# Patient Record
Sex: FEMALE | Race: WHITE | NOT HISPANIC OR LATINO | Employment: OTHER | URBAN - METROPOLITAN AREA
[De-identification: names, ages, dates, MRNs, and addresses within clinical notes are randomized per-mention and may not be internally consistent; named-entity substitution may affect disease eponyms.]

---

## 2018-04-27 ENCOUNTER — OFFICE VISIT (OUTPATIENT)
Dept: INTERNAL MEDICINE | Facility: CLINIC | Age: 63
End: 2018-04-27

## 2018-04-27 VITALS
HEART RATE: 66 BPM | RESPIRATION RATE: 18 BRPM | TEMPERATURE: 97.1 F | HEIGHT: 60 IN | SYSTOLIC BLOOD PRESSURE: 136 MMHG | WEIGHT: 167.4 LBS | DIASTOLIC BLOOD PRESSURE: 70 MMHG | BODY MASS INDEX: 32.86 KG/M2

## 2018-04-27 DIAGNOSIS — J30.9 ALLERGIC RHINITIS, UNSPECIFIED CHRONICITY, UNSPECIFIED SEASONALITY, UNSPECIFIED TRIGGER: ICD-10-CM

## 2018-04-27 DIAGNOSIS — Z13.220 ENCOUNTER FOR SCREENING FOR LIPID DISORDER: ICD-10-CM

## 2018-04-27 DIAGNOSIS — E53.8 COBALAMIN DEFICIENCY: ICD-10-CM

## 2018-04-27 DIAGNOSIS — E78.49 OTHER HYPERLIPIDEMIA: ICD-10-CM

## 2018-04-27 DIAGNOSIS — Z86.19: ICD-10-CM

## 2018-04-27 DIAGNOSIS — Z86.39 HISTORY OF VITAMIN D DEFICIENCY: ICD-10-CM

## 2018-04-27 DIAGNOSIS — I25.118 CORONARY ARTERY DISEASE OF NATIVE ARTERY OF NATIVE HEART WITH STABLE ANGINA PECTORIS (HCC): ICD-10-CM

## 2018-04-27 DIAGNOSIS — K21.9 GASTROESOPHAGEAL REFLUX DISEASE WITHOUT ESOPHAGITIS: Primary | ICD-10-CM

## 2018-04-27 DIAGNOSIS — R09.89 BILATERAL CAROTID BRUITS: ICD-10-CM

## 2018-04-27 DIAGNOSIS — I10 ESSENTIAL HYPERTENSION: ICD-10-CM

## 2018-04-27 DIAGNOSIS — Z79.899 HIGH RISK MEDICATION USE: ICD-10-CM

## 2018-04-27 PROCEDURE — 99204 OFFICE O/P NEW MOD 45 MIN: CPT | Performed by: PHYSICIAN ASSISTANT

## 2018-04-27 RX ORDER — NITROGLYCERIN 0.4 MG/1
0.4 TABLET SUBLINGUAL
COMMUNITY
Start: 2016-03-08 | End: 2021-03-10 | Stop reason: SDUPTHER

## 2018-04-27 RX ORDER — ISOSORBIDE MONONITRATE 30 MG/1
30 TABLET, EXTENDED RELEASE ORAL DAILY
COMMUNITY
Start: 2016-08-01 | End: 2018-07-23 | Stop reason: SDUPTHER

## 2018-04-27 RX ORDER — PANTOPRAZOLE SODIUM 40 MG/1
40 TABLET, DELAYED RELEASE ORAL 2 TIMES DAILY
Qty: 60 TABLET | Refills: 2 | Status: SHIPPED | OUTPATIENT
Start: 2018-04-27 | End: 2018-05-07 | Stop reason: SDUPTHER

## 2018-04-27 RX ORDER — SUCRALFATE 1 G/1
1 TABLET ORAL AS NEEDED
COMMUNITY
Start: 2016-03-22 | End: 2019-05-08 | Stop reason: SDUPTHER

## 2018-04-27 RX ORDER — CETIRIZINE HYDROCHLORIDE 10 MG/1
10 TABLET ORAL DAILY
Qty: 30 TABLET | Refills: 2 | Status: SHIPPED | OUTPATIENT
Start: 2018-04-27 | End: 2018-09-21 | Stop reason: SDDI

## 2018-04-27 RX ORDER — CLOPIDOGREL BISULFATE 75 MG/1
75 TABLET ORAL DAILY
COMMUNITY
Start: 2016-08-01 | End: 2018-07-09 | Stop reason: SDUPTHER

## 2018-04-27 RX ORDER — PRAVASTATIN SODIUM 20 MG
20 TABLET ORAL DAILY
COMMUNITY
End: 2018-04-27 | Stop reason: SDUPTHER

## 2018-04-27 RX ORDER — MELATONIN
1000 DAILY
COMMUNITY
End: 2020-07-29 | Stop reason: SDUPTHER

## 2018-04-27 RX ORDER — PRAVASTATIN SODIUM 20 MG
20 TABLET ORAL DAILY
Qty: 30 TABLET | Refills: 2 | Status: SHIPPED | OUTPATIENT
Start: 2018-04-27 | End: 2018-05-07 | Stop reason: SDUPTHER

## 2018-04-27 RX ORDER — KETOCONAZOLE 20 MG/ML
SHAMPOO TOPICAL 2 TIMES WEEKLY
Qty: 120 ML | Refills: 1 | Status: SHIPPED | OUTPATIENT
Start: 2018-04-30 | End: 2018-05-07 | Stop reason: SDUPTHER

## 2018-04-27 RX ORDER — LOSARTAN POTASSIUM 50 MG/1
1 TABLET ORAL DAILY
COMMUNITY
Start: 2016-03-01 | End: 2018-05-07 | Stop reason: SDUPTHER

## 2018-04-27 RX ORDER — IBUPROFEN 600 MG/1
600 TABLET ORAL AS NEEDED
COMMUNITY
Start: 2016-03-22 | End: 2018-11-15 | Stop reason: CLARIF

## 2018-04-27 RX ORDER — METOPROLOL TARTRATE 50 MG/1
50 TABLET, FILM COATED ORAL DAILY
COMMUNITY
Start: 2016-08-01 | End: 2018-08-03 | Stop reason: SDUPTHER

## 2018-04-27 RX ORDER — PANTOPRAZOLE SODIUM 40 MG/1
40 TABLET, DELAYED RELEASE ORAL 2 TIMES DAILY
COMMUNITY
Start: 2016-08-01 | End: 2018-04-27 | Stop reason: SDUPTHER

## 2018-04-27 RX ORDER — PYRIDOXINE HCL (VITAMIN B6) 50 MG
100 TABLET ORAL DAILY
COMMUNITY
End: 2018-09-21 | Stop reason: SDDI

## 2018-04-27 NOTE — PROGRESS NOTES
Chief Complaint   Patient presents with   • Heartburn     establish care       Subjective       History of Present Illness     Jasvir Hamilton is a 63 y.o. female. She presents as a new patient to establish care. Primary concerns today include GERD symptoms and need for pantoprazole refill. She also has R knee pain. She was previously seeing Dr. Ledesma as PCP.     Regarding GERD, patient states that she was previously taking pantoprazole 40mg BID per her last PCP, but she has been out of this medication x1 month. She was increased to BID as she was having cough, and PCP thought it may be related to GERD symptoms. Patient continues to have mildly productive cough, clear mucous. She is also having return of burning sensation in her chest which is worse at night x2-3 weeks since being off her medications. Current smoker, especially at night. She is not eating 3-4 hours before bed, earlier dinner. She also has runny nose and seasonal allergies x weeks and is not currently taking anything for this issue.     Regarding R knee pain, has history knee pain but usually improves within 1-2 days. This episode has lasted one week with resolving and remitting dull, achy pain. Pain is worse on medial side of knee. Worsened with walking and standing for long periods of time. Currently taking Ibuprofen 600mg and this relieves her pain. Has not needed to take Ibuprofen the last two days as pain is improving. It does not affect her daily activities. No history of recent trauma or injury to affected knee.     Patient also complains of chronic history of scalp infection x years. She states it is a fungal infection and she has tried various treatments for this issue. Ketoconazole shampoo improves her symptoms but causes irritation at border of scalp on forehead. Has also tried oral steroids which improved symptoms, but irritation returned when finished steroid doses. She has seen a dermatologist for this in the past. Has not seen   dermatology.     Relevant past medical history includes heart stent placed 2015 for CAD. Also had heart cath in 2012. Previously had cardiologist in [place] but has not re-established here in [place] x2 years.       Are you currently seeing any other doctors or specialists? Needs cardiologist, not established x2 years. [facility] for optometry.     Are you currently taking any OTC medications or herbal medications? As listed in med list, no other vitamins or supplements.    Sleep: 7-8 hours, sleeps well and well-rested  Diet: not well-balanced, has been to nutritionist in past- helped but only short term.   Exercise: no current exercise plan    [facility]   Most recent mammogram: [facility]April 2017.  Most recent pap smear: July 2017-- in scanned records  First day of last menses: n/a     Regular dental visits: no, 2+ years  Regular eye exams: yes, annually- [facility]    Patient is current smoker. No EtOH use. No current illicit drug use.     The following portions of the patient's history were reviewed and updated as appropriate: allergies, current medications, past family history, past medical history, past social history, past surgical history and problem list.    No Known Allergies  Social History   Substance Use Topics   • Smoking status: Current Every Day Smoker   • Smokeless tobacco: Never Used   • Alcohol use No     Past Surgical History:   Procedure Laterality Date   • CARDIAC CATHETERIZATION     • CHOLECYSTECTOMY     • ECTOPIC PREGNANCY     • HAND SURGERY     • OTHER SURGICAL HISTORY      lap and leep     Family History   Problem Relation Age of Onset   • Arthritis Mother    • Hypertension Mother    • Obesity Mother    • Heart attack Father    • Hypertension Father    • Obesity Father    • Stroke Father    • Arthritis Sister    • Hypertension Sister          Current Outpatient Prescriptions:   •  cholecalciferol (VITAMIN  D3) 1000 units tablet, Take 2,000 Units by mouth Daily., Disp: , Rfl:   •  clopidogrel (PLAVIX) 75 MG tablet, Take 75 mg by mouth Daily., Disp: , Rfl:   •  cyanocobalamin (CYANOCOBALAMIN) 100 MCG tablet tablet, Take 100 mcg by mouth Daily., Disp: , Rfl:   •  ibuprofen (ADVIL,MOTRIN) 600 MG tablet, Take 600 mg by mouth As Needed., Disp: , Rfl:   •  isosorbide mononitrate (IMDUR) 30 MG 24 hr tablet, Take 30 mg by mouth Daily., Disp: , Rfl:   •  losartan (COZAAR) 50 MG tablet, Take 1 tablet by mouth Daily., Disp: , Rfl:   •  metoprolol tartrate (LOPRESSOR) 50 MG tablet, Take 50 mg by mouth Daily., Disp: , Rfl:   •  nitroglycerin (NITROSTAT) 0.4 MG SL tablet, Place 0.4 mg under the tongue Every 5 (Five) Minutes As Needed., Disp: , Rfl:   •  pantoprazole (PROTONIX) 40 MG EC tablet, Take 1 tablet by mouth 2 (Two) Times a Day., Disp: 60 tablet, Rfl: 2  •  pravastatin (PRAVACHOL) 20 MG tablet, Take 1 tablet by mouth Daily., Disp: 30 tablet, Rfl: 2  •  sucralfate (CARAFATE) 1 g tablet, Take 1 g by mouth As Needed., Disp: , Rfl:   •  aspirin 81 MG tablet, Take 1 tablet by mouth Daily., Disp: 30 tablet, Rfl: 2  •  cetirizine (ZYRTEC ALLERGY) 10 MG tablet, Take 1 tablet by mouth Daily., Disp: 30 tablet, Rfl: 2  •  [START ON 4/30/2018] ketoconazole (NIZORAL) 2 % shampoo, Apply  topically 2 (Two) Times a Week., Disp: 120 mL, Rfl: 1    Patient Active Problem List   Diagnosis   • Essential hypertension   • Gastroesophageal reflux disease without esophagitis   • Other hyperlipidemia   • Cobalamin deficiency   • History of vitamin D deficiency   • Coronary artery disease of native heart with stable angina pectoris   • Bilateral carotid bruits       Review of Systems   Constitutional: Negative for chills, diaphoresis, fatigue and fever.   HENT: Negative for congestion, ear pain, sneezing and sore throat.    Eyes: Negative for blurred vision, double vision and pain.   Respiratory: Negative for cough, shortness of breath and wheezing.     Cardiovascular: Negative for chest pain, palpitations and leg swelling.   Gastrointestinal: Positive for GERD. Negative for abdominal pain, constipation, diarrhea, nausea and vomiting.   Genitourinary: Negative for dysuria, frequency, hematuria and urgency.   Musculoskeletal: Positive for arthralgias. Negative for myalgias.   Skin: Positive for rash.   Neurological: Negative for dizziness, weakness, light-headedness and headaches.   Hematological: Does not bruise/bleed easily.   Psychiatric/Behavioral: Negative for agitation and depressed mood. The patient is not nervous/anxious.        Objective   Vitals:    04/27/18 1053   BP: 136/70   Pulse: 66   Resp: 18   Temp: 97.1 °F (36.2 °C)     Physical Exam   Constitutional: She appears well-developed and well-nourished.   HENT:   Head: Normocephalic and atraumatic.   Right Ear: Tympanic membrane, external ear and ear canal normal. Tympanic membrane is not erythematous and not bulging.   Left Ear: Tympanic membrane, external ear and ear canal normal. Tympanic membrane is not erythematous and not bulging.   Nose: Right sinus exhibits no maxillary sinus tenderness and no frontal sinus tenderness. Left sinus exhibits no maxillary sinus tenderness and no frontal sinus tenderness.   Mouth/Throat: Oropharynx is clear and moist and mucous membranes are normal.   Eyes: Conjunctivae are normal. Pupils are equal, round, and reactive to light.   Neck: Normal range of motion. Neck supple. Carotid bruit is present (left carotid and right carotid, L > R). No thyromegaly present.   Cardiovascular: Normal rate, regular rhythm, normal heart sounds and intact distal pulses.  Exam reveals no gallop.    No murmur heard.  No peripheral edema of lower extremities bilaterally.    Pulmonary/Chest: Effort normal and breath sounds normal.   Abdominal: Soft. Bowel sounds are normal. She exhibits no distension and no mass. There is no hepatosplenomegaly. There is no tenderness.   Musculoskeletal:  Normal range of motion.   Lymphadenopathy:     She has no cervical adenopathy.   Neurological: She has normal strength and normal reflexes. She exhibits normal muscle tone.   Skin: Rash (diffuse rash of scalp, erythematous, scaly, no bleeding or discharge) noted.   Psychiatric: She has a normal mood and affect.   Nursing note and vitals reviewed.          Assessment/Plan   Jasvir was seen today for heartburn.    Diagnoses and all orders for this visit:    Gastroesophageal reflux disease without esophagitis  -     Comprehensive Metabolic Panel; Future  -     CBC & Differential; Future  -     pantoprazole (PROTONIX) 40 MG EC tablet; Take 1 tablet by mouth 2 (Two) Times a Day.    Essential hypertension  -     Comprehensive Metabolic Panel; Future  -     CBC & Differential; Future  -     Ambulatory Referral to Cardiology    Other hyperlipidemia  -     Comprehensive Metabolic Panel; Future  -     Lipid Panel; Future  -     pravastatin (PRAVACHOL) 20 MG tablet; Take 1 tablet by mouth Daily.  -     Ambulatory Referral to Cardiology    High risk medication use  -     Comprehensive Metabolic Panel; Future  -     CBC & Differential; Future    Encounter for screening for lipid disorder  -     Lipid Panel; Future    Cobalamin deficiency  -     Vitamin B12; Future    History of vitamin D deficiency  -     Vitamin D 1,25 Dihydroxy; Future    Allergic rhinitis, unspecified chronicity, unspecified seasonality, unspecified trigger  -     cetirizine (ZYRTEC ALLERGY) 10 MG tablet; Take 1 tablet by mouth Daily.    Hx of tinea capitis  -     ketoconazole (NIZORAL) 2 % shampoo; Apply  topically 2 (Two) Times a Week.    Coronary artery disease of native artery of native heart with stable angina pectoris  -     Ambulatory Referral to Cardiology    Bilateral carotid bruits  -     Ambulatory Referral to Cardiology    Other orders  -     aspirin 81 MG tablet; Take 1 tablet by mouth Daily.        Bilateral carotid bruits noted previously by  cardiologist in [place]; has had doppler study as noted in past medical records scanned in media. Referral to cardiology for f/u of carotid stenosis/ sclerosis and CAD.   Continue Ibuprofen PRN with food for knee pain, as this appears to be controlling symptoms well.  Will re-start pantoprazole BID and add Zyrtec for cough symptoms.        Return in about 3 months (around 7/27/2018) for Annual physical.

## 2018-05-07 ENCOUNTER — TELEPHONE (OUTPATIENT)
Dept: INTERNAL MEDICINE | Facility: CLINIC | Age: 63
End: 2018-05-07

## 2018-05-07 DIAGNOSIS — Z79.899 HIGH RISK MEDICATION USE: ICD-10-CM

## 2018-05-07 DIAGNOSIS — Z86.19: ICD-10-CM

## 2018-05-07 DIAGNOSIS — K21.9 GASTROESOPHAGEAL REFLUX DISEASE WITHOUT ESOPHAGITIS: ICD-10-CM

## 2018-05-07 DIAGNOSIS — I10 ESSENTIAL HYPERTENSION: ICD-10-CM

## 2018-05-07 DIAGNOSIS — Z13.220 ENCOUNTER FOR SCREENING FOR LIPID DISORDER: ICD-10-CM

## 2018-05-07 DIAGNOSIS — Z86.39 HISTORY OF VITAMIN D DEFICIENCY: ICD-10-CM

## 2018-05-07 DIAGNOSIS — E53.8 COBALAMIN DEFICIENCY: ICD-10-CM

## 2018-05-07 DIAGNOSIS — E78.49 OTHER HYPERLIPIDEMIA: ICD-10-CM

## 2018-05-07 RX ORDER — LOSARTAN POTASSIUM 50 MG/1
50 TABLET ORAL DAILY
Qty: 90 TABLET | Refills: 1 | Status: SHIPPED | OUTPATIENT
Start: 2018-05-07 | End: 2018-10-24 | Stop reason: SDUPTHER

## 2018-05-07 RX ORDER — KETOCONAZOLE 20 MG/ML
SHAMPOO TOPICAL 2 TIMES WEEKLY
Qty: 120 ML | Refills: 1 | Status: SHIPPED | OUTPATIENT
Start: 2018-05-07 | End: 2018-09-21 | Stop reason: SDDI

## 2018-05-07 RX ORDER — PANTOPRAZOLE SODIUM 40 MG/1
40 TABLET, DELAYED RELEASE ORAL 2 TIMES DAILY
Qty: 180 TABLET | Refills: 1 | Status: SHIPPED | OUTPATIENT
Start: 2018-05-07 | End: 2018-11-15 | Stop reason: SDUPTHER

## 2018-05-07 RX ORDER — PRAVASTATIN SODIUM 20 MG
20 TABLET ORAL DAILY
Qty: 90 TABLET | Refills: 1 | Status: SHIPPED | OUTPATIENT
Start: 2018-05-07 | End: 2018-09-21

## 2018-05-07 NOTE — TELEPHONE ENCOUNTER
Spoke with pt and advised of medications refilled for 90 day supply. She verbalized understanding. She stated she [facility] and her B12 was at 1140, which 1 year ago it was in the 300s. Should she do anything at this point? Please advise?

## 2018-05-07 NOTE — TELEPHONE ENCOUNTER
Request for 90 day supply rcvd via fax. Resent Rx's to [facility] pharmacy electronically.

## 2018-05-07 NOTE — TELEPHONE ENCOUNTER
Please call patient and let her know I have sent in Losartan 50mg 90 day supply + one refill to [facility].     ----- Message from Angella Ko sent at 5/7/2018 10:32 AM EDT -----  Contact: SELF  WILLIE BISHOP CALLING FOR A REFILL FOR LOSARTAN 50 MG. SHE USES THE [facility] FOR A 90 DAY SUPPLY. SHE CAN BE REACHED [phone]

## 2018-05-09 NOTE — TELEPHONE ENCOUNTER
Please call patient and let her know that the elevated B12 level is nothing to be concerned about; decreased B12 levels cause more problems, and her B12 is only slightly elevated. Is she still taking the oral Vitamin B12? She may discontinue this if she is still taking it and we can re-check levels at future office visit. If she is not taking B12 supplement, we will just monitor and no changes necessary at this time.

## 2018-05-09 NOTE — TELEPHONE ENCOUNTER
Spoke with pt and advised of lab results. She verbalized good understanding and stated she would continue to work on her diet and exercising more. Pt requested we mail a copy of her labs to her which I have placed in an envelope up front to be mailed. She thanked our office and we ended the call.

## 2018-06-06 ENCOUNTER — PRIOR AUTHORIZATION (OUTPATIENT)
Dept: INTERNAL MEDICINE | Facility: CLINIC | Age: 63
End: 2018-06-06

## 2018-06-06 NOTE — TELEPHONE ENCOUNTER
PA for Pantoprazole 40mg tablets submitted due to quantity limits via cover my meds. Key: [id number] Diagnosis GERD without Esophagitis. Real time approval rcvd via cover my meds. Nothing further needed at this time.

## 2018-07-09 ENCOUNTER — TELEPHONE (OUTPATIENT)
Dept: INTERNAL MEDICINE | Facility: CLINIC | Age: 63
End: 2018-07-09

## 2018-07-09 RX ORDER — CLOPIDOGREL BISULFATE 75 MG/1
75 TABLET ORAL DAILY
Qty: 30 TABLET | Refills: 5 | Status: SHIPPED | OUTPATIENT
Start: 2018-07-09 | End: 2018-07-23 | Stop reason: SDUPTHER

## 2018-07-09 NOTE — TELEPHONE ENCOUNTER
Sent to [facility].     ----- Message from Juliana Goldsmith sent at 7/9/2018  9:08 AM EDT -----  Patient called needing refill on clopidogrel (PLAVIX) 75 MG tablet sent to [facility] in [place] phone [phone].  Patient can be reached at [phone] if needed.

## 2018-07-23 ENCOUNTER — TELEPHONE (OUTPATIENT)
Dept: INTERNAL MEDICINE | Facility: CLINIC | Age: 63
End: 2018-07-23

## 2018-07-23 RX ORDER — CLOPIDOGREL BISULFATE 75 MG/1
75 TABLET ORAL DAILY
Qty: 90 TABLET | Refills: 1 | Status: SHIPPED | OUTPATIENT
Start: 2018-07-23 | End: 2018-08-03 | Stop reason: SDUPTHER

## 2018-07-23 RX ORDER — ISOSORBIDE MONONITRATE 30 MG/1
30 TABLET, EXTENDED RELEASE ORAL DAILY
Qty: 90 TABLET | Refills: 1 | Status: SHIPPED | OUTPATIENT
Start: 2018-07-23 | End: 2019-01-09 | Stop reason: SDUPTHER

## 2018-07-23 NOTE — TELEPHONE ENCOUNTER
Placed order for Isosorbide 30mg ER qday  #90 + 1 refill and Plavix 75mg qday #90 + 1 refill.    [facility]   I have ordered these as prescribed rather than doubling dose daily as mail order had suggested.     ----- Message from Monica Montalvo sent at 7/23/2018 11:41 AM EDT -----  [phone]    NEEDS NEW RXS OF ISOSORBIDE NN ER 30MG 2X DAY AND CLOPIDOGREL 75 MG 2X DAY.  90 DAY SUPPLY OF EACH.  SHE ONLY TAKES THESE 1X DAY BUT MAIL ORDER TOLD HER TO GET IT FOR 2X DAY TO GET EXTRA MEDS. HAS BEEN GETTING THESE FROM OLD DR MUNOZ NEEDS FROM US NOW    [facility]

## 2018-08-03 ENCOUNTER — TELEPHONE (OUTPATIENT)
Dept: INTERNAL MEDICINE | Facility: CLINIC | Age: 63
End: 2018-08-03

## 2018-08-03 DIAGNOSIS — Z00.00 HEALTHCARE MAINTENANCE: Primary | ICD-10-CM

## 2018-08-03 RX ORDER — METOPROLOL TARTRATE 50 MG/1
50 TABLET, FILM COATED ORAL 2 TIMES DAILY
Qty: 180 TABLET | Refills: 1 | Status: SHIPPED | OUTPATIENT
Start: 2018-08-03 | End: 2018-08-03 | Stop reason: SDUPTHER

## 2018-08-03 RX ORDER — CLOPIDOGREL BISULFATE 75 MG/1
75 TABLET ORAL DAILY
Qty: 90 TABLET | Refills: 3 | Status: SHIPPED | OUTPATIENT
Start: 2018-08-03 | End: 2020-04-06 | Stop reason: SDUPTHER

## 2018-08-03 RX ORDER — METOPROLOL TARTRATE 50 MG/1
50 TABLET, FILM COATED ORAL 2 TIMES DAILY
Qty: 180 TABLET | Refills: 3 | Status: SHIPPED | OUTPATIENT
Start: 2018-08-03 | End: 2019-07-09 | Stop reason: SDUPTHER

## 2018-08-03 NOTE — TELEPHONE ENCOUNTER
Please call pt and let her know I sent in her metoprolol. I had previously sent in Plavix on 7/23/2018 for 90 day supply to [facility] mail order so she should have plenty of these left.     ----- Message from Angella Ko sent at 8/3/2018 10:06 AM EDT -----  Contact: SELF  WILLIE BISHOP CALLING FOR REFILLS FOR METOPROLOL 50 MG BID, CLOPIDOGREL 75 MG ONCE DAILY. SHE WOULD LIKE THESE FOR A 90 DAY SUPPLY SENT TO [facility]. IF NEEDED WILLIE CAN BE REACHED [phone]

## 2018-09-20 PROBLEM — E78.5 HYPERLIPEMIA: Status: ACTIVE | Noted: 2018-04-27

## 2018-09-20 PROBLEM — Z72.0 TOBACCO ABUSE: Status: ACTIVE | Noted: 2018-09-20

## 2018-09-20 PROCEDURE — 93000 ELECTROCARDIOGRAM COMPLETE: CPT | Performed by: INTERNAL MEDICINE

## 2018-09-20 NOTE — PROGRESS NOTES
Subjective   Jasvir Hamilton is a 63 y.o. female.  Valeria Gentile PA-C Franey, Jessica L, PA-C  [address] [address]      Chief Complaint   Patient presents with   • Coronary Artery Disease   • Hypertension   • Hyperlipidemia       Patient Active Problem List    Diagnosis   • Coronary artery disease of native heart with stable angina pectoris (CMS/HCC) [I25.118]     Priority: High     Overview Note:     1. [facility] 3-16-12  · One vessel CAD with 60-70% eccentric stenosis of the mid LAD  · LVEF 65-70%    2. [facility] 1-26-15  · Coronary angioplasty with stenting to the mid LAD  · Entry into the ABSORB bio-absorbable drug eluding stent trial     • Bilateral carotid bruits [R09.89]     Priority: High   • Hypertension [I10]     Priority: Medium   • Hyperlipemia [E78.5]     Priority: Medium   • Tobacco abuse [Z72.0]     Priority: Low   • Obesity (BMI 30.0-34.9) [E66.9]   • Carotid artery disease (CMS/[facility]) [I77.9]     Overview Note:     1.  Carotid duplex study August 31, 2012  · R ICA less than 50%  · LICA less than 50%  · Bilateral common carotid arteries reveal no evidence of significant stenosis patent vertebral arteries bilaterally  ·      • GERD (gastroesophageal reflux disease) [K21.9]   • Cobalamin deficiency [E53.8]   • History of vitamin D deficiency [Z86.39]        History of Present Illness   This is a 63 year old hypertensive dyslipidemic obese female smoker with known coronary artery disease.  She underwent cardiac catheterization in 2015 in [place] and received a research study stent and remains enrolled.  She moved to [place] approximately 2 years ago and presents on referral today to establish cardiology follow-up.  She has known carotid bruits, states her last carotid duplex study was in 2015 and showed a 40-50% blockage.  She checks her blood pressure regularly at work and it generally runs in  the 130s systolic.  Her last lipid panel was in May of this year at which time her total cholesterol 234, triglycerides 217, HDL 42 and .  She states she's been on pravastatin 20 mg daily for at least the last 2 years.  She was previously on Livalo however this was not covered by her insurance.  States she has never been on atorvastatin or rosuvastatin.  She unfortunately continues to smoke though she did stop briefly after her stent procedure.  She currently has no exercise regimen.  She works full time without significant limitations.    Past Surgical History:   Procedure Laterality Date   • CARDIAC CATHETERIZATION     • CARPAL TUNNEL RELEASE      both hands   • CHOLECYSTECTOMY     • COLONOSCOPY  2009   • ECTOPIC PREGNANCY     • HAND SURGERY     • OTHER SURGICAL HISTORY      lap and leep       The following portions of the patient's history were reviewed and updated as appropriate: allergies, current medications, past family history, past medical history, past social history, past surgical history and problem list.    No Known Allergies      Current Outpatient Prescriptions:   •  aspirin 81 MG tablet, Take 1 tablet by mouth Daily., Disp: 90 tablet, Rfl: 1  •  cetirizine (ZYRTEC ALLERGY) 10 MG tablet, Take 1 tablet by mouth Daily., Disp: 30 tablet, Rfl: 2  •  cholecalciferol (VITAMIN D3) 1000 units tablet, Take 2,000 Units by mouth Daily., Disp: , Rfl:   •  clopidogrel (PLAVIX) 75 MG tablet, Take 1 tablet by mouth Daily., Disp: 90 tablet, Rfl: 3  •  cyanocobalamin (CYANOCOBALAMIN) 100 MCG tablet tablet, Take 100 mcg by mouth Daily., Disp: , Rfl:   •  ibuprofen (ADVIL,MOTRIN) 600 MG tablet, Take 600 mg by mouth As Needed., Disp: , Rfl:   •  isosorbide mononitrate (IMDUR) 30 MG 24 hr tablet, Take 1 tablet by mouth Daily., Disp: 90 tablet, Rfl: 1  •  ketoconazole (NIZORAL) 2 % shampoo, Apply  topically 2 (Two) Times a Week., Disp: 120 mL, Rfl: 1  •  losartan (COZAAR) 50 MG tablet, Take 1 tablet by mouth Daily.,  "Disp: 90 tablet, Rfl: 1  •  metoprolol tartrate (LOPRESSOR) 50 MG tablet, Take 1 tablet by mouth 2 (Two) Times a Day., Disp: 180 tablet, Rfl: 3  •  nitroglycerin (NITROSTAT) 0.4 MG SL tablet, Place 0.4 mg under the tongue Every 5 (Five) Minutes As Needed., Disp: , Rfl:   •  pantoprazole (PROTONIX) 40 MG EC tablet, Take 1 tablet by mouth 2 (Two) Times a Day., Disp: 180 tablet, Rfl: 1  •  pravastatin (PRAVACHOL) 20 MG tablet, Take 1 tablet by mouth Daily., Disp: 90 tablet, Rfl: 1  •  sucralfate (CARAFATE) 1 g tablet, Take 1 g by mouth As Needed., Disp: , Rfl:     Review of Systems   Constitution: Negative.   HENT: Negative.    Eyes: Negative.    Respiratory: Positive for cough, snoring and sputum production.    Endocrine: Positive for cold intolerance.   Hematologic/Lymphatic: Negative.    Skin: Negative.    Musculoskeletal: Positive for muscle weakness.   Gastrointestinal: Positive for heartburn.   Genitourinary: Negative.    Neurological: Positive for dizziness and vertigo.   Psychiatric/Behavioral: Negative.    Allergic/Immunologic: Negative.    All other systems reviewed and are negative.      Social History     Social History   • Marital status:      Spouse name: N/A   • Number of children: N/A   • Years of education: N/A     Occupational History   • Not on file.     Social History Main Topics   • Smoking status: Current Every Day Smoker     Packs/day: 1.00   • Smokeless tobacco: Never Used   • Alcohol use No   • Drug use: No   • Sexual activity: Defer     Other Topics Concern   • Not on file     Social History Narrative   • No narrative on file       Family History   Problem Relation Age of Onset   • Arthritis Mother    • Hypertension Mother    • Heart attack Father    • Hypertension Father    • Obesity Father    • Stroke Father         massive   • Arthritis Sister    • Hypertension Sister    • No Known Problems Sister        Objective      Blood pressure 150/90, pulse 67, height 154.9 cm (61\"), weight " 74.9 kg (165 lb 3.2 oz), SpO2 97 %.    Physical Exam   Constitutional: She is oriented to person, place, and time. She appears well-developed and well-nourished. No distress.   HENT:   Head: Normocephalic and atraumatic.   Mouth/Throat: Oropharynx is clear and moist.   Eyes: Pupils are equal, round, and reactive to light. No scleral icterus.   Neck: Neck supple. No JVD present. No tracheal deviation present. No thyromegaly present.   Cardiovascular: Normal rate, regular rhythm and normal heart sounds.  Exam reveals no gallop and no friction rub.    No murmur heard.  Pulses:       Femoral pulses are on the right side with bruit, and on the left side with bruit.  Pulmonary/Chest: Effort normal and breath sounds normal. No respiratory distress. She has no wheezes. She has no rales.   Abdominal: Soft. Bowel sounds are normal. She exhibits no distension and no mass. There is no tenderness. There is no rebound and no guarding.   Musculoskeletal: Normal range of motion. She exhibits no edema or deformity.   Lymphadenopathy:     She has no cervical adenopathy.   Neurological: She is alert and oriented to person, place, and time. No cranial nerve deficit.   Skin: Skin is warm and dry. No rash noted. She is not diaphoretic.   Psychiatric: She has a normal mood and affect.   Nursing note and vitals reviewed.        ECG 12 Lead  Date/Time: 9/20/2018 12:32 PM  Performed by: LOREN BEVERLY  Authorized by: LOREN BEVERLY   Previous ECG: no previous ECG available  Rhythm: sinus rhythm  Rate: normal  Conduction: conduction normal  ST Segments: ST segments normal  T Waves: T waves normal  QRS axis: normal  Other: no other findings  Clinical impression: normal ECG          Lab Review:    Assessment:     Diagnosis Plan   1. Coronary artery disease of native artery of native heart with stable angina pectoris (CMS/HCC)  Currently stable    2. Carotid artery disease, unspecified laterality (CMS/HCC)  Asymptomatic    3. Essential hypertension   Overall reasonably well controlled    4. Mixed hyperlipidemia  Lipid Panel in 3 months     Comprehensive Metabolic Panel in 3 months  Discontinue pravastatin 20 mg daily   Prescribed rosuvastatin 40 mg daily    5. Tobacco abuse  She is counseled and strongly urged to discontinue tobacco abuse    Plan:  1. And with CAD, status post previous stent of the LAD, has done fairly well with no current symptoms of angina or CHF and fairly active lifestyle.  2. She monitors her blood pressure at home and average readings are within controlled range.  3. For now we have recommended changing from low-dose pravastatin to high dose Crestor for better control of dyslipidemia.  We will recheck FLP in 3 months and further adjust medical therapy as needed.  4. Continue all other current medications.  5. Follow up 6 months, sooner as needed.  6. Thank you for allowing us to participate in the care of your patient.     Scribed for Chiara Montalvo MD by Rui Asher PA-C. 9/21/2018  1:32 PM     IChiara MD, personally performed the services described in this documentation as scribed by the above named individual in my presence, and it is both accurate and complete.  9/21/2018  1:49 PM

## 2018-09-21 ENCOUNTER — OFFICE VISIT (OUTPATIENT)
Dept: INTERNAL MEDICINE | Facility: CLINIC | Age: 63
End: 2018-09-21

## 2018-09-21 ENCOUNTER — CONSULT (OUTPATIENT)
Dept: CARDIOLOGY | Facility: CLINIC | Age: 63
End: 2018-09-21

## 2018-09-21 VITALS
TEMPERATURE: 97.1 F | WEIGHT: 165.25 LBS | OXYGEN SATURATION: 98 % | HEIGHT: 60 IN | HEART RATE: 70 BPM | BODY MASS INDEX: 32.44 KG/M2 | DIASTOLIC BLOOD PRESSURE: 82 MMHG | SYSTOLIC BLOOD PRESSURE: 130 MMHG | RESPIRATION RATE: 16 BRPM

## 2018-09-21 VITALS
OXYGEN SATURATION: 97 % | HEART RATE: 67 BPM | HEIGHT: 61 IN | DIASTOLIC BLOOD PRESSURE: 90 MMHG | WEIGHT: 165.2 LBS | SYSTOLIC BLOOD PRESSURE: 150 MMHG | BODY MASS INDEX: 31.19 KG/M2

## 2018-09-21 DIAGNOSIS — I25.118 CORONARY ARTERY DISEASE OF NATIVE ARTERY OF NATIVE HEART WITH STABLE ANGINA PECTORIS (HCC): ICD-10-CM

## 2018-09-21 DIAGNOSIS — I10 ESSENTIAL HYPERTENSION: ICD-10-CM

## 2018-09-21 DIAGNOSIS — Z11.59 ENCOUNTER FOR HEPATITIS C SCREENING TEST FOR LOW RISK PATIENT: ICD-10-CM

## 2018-09-21 DIAGNOSIS — Z72.0 TOBACCO ABUSE: ICD-10-CM

## 2018-09-21 DIAGNOSIS — I77.9 CAROTID ARTERY DISEASE, UNSPECIFIED LATERALITY (HCC): ICD-10-CM

## 2018-09-21 DIAGNOSIS — E78.2 MIXED HYPERLIPIDEMIA: ICD-10-CM

## 2018-09-21 DIAGNOSIS — E53.8 COBALAMIN DEFICIENCY: ICD-10-CM

## 2018-09-21 DIAGNOSIS — E66.9 OBESITY (BMI 30.0-34.9): ICD-10-CM

## 2018-09-21 DIAGNOSIS — Z86.39 HISTORY OF VITAMIN D DEFICIENCY: ICD-10-CM

## 2018-09-21 DIAGNOSIS — K21.9 GASTROESOPHAGEAL REFLUX DISEASE, ESOPHAGITIS PRESENCE NOT SPECIFIED: ICD-10-CM

## 2018-09-21 DIAGNOSIS — E78.49 OTHER HYPERLIPIDEMIA: ICD-10-CM

## 2018-09-21 DIAGNOSIS — Z12.39 SCREENING FOR BREAST CANCER: ICD-10-CM

## 2018-09-21 DIAGNOSIS — Z12.4 SCREENING FOR CERVICAL CANCER: ICD-10-CM

## 2018-09-21 DIAGNOSIS — Z00.00 ENCOUNTER FOR HEALTH MAINTENANCE EXAMINATION: Primary | ICD-10-CM

## 2018-09-21 DIAGNOSIS — A63.0 GENITAL WARTS: ICD-10-CM

## 2018-09-21 DIAGNOSIS — I25.118 CORONARY ARTERY DISEASE OF NATIVE ARTERY OF NATIVE HEART WITH STABLE ANGINA PECTORIS (HCC): Primary | ICD-10-CM

## 2018-09-21 PROBLEM — E66.811 OBESITY (BMI 30.0-34.9): Status: ACTIVE | Noted: 2018-09-21

## 2018-09-21 PROCEDURE — 99406 BEHAV CHNG SMOKING 3-10 MIN: CPT | Performed by: PHYSICIAN ASSISTANT

## 2018-09-21 PROCEDURE — 99396 PREV VISIT EST AGE 40-64: CPT | Performed by: PHYSICIAN ASSISTANT

## 2018-09-21 PROCEDURE — 99243 OFF/OP CNSLTJ NEW/EST LOW 30: CPT | Performed by: INTERNAL MEDICINE

## 2018-09-21 RX ORDER — ROSUVASTATIN CALCIUM 40 MG/1
40 TABLET, COATED ORAL DAILY
Qty: 90 TABLET | Refills: 3 | Status: SHIPPED | OUTPATIENT
Start: 2018-09-21 | End: 2019-10-03 | Stop reason: SDUPTHER

## 2018-09-21 RX ORDER — FEXOFENADINE HYDROCHLORIDE 60 MG/1
60 TABLET, FILM COATED ORAL AS NEEDED
COMMUNITY
End: 2019-01-30

## 2018-09-21 NOTE — PROGRESS NOTES
"Chief Complaint   Patient presents with   • Annual Exam     physical with pap       Subjective       History of Present Illness     Jasvir Hamilton is a 63 y.o. female. She presents for annual physical with pap.  Pt is taking all medications as directed. She is not checking her blood pressure at home. She has seen significant improvement of GERD with current regimen, no GERD symptoms at this time. Pt has first appt to establish care with cardiology today for hx of CAD with stent placement in the past, and bilateral carotid bruits which she has had evaluated in the past with no tx at that time.     Pt has had recent Cologuard through her work at [employer], states results were normal.       Are you currently seeing any other doctors or specialists? Cardiology-- Dr. Montalvo    Are you currently taking any OTC medications or herbal medications? Only as listed in med list below    Sleep: 7-8 hours, sleeps well and well-rested  Diet: not well-balanced, has been to nutritionist in past- helped but only short term. Trying to eat healthier, participating in \"Biggest Loser\" competition at work.   Exercise: no current exercise plan     Most recent colonoscopy: July 2018 cologuard, normal per pt  Most recent mammogram: [facility].-April 2017.  Most recent pap smear: July 2017-- in scanned records  First day of last menses: n/a-- post-menopausal      Regular dental visits: no, 2+ years  Regular eye exams: yes, annually- Eye max     Patient is current smoker at 1 ppd. No EtOH use. No current illicit drug use.     PHQ-2 Depression Screening  Little interest or pleasure in doing things? 0   Feeling down, depressed, or hopeless? 0   PHQ-2 Total Score 0         The following portions of the patient's history were reviewed and updated as appropriate: allergies, current medications, past medical history, past social history and problem list.    No Known Allergies  Social History   Substance Use Topics   • Smoking status: " Current Every Day Smoker   • Smokeless tobacco: Never Used   • Alcohol use No     Past Surgical History:   Procedure Laterality Date   • CARDIAC CATHETERIZATION     • CHOLECYSTECTOMY     • ECTOPIC PREGNANCY     • HAND SURGERY     • OTHER SURGICAL HISTORY      lap and leep     Family History   Problem Relation Age of Onset   • Arthritis Mother    • Hypertension Mother    • Obesity Mother    • Heart attack Father    • Hypertension Father    • Obesity Father    • Stroke Father    • Arthritis Sister    • Hypertension Sister          Current Outpatient Prescriptions:   •  aspirin 81 MG tablet, Take 1 tablet by mouth Daily., Disp: 90 tablet, Rfl: 1  •  cetirizine (ZYRTEC ALLERGY) 10 MG tablet, Take 1 tablet by mouth Daily., Disp: 30 tablet, Rfl: 2  •  cholecalciferol (VITAMIN D3) 1000 units tablet, Take 2,000 Units by mouth Daily., Disp: , Rfl:   •  clopidogrel (PLAVIX) 75 MG tablet, Take 1 tablet by mouth Daily., Disp: 90 tablet, Rfl: 3  •  cyanocobalamin (CYANOCOBALAMIN) 100 MCG tablet tablet, Take 100 mcg by mouth Daily., Disp: , Rfl:   •  ibuprofen (ADVIL,MOTRIN) 600 MG tablet, Take 600 mg by mouth As Needed., Disp: , Rfl:   •  isosorbide mononitrate (IMDUR) 30 MG 24 hr tablet, Take 1 tablet by mouth Daily., Disp: 90 tablet, Rfl: 1  •  ketoconazole (NIZORAL) 2 % shampoo, Apply  topically 2 (Two) Times a Week., Disp: 120 mL, Rfl: 1  •  losartan (COZAAR) 50 MG tablet, Take 1 tablet by mouth Daily., Disp: 90 tablet, Rfl: 1  •  metoprolol tartrate (LOPRESSOR) 50 MG tablet, Take 1 tablet by mouth 2 (Two) Times a Day., Disp: 180 tablet, Rfl: 3  •  nitroglycerin (NITROSTAT) 0.4 MG SL tablet, Place 0.4 mg under the tongue Every 5 (Five) Minutes As Needed., Disp: , Rfl:   •  pantoprazole (PROTONIX) 40 MG EC tablet, Take 1 tablet by mouth 2 (Two) Times a Day., Disp: 180 tablet, Rfl: 1  •  pravastatin (PRAVACHOL) 20 MG tablet, Take 1 tablet by mouth Daily., Disp: 90 tablet, Rfl: 1  •  sucralfate (CARAFATE) 1 g tablet, Take 1 g  by mouth As Needed., Disp: , Rfl:     Patient Active Problem List   Diagnosis   • Hypertension   • GERD (gastroesophageal reflux disease)   • Hyperlipemia   • Cobalamin deficiency   • History of vitamin D deficiency   • Coronary artery disease of native heart with stable angina pectoris (CMS/HCC)   • Bilateral carotid bruits   • Tobacco abuse   • Obesity (BMI 30.0-34.9)       Review of Systems   Constitutional: Negative for chills, fatigue, fever, unexpected weight gain and unexpected weight loss.   HENT: Negative for congestion, ear pain, postnasal drip, sore throat and trouble swallowing.    Eyes: Negative for pain, redness and visual disturbance.   Respiratory: Negative for cough, shortness of breath and wheezing.    Cardiovascular: Negative for chest pain, palpitations and leg swelling.   Gastrointestinal: Negative for abdominal pain, blood in stool, diarrhea, nausea and vomiting.   Endocrine: Negative for cold intolerance and heat intolerance.   Genitourinary: Negative for urinary incontinence, difficulty urinating, dysuria, frequency and breast lump.   Musculoskeletal: Negative for back pain.   Skin: Negative for rash.   Neurological: Negative for dizziness, syncope, weakness, numbness and headache.   Hematological: Does not bruise/bleed easily.   Psychiatric/Behavioral: Negative for agitation and depressed mood. The patient is not nervous/anxious.        Objective   Vitals:    09/21/18 0852   BP: 130/82   Pulse: 70   Resp: 16   Temp: 97.1 °F (36.2 °C)   SpO2: 98%     Physical Exam   Constitutional: She is oriented to person, place, and time. She appears well-developed and well-nourished.   HENT:   Head: Normocephalic and atraumatic. Head is without abrasion and without contusion.   Right Ear: External ear normal. No tenderness. No foreign bodies. Tympanic membrane is not perforated and not erythematous.   Left Ear: External ear normal. No tenderness. No foreign bodies. Tympanic membrane is not perforated and  not erythematous.   Nose: Nose normal. No mucosal edema or sinus tenderness. No epistaxis. Right sinus exhibits no maxillary sinus tenderness and no frontal sinus tenderness. Left sinus exhibits no maxillary sinus tenderness and no frontal sinus tenderness.   Mouth/Throat: Uvula is midline and oropharynx is clear and moist. No oral lesions.   Eyes: Pupils are equal, round, and reactive to light. Conjunctivae and EOM are normal. No scleral icterus.   Neck: Trachea normal and normal range of motion. Neck supple. Carotid bruit is present (bilateral). No thyromegaly present.   Cardiovascular: Normal rate, regular rhythm, normal heart sounds and normal pulses.  Exam reveals no gallop and no friction rub.    No murmur heard.  Pulses:       Radial pulses are 2+ on the right side, and 2+ on the left side.        Dorsalis pedis pulses are 2+ on the right side, and 2+ on the left side.   Pulmonary/Chest: Effort normal and breath sounds normal. She has no wheezes. She has no rales. She exhibits no tenderness, no crepitus and no deformity. Right breast exhibits no mass, no nipple discharge, no skin change and no tenderness. Left breast exhibits no mass, no nipple discharge, no skin change and no tenderness.   Abdominal: Soft. Bowel sounds are normal. She exhibits no mass. There is no tenderness. There is no guarding.   Genitourinary: Cervix normal. Pelvic exam was performed with patient supine. There is lesion on the right labia. There is no rash or tenderness on the right labia. There is no rash, tenderness or lesion on the left labia. Uterus is not tender. Cervix does not exhibit discharge, lesion or cyanosis. Right adnexum displays no mass and no tenderness. Left adnexum displays no mass and no tenderness. No erythema or tenderness in the vagina. No vaginal discharge found.   Genitourinary Comments: +two small genital warts noted on interior R labia majora. No tenderness to palpation.    Musculoskeletal: Normal range of  motion. She exhibits no edema or deformity.   Lymphadenopathy:     She has no cervical adenopathy.     She has no axillary adenopathy.   Neurological: She is oriented to person, place, and time. She has normal strength.   Skin: Skin is warm and dry. No abrasion, no ecchymosis and no rash noted. No erythema.   Psychiatric: She has a normal mood and affect. Her behavior is normal.   Vitals reviewed.        No results found for this or any previous visit.    Assessment/Plan   Jasvir was seen today for annual exam.    Diagnoses and all orders for this visit:    Encounter for health maintenance examination  -     Ambulatory Referral to Nutrition Services  -     Liquid-based Pap Smear, Screening; Future  -     Hepatitis C Antibody; Future  -     Mammo Screening Digital Tomosynthesis Bilateral With CAD; Future  -     Lipid Panel; Future  -     Comprehensive Metabolic Panel; Future  -     CBC & Differential; Future  -     Vitamin B12; Future  -     Vitamin D 1,25 Dihydroxy; Future    Essential hypertension  -     Comprehensive Metabolic Panel; Future  -     CBC & Differential; Future    Other hyperlipidemia  -     Lipid Panel; Future    Coronary artery disease of native artery of native heart with stable angina pectoris (CMS/HCC)  -     Comprehensive Metabolic Panel; Future  -     CBC & Differential; Future    Gastroesophageal reflux disease, esophagitis presence not specified  -     CBC & Differential; Future  -     Vitamin B12; Future    Obesity (BMI 30.0-34.9)  -     Ambulatory Referral to Nutrition Services    Cobalamin deficiency  -     Vitamin B12; Future    History of vitamin D deficiency  -     Vitamin D 1,25 Dihydroxy; Future    Tobacco abuse    Screening for cervical cancer  -     Liquid-based Pap Smear, Screening; Future    Screening for breast cancer  -     Mammo Screening Digital Tomosynthesis Bilateral With CAD; Future    Genital warts    Encounter for hepatitis C screening test for low risk patient  -      Hepatitis C Antibody; Future       Discussed smoking cessation with patient for approximately 5 minutes, including health risks of tobacco abuse. Also discussed options for smoking cessation including nicotine patches/ gum, Wellbutrin, and Chantix. Pt would like to try to decrease on her own, declines any meds/ patches/ interventions today.   Pt prefers no follow up or tx of genital warts at this time. Has not noticed warts. Does note exposure in the past in 1980s to partner with genital warts. Declines referral to OBGYN or dermatology for removal of these warts. Will continue to monitor.  Continue all current meds at this time.          Patient education discussed during this visit:  - avoidance of texting while driving and the need for wearing seatbelt  - use of sunscreen  - healthy sleep habits and appropriate amount of sleep  - H2O consumption, well-balanced diet  - exercise routine which includes at least 150 minutes of cardio per week + muscle strengthening exercises  - immunizations including annual flu vaccination      Return in about 4 months (around 1/21/2019).

## 2018-09-24 ENCOUNTER — TELEPHONE (OUTPATIENT)
Dept: INTERNAL MEDICINE | Facility: CLINIC | Age: 63
End: 2018-09-24

## 2018-09-24 DIAGNOSIS — Z12.39 SCREENING FOR BREAST CANCER: Primary | ICD-10-CM

## 2018-09-24 NOTE — TELEPHONE ENCOUNTER
Spoke with the patient, she stated that she has the results at home and will fax us a copy when she returns.

## 2018-09-24 NOTE — TELEPHONE ENCOUNTER
----- Message from Valeria Gentile PA-C sent at 9/23/2018  6:45 PM EDT -----  Please track down cologuard results from [facility]. May need to call pt to see where to call/ locate these.

## 2018-09-24 NOTE — TELEPHONE ENCOUNTER
"----- Message from Renuka Durán sent at 9/24/2018  2:53 PM EDT -----  Concerning mammogram, scheduling dept said \"PLEASE REMOVE;Z00.00 (ICD-10-CM) - Encounter for health maintenance examination NOT AN ACTIVE CODE.\" can you remove dx code or reenter order?   "

## 2018-10-24 RX ORDER — LOSARTAN POTASSIUM 50 MG/1
TABLET ORAL
Qty: 90 TABLET | Refills: 1 | Status: SHIPPED | OUTPATIENT
Start: 2018-10-24 | End: 2019-03-04 | Stop reason: SDUPTHER

## 2018-10-29 ENCOUNTER — TELEPHONE (OUTPATIENT)
Dept: INTERNAL MEDICINE | Facility: CLINIC | Age: 63
End: 2018-10-29

## 2018-10-29 NOTE — TELEPHONE ENCOUNTER
----- Message from Angella Ko sent at 10/29/2018 12:11 PM EDT -----  Contact: SELF  WILLIE BISHOP CALLING FOR HER LAB RESULTS. SHE CAN BE REACHED -[phone]

## 2018-10-29 NOTE — TELEPHONE ENCOUNTER
Spoke to [facility] [phone], I was advised results are not accessible at draw station will need to contact [phone] to request. Spoke to Radha, confirmed results are ready, confirmed fax number [phone]. Radha advised I will have results in 10-15 minutes.

## 2018-10-29 NOTE — TELEPHONE ENCOUNTER
Could you call [facility] and check on the status of these? I don't see that they have been faxed over yet. Thanks.

## 2018-10-31 NOTE — TELEPHONE ENCOUNTER
Please call pt and let her know the following results:    Vit B12- WNL  Vit D - WNL  CMP and CBC- unremarkable  Lipids- TGs elevated mildly at 187 (150 is upper range). At this time, continue healthy diet choices and Crestor as directed.     No new meds or changes at this time.

## 2018-11-15 DIAGNOSIS — K21.9 GASTROESOPHAGEAL REFLUX DISEASE WITHOUT ESOPHAGITIS: ICD-10-CM

## 2018-11-15 RX ORDER — PANTOPRAZOLE SODIUM 40 MG/1
TABLET, DELAYED RELEASE ORAL
Qty: 180 TABLET | Refills: 1 | Status: SHIPPED | OUTPATIENT
Start: 2018-11-15 | End: 2019-10-17 | Stop reason: SDUPTHER

## 2018-11-15 RX ORDER — ASPIRIN 81 MG/1
TABLET, CHEWABLE ORAL
Qty: 90 TABLET | Refills: 1 | Status: SHIPPED | OUTPATIENT
Start: 2018-11-15 | End: 2019-01-30

## 2018-11-26 ENCOUNTER — HOSPITAL ENCOUNTER (OUTPATIENT)
Dept: MAMMOGRAPHY | Facility: HOSPITAL | Age: 63
Discharge: HOME | End: 2018-11-26
Admitting: PHYSICIAN ASSISTANT

## 2018-11-26 ENCOUNTER — APPOINTMENT (OUTPATIENT)
Dept: OTHER | Facility: HOSPITAL | Age: 63
End: 2018-11-26

## 2018-11-26 DIAGNOSIS — Z92.89 H/O MAMMOGRAM: ICD-10-CM

## 2018-11-26 DIAGNOSIS — Z12.39 SCREENING FOR BREAST CANCER: ICD-10-CM

## 2018-11-26 PROCEDURE — 77067 SCR MAMMO BI INCL CAD: CPT | Performed by: RADIOLOGY

## 2018-11-26 PROCEDURE — 77063 BREAST TOMOSYNTHESIS BI: CPT | Performed by: RADIOLOGY

## 2018-11-26 PROCEDURE — 77067 SCR MAMMO BI INCL CAD: CPT

## 2018-11-26 PROCEDURE — 77063 BREAST TOMOSYNTHESIS BI: CPT

## 2018-12-06 ENCOUNTER — TELEPHONE (OUTPATIENT)
Dept: INTERNAL MEDICINE | Facility: CLINIC | Age: 63
End: 2018-12-06

## 2018-12-06 RX ORDER — IBUPROFEN 600 MG/1
600 TABLET ORAL EVERY 8 HOURS PRN
Qty: 90 TABLET | Refills: 0 | Status: SHIPPED | OUTPATIENT
Start: 2018-12-06 | End: 2020-04-17 | Stop reason: SDUPTHER

## 2018-12-06 NOTE — TELEPHONE ENCOUNTER
----- Message from Sara Morrison sent at 12/6/2018  2:16 PM EST -----  PATIENT CALLED AND REQUESTED REFILL ON ibuprofen (ADVIL,MOTRIN) 600 MG tablet, Q90.    PHARMACY: [facility] - [address] [phone] [facility] [phone] FX    THANK YOU.

## 2018-12-19 ENCOUNTER — OFFICE VISIT (OUTPATIENT)
Dept: INTERNAL MEDICINE | Facility: CLINIC | Age: 63
End: 2018-12-19

## 2018-12-19 VITALS
TEMPERATURE: 97.9 F | HEIGHT: 61 IN | WEIGHT: 164 LBS | HEART RATE: 72 BPM | BODY MASS INDEX: 30.96 KG/M2 | SYSTOLIC BLOOD PRESSURE: 132 MMHG | RESPIRATION RATE: 16 BRPM | DIASTOLIC BLOOD PRESSURE: 80 MMHG | OXYGEN SATURATION: 97 %

## 2018-12-19 DIAGNOSIS — I10 ESSENTIAL HYPERTENSION: ICD-10-CM

## 2018-12-19 DIAGNOSIS — R19.7 DIARRHEA, UNSPECIFIED TYPE: ICD-10-CM

## 2018-12-19 DIAGNOSIS — K21.9 GASTROESOPHAGEAL REFLUX DISEASE, ESOPHAGITIS PRESENCE NOT SPECIFIED: Primary | ICD-10-CM

## 2018-12-19 DIAGNOSIS — E78.2 MIXED HYPERLIPIDEMIA: ICD-10-CM

## 2018-12-19 DIAGNOSIS — R05.9 COUGH: ICD-10-CM

## 2018-12-19 DIAGNOSIS — L65.9 ALOPECIA OF SCALP: ICD-10-CM

## 2018-12-19 PROCEDURE — 99214 OFFICE O/P EST MOD 30 MIN: CPT | Performed by: PHYSICIAN ASSISTANT

## 2018-12-19 RX ORDER — PRAVASTATIN SODIUM 20 MG
1 TABLET ORAL ONCE
COMMUNITY
End: 2019-04-19

## 2018-12-19 NOTE — PROGRESS NOTES
"Chief Complaint   Patient presents with   • Heartburn     4 month F/U   • Diarrhea     x3 weeks   • Cough     x2 months, dry       Subjective       History of Present Illness     Jasvir Hamilton is a 63 y.o. female. She presents for follow up of GERD, and new problems of diarrhea and cough. Regarding GERD and diarrhea, pt states she had 3 weeks of diarrhea which occurred about 6 weeks ago. She was experiencing loose stools every AM, but only 1 BM per day in AM. Pt states she stopped taking Crestor, and reduced protonix to 1 tab daily instead of prescribed BID dosing. This improved diarrhea, but not complete resolution of symptoms. She continues to have occasional loose stool with urgency in AM, about 20 mins after drinking morning coffee. She drinks 3-4 cups coffee daily, as well as sodas. Pt endorses poor fiber intake.  Pt also complains of abdominal cramping and gas. Cramping severe at times. Pt states pepsi worsens symptoms. She has not had any significant changes in diet, has not changed coffee intake or brand. She denies dark stools or blood in stool. Denies N/V. She has not taken Crestor in about 5 weeks, prev on Crestor for 3 months before onset of symptoms, per pt. She has not tried any meds for this issue. GERD well-controlled otherwise with no reflux.     Pt also complains of 2 month history of intermittent cough. Cough is dry and worse with laying down at night. Pt states she does cough up some mucous in AM, but this is to \"clear her throat\" and not associated with dry cough at night. She is not taking Allegra at this time. She continues to smoke 10 cigs/ day. She denies fever, chills, nasal congestion, ear pain or pressure, sore throat, SOA worsened from baseline. Does have occasional wheezing, present x years with smoking and not worsened in the last two months.     Pt requesting ref to dermatology for alopecia of scalp.     The following portions of the patient's history were reviewed and updated " as appropriate: allergies, current medications, past family history, past medical history, past social history, past surgical history and problem list.    No Known Allergies  Social History     Tobacco Use   • Smoking status: Current Every Day Smoker     Packs/day: 1.00   • Smokeless tobacco: Never Used   Substance Use Topics   • Alcohol use: No         Current Outpatient Medications:   •  aspirin 81 MG chewable tablet, CHEW 1 TABLET DAILY, Disp: 90 tablet, Rfl: 1  •  cholecalciferol (VITAMIN D3) 1000 units tablet, Take 1,000 Units by mouth Daily., Disp: , Rfl:   •  clopidogrel (PLAVIX) 75 MG tablet, Take 1 tablet by mouth Daily., Disp: 90 tablet, Rfl: 3  •  fexofenadine (ALLEGRA) 60 MG tablet, Take 60 mg by mouth As Needed., Disp: , Rfl:   •  ibuprofen (ADVIL,MOTRIN) 600 MG tablet, Take 1 tablet by mouth Every 8 (Eight) Hours As Needed for Mild Pain  or Moderate Pain ., Disp: 90 tablet, Rfl: 0  •  isosorbide mononitrate (IMDUR) 30 MG 24 hr tablet, Take 1 tablet by mouth Daily., Disp: 90 tablet, Rfl: 1  •  losartan (COZAAR) 50 MG tablet, TAKE 1 TABLET DAILY, Disp: 90 tablet, Rfl: 1  •  metoprolol tartrate (LOPRESSOR) 50 MG tablet, Take 1 tablet by mouth 2 (Two) Times a Day., Disp: 180 tablet, Rfl: 3  •  pantoprazole (PROTONIX) 40 MG EC tablet, TAKE 1 TABLET TWICE A DAY (Patient taking differently: take 1 tablet in the A.M), Disp: 180 tablet, Rfl: 1  •  rosuvastatin (CRESTOR) 40 MG tablet, Take 1 tablet by mouth Daily., Disp: 90 tablet, Rfl: 3  •  sucralfate (CARAFATE) 1 g tablet, Take 1 g by mouth As Needed., Disp: , Rfl:   •  nitroglycerin (NITROSTAT) 0.4 MG SL tablet, Place 0.4 mg under the tongue Every 5 (Five) Minutes As Needed., Disp: , Rfl:   •  pravastatin (PRAVACHOL) 20 MG tablet, Take 1 tablet by mouth 1 (One) Time., Disp: , Rfl:       Review of Systems   Constitutional: Negative for chills, fatigue and fever.   HENT: Negative for congestion, ear pain, postnasal drip, sore throat and trouble swallowing.     Respiratory: Positive for cough, shortness of breath (with exertion, at baseline) and wheezing (chronic, intermittent, at baseline).    Gastrointestinal: Positive for diarrhea and GERD (well-controlled). Negative for abdominal pain, blood in stool, nausea and vomiting.        +abdominal cramping   Genitourinary: Negative for dysuria and hematuria.   Neurological: Negative for dizziness and headache.       Objective   Vitals:    12/19/18 0936   BP: 132/80   Pulse: 72   Resp: 16   Temp: 97.9 °F (36.6 °C)   SpO2: 97%     Physical Exam   Constitutional: She appears well-developed and well-nourished.   HENT:   Head: Normocephalic and atraumatic.   Right Ear: Tympanic membrane, external ear and ear canal normal.   Left Ear: Tympanic membrane, external ear and ear canal normal.   Nose: Nose normal.   Mouth/Throat: Oropharynx is clear and moist and mucous membranes are normal.   Eyes: Conjunctivae are normal.   Neck: Normal range of motion. Neck supple. Carotid bruit is present (bilateral).   Cardiovascular: Normal rate and regular rhythm.   No murmur heard.  Pulmonary/Chest: Effort normal and breath sounds normal. She has no wheezes. She has no rales.   Abdominal: Soft. There is no hepatosplenomegaly. There is no tenderness.   Lymphadenopathy:     She has no cervical adenopathy.   Psychiatric: She has a normal mood and affect. Her behavior is normal.       No results found for this or any previous visit.      Assessment/Plan   Jasvir was seen today for heartburn, diarrhea and cough.    Diagnoses and all orders for this visit:    Gastroesophageal reflux disease, esophagitis presence not specified    Essential hypertension    Alopecia of scalp  -     Ambulatory Referral to Dermatology    Mixed hyperlipidemia    Diarrhea, unspecified type    Cough        Pt advised to resume Allegra daily. Advised to continue stepdown with cigs as this is contributing to cough.   Advised to resume BID dosing of Protonix.  Advised to resume  Crestor.  Pt advised to decrease caffeine use, cut out Pepsi and soda, and reduce coffee to 1 cup in AM. Increase fiber in diet.          Return in about 6 months (around 6/19/2019) for Follow up- routine meds.

## 2019-01-09 RX ORDER — ISOSORBIDE MONONITRATE 30 MG/1
TABLET, EXTENDED RELEASE ORAL
Qty: 90 TABLET | Refills: 1 | Status: SHIPPED | OUTPATIENT
Start: 2019-01-09 | End: 2019-01-30 | Stop reason: SDUPTHER

## 2019-01-30 ENCOUNTER — OFFICE VISIT (OUTPATIENT)
Dept: INTERNAL MEDICINE | Facility: CLINIC | Age: 64
End: 2019-01-30

## 2019-01-30 VITALS
BODY MASS INDEX: 30.96 KG/M2 | OXYGEN SATURATION: 95 % | HEIGHT: 61 IN | RESPIRATION RATE: 16 BRPM | SYSTOLIC BLOOD PRESSURE: 132 MMHG | DIASTOLIC BLOOD PRESSURE: 84 MMHG | TEMPERATURE: 97.7 F | HEART RATE: 65 BPM | WEIGHT: 164 LBS

## 2019-01-30 DIAGNOSIS — R14.0 ABDOMINAL BLOATING: ICD-10-CM

## 2019-01-30 DIAGNOSIS — R10.32 LLQ PAIN: Primary | ICD-10-CM

## 2019-01-30 PROBLEM — I10 HYPERTENSION: Status: RESOLVED | Noted: 2018-04-27 | Resolved: 2019-01-30

## 2019-01-30 PROCEDURE — 99213 OFFICE O/P EST LOW 20 MIN: CPT | Performed by: PHYSICIAN ASSISTANT

## 2019-01-30 RX ORDER — ISOSORBIDE MONONITRATE 30 MG/1
30 TABLET, EXTENDED RELEASE ORAL DAILY
Qty: 90 TABLET | Refills: 1 | Status: SHIPPED | OUTPATIENT
Start: 2019-01-30 | End: 2019-07-09 | Stop reason: SDUPTHER

## 2019-01-30 NOTE — PROGRESS NOTES
Chief Complaint   Patient presents with   • GI Problem     x1 month, worsening, sporadic       Subjective       History of Present Illness     Jasvir Hamilton is a 63 y.o. female. She presents with 2 month history intermittent abdominal cramping and bloating. Pt states this problem is unchanged since last visit, had some worsening of symptoms over the weekend. She reports she had cramping, bloating, increased gas over the weekend with some nausea and decreased appetite. The nausea has since resolved, but cramping and bloating occurring daily at this time. It is worse after she drinks carbonated drinks, but still present without carbonated drinks. She has cut down to 2 cups coffee in AM and only 1 pepsi throughout the day, improved from 3-4 pepsi per day at last visit. She is still taking only 1 protonix per day (prescribed BID). She has had some variation in stools, with loose stools and some constipation, although typically still going once per day in the AM after coffee. No dark stools or blood in stool. No weight gain or weight loss. She denies fever, chills, SOA, chest pain, difficulty urinating, dark urine or blood in urine, urinary frequency.     Pt has hx cholecystectomy, tuba ligation with left salpingectomy and left oophorectomy  (1993); and endometrial ablation (2002)-- no hysterectomy, 2001. Uterus and R ovary still intact.   No FHx ovarian cancer or uterine cancer.       The following portions of the patient's history were reviewed and updated as appropriate: allergies, current medications, past family history, past medical history, past social history, past surgical history and problem list.    No Known Allergies  Social History     Tobacco Use   • Smoking status: Current Every Day Smoker     Packs/day: 1.00   • Smokeless tobacco: Never Used   Substance Use Topics   • Alcohol use: No         Current Outpatient Medications:   •  cholecalciferol (VITAMIN D3) 1000 units tablet, Take 1,000 Units by  mouth Daily., Disp: , Rfl:   •  clopidogrel (PLAVIX) 75 MG tablet, Take 1 tablet by mouth Daily., Disp: 90 tablet, Rfl: 3  •  ibuprofen (ADVIL,MOTRIN) 600 MG tablet, Take 1 tablet by mouth Every 8 (Eight) Hours As Needed for Mild Pain  or Moderate Pain ., Disp: 90 tablet, Rfl: 0  •  isosorbide mononitrate (IMDUR) 30 MG 24 hr tablet, Take 1 tablet by mouth Daily., Disp: 90 tablet, Rfl: 1  •  losartan (COZAAR) 50 MG tablet, TAKE 1 TABLET DAILY, Disp: 90 tablet, Rfl: 1  •  metoprolol tartrate (LOPRESSOR) 50 MG tablet, Take 1 tablet by mouth 2 (Two) Times a Day., Disp: 180 tablet, Rfl: 3  •  nitroglycerin (NITROSTAT) 0.4 MG SL tablet, Place 0.4 mg under the tongue Every 5 (Five) Minutes As Needed., Disp: , Rfl:   •  pantoprazole (PROTONIX) 40 MG EC tablet, TAKE 1 TABLET TWICE A DAY (Patient taking differently: take 1 tablet in the A.M), Disp: 180 tablet, Rfl: 1  •  pravastatin (PRAVACHOL) 20 MG tablet, Take 1 tablet by mouth 1 (One) Time., Disp: , Rfl:   •  rosuvastatin (CRESTOR) 40 MG tablet, Take 1 tablet by mouth Daily., Disp: 90 tablet, Rfl: 3  •  sucralfate (CARAFATE) 1 g tablet, Take 1 g by mouth As Needed., Disp: , Rfl:     Past Surgical History:   Procedure Laterality Date   • CARDIAC CATHETERIZATION     • CARPAL TUNNEL RELEASE      both hands   • CHOLECYSTECTOMY     • COLONOSCOPY  2009   • ECTOPIC PREGNANCY     • ENDOMETRIAL ABLATION     • HAND SURGERY     • OTHER SURGICAL HISTORY      lap and leep   • TUBAL ABDOMINAL LIGATION  1993    with L salpingo-oophorectomy         Review of Systems   Constitutional: Negative for chills, fatigue, fever, unexpected weight gain and unexpected weight loss.   HENT: Negative for congestion and sore throat.    Eyes: Negative for pain.   Respiratory: Negative for cough and shortness of breath.    Cardiovascular: Negative for chest pain and palpitations.   Gastrointestinal: Positive for abdominal pain, constipation, diarrhea, nausea and GERD. Negative for vomiting.         +bloating   Genitourinary: Negative for dysuria, frequency, hematuria and urgency.   Musculoskeletal: Negative for back pain.   Neurological: Negative for dizziness and headache.       Objective   Vitals:    01/30/19 1353   BP: 132/84   Pulse: 65   Resp: 16   Temp: 97.7 °F (36.5 °C)   SpO2: 95%     Physical Exam   Constitutional: She appears well-developed and well-nourished.   HENT:   Head: Normocephalic and atraumatic.   Right Ear: Tympanic membrane, external ear and ear canal normal.   Left Ear: Tympanic membrane, external ear and ear canal normal.   Mouth/Throat: Oropharynx is clear and moist and mucous membranes are normal.   Eyes: Conjunctivae are normal. Pupils are equal, round, and reactive to light.   Neck: Normal range of motion. Neck supple.   Cardiovascular: Normal rate and regular rhythm.   No murmur heard.  Pulmonary/Chest: Effort normal and breath sounds normal. She has no wheezes. She has no rales.   Abdominal: Soft. There is no hepatosplenomegaly. There is tenderness (mild) in the left lower quadrant. There is no CVA tenderness.   Lymphadenopathy:     She has no cervical adenopathy.   Psychiatric: She has a normal mood and affect. Her behavior is normal.       No results found for this or any previous visit.      Assessment/Plan   Jasvir was seen today for gi problem.    Diagnoses and all orders for this visit:    LLQ pain  -     CT Abdomen Pelvis With & Without Contrast; Future    Abdominal bloating  -     CT Abdomen Pelvis With & Without Contrast; Future      Increase protonix to BID as directed. Avoid trigger foods for GERD.   Continue with decreased caffeine intake and decreased soda intake.   Further plans after review of imaging.          Return in about 6 weeks (around 3/13/2019).

## 2019-02-04 ENCOUNTER — HOSPITAL ENCOUNTER (OUTPATIENT)
Dept: CT IMAGING | Facility: HOSPITAL | Age: 64
Discharge: HOME | End: 2019-02-04
Admitting: PHYSICIAN ASSISTANT

## 2019-02-04 DIAGNOSIS — R10.32 LLQ PAIN: ICD-10-CM

## 2019-02-04 DIAGNOSIS — R14.0 ABDOMINAL BLOATING: ICD-10-CM

## 2019-02-04 LAB — CREAT BLDA-MCNC: 0.9 MG/DL (ref 0.6–1.3)

## 2019-02-04 PROCEDURE — 82565 ASSAY OF CREATININE: CPT

## 2019-02-04 PROCEDURE — 74178 CT ABD&PLV WO CNTR FLWD CNTR: CPT

## 2019-02-04 PROCEDURE — 25010000002 IOPAMIDOL 61 % SOLUTION: Performed by: PHYSICIAN ASSISTANT

## 2019-02-04 PROCEDURE — 0 DIATRIZOATE MEGLUMINE & SODIUM PER 1 ML: Performed by: PHYSICIAN ASSISTANT

## 2019-02-04 RX ADMIN — DIATRIZOATE MEGLUMINE AND DIATRIZOATE SODIUM 15 ML: 660; 100 LIQUID ORAL; RECTAL at 15:08

## 2019-02-04 RX ADMIN — IOPAMIDOL 85 ML: 612 INJECTION, SOLUTION INTRAVENOUS at 15:08

## 2019-02-07 RX ORDER — AMOXICILLIN AND CLAVULANATE POTASSIUM 875; 125 MG/1; MG/1
1 TABLET, FILM COATED ORAL 2 TIMES DAILY
Qty: 20 TABLET | Refills: 0 | Status: SHIPPED | OUTPATIENT
Start: 2019-02-07 | End: 2019-02-08 | Stop reason: SDUPTHER

## 2019-02-08 ENCOUNTER — TELEPHONE (OUTPATIENT)
Dept: INTERNAL MEDICINE | Facility: CLINIC | Age: 64
End: 2019-02-08

## 2019-02-08 RX ORDER — AMOXICILLIN AND CLAVULANATE POTASSIUM 875; 125 MG/1; MG/1
1 TABLET, FILM COATED ORAL 2 TIMES DAILY
Qty: 20 TABLET | Refills: 0 | Status: SHIPPED | OUTPATIENT
Start: 2019-02-08 | End: 2019-02-28

## 2019-02-08 NOTE — TELEPHONE ENCOUNTER
----- Message from Nichole Engle sent at 2/8/2019  8:11 AM EST -----  PATIENT STATES THE ANTIBIOTIC WAS SUPPOSED TO BE SENT TO [facility], NOT HER MAILORDER. SHE CAN BE REACHED [phone].

## 2019-02-28 ENCOUNTER — OFFICE VISIT (OUTPATIENT)
Dept: INTERNAL MEDICINE | Facility: CLINIC | Age: 64
End: 2019-02-28

## 2019-02-28 VITALS
RESPIRATION RATE: 18 BRPM | HEIGHT: 61 IN | OXYGEN SATURATION: 95 % | DIASTOLIC BLOOD PRESSURE: 80 MMHG | WEIGHT: 162.6 LBS | TEMPERATURE: 97.2 F | HEART RATE: 90 BPM | SYSTOLIC BLOOD PRESSURE: 128 MMHG | BODY MASS INDEX: 30.7 KG/M2

## 2019-02-28 DIAGNOSIS — R68.89 FLU-LIKE SYMPTOMS: ICD-10-CM

## 2019-02-28 DIAGNOSIS — J02.0 STREP PHARYNGITIS: Primary | ICD-10-CM

## 2019-02-28 LAB
EXPIRATION DATE: ABNORMAL
EXPIRATION DATE: NORMAL
FLUAV AG NPH QL: NEGATIVE
FLUBV AG NPH QL: NEGATIVE
INTERNAL CONTROL: ABNORMAL
INTERNAL CONTROL: NORMAL
Lab: ABNORMAL
Lab: NORMAL
S PYO AG THROAT QL: POSITIVE

## 2019-02-28 PROCEDURE — 87880 STREP A ASSAY W/OPTIC: CPT | Performed by: PHYSICIAN ASSISTANT

## 2019-02-28 PROCEDURE — 99213 OFFICE O/P EST LOW 20 MIN: CPT | Performed by: PHYSICIAN ASSISTANT

## 2019-02-28 PROCEDURE — 87804 INFLUENZA ASSAY W/OPTIC: CPT | Performed by: PHYSICIAN ASSISTANT

## 2019-02-28 RX ORDER — AMOXICILLIN 500 MG/1
1000 CAPSULE ORAL 2 TIMES DAILY
Qty: 20 CAPSULE | Refills: 0 | Status: SHIPPED | OUTPATIENT
Start: 2019-02-28 | End: 2019-04-15

## 2019-02-28 NOTE — PROGRESS NOTES
Chief Complaint   Patient presents with   • Sore Throat     x1 day, fatigue, chills, sinus headaches, ear pressure, no fever       Subjective       History of Present Illness     Jasvir Hamilton is a 63 y.o. female. She presents with 1 day history of sore throat, fatigue, and chills. Pt states she has also had some ear fullness today and feeling of sinus pressure. She has taken Motrin, last dose before bed last night, which did give some relief of symptoms. 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 She did not receive flu vaccine this season.       The following portions of the patient's history were reviewed and updated as appropriate: allergies, current medications, past medical history, past social history, past surgical history and problem list.    No Known Allergies  Social History     Tobacco Use   • Smoking status: Current Every Day Smoker     Packs/day: 1.00   • Smokeless tobacco: Never Used   Substance Use Topics   • Alcohol use: No         Current Outpatient Medications:   •  amoxicillin (AMOXIL) 500 MG capsule, Take 2 capsules by mouth 2 (Two) Times a Day., Disp: 20 capsule, Rfl: 0  •  cholecalciferol (VITAMIN D3) 1000 units tablet, Take 1,000 Units by mouth Daily., Disp: , Rfl:   •  clopidogrel (PLAVIX) 75 MG tablet, Take 1 tablet by mouth Daily., Disp: 90 tablet, Rfl: 3  •  ibuprofen (ADVIL,MOTRIN) 600 MG tablet, Take 1 tablet by mouth Every 8 (Eight) Hours As Needed for Mild Pain  or Moderate Pain ., Disp: 90 tablet, Rfl: 0  •  isosorbide mononitrate (IMDUR) 30 MG 24 hr tablet, Take 1 tablet by mouth Daily., Disp: 90 tablet, Rfl: 1  •  losartan (COZAAR) 50 MG tablet, TAKE 1 TABLET DAILY, Disp: 90 tablet, Rfl: 1  •  metoprolol tartrate (LOPRESSOR) 50 MG tablet, Take 1 tablet by mouth 2 (Two) Times a Day., Disp: 180 tablet, Rfl: 3  •  nitroglycerin (NITROSTAT) 0.4 MG SL tablet, Place 0.4 mg under the tongue Every 5 (Five) Minutes As Needed., Disp: , Rfl:   •  pantoprazole  (PROTONIX) 40 MG EC tablet, TAKE 1 TABLET TWICE A DAY (Patient taking differently: take 1 tablet in the A.M), Disp: 180 tablet, Rfl: 1  •  pravastatin (PRAVACHOL) 20 MG tablet, Take 1 tablet by mouth 1 (One) Time., Disp: , Rfl:   •  rosuvastatin (CRESTOR) 40 MG tablet, Take 1 tablet by mouth Daily., Disp: 90 tablet, Rfl: 3  •  sucralfate (CARAFATE) 1 g tablet, Take 1 g by mouth As Needed., Disp: , Rfl:     Review of Systems   Constitutional: Positive for fatigue. Negative for chills and fever.   HENT: Positive for congestion, ear pain and sore throat. Negative for drooling, ear discharge, postnasal drip and trouble swallowing.    Eyes: Negative for blurred vision.   Respiratory: Positive for cough. Negative for shortness of breath.    Cardiovascular: Negative for chest pain.   Gastrointestinal: Negative for abdominal pain, diarrhea and vomiting.   Genitourinary: Negative for dysuria.   Musculoskeletal: Negative for neck pain.   Skin: Negative for rash.   Neurological: Negative for dizziness and headache.       Objective   Vitals:    02/28/19 1008   BP: 128/80   Pulse: 90   Resp: 18   Temp: 97.2 °F (36.2 °C)   SpO2: 95%     Physical Exam   Constitutional: She appears well-developed and well-nourished.   HENT:   Head: Normocephalic and atraumatic.   Right Ear: Tympanic membrane, external ear and ear canal normal.   Left Ear: Tympanic membrane, external ear and ear canal normal.   Mouth/Throat: Mucous membranes are normal. Posterior oropharyngeal erythema (mild) present. No oropharyngeal exudate or posterior oropharyngeal edema.   Eyes: Conjunctivae are normal.   Neck: Normal range of motion. Neck supple.   Cardiovascular: Normal rate and regular rhythm.   No murmur heard.  Pulmonary/Chest: Effort normal and breath sounds normal. She has no wheezes. She has no rales.   Lymphadenopathy:     She has no cervical adenopathy.   Psychiatric: She has a normal mood and affect. Her behavior is normal.         Results for orders  placed or performed in visit on 02/28/19   POC Influenza A / B   Result Value Ref Range    Rapid Influenza A Ag Negative Negative    Rapid Influenza B Ag Negative Negative    Internal Control Passed Passed    Lot Number [id number]     Expiration Date 04/30/21    POC Rapid Strep A   Result Value Ref Range    Rapid Strep A Screen Positive (A) Negative, VALID, INVALID, Not Performed    Internal Control Passed Passed    Lot Number [id number]     Expiration Date 04/30/21          Assessment/Plan   Jasvir was seen today for sore throat.    Diagnoses and all orders for this visit:    Strep pharyngitis  -     amoxicillin (AMOXIL) 500 MG capsule; Take 2 capsules by mouth 2 (Two) Times a Day.    Flu-like symptoms  -     POC Influenza A / B  -     POC Rapid Strep A      POSITIVE strep. Negative flu A/B.   Finish all Abx through completion.  Motrin or tylenol PRN.  Plenty of fluids and rest.            Return if symptoms worsen or fail to improve.

## 2019-03-04 RX ORDER — LOSARTAN POTASSIUM 50 MG/1
TABLET ORAL
Qty: 90 TABLET | Refills: 1 | Status: SHIPPED | OUTPATIENT
Start: 2019-03-04 | End: 2019-09-30 | Stop reason: SDUPTHER

## 2019-04-15 ENCOUNTER — TELEPHONE (OUTPATIENT)
Dept: INTERNAL MEDICINE | Facility: CLINIC | Age: 64
End: 2019-04-15

## 2019-04-15 RX ORDER — AMOXICILLIN AND CLAVULANATE POTASSIUM 875; 125 MG/1; MG/1
1 TABLET, FILM COATED ORAL 2 TIMES DAILY
Qty: 20 TABLET | Refills: 0 | Status: ON HOLD | OUTPATIENT
Start: 2019-04-15 | End: 2019-09-30

## 2019-04-15 NOTE — TELEPHONE ENCOUNTER
----- Message from Nichole Engle sent at 4/15/2019 10:39 AM EDT -----  PATIENT STATES SHE HAS HAD DIARRHEA SINCE Friday. SHE CAN BE REACHED [phone]

## 2019-04-15 NOTE — TELEPHONE ENCOUNTER
Spoke with patient who states it is similar to previous Diverticulitis, patient has had cramping and chills no fever and no bloody stool.

## 2019-04-15 NOTE — TELEPHONE ENCOUNTER
I have sent in Augmentin to [facility]. She needs to complete all Abx. If not improving in the next 3-4 days, needs to be seen in office, or if she develops fever, dark stool, bloody stool, RTC sooner. Advised clear liquid diet x2 days, advance as tolerated with bland foods.

## 2019-04-17 RX ORDER — ONDANSETRON 8 MG/1
8 TABLET, ORALLY DISINTEGRATING ORAL EVERY 8 HOURS PRN
Qty: 20 TABLET | Refills: 0 | Status: SHIPPED | OUTPATIENT
Start: 2019-04-17 | End: 2020-07-29 | Stop reason: SDUPTHER

## 2019-04-17 NOTE — TELEPHONE ENCOUNTER
Jasvir Hamilton [phone]  Spoke to pt advised of clinical message, good verbal understanding. Pt is in agreement with plan, pt states she will be in on Friday to F/U regarding her CT scan. Pt complains of nausea and abdominal cramping. Pt would like to know what can she take for her nausea. Please advise?

## 2019-04-17 NOTE — TELEPHONE ENCOUNTER
Jasvir Hamilton [phone]  Spoke to pt, advised of clinical message. Good verbal understanding.

## 2019-04-19 ENCOUNTER — OFFICE VISIT (OUTPATIENT)
Dept: INTERNAL MEDICINE | Facility: CLINIC | Age: 64
End: 2019-04-19

## 2019-04-19 ENCOUNTER — OFFICE VISIT (OUTPATIENT)
Dept: CARDIOLOGY | Facility: CLINIC | Age: 64
End: 2019-04-19

## 2019-04-19 VITALS
TEMPERATURE: 97.2 F | RESPIRATION RATE: 16 BRPM | DIASTOLIC BLOOD PRESSURE: 76 MMHG | WEIGHT: 162 LBS | HEART RATE: 65 BPM | BODY MASS INDEX: 30.58 KG/M2 | SYSTOLIC BLOOD PRESSURE: 120 MMHG | HEIGHT: 61 IN | OXYGEN SATURATION: 96 %

## 2019-04-19 VITALS
HEART RATE: 70 BPM | SYSTOLIC BLOOD PRESSURE: 158 MMHG | WEIGHT: 162.2 LBS | DIASTOLIC BLOOD PRESSURE: 76 MMHG | BODY MASS INDEX: 30.62 KG/M2 | HEIGHT: 61 IN

## 2019-04-19 DIAGNOSIS — I10 ESSENTIAL HYPERTENSION: ICD-10-CM

## 2019-04-19 DIAGNOSIS — E78.2 MIXED HYPERLIPIDEMIA: ICD-10-CM

## 2019-04-19 DIAGNOSIS — Z86.39 HISTORY OF VITAMIN D DEFICIENCY: ICD-10-CM

## 2019-04-19 DIAGNOSIS — E53.8 COBALAMIN DEFICIENCY: Primary | ICD-10-CM

## 2019-04-19 DIAGNOSIS — I25.118 CORONARY ARTERY DISEASE OF NATIVE ARTERY OF NATIVE HEART WITH STABLE ANGINA PECTORIS (HCC): Primary | ICD-10-CM

## 2019-04-19 DIAGNOSIS — I25.118 CORONARY ARTERY DISEASE OF NATIVE ARTERY OF NATIVE HEART WITH STABLE ANGINA PECTORIS (HCC): ICD-10-CM

## 2019-04-19 DIAGNOSIS — B37.31 VAGINAL CANDIDIASIS: ICD-10-CM

## 2019-04-19 DIAGNOSIS — K21.9 GASTROESOPHAGEAL REFLUX DISEASE, ESOPHAGITIS PRESENCE NOT SPECIFIED: ICD-10-CM

## 2019-04-19 PROCEDURE — 99214 OFFICE O/P EST MOD 30 MIN: CPT | Performed by: INTERNAL MEDICINE

## 2019-04-19 PROCEDURE — 99214 OFFICE O/P EST MOD 30 MIN: CPT | Performed by: PHYSICIAN ASSISTANT

## 2019-04-19 RX ORDER — FLUCONAZOLE 150 MG/1
TABLET ORAL
Qty: 2 TABLET | Refills: 0 | Status: ON HOLD | OUTPATIENT
Start: 2019-04-19 | End: 2019-09-30

## 2019-04-19 NOTE — PROGRESS NOTES
Chief Complaint   Patient presents with   • Diarrhea     x1 week, diarhhea has subsided,   • Vaginitis     x2 days, due to abx       Subjective       History of Present Illness     Jasvir Hamilton is a 64 y.o. female. Pt presents for follow up of routine issues, and flare of diverticulitis. Regarding diverticulitis, pt began with symptoms 6 days ago with diarrhea, cramping, and LLQ pain as with previous episode. She started Augmentin on Monday 4/15/2019 as directed by PCP office, and she is significantly improved today. She still has some lower abdominal cramping, but no true pain, and diarrhea has resolved. Did have chills earlier this week which have also resolved. Denies fever, dark stool, blood in stool, N/V. In addition, she has cut back to 1 cup of coffee per day and only 1-2 pepsi/day, improved from 4 pepsi/ day at last visit.  Pt believes she is developing yeast infection d/t Augmentin. Has worsening vaginal itching which began yesterday, with no vaginal discharge at this time. No dysuria, dark urine, blood in urine.     Pt is taking protonix BID and this controls her GERD well. No new concerns, and no reflux symptoms.     She is taking crestor for HLD with no side effects or new concerns. Has CAD and bilateral carotid bruits as well, and she is seeing Dr. Montalvo in cardiology today for routine follow up. Taking metoprolol, imdur, and losartan as directed for HTN + CAD. She denies any chest pain, SOA, headaches, dizziness, or new concerns. She has not needed to take Nitrostat but does have at home.         The following portions of the patient's history were reviewed and updated as appropriate: allergies, current medications, past medical history, past social history, past surgical history and problem list.    No Known Allergies  Social History     Tobacco Use   • Smoking status: Current Every Day Smoker     Packs/day: 1.00   • Smokeless tobacco: Never Used   Substance Use Topics   • Alcohol use: No          Current Outpatient Medications:   •  amoxicillin-clavulanate (AUGMENTIN) 875-125 MG per tablet, Take 1 tablet by mouth 2 (Two) Times a Day., Disp: 20 tablet, Rfl: 0  •  cholecalciferol (VITAMIN D3) 1000 units tablet, Take 1,000 Units by mouth Daily., Disp: , Rfl:   •  clopidogrel (PLAVIX) 75 MG tablet, Take 1 tablet by mouth Daily., Disp: 90 tablet, Rfl: 3  •  ibuprofen (ADVIL,MOTRIN) 600 MG tablet, Take 1 tablet by mouth Every 8 (Eight) Hours As Needed for Mild Pain  or Moderate Pain ., Disp: 90 tablet, Rfl: 0  •  isosorbide mononitrate (IMDUR) 30 MG 24 hr tablet, Take 1 tablet by mouth Daily., Disp: 90 tablet, Rfl: 1  •  losartan (COZAAR) 50 MG tablet, TAKE 1 TABLET DAILY, Disp: 90 tablet, Rfl: 1  •  metoprolol tartrate (LOPRESSOR) 50 MG tablet, Take 1 tablet by mouth 2 (Two) Times a Day., Disp: 180 tablet, Rfl: 3  •  nitroglycerin (NITROSTAT) 0.4 MG SL tablet, Place 0.4 mg under the tongue Every 5 (Five) Minutes As Needed., Disp: , Rfl:   •  ondansetron ODT (ZOFRAN ODT) 8 MG disintegrating tablet, Take 1 tablet by mouth Every 8 (Eight) Hours As Needed for Nausea or Vomiting., Disp: 20 tablet, Rfl: 0  •  pantoprazole (PROTONIX) 40 MG EC tablet, TAKE 1 TABLET TWICE A DAY (Patient taking differently: take 1 tablet in the A.M), Disp: 180 tablet, Rfl: 1  •  rosuvastatin (CRESTOR) 40 MG tablet, Take 1 tablet by mouth Daily., Disp: 90 tablet, Rfl: 3  •  sucralfate (CARAFATE) 1 g tablet, Take 1 g by mouth As Needed., Disp: , Rfl:   •  fluconazole (DIFLUCAN) 150 MG tablet, Take 1 tablet by mouth today and 1 tablet in three days., Disp: 2 tablet, Rfl: 0    Review of Systems   Constitutional: Positive for chills (resolved). Negative for fatigue and fever.   HENT: Negative for congestion, ear pain, sore throat and trouble swallowing.    Respiratory: Negative for cough and shortness of breath.    Cardiovascular: Negative for chest pain and palpitations.   Gastrointestinal: Positive for diarrhea (resolved) and GERD.  Negative for abdominal pain, nausea and vomiting.        +abdominal cramps   Genitourinary: Negative for dysuria, frequency, hematuria and vaginal discharge.        +vaginal itching   Skin: Negative for rash.   Neurological: Negative for dizziness, weakness and headache.       Objective   Vitals:    04/19/19 0808   BP: 120/76   Pulse: 65   Resp: 16   Temp: 97.2 °F (36.2 °C)   SpO2: 96%     Physical Exam   Constitutional: She appears well-developed and well-nourished.   HENT:   Head: Normocephalic and atraumatic.   Right Ear: Tympanic membrane, external ear and ear canal normal.   Left Ear: Tympanic membrane, external ear and ear canal normal.   Mouth/Throat: Oropharynx is clear and moist and mucous membranes are normal.   Eyes: Conjunctivae are normal.   Neck: Normal range of motion. Neck supple. Carotid bruit is present (JOSE ROBERTO).   Cardiovascular: Normal rate and regular rhythm.   No murmur heard.  Pulmonary/Chest: Effort normal and breath sounds normal. She has no wheezes. She has no rales.   Abdominal: Soft. There is no hepatosplenomegaly. There is no tenderness.   Lymphadenopathy:     She has no cervical adenopathy.   Psychiatric: She has a normal mood and affect. Her behavior is normal.           Assessment/Plan   Jasvir was seen today for diarrhea and vaginitis.    Diagnoses and all orders for this visit:    Cobalamin deficiency  -     Vitamin B12; Future    Gastroesophageal reflux disease, esophagitis presence not specified  -     CBC & Differential; Future  -     Basic Metabolic Panel; Future    Coronary artery disease of native artery of native heart with stable angina pectoris (CMS/HCC)  -     CBC & Differential; Future  -     Basic Metabolic Panel; Future    History of vitamin D deficiency  -     Vitamin D 1,25 Dihydroxy; Future    Mixed hyperlipidemia  -     Lipid Panel; Future    Vaginal candidiasis  -     fluconazole (DIFLUCAN) 150 MG tablet; Take 1 tablet by mouth today and 1 tablet in three  days.      Continue all current meds.   Routine labs. Further plans after review of labs.   Smoking cessation discussed with pt. She continues to cut back on cig use, not ready for other meds or interventions to help with smoking cessation today.            Return in about 6 months (around 10/19/2019) for Annual physical.

## 2019-04-19 NOTE — PROGRESS NOTES
Encounter Date:04/19/2019      Patient ID: Jasvir Hamilton is a 64 y.o. female.    Chief Complaint: Hypertension and Coronary Artery Disease      PROBLEM LIST:  1. Coronary artery disease:  a. [facility], 03/16/2012, [place], [place]: single-vessel CAD with 60-70% eccentric stenosis of the mid-LAD. EF 65-70%.   b. [facility], 01/26/2015, [place], [place]: Coronary angioplasty with stenting to the mid LAD. Entry into the ABSORB bio-absorbable drug eluding stent trial.  2. Carotid artery disease:  a. Carotid duplex, 08/31/2012: Bilateral ICA stenosis < 50%.   3. Hypertension.  4. Hyperlipidemia.  5. Tobacco abuse.  6. Obesity (BMI 30.6)  7. GERD  8. Cobalamin deficiency    History of Present Illness  Patient presents today for 6 month follow-up with a history of coronary artery disease and cardiac risk factors. Since last visit, she has had a flare-up in an infection in her stomach. Denies chest pain, shortness of breath, leg swelling, palpitations, and syncope. 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  Continues to smoke less than 1 pack of cigarettes a day.    No Known Allergies      Current Outpatient Medications:   •  amoxicillin-clavulanate (AUGMENTIN) 875-125 MG per tablet, Take 1 tablet by mouth 2 (Two) Times a Day., Disp: 20 tablet, Rfl: 0  •  cholecalciferol (VITAMIN D3) 1000 units tablet, Take 1,000 Units by mouth Daily., Disp: , Rfl:   •  clopidogrel (PLAVIX) 75 MG tablet, Take 1 tablet by mouth Daily., Disp: 90 tablet, Rfl: 3  •  fluconazole (DIFLUCAN) 150 MG tablet, Take 1 tablet by mouth today and 1 tablet in three days., Disp: 2 tablet, Rfl: 0  •  ibuprofen (ADVIL,MOTRIN) 600 MG tablet, Take 1 tablet by mouth Every 8 (Eight) Hours As Needed for Mild Pain  or Moderate Pain ., Disp: 90 tablet, Rfl: 0  •  isosorbide mononitrate (IMDUR) 30 MG 24 hr tablet, Take 1 tablet by mouth Daily., Disp: 90 tablet, Rfl: 1  •  losartan (COZAAR) 50  "MG tablet, TAKE 1 TABLET DAILY (Patient taking differently: TAKE 1/2 TABLET DAILY), Disp: 90 tablet, Rfl: 1  •  metoprolol tartrate (LOPRESSOR) 50 MG tablet, Take 1 tablet by mouth 2 (Two) Times a Day., Disp: 180 tablet, Rfl: 3  •  nitroglycerin (NITROSTAT) 0.4 MG SL tablet, Place 0.4 mg under the tongue Every 5 (Five) Minutes As Needed., Disp: , Rfl:   •  ondansetron ODT (ZOFRAN ODT) 8 MG disintegrating tablet, Take 1 tablet by mouth Every 8 (Eight) Hours As Needed for Nausea or Vomiting., Disp: 20 tablet, Rfl: 0  •  pantoprazole (PROTONIX) 40 MG EC tablet, TAKE 1 TABLET TWICE A DAY (Patient taking differently: take 1 tablet twice a day), Disp: 180 tablet, Rfl: 1  •  rosuvastatin (CRESTOR) 40 MG tablet, Take 1 tablet by mouth Daily., Disp: 90 tablet, Rfl: 3  •  sucralfate (CARAFATE) 1 g tablet, Take 1 g by mouth As Needed., Disp: , Rfl:     The following portions of the patient's history were reviewed and updated as appropriate: allergies, current medications, past family history, past medical history, past social history, past surgical history and problem list.    ROS  Review of Systems   Constitution: Negative for chills, fatigue, fever, generalized weakness, weight gain and weight loss.   Cardiovascular: Negative for chest pain, claudication, dyspnea on exertion, leg swelling, orthopnea, palpitations, paroxysmal nocturnal dyspnea and syncope.   Respiratory: Negative for cough, shortness of breath, snoring, and wheezing.  HENT: Negative for ear pain, nosebleeds, and tinnitus.  Gastrointestinal: Negative for abdominal pain, constipation, diarrhea, nausea and vomiting.   Genitourinary: No urinary symptoms   Neurological: Negative for dizziness, headaches, loss of balance, numbness, and symptoms of stroke.  Psychiatric: Normal mental status.     All other systems reviewed and are negative.    Objective:     /76   Pulse 70   Ht 154.9 cm (61\")   Wt 73.6 kg (162 lb 3.2 oz)   BMI 30.65 kg/m²    Repeat blood " pressure measurement by, Chiara Montalvo MD, at 136/78      Physical Exam  Constitutional: Patient appears well-developed and well-nourished.   HENT: HEENT exam unremarkable.   Neck: Neck supple. No JVD present. No carotid bruits.   Cardiovascular: Normal rate, regular rhythm and normal heart sounds. No murmur heard.   2+ symmetric pulses.   Pulmonary/Chest: Breath sounds normal. Does not exhibit tenderness.   Abdominal: Abdomen benign.   Musculoskeletal: Does not exhibit edema.   Neurological: Neurological exam unremarkable.   Vitals reviewed.    Lab Review:     Last lipid panel, 10/24/2018:  Chol 148  Trog 187  HDL 41  LDL 79      Procedures       Assessment:      Diagnosis Plan   1. Coronary artery disease of native artery of native heart with stable angina pectoris (CMS/HCC)  Stable, continue current medications.   2. Mixed hyperlipidemia  Well-controlled, continue rosuvastatin 40 mg. Pt had an FLP ordered with her PCP this morning.   3. Essential hypertension  Well-controlled, continue current medications.     Plan:   Stable cardiac status.  Advised to quit smoking.  Continue current medications.   FU in 12 MO, sooner as needed.  Thank you for allowing us to participate in the care of your patient.     Scribed for Chiara Montalvo MD by Leelee Florention. 4/19/2019  1:21 PM      I, Chiara Montalvo MD, personally performed the services described in this documentation as scribed by the above named individual in my presence, and it is both accurate and complete.  4/19/2019  1:45 PM        Please note that portions of this note may have been completed with a voice recognition program. Efforts were made to edit the dictations, but occasionally words are mistranscribed.

## 2019-05-08 ENCOUNTER — TELEPHONE (OUTPATIENT)
Dept: INTERNAL MEDICINE | Facility: CLINIC | Age: 64
End: 2019-05-08

## 2019-05-08 RX ORDER — SUCRALFATE 1 G/1
1 TABLET ORAL NIGHTLY
Qty: 90 TABLET | Refills: 1 | Status: SHIPPED | OUTPATIENT
Start: 2019-05-08 | End: 2020-05-21

## 2019-05-08 NOTE — TELEPHONE ENCOUNTER
----- Message from Emily Brown sent at 5/7/2019  3:52 PM EDT -----  OK FOR Wednesday   [phone]  REFILL ON CARAFATE , PT ONLY TAKES IT AT NIGHT ONLY NEEDS 30 DAY SUPPLY   [facility]

## 2019-07-09 DIAGNOSIS — Z00.00 HEALTHCARE MAINTENANCE: ICD-10-CM

## 2019-07-09 RX ORDER — METOPROLOL TARTRATE 50 MG/1
TABLET, FILM COATED ORAL
Qty: 180 TABLET | Refills: 3 | Status: SHIPPED | OUTPATIENT
Start: 2019-07-09 | End: 2020-06-22 | Stop reason: SDUPTHER

## 2019-07-09 RX ORDER — ISOSORBIDE MONONITRATE 30 MG/1
TABLET, EXTENDED RELEASE ORAL
Qty: 90 TABLET | Refills: 1 | Status: SHIPPED | OUTPATIENT
Start: 2019-07-09 | End: 2020-01-14

## 2019-09-30 ENCOUNTER — HOSPITAL ENCOUNTER (INPATIENT)
Facility: HOSPITAL | Age: 64
LOS: 2 days | Discharge: HOME | End: 2019-10-02
Attending: EMERGENCY MEDICINE | Admitting: INTERNAL MEDICINE

## 2019-09-30 ENCOUNTER — APPOINTMENT (OUTPATIENT)
Dept: GENERAL RADIOLOGY | Facility: HOSPITAL | Age: 64
End: 2019-09-30

## 2019-09-30 ENCOUNTER — APPOINTMENT (OUTPATIENT)
Dept: CT IMAGING | Facility: HOSPITAL | Age: 64
End: 2019-09-30

## 2019-09-30 DIAGNOSIS — Z74.09 IMPAIRED MOBILITY AND ADLS: ICD-10-CM

## 2019-09-30 DIAGNOSIS — I63.9 ACUTE ISCHEMIC STROKE (HCC): Primary | ICD-10-CM

## 2019-09-30 DIAGNOSIS — Z78.9 IMPAIRED MOBILITY AND ADLS: ICD-10-CM

## 2019-09-30 LAB
ALT SERPL W P-5'-P-CCNC: 12 U/L (ref 1–33)
APTT PPP: 29.8 SECONDS (ref 24–37)
AST SERPL-CCNC: 13 U/L (ref 1–32)
BASOPHILS # BLD AUTO: 0.02 10*3/MM3 (ref 0–0.2)
BASOPHILS NFR BLD AUTO: 0.5 % (ref 0–1.5)
BUN BLDA-MCNC: 15 MG/DL (ref 8–26)
CA-I BLDA-SCNC: 1.2 MMOL/L (ref 1.2–1.32)
CHLORIDE BLDA-SCNC: 105 MMOL/L (ref 98–109)
CO2 BLDA-SCNC: 26 MMOL/L (ref 24–29)
CREAT BLDA-MCNC: 0.8 MG/DL (ref 0.6–1.3)
DEPRECATED RDW RBC AUTO: 46.4 FL (ref 37–54)
EOSINOPHIL # BLD AUTO: 0.13 10*3/MM3 (ref 0–0.4)
EOSINOPHIL NFR BLD AUTO: 3.1 % (ref 0.3–6.2)
ERYTHROCYTE [DISTWIDTH] IN BLOOD BY AUTOMATED COUNT: 14.5 % (ref 12.3–15.4)
GLUCOSE BLDC GLUCOMTR-MCNC: 103 MG/DL (ref 70–130)
GLUCOSE BLDC GLUCOMTR-MCNC: 113 MG/DL (ref 70–130)
GLUCOSE BLDC GLUCOMTR-MCNC: 153 MG/DL (ref 70–130)
HCT VFR BLD AUTO: 36.2 % (ref 34–46.6)
HCT VFR BLDA CALC: 36 % (ref 38–51)
HGB BLD-MCNC: 11.8 G/DL (ref 12–15.9)
HGB BLDA-MCNC: 12.2 G/DL (ref 12–17)
HOLD SPECIMEN: NORMAL
HOLD SPECIMEN: NORMAL
IMM GRANULOCYTES # BLD AUTO: 0.01 10*3/MM3 (ref 0–0.05)
IMM GRANULOCYTES NFR BLD AUTO: 0.2 % (ref 0–0.5)
INR PPP: 1 (ref 0.8–1.2)
LYMPHOCYTES # BLD AUTO: 1.01 10*3/MM3 (ref 0.7–3.1)
LYMPHOCYTES NFR BLD AUTO: 24.5 % (ref 19.6–45.3)
MCH RBC QN AUTO: 28.9 PG (ref 26.6–33)
MCHC RBC AUTO-ENTMCNC: 32.6 G/DL (ref 31.5–35.7)
MCV RBC AUTO: 88.5 FL (ref 79–97)
MONOCYTES # BLD AUTO: 0.33 10*3/MM3 (ref 0.1–0.9)
MONOCYTES NFR BLD AUTO: 8 % (ref 5–12)
NEUTROPHILS # BLD AUTO: 2.63 10*3/MM3 (ref 1.7–7)
NEUTROPHILS NFR BLD AUTO: 63.7 % (ref 42.7–76)
NRBC BLD AUTO-RTO: 0 /100 WBC (ref 0–0.2)
PLATELET # BLD AUTO: 171 10*3/MM3 (ref 140–450)
PMV BLD AUTO: 10.3 FL (ref 6–12)
POTASSIUM BLDA-SCNC: 3.4 MMOL/L (ref 3.5–4.9)
PROTHROMBIN TIME: 11.9 SECONDS (ref 12.8–15.2)
RBC # BLD AUTO: 4.09 10*6/MM3 (ref 3.77–5.28)
SODIUM BLDA-SCNC: 141 MMOL/L (ref 138–146)
TROPONIN T SERPL-MCNC: <0.01 NG/ML (ref 0–0.03)
WBC NRBC COR # BLD: 4.13 10*3/MM3 (ref 3.4–10.8)
WHOLE BLOOD HOLD SPECIMEN: NORMAL
WHOLE BLOOD HOLD SPECIMEN: NORMAL

## 2019-09-30 PROCEDURE — 99285 EMERGENCY DEPT VISIT HI MDM: CPT

## 2019-09-30 PROCEDURE — 99223 1ST HOSP IP/OBS HIGH 75: CPT | Performed by: INTERNAL MEDICINE

## 2019-09-30 PROCEDURE — 85730 THROMBOPLASTIN TIME PARTIAL: CPT | Performed by: EMERGENCY MEDICINE

## 2019-09-30 PROCEDURE — 3E03317 INTRODUCTION OF OTHER THROMBOLYTIC INTO PERIPHERAL VEIN, PERCUTANEOUS APPROACH: ICD-10-PCS | Performed by: EMERGENCY MEDICINE

## 2019-09-30 PROCEDURE — 84450 TRANSFERASE (AST) (SGOT): CPT | Performed by: EMERGENCY MEDICINE

## 2019-09-30 PROCEDURE — 70450 CT HEAD/BRAIN W/O DYE: CPT

## 2019-09-30 PROCEDURE — 85025 COMPLETE CBC W/AUTO DIFF WBC: CPT | Performed by: EMERGENCY MEDICINE

## 2019-09-30 PROCEDURE — 93005 ELECTROCARDIOGRAM TRACING: CPT | Performed by: EMERGENCY MEDICINE

## 2019-09-30 PROCEDURE — 71045 X-RAY EXAM CHEST 1 VIEW: CPT

## 2019-09-30 PROCEDURE — 84484 ASSAY OF TROPONIN QUANT: CPT | Performed by: EMERGENCY MEDICINE

## 2019-09-30 PROCEDURE — 25010000002 ALTEPLASE PER 1 MG: Performed by: EMERGENCY MEDICINE

## 2019-09-30 PROCEDURE — 85610 PROTHROMBIN TIME: CPT

## 2019-09-30 PROCEDURE — 82962 GLUCOSE BLOOD TEST: CPT

## 2019-09-30 PROCEDURE — 84460 ALANINE AMINO (ALT) (SGPT): CPT | Performed by: EMERGENCY MEDICINE

## 2019-09-30 PROCEDURE — 80047 BASIC METABLC PNL IONIZED CA: CPT

## 2019-09-30 PROCEDURE — 85014 HEMATOCRIT: CPT

## 2019-09-30 RX ORDER — PANTOPRAZOLE SODIUM 40 MG/10ML
40 INJECTION, POWDER, LYOPHILIZED, FOR SOLUTION INTRAVENOUS
Status: DISCONTINUED | OUTPATIENT
Start: 2019-09-30 | End: 2019-10-01

## 2019-09-30 RX ORDER — LABETALOL HYDROCHLORIDE 5 MG/ML
20 INJECTION, SOLUTION INTRAVENOUS ONCE
Status: COMPLETED | OUTPATIENT
Start: 2019-09-30 | End: 2019-09-30

## 2019-09-30 RX ORDER — SODIUM CHLORIDE 0.9 % (FLUSH) 0.9 %
10 SYRINGE (ML) INJECTION AS NEEDED
Status: DISCONTINUED | OUTPATIENT
Start: 2019-09-30 | End: 2019-10-02 | Stop reason: HOSPADM

## 2019-09-30 RX ORDER — ACETAMINOPHEN 325 MG/1
650 TABLET ORAL EVERY 4 HOURS PRN
Status: DISCONTINUED | OUTPATIENT
Start: 2019-09-30 | End: 2019-10-02 | Stop reason: HOSPADM

## 2019-09-30 RX ORDER — SODIUM CHLORIDE 0.9 % (FLUSH) 0.9 %
10 SYRINGE (ML) INJECTION EVERY 12 HOURS SCHEDULED
Status: DISCONTINUED | OUTPATIENT
Start: 2019-09-30 | End: 2019-10-02 | Stop reason: HOSPADM

## 2019-09-30 RX ORDER — ATORVASTATIN CALCIUM 40 MG/1
80 TABLET, FILM COATED ORAL NIGHTLY
Status: DISCONTINUED | OUTPATIENT
Start: 2019-09-30 | End: 2019-10-02

## 2019-09-30 RX ORDER — ASPIRIN 300 MG/1
300 SUPPOSITORY RECTAL DAILY
Status: DISCONTINUED | OUTPATIENT
Start: 2019-10-01 | End: 2019-10-02 | Stop reason: HOSPADM

## 2019-09-30 RX ORDER — LOSARTAN POTASSIUM 50 MG/1
50 TABLET ORAL DAILY
Qty: 90 TABLET | Refills: 1 | Status: SHIPPED | OUTPATIENT
Start: 2019-09-30 | End: 2019-10-03 | Stop reason: SDUPTHER

## 2019-09-30 RX ORDER — SODIUM CHLORIDE 9 MG/ML
100 INJECTION, SOLUTION INTRAVENOUS ONCE
Status: COMPLETED | OUTPATIENT
Start: 2019-09-30 | End: 2019-09-30

## 2019-09-30 RX ORDER — ASPIRIN 325 MG
325 TABLET ORAL DAILY
Status: DISCONTINUED | OUTPATIENT
Start: 2019-10-01 | End: 2019-10-02 | Stop reason: HOSPADM

## 2019-09-30 RX ORDER — ONDANSETRON 2 MG/ML
4 INJECTION INTRAMUSCULAR; INTRAVENOUS EVERY 6 HOURS PRN
Status: DISCONTINUED | OUTPATIENT
Start: 2019-09-30 | End: 2019-10-02 | Stop reason: HOSPADM

## 2019-09-30 RX ADMIN — LABETALOL 20 MG/4 ML (5 MG/ML) INTRAVENOUS SYRINGE 20 MG: at 16:22

## 2019-09-30 RX ADMIN — SODIUM CHLORIDE, PRESERVATIVE FREE 10 ML: 5 INJECTION INTRAVENOUS at 20:06

## 2019-09-30 RX ADMIN — ALTEPLASE 6.84 MG: KIT at 16:30

## 2019-09-30 RX ADMIN — SODIUM CHLORIDE 5 MG/HR: 9 INJECTION, SOLUTION INTRAVENOUS at 18:50

## 2019-09-30 RX ADMIN — SODIUM CHLORIDE 100 ML/HR: 9 INJECTION, SOLUTION INTRAVENOUS at 17:04

## 2019-09-30 RX ADMIN — ACETAMINOPHEN 650 MG: 325 TABLET ORAL at 22:40

## 2019-09-30 RX ADMIN — ALTEPLASE 61.56 MG: KIT at 16:09

## 2019-09-30 RX ADMIN — PANTOPRAZOLE SODIUM 40 MG: 40 INJECTION, POWDER, FOR SOLUTION INTRAVENOUS at 18:51

## 2019-09-30 RX ADMIN — ATORVASTATIN CALCIUM 80 MG: 40 TABLET, FILM COATED ORAL at 20:05

## 2019-09-30 NOTE — TELEPHONE ENCOUNTER
Patient states she cannot find her bottle and needs new rx. Has not had meds in about 4 days. Patient can be reached at [phone] if needed.

## 2019-10-01 ENCOUNTER — APPOINTMENT (OUTPATIENT)
Dept: CARDIOLOGY | Facility: HOSPITAL | Age: 64
End: 2019-10-01

## 2019-10-01 ENCOUNTER — APPOINTMENT (OUTPATIENT)
Dept: CT IMAGING | Facility: HOSPITAL | Age: 64
End: 2019-10-01

## 2019-10-01 LAB
ALBUMIN SERPL-MCNC: 4.1 G/DL (ref 3.5–5.2)
ALBUMIN/GLOB SERPL: 2.1 G/DL
ALP SERPL-CCNC: 59 U/L (ref 39–117)
ALT SERPL W P-5'-P-CCNC: 12 U/L (ref 1–33)
ANION GAP SERPL CALCULATED.3IONS-SCNC: 10 MMOL/L (ref 5–15)
AST SERPL-CCNC: 13 U/L (ref 1–32)
BH CV ECHO MEAS - AO MAX PG (FULL): 3.9 MMHG
BH CV ECHO MEAS - AO MAX PG: 7.8 MMHG
BH CV ECHO MEAS - AO MEAN PG (FULL): 3 MMHG
BH CV ECHO MEAS - AO MEAN PG: 5 MMHG
BH CV ECHO MEAS - AO ROOT AREA (BSA CORRECTED): 1.8
BH CV ECHO MEAS - AO ROOT AREA: 8 CM^2
BH CV ECHO MEAS - AO ROOT DIAM: 3.2 CM
BH CV ECHO MEAS - AO V2 MAX: 140 CM/SEC
BH CV ECHO MEAS - AO V2 MEAN: 101 CM/SEC
BH CV ECHO MEAS - AO V2 VTI: 34.5 CM
BH CV ECHO MEAS - AVA(I,A): 1.7 CM^2
BH CV ECHO MEAS - AVA(I,D): 1.7 CM^2
BH CV ECHO MEAS - AVA(V,A): 1.8 CM^2
BH CV ECHO MEAS - AVA(V,D): 1.8 CM^2
BH CV ECHO MEAS - BSA(HAYCOCK): 1.8 M^2
BH CV ECHO MEAS - BSA: 1.7 M^2
BH CV ECHO MEAS - BZI_BMI: 30.8 KILOGRAMS/M^2
BH CV ECHO MEAS - BZI_METRIC_HEIGHT: 154.9 CM
BH CV ECHO MEAS - BZI_METRIC_WEIGHT: 73.9 KG
BH CV ECHO MEAS - EDV(CUBED): 54.4 ML
BH CV ECHO MEAS - EDV(MOD-SP2): 48 ML
BH CV ECHO MEAS - EDV(MOD-SP4): 48 ML
BH CV ECHO MEAS - EDV(TEICH): 61.6 ML
BH CV ECHO MEAS - EF(CUBED): 76.2 %
BH CV ECHO MEAS - EF(MOD-BP): 67 %
BH CV ECHO MEAS - EF(MOD-SP2): 70.8 %
BH CV ECHO MEAS - EF(MOD-SP4): 64.6 %
BH CV ECHO MEAS - EF(TEICH): 68.9 %
BH CV ECHO MEAS - ESV(CUBED): 13 ML
BH CV ECHO MEAS - ESV(MOD-SP2): 14 ML
BH CV ECHO MEAS - ESV(MOD-SP4): 17 ML
BH CV ECHO MEAS - ESV(TEICH): 19.1 ML
BH CV ECHO MEAS - FS: 38 %
BH CV ECHO MEAS - IVS/LVPW: 0.93
BH CV ECHO MEAS - IVSD: 1.2 CM
BH CV ECHO MEAS - LAD MAJOR: 3.8 CM
BH CV ECHO MEAS - LAT PEAK E' VEL: 7.4 CM/SEC
BH CV ECHO MEAS - LATERAL E/E' RATIO: 10
BH CV ECHO MEAS - LV DIASTOLIC VOL/BSA (35-75): 27.7 ML/M^2
BH CV ECHO MEAS - LV MASS(C)D: 139.5 GRAMS
BH CV ECHO MEAS - LV MASS(C)DI: 80.6 GRAMS/M^2
BH CV ECHO MEAS - LV MAX PG: 3.9 MMHG
BH CV ECHO MEAS - LV MEAN PG: 2 MMHG
BH CV ECHO MEAS - LV SYSTOLIC VOL/BSA (12-30): 9.8 ML/M^2
BH CV ECHO MEAS - LV V1 MAX: 99.3 CM/SEC
BH CV ECHO MEAS - LV V1 MEAN: 67.9 CM/SEC
BH CV ECHO MEAS - LV V1 VTI: 22.4 CM
BH CV ECHO MEAS - LVIDD: 3.8 CM
BH CV ECHO MEAS - LVIDS: 2.4 CM
BH CV ECHO MEAS - LVLD AP2: 6.3 CM
BH CV ECHO MEAS - LVLD AP4: 6.6 CM
BH CV ECHO MEAS - LVLS AP2: 4.8 CM
BH CV ECHO MEAS - LVLS AP4: 5.2 CM
BH CV ECHO MEAS - LVOT AREA (M): 2.5 CM^2
BH CV ECHO MEAS - LVOT AREA: 2.5 CM^2
BH CV ECHO MEAS - LVOT DIAM: 1.8 CM
BH CV ECHO MEAS - LVPWD: 1.2 CM
BH CV ECHO MEAS - MED PEAK E' VEL: 6.9 CM/SEC
BH CV ECHO MEAS - MEDIAL E/E' RATIO: 10.6
BH CV ECHO MEAS - MV A MAX VEL: 92.3 CM/SEC
BH CV ECHO MEAS - MV DEC TIME: 0.19 SEC
BH CV ECHO MEAS - MV E MAX VEL: 73.5 CM/SEC
BH CV ECHO MEAS - MV E/A: 0.8
BH CV ECHO MEAS - MV MAX PG: 6.9 MMHG
BH CV ECHO MEAS - MV MEAN PG: 2 MMHG
BH CV ECHO MEAS - MV V2 MAX: 131 CM/SEC
BH CV ECHO MEAS - MV V2 MEAN: 70.1 CM/SEC
BH CV ECHO MEAS - MV V2 VTI: 38.1 CM
BH CV ECHO MEAS - MVA(VTI): 1.5 CM^2
BH CV ECHO MEAS - PA ACC SLOPE: 450.5 CM/SEC^2
BH CV ECHO MEAS - PA ACC TIME: 0.14 SEC
BH CV ECHO MEAS - PA MAX PG: 2.1 MMHG
BH CV ECHO MEAS - PA PR(ACCEL): 14.2 MMHG
BH CV ECHO MEAS - PA V2 MAX: 71.8 CM/SEC
BH CV ECHO MEAS - RVSP: 24 MMHG
BH CV ECHO MEAS - SI(AO): 160.2 ML/M^2
BH CV ECHO MEAS - SI(CUBED): 23.9 ML/M^2
BH CV ECHO MEAS - SI(LVOT): 32.9 ML/M^2
BH CV ECHO MEAS - SI(MOD-SP2): 19.6 ML/M^2
BH CV ECHO MEAS - SI(MOD-SP4): 17.9 ML/M^2
BH CV ECHO MEAS - SI(TEICH): 24.5 ML/M^2
BH CV ECHO MEAS - SV(AO): 277.5 ML
BH CV ECHO MEAS - SV(CUBED): 41.5 ML
BH CV ECHO MEAS - SV(LVOT): 57 ML
BH CV ECHO MEAS - SV(MOD-SP2): 34 ML
BH CV ECHO MEAS - SV(MOD-SP4): 31 ML
BH CV ECHO MEAS - SV(TEICH): 42.4 ML
BH CV ECHO MEAS - TAPSE (>1.6): 2.8 CM2
BH CV ECHO MEAS - TR MAX PG: 21 MMHG
BH CV ECHO MEAS - TR MAX VEL: 170 CM/SEC
BH CV ECHO MEASUREMENTS AVERAGE E/E' RATIO: 10.28
BH CV VAS BP RIGHT ARM: NORMAL MMHG
BH CV XLRA - RV BASE: 2 CM
BH CV XLRA - RV LENGTH: 6.3 CM
BH CV XLRA - RV MID: 1.52 CM
BH CV XLRA - TDI S': 12.9 CM/SEC
BH CV XLRA MEAS LEFT CCA RATIO VEL: 102 CM/SEC
BH CV XLRA MEAS LEFT DIST CCA EDV: 21.2 CM/SEC
BH CV XLRA MEAS LEFT DIST CCA PSV: 102.9 CM/SEC
BH CV XLRA MEAS LEFT DIST ICA EDV: 34.6 CM/SEC
BH CV XLRA MEAS LEFT DIST ICA PSV: 109.2 CM/SEC
BH CV XLRA MEAS LEFT ICA RATIO VEL: 112 CM/SEC
BH CV XLRA MEAS LEFT ICA/CCA RATIO: 1.1
BH CV XLRA MEAS LEFT MID CCA EDV: 20.4 CM/SEC
BH CV XLRA MEAS LEFT MID CCA PSV: 88.8 CM/SEC
BH CV XLRA MEAS LEFT MID ICA EDV: 36.1 CM/SEC
BH CV XLRA MEAS LEFT MID ICA PSV: 80.1 CM/SEC
BH CV XLRA MEAS LEFT PROX CCA EDV: 20.4 CM/SEC
BH CV XLRA MEAS LEFT PROX CCA PSV: 110 CM/SEC
BH CV XLRA MEAS LEFT PROX ECA PSV: 95.9 CM/SEC
BH CV XLRA MEAS LEFT PROX ICA EDV: 36.9 CM/SEC
BH CV XLRA MEAS LEFT PROX ICA PSV: 113.1 CM/SEC
BH CV XLRA MEAS LEFT PROX SCLA PSV: 130.2 CM/SEC
BH CV XLRA MEAS LEFT VERTEBRAL A PSV: 87.2 CM/SEC
BH CV XLRA MEAS RIGHT CCA RATIO VEL: 73.3 CM/SEC
BH CV XLRA MEAS RIGHT DIST CCA EDV: 18.9 CM/SEC
BH CV XLRA MEAS RIGHT DIST CCA PSV: 74 CM/SEC
BH CV XLRA MEAS RIGHT DIST ICA EDV: 41 CM/SEC
BH CV XLRA MEAS RIGHT DIST ICA PSV: 124.9 CM/SEC
BH CV XLRA MEAS RIGHT ICA RATIO VEL: 102 CM/SEC
BH CV XLRA MEAS RIGHT ICA/CCA RATIO: 1.4
BH CV XLRA MEAS RIGHT MID CCA EDV: 21.6 CM/SEC
BH CV XLRA MEAS RIGHT MID CCA PSV: 67.7 CM/SEC
BH CV XLRA MEAS RIGHT MID ICA EDV: 39.8 CM/SEC
BH CV XLRA MEAS RIGHT MID ICA PSV: 102.7 CM/SEC
BH CV XLRA MEAS RIGHT PROX CCA EDV: 27.9 CM/SEC
BH CV XLRA MEAS RIGHT PROX CCA PSV: 96.9 CM/SEC
BH CV XLRA MEAS RIGHT PROX ECA EDV: 74.4 CM/SEC
BH CV XLRA MEAS RIGHT PROX ECA PSV: 479.7 CM/SEC
BH CV XLRA MEAS RIGHT PROX ICA EDV: 31.4 CM/SEC
BH CV XLRA MEAS RIGHT PROX ICA PSV: 97.8 CM/SEC
BH CV XLRA MEAS RIGHT PROX SCLA PSV: 181 CM/SEC
BH CV XLRA MEAS RIGHT VERTEBRAL A PSV: 31.9 CM/SEC
BILIRUB SERPL-MCNC: 0.5 MG/DL (ref 0.2–1.2)
BUN BLD-MCNC: 15 MG/DL (ref 8–23)
BUN/CREAT SERPL: 21.1 (ref 7–25)
CALCIUM SPEC-SCNC: 9.3 MG/DL (ref 8.6–10.5)
CHLORIDE SERPL-SCNC: 105 MMOL/L (ref 98–107)
CHOLEST SERPL-MCNC: 127 MG/DL (ref 0–200)
CO2 SERPL-SCNC: 30 MMOL/L (ref 22–29)
CREAT BLD-MCNC: 0.71 MG/DL (ref 0.57–1)
DEPRECATED RDW RBC AUTO: 45.6 FL (ref 37–54)
ERYTHROCYTE [DISTWIDTH] IN BLOOD BY AUTOMATED COUNT: 14.4 % (ref 12.3–15.4)
GFR SERPL CREATININE-BSD FRML MDRD: 83 ML/MIN/1.73
GLOBULIN UR ELPH-MCNC: 2 GM/DL
GLUCOSE BLD-MCNC: 98 MG/DL (ref 65–99)
GLUCOSE BLDC GLUCOMTR-MCNC: 117 MG/DL (ref 70–130)
GLUCOSE BLDC GLUCOMTR-MCNC: 98 MG/DL (ref 70–130)
HBA1C MFR BLD: 5.6 % (ref 4.8–5.6)
HCT VFR BLD AUTO: 39 % (ref 34–46.6)
HDLC SERPL-MCNC: 40 MG/DL (ref 40–60)
HGB BLD-MCNC: 12.6 G/DL (ref 12–15.9)
LDLC SERPL CALC-MCNC: 65 MG/DL (ref 0–100)
LDLC/HDLC SERPL: 1.62 {RATIO}
LEFT ARM BP: NORMAL MMHG
LV EF 2D ECHO EST: 68 %
MAXIMAL PREDICTED HEART RATE: 158 BPM
MCH RBC QN AUTO: 28.4 PG (ref 26.6–33)
MCHC RBC AUTO-ENTMCNC: 32.3 G/DL (ref 31.5–35.7)
MCV RBC AUTO: 88 FL (ref 79–97)
PLATELET # BLD AUTO: 164 10*3/MM3 (ref 140–450)
PMV BLD AUTO: 10.2 FL (ref 6–12)
POTASSIUM BLD-SCNC: 4.3 MMOL/L (ref 3.5–5.2)
PROT SERPL-MCNC: 6.1 G/DL (ref 6–8.5)
RBC # BLD AUTO: 4.43 10*6/MM3 (ref 3.77–5.28)
SODIUM BLD-SCNC: 145 MMOL/L (ref 136–145)
STRESS TARGET HR: 134 BPM
TRIGL SERPL-MCNC: 111 MG/DL (ref 0–150)
TSH SERPL DL<=0.05 MIU/L-ACNC: 1.41 UIU/ML (ref 0.27–4.2)
VLDLC SERPL-MCNC: 22.2 MG/DL
WBC NRBC COR # BLD: 4.8 10*3/MM3 (ref 3.4–10.8)

## 2019-10-01 PROCEDURE — 70496 CT ANGIOGRAPHY HEAD: CPT

## 2019-10-01 PROCEDURE — 97165 OT EVAL LOW COMPLEX 30 MIN: CPT

## 2019-10-01 PROCEDURE — 83036 HEMOGLOBIN GLYCOSYLATED A1C: CPT | Performed by: NURSE PRACTITIONER

## 2019-10-01 PROCEDURE — 70450 CT HEAD/BRAIN W/O DYE: CPT

## 2019-10-01 PROCEDURE — 97110 THERAPEUTIC EXERCISES: CPT

## 2019-10-01 PROCEDURE — 99255 IP/OBS CONSLTJ NEW/EST HI 80: CPT | Performed by: PSYCHIATRY & NEUROLOGY

## 2019-10-01 PROCEDURE — 97161 PT EVAL LOW COMPLEX 20 MIN: CPT

## 2019-10-01 PROCEDURE — 93880 EXTRACRANIAL BILAT STUDY: CPT | Performed by: INTERNAL MEDICINE

## 2019-10-01 PROCEDURE — 92523 SPEECH SOUND LANG COMPREHEN: CPT

## 2019-10-01 PROCEDURE — 82962 GLUCOSE BLOOD TEST: CPT

## 2019-10-01 PROCEDURE — 93306 TTE W/DOPPLER COMPLETE: CPT | Performed by: INTERNAL MEDICINE

## 2019-10-01 PROCEDURE — 85027 COMPLETE CBC AUTOMATED: CPT | Performed by: NURSE PRACTITIONER

## 2019-10-01 PROCEDURE — 84443 ASSAY THYROID STIM HORMONE: CPT | Performed by: NURSE PRACTITIONER

## 2019-10-01 PROCEDURE — 80061 LIPID PANEL: CPT | Performed by: NURSE PRACTITIONER

## 2019-10-01 PROCEDURE — 93880 EXTRACRANIAL BILAT STUDY: CPT

## 2019-10-01 PROCEDURE — 70498 CT ANGIOGRAPHY NECK: CPT

## 2019-10-01 PROCEDURE — 0 IOPAMIDOL PER 1 ML: Performed by: INTERNAL MEDICINE

## 2019-10-01 PROCEDURE — 93306 TTE W/DOPPLER COMPLETE: CPT

## 2019-10-01 PROCEDURE — 99232 SBSQ HOSP IP/OBS MODERATE 35: CPT | Performed by: INTERNAL MEDICINE

## 2019-10-01 PROCEDURE — 80053 COMPREHEN METABOLIC PANEL: CPT | Performed by: NURSE PRACTITIONER

## 2019-10-01 RX ORDER — ISOSORBIDE MONONITRATE 30 MG/1
30 TABLET, EXTENDED RELEASE ORAL DAILY
Status: DISCONTINUED | OUTPATIENT
Start: 2019-10-01 | End: 2019-10-02 | Stop reason: HOSPADM

## 2019-10-01 RX ORDER — PANTOPRAZOLE SODIUM 40 MG/1
40 TABLET, DELAYED RELEASE ORAL
Status: DISCONTINUED | OUTPATIENT
Start: 2019-10-01 | End: 2019-10-02 | Stop reason: HOSPADM

## 2019-10-01 RX ORDER — ALPRAZOLAM 0.5 MG/1
1 TABLET ORAL ONCE AS NEEDED
Status: DISCONTINUED | OUTPATIENT
Start: 2019-10-01 | End: 2019-10-02 | Stop reason: HOSPADM

## 2019-10-01 RX ORDER — METOPROLOL TARTRATE 50 MG/1
50 TABLET, FILM COATED ORAL 2 TIMES DAILY
Status: DISCONTINUED | OUTPATIENT
Start: 2019-10-01 | End: 2019-10-02 | Stop reason: HOSPADM

## 2019-10-01 RX ORDER — ROSUVASTATIN CALCIUM 20 MG/1
40 TABLET, COATED ORAL DAILY
Status: DISCONTINUED | OUTPATIENT
Start: 2019-10-01 | End: 2019-10-01 | Stop reason: SDUPTHER

## 2019-10-01 RX ORDER — LOSARTAN POTASSIUM 25 MG/1
50 TABLET ORAL DAILY
Status: DISCONTINUED | OUTPATIENT
Start: 2019-10-01 | End: 2019-10-02 | Stop reason: HOSPADM

## 2019-10-01 RX ORDER — PANTOPRAZOLE SODIUM 40 MG/1
40 TABLET, DELAYED RELEASE ORAL 2 TIMES DAILY
Status: DISCONTINUED | OUTPATIENT
Start: 2019-10-01 | End: 2019-10-01

## 2019-10-01 RX ADMIN — PANTOPRAZOLE SODIUM 40 MG: 40 TABLET, DELAYED RELEASE ORAL at 08:12

## 2019-10-01 RX ADMIN — ATORVASTATIN CALCIUM 80 MG: 40 TABLET, FILM COATED ORAL at 19:55

## 2019-10-01 RX ADMIN — IOPAMIDOL 75 ML: 755 INJECTION, SOLUTION INTRAVENOUS at 16:41

## 2019-10-01 RX ADMIN — METOPROLOL TARTRATE 50 MG: 50 TABLET ORAL at 19:55

## 2019-10-01 RX ADMIN — LOSARTAN POTASSIUM 50 MG: 25 TABLET, FILM COATED ORAL at 11:52

## 2019-10-01 RX ADMIN — PANTOPRAZOLE SODIUM 40 MG: 40 TABLET, DELAYED RELEASE ORAL at 17:38

## 2019-10-01 RX ADMIN — SODIUM CHLORIDE, PRESERVATIVE FREE 10 ML: 5 INJECTION INTRAVENOUS at 19:52

## 2019-10-01 RX ADMIN — METOPROLOL TARTRATE 50 MG: 50 TABLET ORAL at 11:53

## 2019-10-01 RX ADMIN — SODIUM CHLORIDE, PRESERVATIVE FREE 10 ML: 5 INJECTION INTRAVENOUS at 08:05

## 2019-10-01 RX ADMIN — PANTOPRAZOLE SODIUM 40 MG: 40 INJECTION, POWDER, FOR SOLUTION INTRAVENOUS at 06:17

## 2019-10-01 RX ADMIN — ISOSORBIDE MONONITRATE 30 MG: 30 TABLET, EXTENDED RELEASE ORAL at 11:52

## 2019-10-01 NOTE — PLAN OF CARE
Problem: Stroke (Ischemic) (Adult)  Goal: Signs and Symptoms of Listed Potential Problems Will be Absent, Minimized or Managed (Stroke)  Outcome: Ongoing (interventions implemented as appropriate)   10/01/19 7770   Goal/Outcome Evaluation   Problems Assessed (Stroke (Ischemic)) communication impairment;cognitive impairment   Problems Assessed (Stroke (Ischemic)) none       Problem: Patient Care Overview  Goal: Plan of Care Review  Outcome: Ongoing (interventions implemented as appropriate)   10/01/19 8460   Coping/Psychosocial   Plan of Care Reviewed With patient;spouse   SLP evaluation completed. Will sign-off as cognitive-communication skills appeared to be WFL and pt declined further SLP intervention. Please see note for further details and recommendations.

## 2019-10-01 NOTE — CONSULTS
Order criteria not met for current diabetes education consult. Current A1c is 5.6, no home meds, no diagnosis noted upon chart review. Please reconsult if needed. Thank you.

## 2019-10-01 NOTE — PROGRESS NOTES
INTENSIVIST / PULMONARY FOLLOW UP NOTE     Hospital:  LOS: 1 day   Ms. Jasvir Hamilton, 64 y.o. female is followed for:     Acute ischemic stroke (CMS/HCC)    Essential hypertension    GERD (gastroesophageal reflux disease)    Hyperlipemia    Coronary artery disease of native heart with stable angina pectoris (CMS/HCC)    Tobacco abuse    Carotid artery disease (CMS/HCC)          SUBJECTIVE   Approaching neuro baseline    The patient's relevant past medical, surgical, family, and social history were reviewed    Allergies and medications were reviewed    ROS:  Per subjective, all other systems were reviewed and were negative        OBJECTIVE     Vital Sign Min/Max for last 24 hours:  Temp  Min: 97.6 °F (36.4 °C)  Max: 98.3 °F (36.8 °C)   BP  Min: 121/68  Max: 232/118   Pulse  Min: 64  Max: 80   Resp  Min: 12  Max: 21   SpO2  Min: 92 %  Max: 99 %   No Data Recorded     Physical Exam:  General Appearance:  Conversant, in no acute distress  Eyes:  No scleral icterus or pallor, pupils normal  Ears, Nose, Mouth, Throat:  Atraumatic, oropharynx clear  Neck:  Trachea midline, thyroid normal  Respiratory:  Clear to auscultation bilaterally, normal effort, no tenderness to palpation  Cardiovascular:  Regular rate and rhythm, no murmurs, no peripheral edema, no thrill  Gastrointestinal:  Soft, non-tender, non-distended, no hepatosplenomegaly  Skin:  Normal temperature, no rash  Psychiatric:  Alert and oriented x 3, normal judgement and insight  Neuro:    Interval: (shift change)  1a. Level of Consciousness: 0-->Alert, keenly responsive  1b. LOC Questions: 0-->Answers both questions correctly  1c. LOC Commands: 0-->Performs both tasks correctly  2. Best Gaze: 0-->Normal  3. Visual: 0-->No visual loss  4. Facial Palsy: 0-->Normal symmetrical movements  5a. Motor Arm, Left: 0-->No drift, limb holds 90 (or 45) degrees for full 10 secs  5b. Motor Arm, Right: 0-->No drift, limb holds 90 (or 45) degrees for full 10 secs  6a.  Motor Leg, Left: 0-->No drift, leg holds 30 degree position for full 5 secs  6b. Motor Leg, Right: 0-->No drift, leg holds 30 degree position for full 5 secs  7. Limb Ataxia: 0-->Absent  8. Sensory: 0-->Normal, no sensory loss  9. Best Language: 0-->No aphasia, normal  10. Dysarthria: 0-->Normal  11. Extinction and Inattention (formerly Neglect): 0-->No abnormality    Total (NIH Stroke Scale): 0      Telemetry:              Hemodynamics:   CVP:     PAP:     PAOP:     CO:     CI:     SVI:     SVR:       SpO2: 95 % SpO2  Min: 92 %  Max: 99 %   Device:      Flow Rate:   No Data Recorded     Mechanical Ventilator Settings:                                         Intake/Ouptut 24 hrs (7:00AM - 6:59 AM)  Intake & Output (last 3 days)       09/28 0701 - 09/29 0700 09/29 0701 - 09/30 0700 09/30 0701 - 10/01 0700 10/01 0701 - 10/02 0700    I.V. (mL/kg)   56.4 (0.8)     Total Intake(mL/kg)   56.4 (0.8)     Urine (mL/kg/hr)   1500     Total Output   1500     Net   -1443.6             Urine Unmeasured Occurrence   1 x           Lines, Drains & Airways    Active LDAs     Name:   Placement date:   Placement time:   Site:   Days:    Peripheral IV 09/30/19 1540 Right Antecubital   09/30/19    1540    Antecubital   less than 1    Peripheral IV 09/30/19 1601 Right Arm   09/30/19    1601    Arm   less than 1                Hematology:  Results from last 7 days   Lab Units 10/01/19  0445 09/30/19  1549 09/30/19  1540   WBC 10*3/mm3 4.80 4.13  --    HEMOGLOBIN g/dL 12.6 11.8*  --    HEMOGLOBIN, POC g/dL  --   --  12.2   HEMATOCRIT % 39.0 36.2  --    HEMATOCRIT POC %  --   --  36*   PLATELETS 10*3/mm3 164 171  --      Electrolytes, Magnesium and Phosphorus:  Results from last 7 days   Lab Units 10/01/19  0445   SODIUM mmol/L 145   CHLORIDE mmol/L 105   POTASSIUM mmol/L 4.3   CO2 mmol/L 30.0*     Renal:  Results from last 7 days   Lab Units 10/01/19  0445 09/30/19  1540   CREATININE mg/dL 0.71 0.80   BUN mg/dL 15  --      Estimated  Creatinine Clearance: 75.7 mL/min (by C-G formula based on SCr of 0.71 mg/dL).  Hepatic:  Results from last 7 days   Lab Units 10/01/19  0445 09/30/19  1549   ALK PHOS U/L 59  --    BILIRUBIN mg/dL 0.5  --    ALT (SGPT) U/L 12 12   AST (SGOT) U/L 13 13     Arterial Blood Gases:        Results from last 7 days   Lab Units 10/01/19  0445   HEMOGLOBIN A1C % 5.60       No results found for: LACTATE    Relevant imaging studies and labs from 10/01/19 were reviewed and interpreted by me    Medications (drips):    niCARdipine Last Rate: Stopped (09/30/19 2032)         aspirin 325 mg Oral Daily   Or      aspirin 300 mg Rectal Daily   atorvastatin 80 mg Oral Nightly   pantoprazole 40 mg Oral BID AC   sodium chloride 10 mL Intravenous Q12H       Assessment/Plan   IMPRESSION / PLAN     Inpatient Problem List:  64 y.o.female:  Active Hospital Problems    Diagnosis   • **Acute ischemic stroke (CMS/HCC)   • Carotid artery disease (CMS/[facility])     1.  Carotid duplex study August 31, 2012  · R ICA less than 50%  · LICA less than 50%  · Bilateral common carotid arteries reveal no evidence of significant stenosis patent vertebral arteries bilaterally  ·      • Tobacco abuse   • Essential hypertension   • GERD (gastroesophageal reflux disease)   • Hyperlipemia   • Coronary artery disease of native heart with stable angina pectoris (CMS/[facility])     1. [facility] 3-16-12  · One vessel CAD with 60-70% eccentric stenosis of the mid LAD  · LVEF 65-70%    2. [facility] 1-26-15  · Coronary angioplasty with stenting to the mid LAD  · Entry into the ABSORB bio-absorbable drug eluding stent trial          Impression:  64 y.o.female with relevant PMH of HTN, HLD, GERD, CAD w/cardiac stents and ongoing tobacco abuse admitted 9/30/2019 w/ expressive aphasia w/ initial NIHSS of 6.  S/p TPA after blood pressure control.    Plan:  Possible Stroke - per Neurology    CAD - on plavix at home, not asa    BP /  Glycemic Control    PT/OT    To tele    Nutrition - Diet Regular; Thin; Cardiac    Plan of care and goals reviewed with mulitdisciplinary team at daily rounds           Aniket Brito MD  Intensive Care Medicine  10/01/19 9:52 AM

## 2019-10-01 NOTE — THERAPY EVALUATION
Patient Name: Jasvir Hamilton  : 1955    MRN: [id number]                              Today's Date: 10/1/2019       Admit Date: 2019    Visit Dx:     ICD-10-CM ICD-9-CM   1. Acute ischemic stroke (CMS/HCC) I63.9 434.91   2. Impaired mobility and ADLs Z74.09 799.89     Patient Active Problem List   Diagnosis   • Essential hypertension   • GERD (gastroesophageal reflux disease)   • Hyperlipemia   • Cobalamin deficiency   • History of vitamin D deficiency   • Coronary artery disease of native heart with stable angina pectoris (CMS/HCC)   • Bilateral carotid bruits   • Tobacco abuse   • Obesity (BMI 30.0-34.9)   • Carotid artery disease (CMS/HCC)   • Acute ischemic stroke (CMS/HCC)     Past Medical History:   Diagnosis Date   • Chicken pox    • Diverticulitis    • Easy bruising    • Gall stones    • GERD (gastroesophageal reflux disease)    • Glaucoma    • Hyperlipemia    • Hypertension    • Measles    • Menopause    • Mumps    • Positive PPD    • Scarlet fever      Past Surgical History:   Procedure Laterality Date   • CARDIAC CATHETERIZATION     • CARPAL TUNNEL RELEASE      both hands   • CHOLECYSTECTOMY     • COLONOSCOPY     • ECTOPIC PREGNANCY     • ENDOMETRIAL ABLATION     • HAND SURGERY     • OTHER SURGICAL HISTORY      lap and leep   • TUBAL ABDOMINAL LIGATION      with L salpingo-oophorectomy     General Information     Row Name 10/01/19 1434          PT Evaluation Time/Intention    Document Type  evaluation  -KR     Mode of Treatment  physical therapy  -KR     Row Name 10/01/19 1434          General Information    Patient Profile Reviewed?  yes  -KR     Prior Level of Function  independent:;all household mobility;gait;transfer;ADL's;dressing;bathing  -KR     Existing Precautions/Restrictions  no known precautions/restrictions  -KR     Barriers to Rehab  none identified  -KR     Row Name 10/01/19 1434          Relationship/Environment    Lives With  spouse  -KR     Row Name 10/01/19  1434          Resource/Environmental Concerns    Current Living Arrangements  home/apartment/condo  -KR     Row Name 10/01/19 1434          Home Main Entrance    Number of Stairs, Main Entrance  one  -KR     Stair Railings, Main Entrance  none  -KR     Row Name 10/01/19 1434          Stairs Within Home, Primary    Number of Stairs, Within Home, Primary  none  -KR     Row Name 10/01/19 1434          Cognitive Assessment/Intervention- PT/OT    Orientation Status (Cognition)  oriented x 4  -KR     Row Name 10/01/19 1434          Safety Issues, Functional Mobility    Safety Issues Affecting Function (Mobility)  safety precaution awareness;safety precautions follow-through/compliance  -KR     Impairments Affecting Function (Mobility)  endurance/activity tolerance;strength  -KR       User Key  (r) = Recorded By, (t) = Taken By, (c) = Cosigned By    Initials Name Provider Type    Nancy Garcia, PT Physical Therapist        Mobility     Row Name 10/01/19 1435          Bed Mobility Assessment/Treatment    Comment (Bed Mobility)  UIC  -KR     Row Name 10/01/19 1435          Transfer Assessment/Treatment    Comment (Transfers)  VC's for sequencing.   -KR     Row Name 10/01/19 1435          Sit-Stand Transfer    Sit-Stand [place] (Transfers)  supervision  -KR     Row Name 10/01/19 1435          Gait/Stairs Assessment/Training    Gait/Stairs Assessment/Training  gait/ambulation independence  -KR     [place] Level (Gait)  contact guard;verbal cues progressed to periods of SBA  -KR     Distance in Feet (Gait)  250  -KR     Pattern (Gait)  step-through  -KR     Deviations/Abnormal Patterns (Gait)  base of support, narrow;zackary decreased;stride length decreased  -KR     Bilateral Gait Deviations  heel strike decreased  -KR     Comment (Gait/Stairs)  Pt demonstrated step through gait pattern with slow zackary and decreased step length. Pt with good stability and no LOB; able to progress to periods of SBA. Pt denied  any c/o pain during ambulation.   -KR       User Key  (r) = Recorded By, (t) = Taken By, (c) = Cosigned By    Initials Name Provider Type    Nancy Garcia PT Physical Therapist        Obj/Interventions     Row Name 10/01/19 1439          General ROM    GENERAL ROM COMMENTS  BLE WFL   -KR     Row Name 10/01/19 1439          MMT (Manual Muscle Testing)    General MMT Comments  BLE grossly 4/5  -KR     Row Name 10/01/19 1441          Therapeutic Exercise    Lower Extremity (Therapeutic Exercise)  SLR (straight leg raise), bilateral  -KR     Lower Extremity Range of Motion (Therapeutic Exercise)  ankle dorsiflexion/plantar flexion, bilateral;hip abduction/adduction, bilateral  -KR     Exercise Type (Therapeutic Exercise)  AROM (active range of motion)  -KR     Position (Therapeutic Exercise)  seated  -KR     Sets/Reps (Therapeutic Exercise)  1/10  -KR     Row Name 10/01/19 1439          Static Sitting Balance    Level of [place] (Unsupported Sitting, Static Balance)  independent  -KR     Sitting Position (Unsupported Sitting, Static Balance)  sitting in chair  -KR     Row Name 10/01/19 1439          Static Standing Balance    Level of [place] (Supported Standing, Static Balance)  supervision  -KR     Row Name 10/01/19 1439          Dynamic Standing Balance    Level of [place], Reaches Outside Midline (Standing, Dynamic Balance)  standby assist  -KR     Row Name 10/01/19 1439          Sensory Assessment/Intervention    Sensory General Assessment  no sensation deficits identified  -KR       User Key  (r) = Recorded By, (t) = Taken By, (c) = Cosigned By    Initials Name Provider Type    Nancy Garcia PT Physical Therapist        Goals/Plan     Row Name 10/01/19 1440          Bed Mobility Goal 1 (PT)    Activity/Assistive Device (Bed Mobility Goal 1, PT)  bed mobility activities, all  -KR     [place] Level/Cues Needed (Bed Mobility Goal 1, PT)  independent  -KR     Time Frame (Bed Mobility  Goal 1, PT)  2 weeks  -KR     Progress/Outcomes (Bed Mobility Goal 1, PT)  goal ongoing  -KR     Row Name 10/01/19 1440          Transfer Goal 1 (PT)    Activity/Assistive Device (Transfer Goal 1, PT)  sit-to-stand/stand-to-sit;bed-to-chair/chair-to-bed  -KR     [place] Level/Cues Needed (Transfer Goal 1, PT)  independent  -KR     Time Frame (Transfer Goal 1, PT)  2 weeks  -KR     Progress/Outcome (Transfer Goal 1, PT)  goal ongoing  -KR     Row Name 10/01/19 1440          Gait Training Goal 1 (PT)    Activity/Assistive Device (Gait Training Goal 1, PT)  gait (walking locomotion)  -KR     [place] Level (Gait Training Goal 1, PT)  independent  -KR     Distance (Gait Goal 1, PT)  400 feet  -KR     Time Frame (Gait Training Goal 1, PT)  2 weeks  -KR     Progress/Outcome (Gait Training Goal 1, PT)  goal ongoing  -KR     Row Name 10/01/19 1440          Stairs Goal 1 (PT)    Activity/Assistive Device (Stairs Goal 1, PT)  ascending stairs;descending stairs;step-to-step  -KR     [place] Level/Cues Needed (Stairs Goal 1, PT)  supervision required  -KR     Number of Stairs (Stairs Goal 1, PT)  1  -KR     Time Frame (Stairs Goal 1, PT)  2 weeks  -KR     Progress/Outcome (Stairs Goal 1, PT)  goal ongoing  -KR       User Key  (r) = Recorded By, (t) = Taken By, (c) = Cosigned By    Initials Name Provider Type    Nancy Garcia, PT Physical Therapist        Clinical Impression     Row Name 10/01/19 1439          Pain Assessment    Additional Documentation  Pain Scale: Numbers Pre/Post-Treatment (Group)  -KR     Row Name 10/01/19 1439          Pain Scale: Numbers Pre/Post-Treatment    Pain Scale: Numbers, Pretreatment  0/10 - no pain  -KR     Pain Scale: Numbers, Post-Treatment  0/10 - no pain  -KR     Row Name 10/01/19 1439          Plan of Care Review    Plan of Care Reviewed With  patient  -KR     Row Name 10/01/19 1439          Physical Therapy Clinical Impression    Patient/Family Goals Statement (PT  Clinical Impression)  return to PLOF  -KR     Criteria for Skilled Interventions Met (PT Clinical Impression)  yes;treatment indicated  -KR     Rehab Potential (PT Clinical Summary)  good, to achieve stated therapy goals  -KR     Row Name 10/01/19 1439          Vital Signs    Pre Systolic BP Rehab  167  -KR     Pre Treatment Diastolic BP  86  -KR     Post Systolic BP Rehab  162  -KR     Post Treatment Diastolic BP  76  -KR     Pretreatment Heart Rate (beats/min)  80  -KR     Posttreatment Heart Rate (beats/min)  72  -KR     Pre SpO2 (%)  98  -KR     O2 Delivery Pre Treatment  room air  -KR     Post SpO2 (%)  97  -KR     O2 Delivery Post Treatment  room air  -KR     Pre Patient Position  Standing  -KR     Intra Patient Position  Standing  -KR     Post Patient Position  Sitting  -KR     Row Name 10/01/19 1439          Positioning and Restraints    Pre-Treatment Position  standing in room  -KR     Post Treatment Position  chair  -KR     In Chair  notified nsg;reclined;call light within reach;encouraged to call for assist;exit alarm on;legs elevated  -KR       User Key  (r) = Recorded By, (t) = Taken By, (c) = Cosigned By    Initials Name Provider Type    Nancy Garcia, PT Physical Therapist        Outcome Measures     Row Name 10/01/19 1443          How much help from another person do you currently need...    Turning from your back to your side while in flat bed without using bedrails?  4  -KR     Moving from lying on back to sitting on the side of a flat bed without bedrails?  3  -KR     Moving to and from a bed to a chair (including a wheelchair)?  3  -KR     Standing up from a chair using your arms (e.g., wheelchair, bedside chair)?  3  -KR     Climbing 3-5 steps with a railing?  3  -KR     To walk in hospital room?  3  -KR     AM-PAC 6 Clicks Score (PT)  19  -KR     Row Name 10/01/19 1444 10/01/19 1443       Modified Tony Scale    Pre-Stroke Modified Holt Scale  0 - No Symptoms at all.  -KR  0 - No  Symptoms at all.  -KR    Modified [place] Scale  1 - No significant disability despite symptoms.  Able to carry out all usual duties and activities.  -KR  --    Row Name 10/01/19 1443          Functional Assessment    Outcome Measure Options  AM-PAC 6 Clicks Basic Mobility (PT);Modified Tony  -KR       User Key  (r) = Recorded By, (t) = Taken By, (c) = Cosigned By    Initials Name Provider Type    Nancy Garcia, PT Physical Therapist        Physical Therapy Education     Title: PT OT SLP Therapies (In Progress)     Topic: Physical Therapy (Done)     Point: Mobility training (Done)     Learning Progress Summary           Patient Acceptance, E, DU,NR by TAD at 10/1/2019  9:17 AM                   Point: Home exercise program (Done)     Learning Progress Summary           Patient Acceptance, E, DU,NR by TAD at 10/1/2019  9:17 AM                   Point: Body mechanics (Done)     Learning Progress Summary           Patient Acceptance, E, DU,NR by TAD at 10/1/2019  9:17 AM                   Point: Precautions (Done)     Learning Progress Summary           Patient Acceptance, E, DU,NR by TAD at 10/1/2019  9:17 AM                               User Key     Initials Effective Dates Name Provider Type Discipline    TAD 04/03/18 -  Nancy Shelton, PT Physical Therapist PT              PT Recommendation and Plan  Planned Therapy Interventions (PT Eval): balance training, bed mobility training, gait training, stair training, strengthening, transfer training  Outcome Summary/Treatment Plan (PT)  Anticipated Discharge Disposition (PT): home with assist  Plan of Care Reviewed With: patient  Outcome Summary: PT initial evaluation completed for pt presenting with generalized weakness and decreased functional mobility. Pt ambulated 250ft with CGA, progressing to SBA. Pt's decreased independence warrants PT skilled care. Recommend D/C home with assistance.       Time Calculation:   PT Charges     Row Name 10/01/19 3366              Time Calculation    Start Time  0927  -KR      PT Received On  10/01/19  -TAD      PT Goal Re-Cert Due Date  10/11/19  -TAD         Time Calculation- PT    Total Timed Code Minutes- PT  8 minute(s)  -KR         Timed Charges    02644 - PT Therapeutic Exercise Minutes  8  -KR        User Key  (r) = Recorded By, (t) = Taken By, (c) = Cosigned By    Initials Name Provider Type    Nancy Garcia, PT Physical Therapist        Therapy Charges for Today     Code Description Service Date Service Provider Modifiers Qty    79663455798 HC PT THER PROC EA 15 MIN 10/1/2019 Nancy Shelton, PT GP 1    49888770515 HC PT EVAL LOW COMPLEXITY 3 10/1/2019 Nancy Shelton, PT GP 1          PT G-Codes  Outcome Measure Options: AM-PAC 6 Clicks Basic Mobility (PT), Modified Lamb  AM-PAC 6 Clicks Score (PT): 19  AM-PAC 6 Clicks Score (OT): 24  Modified Lamb Scale: 1 - No significant disability despite symptoms.  Able to carry out all usual duties and activities.    Celeste Shelton PT  10/1/2019

## 2019-10-01 NOTE — PROGRESS NOTES
Discharge Planning Assessment  [facility]     Patient Name: Jasvir Hamilton  MRN: [id number]  Today's Date: 10/1/2019    Admit Date: 9/30/2019    Discharge Needs Assessment     Row Name 10/01/19 1345       Living Environment    Lives With  spouse    Current Living Arrangements  home/apartment/condo    Primary Care Provided by  self    Provides Primary Care For  no one    Family Caregiver if Needed  spouse;other relative(s)    Quality of Family Relationships  helpful;supportive       Resource/Environmental Concerns    Resource/Environmental Concerns  none       Transition Planning    Patient/Family Anticipates Transition to  home with family    Patient/Family Anticipated Services at Transition  none    Transportation Anticipated  family or friend will provide       Discharge Needs Assessment    Readmission Within the Last 30 Days  no previous admission in last 30 days    Concerns to be Addressed  no discharge needs identified;denies needs/concerns at this time    Equipment Currently Used at Home  none        Discharge Plan     Row Name 10/01/19 1346       Plan    Plan  Home    Patient/Family in Agreement with Plan  yes    Plan Comments  Met with patient and her  in the room to initiate discharge planning. Patient lives with her  in a single-story home in [facility]. She is independent with ADLs and mobility and does not use any DME. Her goal is home at discharge, and her family will provide her ride. OT has recommended home with assist. PT eval is pending. CM will continue to follow.     Final Discharge Disposition Code  01 - home or self-care        Destination      No service coordination in this encounter.      Durable Medical Equipment      No service coordination in this encounter.      Dialysis/Infusion      No service coordination in this encounter.      Home Medical Care      No service coordination in this encounter.      Therapy      No service coordination in this encounter.       Community Resources      No service coordination in this encounter.        Expected Discharge Date and Time     Expected Discharge Date Expected Discharge Time    Oct 4, 2019         Demographic Summary     Row Name 10/01/19 1344       General Information    Admission Type  inpatient    Referral Source  admission list    Reason for Consult  discharge planning    General Information Comments  PCP is Valeria Gentile       Contact Information    Permission Granted to Share Info With  ;family/designee , Fransico Hamilton; MIL, Pushpa Blunt; MORE, Minerva Herron        Functional Status     Row Name 10/01/19 1345       Functional Status    Usual Activity Tolerance  good       Functional Status, IADL    Medications  independent    Meal Preparation  independent    Housekeeping  independent    Laundry  independent    Shopping  independent       Employment/    Employment/ Comments  Confirmed with patient that he has medical and rx coverage through [employer].        Psychosocial    No documentation.       Abuse/Neglect    No documentation.       Legal    No documentation.       Substance Abuse    No documentation.       Patient Forms    No documentation.           Sherry Le, RN

## 2019-10-01 NOTE — PROGRESS NOTES
"Clinical Nutrition Note      Patient Name: Jasvir Hamilton  MRN: [id number]  Admission date: 9/30/2019      Multidisciplinary Rounds    Additional information obtained during MDR:  Pt adm w/ expressive aphasia ,CVA s/p tPA; NIH SS now a \"0\". ECHO, carotid duplex and f/u CT at 5 pm today pending.    Current diet: Diet Regular; Thin; Cardiac    Pertinent medical data reviewed  No nutrition risk identified on nursing screen; MST score \"0\"    Intervention:  Plan of care and goals reviewed    Monitor:  RD to follow per protocol      Susanna Rivera MS,RD,LD  10/01/19 1:33 PM  Time: 20 mins       "

## 2019-10-01 NOTE — PLAN OF CARE
Problem: Stroke (Ischemic) (Adult)  Goal: Signs and Symptoms of Listed Potential Problems Will be Absent, Minimized or Managed (Stroke)  Outcome: Ongoing (interventions implemented as appropriate)   10/01/19 0927   Goal/Outcome Evaluation   Problems Assessed (Stroke (Ischemic)) cognitive impairment;communication impairment;motor/sensory impairment   Problems Assessed (Stroke (Ischemic)) motor/sensory impairment       Problem: Patient Care Overview  Goal: Plan of Care Review  Outcome: Ongoing (interventions implemented as appropriate)   10/01/19 0927   Coping/Psychosocial   Plan of Care Reviewed With patient   OTHER   Outcome Summary PT initial evaluation completed for pt presenting with generalized weakness and decreased functional mobility. Pt ambulated 250ft with CGA, progressing to SBA. Pt's decreased independence warrants PT skilled care. Recommend D/C home with assistance.

## 2019-10-01 NOTE — PLAN OF CARE
Problem: Stroke (Ischemic) (Adult)  Goal: Signs and Symptoms of Listed Potential Problems Will be Absent, Minimized or Managed (Stroke)  Outcome: Ongoing (interventions implemented as appropriate)   10/01/19 0422   Goal/Outcome Evaluation   Problems Assessed (Stroke (Ischemic)) all   Problems Assessed (Stroke (Ischemic)) none       Problem: Thrombolytic Therapy (Adult)  Goal: Signs and Symptoms of Listed Potential Problems Will be Absent, Minimized or Managed (Thrombolytic Therapy)  Outcome: Ongoing (interventions implemented as appropriate)   10/01/19 0422   Goal/Outcome Evaluation   Problems Assessed (Thrombolytic Therapy) all   Problems Assessed (Thrombolytic Therapy) none

## 2019-10-01 NOTE — PAYOR COMM NOTE
"Mayte Oleary RN    Phone [phone]  Fax [phone]    Ref# [id number]      Willie Bishop (64 y.o. Female)     Date of Birth Social Security Number Address Home Phone MRN    1955  [address] [facility] [address] [phone] [id number]    [employer] Marital Status          None        Admission Date Admission Type Admitting Provider Attending Provider Department, Room/Bed    9/30/19 Emergency Jacob Dominguez MD Harrison, John M, MD [facility] 2B ICU, N234/1    Discharge Date Discharge Disposition Discharge Destination                       Attending Provider:  Jacob Dominguez MD    Allergies:  No Known Allergies    Isolation:  None   Infection:  None   Code Status:  CPR    Ht:  154.9 cm (60.98\")   Wt:  74.3 kg (163 lb 12.8 oz)    Admission Cmt:  None   Principal Problem:  Acute ischemic stroke (CMS/[facility]) [I63.9]                 Active Insurance as of 9/30/2019     Primary Coverage     Payor Plan Insurance Group Employer/Plan Group    [employer]ERCIAL AETNA [id number]     Payor Plan Address Payor Plan Phone Number Payor Plan Fax Number Effective Dates    [address] [phone]  1/1/2019 - None Entered    [facility] [id number]       Subscriber Name Subscriber Birth Date Member ID       WILLIE BISHOP 1955 [id number]                 Emergency Contacts      (Rel.) Home Phone Work Phone Mobile Phone    Pushpa Bishop (Relative) [phone] -- [phone]    Minerva Herron (Other) -- -- [phone]               History & Physical      Joseph Poole MD at 09/30/19 1724                Chief complaint:  Slurred speech, left facial droop    Subjective     Patient is a 64 y.o. female with PMH HTN, HLP, CAD w/cardiac stents and tobacco abuse who presented to the ED today with c/o slurred speech, fatigue and one sided weakness (she can't recall which side) that occurred about 1450 today.  She reported to the emergency " "room staff that it was her left side per their notes.  She stated that she was at work and went outside for her lunch break.  She was sitting in her car drinking a pop and smoking a cigarette when she had sudden onset of \"feeling funny and extremely tired\".  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  She was trying to get supplies together and talk to a patient but couldn't \"get my words out.\"  The patient got help and they called EMS.  Her initial NIH was 6.  She had CT Head that was unremarkable.  She did not have a CT Cerebral Perfusion in ED.  She had a /107, P 78 and room air sat 96%.  She was given labetolol and her BP improved with 155/76. She was given tPA which ended around 5pm.  Her NIH now is 0.  She was admitted to the ICU post TPA.    She states that she has h/o carotid bruits.  She takes plavix due to her cardiac stents, but does not take aspirin.  She smokes .75 PPD.  She states that she has been under a lot of stress lately due to her stepson going through a divorce and her  being gone all time.  He is a .     History  Past Medical History:   Diagnosis Date   • Chicken pox    • Diverticulitis    • Easy bruising    • Gall stones    • GERD (gastroesophageal reflux disease)    • Glaucoma    • Hyperlipemia    • Hypertension    • Measles    • Menopause    • Mumps    • Positive PPD    • Scarlet fever      Past Surgical History:   Procedure Laterality Date   • CARDIAC CATHETERIZATION     • CARPAL TUNNEL RELEASE      both hands   • CHOLECYSTECTOMY     • COLONOSCOPY  2009   • ECTOPIC PREGNANCY     • ENDOMETRIAL ABLATION     • HAND SURGERY     • OTHER SURGICAL HISTORY      lap and leep   • TUBAL ABDOMINAL LIGATION  1993    with L salpingo-oophorectomy     Family History   Problem Relation Age of Onset   • Arthritis Mother    • Hypertension Mother    • Heart attack Father    • Hypertension Father    • Obesity Father    • Stroke Father         massive   • Arthritis Sister    • " Hypertension Sister    • No Known Problems Sister    • Breast cancer Neg Hx      Social History     Tobacco Use   • Smoking status: Current Every Day Smoker     Packs/day: 0.75   • Smokeless tobacco: Never Used   Substance Use Topics   • Alcohol use: Yes     Comment: occassionally   • Drug use: No     Medications Prior to Admission   Medication Sig Dispense Refill Last Dose   • cholecalciferol (VITAMIN D3) 1000 units tablet Take 1,000 Units by mouth Daily.   9/30/2019 at Unknown time   • clopidogrel (PLAVIX) 75 MG tablet Take 1 tablet by mouth Daily. 90 tablet 3 9/30/2019 at Unknown time   • ibuprofen (ADVIL,MOTRIN) 600 MG tablet Take 1 tablet by mouth Every 8 (Eight) Hours As Needed for Mild Pain  or Moderate Pain . 90 tablet 0 9/29/2019 at Unknown time   • isosorbide mononitrate (IMDUR) 30 MG 24 hr tablet TAKE 1 TABLET DAILY 90 tablet 1 9/30/2019 at Unknown time   • losartan (COZAAR) 50 MG tablet Take 1 tablet by mouth Daily. 90 tablet 1 9/29/2019 at Unknown time   • metoprolol tartrate (LOPRESSOR) 50 MG tablet TAKE 1 TABLET TWICE A  tablet 3 9/30/2019 at Unknown time   • pantoprazole (PROTONIX) 40 MG EC tablet TAKE 1 TABLET TWICE A DAY (Patient taking differently: take 1 tablet twice a day) 180 tablet 1 9/30/2019 at Unknown time   • rosuvastatin (CRESTOR) 40 MG tablet Take 1 tablet by mouth Daily. 90 tablet 3 9/29/2019 at Unknown time   • sucralfate (CARAFATE) 1 g tablet Take 1 tablet by mouth Every Night. 90 tablet 1 9/30/2019 at Unknown time   • nitroglycerin (NITROSTAT) 0.4 MG SL tablet Place 0.4 mg under the tongue Every 5 (Five) Minutes As Needed.   More than a month at Unknown time   • ondansetron ODT (ZOFRAN ODT) 8 MG disintegrating tablet Take 1 tablet by mouth Every 8 (Eight) Hours As Needed for Nausea or Vomiting. 20 tablet 0 Unknown at Unknown time     Allergies:  Patient has no known allergies.    Review of Systems   Pertinent items are noted in HPI, all other systems reviewed and  "negative      Objective     Vital Signs  Temp:  [97.6 °F (36.4 °C)-98.3 °F (36.8 °C)] 97.6 °F (36.4 °C)  Heart Rate:  [68-80] 78  Resp:  [12-21] 18  BP: (138-232)/() 148/70    Physical Exam:    Objective:  General Appearance:  In no acute distress.    Vital signs: (most recent): Blood pressure 148/70, pulse 78, temperature 97.6 °F (36.4 °C), temperature source Oral, resp. rate 18, height 154.9 cm (61\"), weight 74.3 kg (163 lb 12.8 oz), SpO2 96 %.    HEENT: Normal HEENT exam.    Lungs:  Normal effort and normal respiratory rate.  Breath sounds clear to auscultation.  She is not in respiratory distress.  No rales, wheezes or rhonchi.    Heart: Normal rate.  Regular rhythm.  S1 normal and S2 normal.  No murmur, gallop or friction rub.   Chest: Symmetric chest wall expansion.   Abdomen: Abdomen is soft and non-distended.  Bowel sounds are normal.   There is no abdominal tenderness.   There is no mass. There is no splenomegaly. There is no hepatomegaly.   Extremities: There is no deformity or dependent edema.    Neurological: Patient is alert and oriented to person, place and time.    Pupils:  Pupils are equal, round, and reactive to light.    Skin:  Warm and dry.              Interval: (shift change)  1a. Level of Consciousness: 0-->Alert, keenly responsive  1b. LOC Questions: 0-->Answers both questions correctly  1c. LOC Commands: 0-->Performs both tasks correctly  2. Best Gaze: 0-->Normal  3. Visual: 0-->No visual loss  4. Facial Palsy: 0-->Normal symmetrical movements  5a. Motor Arm, Left: 0-->No drift, limb holds 90 (or 45) degrees for full 10 secs  5b. Motor Arm, Right: 0-->No drift, limb holds 90 (or 45) degrees for full 10 secs  6a. Motor Leg, Left: 0-->No drift, leg holds 30 degree position for full 5 secs  6b. Motor Leg, Right: 0-->No drift, leg holds 30 degree position for full 5 secs  7. Limb Ataxia: 0-->Absent  8. Sensory: 0-->Normal, no sensory loss  9. Best Language: 0-->No aphasia, normal  10. " Dysarthria: 0-->Normal  11. Extinction and Inattention (formerly Neglect): 0-->No abnormality    Total (NIH Stroke Scale): 0    Results Review:    I reviewed the patient's new clinical results.  I reviewed the patient's new imaging results and agree with the interpretation.  I reviewed the patient's other test results and agree with the interpretation  I personally viewed and interpreted the patient's EKG/Telemetry data    Assessment/Plan     Assessment:    Active Hospital Problems    Diagnosis   • **Acute ischemic stroke (CMS/[facility])   • Carotid artery disease (CMS/[facility])     1.  Carotid duplex study August 31, 2012  · R ICA less than 50%  · LICA less than 50%  · Bilateral common carotid arteries reveal no evidence of significant stenosis patent vertebral arteries bilaterally  ·      • Tobacco abuse   • Essential hypertension   • GERD (gastroesophageal reflux disease)   • Hyperlipemia   • Coronary artery disease of native heart with stable angina pectoris (CMS/[facility])     1. [facility] 3-16-12  · One vessel CAD with 60-70% eccentric stenosis of the mid LAD  · LVEF 65-70%    2. [facility] 1-26-15  · Coronary angioplasty with stenting to the mid LAD  · Entry into the ABSORB bio-absorbable drug eluding stent trial         64-year-old female who presents with acute neurologic symptoms that may be consistent with a stroke.  These basically consisted of slurred speech, fatigue, and unilateral weakness.  Her NIH scale was 6 on arrival to the emergency department.  CT was negative for any abnormalities and therefore she was offered TPA and accepted.  This was completed around 5 PM and her stroke scale on arrival to the ICU is 0.      Plan:      1. Neuro ICU admission  2. Initiate ischemic stroke post TPA pathway  3. CT of the head 24 hours status post TPA  4. N.p.o. until passes dysphagia evaluation  5. Serial neuro exams  6. Neurology consultation  7. Echo and carotid  Dopplers    I discussed the patients findings and my recommendations with patient and nursing staff.     I have seen and examined patient, performing a face-to-face diagnostic evaluation with plan of care reviewed and developed with APRN and nursing staff. I have addended and modified the above history of present illness, physical examination, and assessment and plan to reflect my findings and impressions.    Joseph Poole MD  Pulmonary and Critical Care Medicine              Electronically signed by Joseph Poole MD at 09/30/19 2038          Emergency Department Notes      Ted Carlos MD at 09/30/19 1534      Procedure Orders    1. Critical Care [[id number]] ordered by Ted Carlos MD at 09/30/19 2050                Subjective   Jasvir Hamilton is a 64 y.o.female who presents to the ED with complaints of a neurologic problem. Her last known well is 1450 today. During this time, she was working at a computer when she had a sudden onset of slurred speech. Since onset, her slurred speech has been improving. She has not taken any medication for her symptoms. She also complains of left sided weakness, a left sided facial droop, and difficulty swelling. There are no other complaints at this time.         History provided by:  Patient and EMS personnel  Neurologic Problem   The patient's primary symptoms include slurred speech and weakness. This is a new problem. The current episode started today. The neurological problem developed suddenly. The last time the patient was known to be well was 9/30/2019 2:50 PM.  The problem has been gradually improving since onset. There was left-sided, lower extremity and upper extremity focality noted. Past treatments include nothing. The treatment provided no relief.       Review of Systems   HENT: Positive for trouble swallowing.    Neurological: Positive for facial asymmetry, speech difficulty and weakness.   All other systems reviewed and  are negative.      Past Medical History:   Diagnosis Date   • Chicken pox    • Diverticulitis    • Easy bruising    • Gall stones    • GERD (gastroesophageal reflux disease)    • Glaucoma    • Hyperlipemia    • Hypertension    • Measles    • Menopause    • Mumps    • Positive PPD    • Scarlet fever        No Known Allergies    Past Surgical History:   Procedure Laterality Date   • CARDIAC CATHETERIZATION     • CARPAL TUNNEL RELEASE      both hands   • CHOLECYSTECTOMY     • COLONOSCOPY  2009   • ECTOPIC PREGNANCY     • ENDOMETRIAL ABLATION     • HAND SURGERY     • OTHER SURGICAL HISTORY      lap and leep   • TUBAL ABDOMINAL LIGATION  1993    with L salpingo-oophorectomy       Family History   Problem Relation Age of Onset   • Arthritis Mother    • Hypertension Mother    • Heart attack Father    • Hypertension Father    • Obesity Father    • Stroke Father         massive   • Arthritis Sister    • Hypertension Sister    • No Known Problems Sister    • Breast cancer Neg Hx        Social History     Socioeconomic History   • Marital status:      Spouse name: Not on file   • Number of children: Not on file   • Years of education: Not on file   • Highest education level: Not on file   Tobacco Use   • Smoking status: Current Every Day Smoker     Packs/day: 0.75   • Smokeless tobacco: Never Used   Substance and Sexual Activity   • Alcohol use: Yes     Comment: occassionally   • Drug use: No   • Sexual activity: Defer         Objective   Physical Exam   Constitutional: She is oriented to person, place, and time. She appears well-developed and well-nourished.   HENT:   Head: Normocephalic and atraumatic.   Eyes: Conjunctivae are normal. No scleral icterus.   Neck: Normal range of motion. Neck supple.   Cardiovascular: Normal rate, regular rhythm, normal heart sounds and intact distal pulses.   No murmur heard.  Pulmonary/Chest: Effort normal and breath sounds normal. No respiratory distress.   Abdominal: Soft. Bowel  sounds are normal. There is no tenderness.   Musculoskeletal: Normal range of motion. She exhibits no edema.   Neurological: She is alert and oriented to person, place, and time.   Bilateral leg weakness. Mild left facial droop. No aphasia or dysarthria.  Normal .  No pronator drift.   Skin: Skin is warm and dry.   Psychiatric: She has a normal mood and affect. Her behavior is normal.   Nursing note and vitals reviewed.      Critical Care  Performed by: Ted Carlos MD  Authorized by: Ted Carlos MD     Critical care provider statement:     Critical care time (minutes):  45    Critical care time was exclusive of:  Separately billable procedures and treating other patients    Critical care was necessary to treat or prevent imminent or life-threatening deterioration of the following conditions:  CNS failure or compromise    Critical care was time spent personally by me on the following activities:  Discussions with consultants, development of treatment plan with patient or surrogate, evaluation of patient's response to treatment, obtaining history from patient or surrogate, examination of patient, ordering and performing treatments and interventions, ordering and review of laboratory studies, ordering and review of radiographic studies, re-evaluation of patient's condition and review of old charts    I assumed direction of critical care for this patient from another provider in my specialty: no              ED Course     Per stroke team NIHSS - 6.  Pt within tPA window.  Discussed risks/benefits of tPA including 6% chance of hemorrhage, 2% at our facility, with risk of clinical worsening if it develops.  Pt voiced understanding.  She took a long time to decide and we had an extensive discussion of pro/con.  She finally agreed.  Then BP was out of range so we had to give labetalol.  Due to these two issues there was likely a delay administering tPA.    tpA given, BP stayed better.  On serial  rechecks she reports significant improvement in how she feels.    Discussed with Dr Sullivan and Dr Dominguez.                MDM  Number of Diagnoses or Management Options  Acute ischemic stroke (CMS/[facility]):      Amount and/or Complexity of Data Reviewed  Clinical lab tests: ordered and reviewed  Tests in the radiology section of CPT®:  ordered and reviewed  Decide to obtain previous medical records or to obtain history from someone other than the patient: yes  Obtain history from someone other than the patient: yes  Discuss the patient with other providers: yes  Independent visualization of images, tracings, or specimens: yes    Critical Care  Total time providing critical care: 30-74 minutes      Final diagnoses:   Acute ischemic stroke (CMS/[facility])       Documentation assistance provided by donald Mckinnon.  Information recorded by the scribe was done at my direction and has been verified and validated by me.     Quan Mckinnon  09/30/19 1627       Quan Mckinnon  09/30/19 1645       Ted Carlos MD  09/30/19 2051      Electronically signed by Ted Carlos MD at 09/30/19 2051     Maite Zavala RN at 09/30/19 1550        Dr Carlos at bedside discussing TPA with pt     Maite Zavala RN  09/30/19 1550      Electronically signed by Maite Zavala RN at 09/30/19 1550     Maite Zavala RN at 09/30/19 1707        Report called to Carry RN pt to go to room ICU N234     Maite Zavala RN  09/30/19 1708      Electronically signed by Maite Zavala RN at 09/30/19 1708       Hospital Medications (all)       Dose Frequency Start End    acetaminophen (TYLENOL) tablet 650 mg 650 mg Every 4 Hours PRN 9/30/2019     Sig - Route: Take 2 tablets by mouth Every 4 (Four) Hours As Needed for Mild Pain . - Oral    alteplase (ACTIVASE) 100 mg kit 0.81 mg/kg × 76 kg Once 9/30/2019 9/30/2019    Sig - Route: Infuse 61.56 mL into a venous catheter 1 (One) Time. - Intravenous    Cosign for Ordering: Accepted by Terrance  "Ted Calero MD on 9/30/2019  5:35 PM    alteplase (ACTIVASE) bolus from vial 0.09 mg/kg × 76 kg Once 9/30/2019 9/30/2019    Sig - Route: Infuse 6.84 mL into a venous catheter 1 (One) Time. - Intravenous    Cosign for Ordering: Accepted by Ted Carlos MD on 9/30/2019  5:35 PM    aspirin suppository 300 mg 300 mg Daily 10/1/2019     Sig - Route: Insert 1 suppository into the rectum Daily. - Rectal    Linked Group 1:  \"Or\" Linked Group Details        aspirin tablet 325 mg 325 mg Daily 10/1/2019     Sig - Route: Take 1 tablet by mouth Daily. - Oral    Linked Group 1:  \"Or\" Linked Group Details        atorvastatin (LIPITOR) tablet 80 mg 80 mg Nightly 9/30/2019     Sig - Route: Take 2 tablets by mouth Every Night. - Oral    isosorbide mononitrate (IMDUR) 24 hr tablet 30 mg 30 mg Daily 10/1/2019     Sig - Route: Take 1 tablet by mouth Daily. - Oral    labetalol (NORMODYNE,TRANDATE) injection 20 mg 20 mg Once 9/30/2019 9/30/2019    Sig - Route: Infuse 4 mL into a venous catheter 1 (One) Time. - Intravenous    Cosign for Ordering: Accepted by Ted Carlos MD on 9/30/2019  5:35 PM    losartan (COZAAR) tablet 50 mg 50 mg Daily 10/1/2019     Sig - Route: Take 1 tablet by mouth Daily. - Oral    metoprolol tartrate (LOPRESSOR) tablet 50 mg 50 mg 2 Times Daily 10/1/2019     Sig - Route: Take 1 tablet by mouth 2 (Two) Times a Day. - Oral    niCARdipine (CARDENE) 25 mg/250 mL (0.1 mg/mL) 0.9% NS infusion 5-15 mg/hr Titrated 9/30/2019     Sig - Route: Infuse 5-15 mg/hr into a venous catheter Dose Adjusted By Provider As Needed. - Intravenous    ondansetron (ZOFRAN) injection 4 mg 4 mg Every 6 Hours PRN 9/30/2019     Sig - Route: Infuse 2 mL into a venous catheter Every 6 (Six) Hours As Needed for Nausea or Vomiting. - Intravenous    pantoprazole (PROTONIX) EC tablet 40 mg 40 mg 2 Times Daily Before Meals 10/1/2019     Sig - Route: Take 1 tablet by mouth 2 (Two) Times a Day Before Meals. - Oral    sodium chloride " 0.9 % flush 10 mL 10 mL As Needed 9/30/2019     Sig - Route: Infuse 10 mL into a venous catheter As Needed for Line Care. - Intravenous    Cosign for Ordering: Accepted by Ted Carlos MD on 9/30/2019  5:35 PM    sodium chloride 0.9 % flush 10 mL 10 mL Every 12 Hours Scheduled 9/30/2019     Sig - Route: Infuse 10 mL into a venous catheter Every 12 (Twelve) Hours. - Intravenous    sodium chloride 0.9 % infusion 100 mL/hr Once 9/30/2019 9/30/2019    Sig - Route: Infuse 100 mL/hr into a venous catheter 1 (One) Time. - Intravenous    Cosign for Ordering: Accepted by Ted Carlos MD on 9/30/2019  5:35 PM    pantoprazole (PROTONIX) EC tablet 40 mg (Discontinued) 40 mg 2 Times Daily 10/1/2019 10/1/2019    Sig - Route: Take 1 tablet by mouth 2 (Two) Times a Day. - Oral    pantoprazole (PROTONIX) injection 40 mg (Discontinued) 40 mg Every Early Morning 9/30/2019 10/1/2019    Sig - Route: Infuse 10 mL into a venous catheter Every Morning. - Intravenous    rosuvastatin (CRESTOR) tablet 40 mg (Discontinued) 40 mg Daily 10/1/2019 10/1/2019    Sig - Route: Take 2 tablets by mouth Daily. - Oral    Reason for Discontinue: Duplicate order            Lab Results (last 24 hours)     Procedure Component Value Units Date/Time    TSH [[id number]]  (Normal) Collected:  10/01/19 0445    Specimen:  Blood Updated:  10/01/19 0607     TSH 1.410 uIU/mL     POC Glucose Once [[id number]]  (Normal) Collected:  10/01/19 0546    Specimen:  Blood Updated:  10/01/19 0553     Glucose 98 mg/dL     Lipid Panel [[id number]] Collected:  10/01/19 0445    Specimen:  Blood Updated:  10/01/19 0553     Total Cholesterol 127 mg/dL      Triglycerides 111 mg/dL      HDL Cholesterol 40 mg/dL      LDL Cholesterol  65 mg/dL      VLDL Cholesterol 22.2 mg/dL      LDL/HDL Ratio 1.62    Narrative:       Cholesterol Reference Ranges  (U.S. Department of Health and Human Services ATP III Classifications)    Desirable          <200 mg/dL  Borderline High     200-239 mg/dL  High Risk          >240 mg/dL      Triglyceride Reference Ranges  (U.S. Department of Health and Human Services ATP III Classifications)    Normal           <150 mg/dL  Borderline High  150-199 mg/dL  High             200-499 mg/dL  Very High        >500 mg/dL    HDL Reference Ranges  (U.S. Department of Health and Human Services ATP III Classifcations)    Low     <40 mg/dl (major risk factor for CHD)  High    >60 mg/dl ('negative' risk factor for CHD)        LDL Reference Ranges  (U.S. Department of Health and Human Services ATP III Classifcations)    Optimal          <100 mg/dL  Near Optimal     100-129 mg/dL  Borderline High  130-159 mg/dL  High             160-189 mg/dL  Very High        >189 mg/dL    Comprehensive Metabolic Panel [[id number]]  (Abnormal) Collected:  10/01/19 0445    Specimen:  Blood Updated:  10/01/19 0553     Glucose 98 mg/dL      BUN 15 mg/dL      Creatinine 0.71 mg/dL      Sodium 145 mmol/L      Potassium 4.3 mmol/L      Chloride 105 mmol/L      CO2 30.0 mmol/L      Calcium 9.3 mg/dL      Total Protein 6.1 g/dL      Albumin 4.10 g/dL      ALT (SGPT) 12 U/L      AST (SGOT) 13 U/L      Alkaline Phosphatase 59 U/L      Total Bilirubin 0.5 mg/dL      eGFR Non African Amer 83 mL/min/1.73      Globulin 2.0 gm/dL      A/G Ratio 2.1 g/dL      BUN/Creatinine Ratio 21.1     Anion Gap 10.0 mmol/L     Narrative:       GFR Normal >60  Chronic Kidney Disease <60  Kidney Failure <15    Hemoglobin A1c [[id number]]  (Normal) Collected:  10/01/19 0445    Specimen:  Blood Updated:  10/01/19 0547     Hemoglobin A1C 5.60 %     Narrative:       Hemoglobin A1C Ranges:    Increased Risk for Diabetes  5.7% to 6.4%  Diabetes                     >= 6.5%  Diabetic Goal                < 7.0%    CBC (No Diff) [[id number]]  (Normal) Collected:  10/01/19 0445    Specimen:  Blood Updated:  10/01/19 0531     WBC 4.80 10*3/mm3      RBC 4.43 10*6/mm3      Hemoglobin 12.6 g/dL      Hematocrit 39.0 %      MCV 88.0 fL       MCH 28.4 pg      MCHC 32.3 g/dL      RDW 14.4 %      RDW-SD 45.6 fl      MPV 10.2 fL      Platelets 164 10*3/mm3     POC Glucose Once [[id number]]  (Abnormal) Collected:  09/30/19 2349    Specimen:  Blood Updated:  09/30/19 2350     Glucose 153 mg/dL     POC Glucose Once [[id number]]  (Normal) Collected:  09/30/19 1747    Specimen:  Blood Updated:  09/30/19 1749     Glucose 113 mg/dL     [facility] Draw [[id number]] Collected:  09/30/19 1549    Specimen:  Blood Updated:  09/30/19 1703    Narrative:       The following orders were created for panel order [facility].  Procedure                               Abnormality         Status                     ---------                               -----------         ------                     Light Blue Top[[id number]]                                   Final result               Green Top (Gel)[[id number]]                                  Final result               Lavender Top[[id number]]                                     Final result               Gold Top - SST[[id number]]                                   Final result               Green Top (No Gel)[[id number]]                                                            Please view results for these tests on the individual orders.    Light Blue Top [[id number]] Collected:  09/30/19 1549    Specimen:  Blood Updated:  09/30/19 1703     Extra Tube hold for add-on     Comment: Auto resulted       Lavender Top [[id number]] Collected:  09/30/19 1549    Specimen:  Blood Updated:  09/30/19 1703     Extra Tube hold for add-on     Comment: Auto resulted       Green Top (Gel) [[id number]] Collected:  09/30/19 1549    Specimen:  Blood Updated:  09/30/19 1703     Extra Tube Hold for add-ons.     Comment: Auto resulted.       Gold Top - SST [[id number]] Collected:  09/30/19 1549    Specimen:  Blood Updated:  09/30/19 1703     Extra Tube Hold for add-ons.     Comment: Auto resulted.       aPTT [[id number]]  (Normal) Collected:  09/30/19  1549    Specimen:  Blood Updated:  09/30/19 1618     PTT 29.8 seconds     Narrative:       PTT = The equivalent PTT values for the therapeutic range of heparin levels at 0.3 to 0.5 U/ml are 55 to 70 seconds.    AST [[id number]]  (Normal) Collected:  09/30/19 1549    Specimen:  Blood Updated:  09/30/19 1617     AST (SGOT) 13 U/L      Comment: Specimen hemolyzed.  Results may be affected.       ALT [[id number]]  (Normal) Collected:  09/30/19 1549    Specimen:  Blood Updated:  09/30/19 1617     ALT (SGPT) 12 U/L     Troponin [[id number]]  (Normal) Collected:  09/30/19 1549    Specimen:  Blood Updated:  09/30/19 1616     Troponin T <0.010 ng/mL     Narrative:       Troponin T Reference Range:  <= 0.03 ng/mL-   Negative for AMI  >0.03 ng/mL-     Abnormal for myocardial necrosis.  Clinicians would have to utilize clinical acumen, EKG, Troponin and serial changes to determine if it is an Acute Myocardial Infarction or myocardial injury due to an underlying chronic condition.     CBC & Differential [[id number]] Collected:  09/30/19 1549    Specimen:  Blood Updated:  09/30/19 1600    Narrative:       The following orders were created for panel order CBC & Differential.  Procedure                               Abnormality         Status                     ---------                               -----------         ------                     CBC Auto Differential[[id number]]        Abnormal            Final result                 Please view results for these tests on the individual orders.    CBC Auto Differential [[id number]]  (Abnormal) Collected:  09/30/19 1549    Specimen:  Blood Updated:  09/30/19 1600     WBC 4.13 10*3/mm3      RBC 4.09 10*6/mm3      Hemoglobin 11.8 g/dL      Hematocrit 36.2 %      MCV 88.5 fL      MCH 28.9 pg      MCHC 32.6 g/dL      RDW 14.5 %      RDW-SD 46.4 fl      MPV 10.3 fL      Platelets 171 10*3/mm3      Neutrophil % 63.7 %      Lymphocyte % 24.5 %      Monocyte % 8.0 %      Eosinophil % 3.1 %       Basophil % 0.5 %      Immature Grans % 0.2 %      Neutrophils, Absolute 2.63 10*3/mm3      Lymphocytes, Absolute 1.01 10*3/mm3      Monocytes, Absolute 0.33 10*3/mm3      Eosinophils, Absolute 0.13 10*3/mm3      Basophils, Absolute 0.02 10*3/mm3      Immature Grans, Absolute 0.01 10*3/mm3      nRBC 0.0 /100 WBC     POC CHEM 8 [[id number]]  (Abnormal) Collected:  09/30/19 1540    Specimen:  Blood Updated:  09/30/19 1544     Glucose 103 mg/dL      BUN 15 mg/dL      Creatinine 0.80 mg/dL      Sodium 141 mmol/L      Potassium 3.4 mmol/L      Chloride 105 mmol/L      Total CO2 26 mmol/L      Hemoglobin 12.2 g/dL      Comment: Serial Number: [id number]:  [id number]        Hematocrit 36 %      Ionized Calcium 1.20 mmol/L     POC Protime / INR [[id number]]  (Abnormal) Collected:  09/30/19 1539    Specimen:  Blood Updated:  09/30/19 1543     Protime 11.9 seconds      INR 1.0     Comment: Serial Number: [id number]:  [id number]           Imaging Results (last 24 hours)     Procedure Component Value Units Date/Time    XR Chest 1 View [[id number]] Collected:  09/30/19 1658     Updated:  10/01/19 0810    Narrative:       EXAMINATION: XR CHEST 1 VW-      INDICATION: Acute stroke protocol (onset < 12 hrs); I63.9-Cerebral  infarction, unspecified.      COMPARISON: None.     FINDINGS: Portable chest reveals cardiac and mediastinal silhouettes  within normal limits. The lung fields are grossly clear. No focal  parenchymal opacification is present.  No pleural effusion or  pneumothorax. Degenerative change is seen within the spine. Pulmonary  vascularity is within normal limits.           Impression:       No acute cardiopulmonary disease.     D:  09/30/2019  E:  10/01/2019          CT Head Without Contrast Stroke Protocol [[id number]] Collected:  09/30/19 1546     Updated:  09/30/19 1703    Narrative:       EXAMINATION: CT HEAD WO CONTRAST, STROKE PROTOCOL-09/30/2019:      INDICATION: Stroke.      TECHNIQUE: CT head without intravenous  contrast following stroke  protocol.     The radiation dose reduction device was turned on for each scan per the  ALARA (As Low as Reasonably Achievable) protocol.     COMPARISON: NONE.     FINDINGS: Midline structures are symmetric without evidence of mass,  mass effect or midline shift. Ventricles and sulci within normal limits.  No intra-axial hemorrhage or extra-axial fluid collection. Globes and  orbits are unremarkable. The visualized paranasal sinuses and mastoid  air cells are grossly clear and well pneumatized. Calvarium intact.       Impression:       No acute intracranial abnormality, specifically no acute  hemorrhage.     Scan performed on 09/30/2019 at 1537 hours. Scan report given to ER  physician and team in person by Dr. Petty on 09/30/2019 at 1542 hours.     D:  09/30/2019  E:  09/30/2019           This report was finalized on 9/30/2019 5:00 PM by Dr. Lew Petty.              Physician Progress Notes (last 24 hours) (Notes from 09/30/19 1013 through 10/01/19 1013)      Aniket Brito MD at 10/01/19 0952          INTENSIVIST / PULMONARY FOLLOW UP NOTE     Hospital:  LOS: 1 day   Ms. Jasvir Hamilton, 64 y.o. female is followed for:     Acute ischemic stroke (CMS/HCC)    Essential hypertension    GERD (gastroesophageal reflux disease)    Hyperlipemia    Coronary artery disease of native heart with stable angina pectoris (CMS/HCC)    Tobacco abuse    Carotid artery disease (CMS/HCC)       Subjective   SUBJECTIVE   Approaching neuro baseline    The patient's relevant past medical, surgical, family, and social history were reviewed    Allergies and medications were reviewed    ROS:  Per subjective, all other systems were reviewed and were negative     Objective   OBJECTIVE     Vital Sign Min/Max for last 24 hours:  Temp  Min: 97.6 °F (36.4 °C)  Max: 98.3 °F (36.8 °C)   BP  Min: 121/68  Max: 232/118   Pulse  Min: 64  Max: 80   Resp  Min: 12  Max: 21   SpO2  Min: 92 %  Max: 99 %   No Data  Recorded     Physical Exam:  General Appearance:  Conversant, in no acute distress  Eyes:  No scleral icterus or pallor, pupils normal  Ears, Nose, Mouth, Throat:  Atraumatic, oropharynx clear  Neck:  Trachea midline, thyroid normal  Respiratory:  Clear to auscultation bilaterally, normal effort, no tenderness to palpation  Cardiovascular:  Regular rate and rhythm, no murmurs, no peripheral edema, no thrill  Gastrointestinal:  Soft, non-tender, non-distended, no hepatosplenomegaly  Skin:  Normal temperature, no rash  Psychiatric:  Alert and oriented x 3, normal judgement and insight  Neuro:    Interval: (shift change)  1a. Level of Consciousness: 0-->Alert, keenly responsive  1b. LOC Questions: 0-->Answers both questions correctly  1c. LOC Commands: 0-->Performs both tasks correctly  2. Best Gaze: 0-->Normal  3. Visual: 0-->No visual loss  4. Facial Palsy: 0-->Normal symmetrical movements  5a. Motor Arm, Left: 0-->No drift, limb holds 90 (or 45) degrees for full 10 secs  5b. Motor Arm, Right: 0-->No drift, limb holds 90 (or 45) degrees for full 10 secs  6a. Motor Leg, Left: 0-->No drift, leg holds 30 degree position for full 5 secs  6b. Motor Leg, Right: 0-->No drift, leg holds 30 degree position for full 5 secs  7. Limb Ataxia: 0-->Absent  8. Sensory: 0-->Normal, no sensory loss  9. Best Language: 0-->No aphasia, normal  10. Dysarthria: 0-->Normal  11. Extinction and Inattention (formerly Neglect): 0-->No abnormality    Total (NIH Stroke Scale): 0      Telemetry:              Hemodynamics:   CVP:     PAP:     PAOP:     CO:     CI:     SVI:     SVR:       SpO2: 95 % SpO2  Min: 92 %  Max: 99 %   Device:      Flow Rate:   No Data Recorded     Mechanical Ventilator Settings:                                         Intake/Ouptut 24 hrs (7:00AM - 6:59 AM)  Intake & Output (last 3 days)       09/28 0701 - 09/29 0700 09/29 0701 - 09/30 0700 09/30 0701 - 10/01 0700 10/01 0701 - 10/02 0700    I.V. (mL/kg)   56.4 (0.8)      Total Intake(mL/kg)   56.4 (0.8)     Urine (mL/kg/hr)   1500     Total Output   1500     Net   -1443.6             Urine Unmeasured Occurrence   1 x           Lines, Drains & Airways    Active LDAs     Name:   Placement date:   Placement time:   Site:   Days:    Peripheral IV 09/30/19 1540 Right Antecubital   09/30/19    1540    Antecubital   less than 1    Peripheral IV 09/30/19 1601 Right Arm   09/30/19    1601    Arm   less than 1                Hematology:  Results from last 7 days   Lab Units 10/01/19  0445 09/30/19  1549 09/30/19  1540   WBC 10*3/mm3 4.80 4.13  --    HEMOGLOBIN g/dL 12.6 11.8*  --    HEMOGLOBIN, POC g/dL  --   --  12.2   HEMATOCRIT % 39.0 36.2  --    HEMATOCRIT POC %  --   --  36*   PLATELETS 10*3/mm3 164 171  --      Electrolytes, Magnesium and Phosphorus:  Results from last 7 days   Lab Units 10/01/19  0445   SODIUM mmol/L 145   CHLORIDE mmol/L 105   POTASSIUM mmol/L 4.3   CO2 mmol/L 30.0*     Renal:  Results from last 7 days   Lab Units 10/01/19  0445 09/30/19  1540   CREATININE mg/dL 0.71 0.80   BUN mg/dL 15  --      Estimated Creatinine Clearance: 75.7 mL/min (by C-G formula based on SCr of 0.71 mg/dL).  Hepatic:  Results from last 7 days   Lab Units 10/01/19  0445 09/30/19  1549   ALK PHOS U/L 59  --    BILIRUBIN mg/dL 0.5  --    ALT (SGPT) U/L 12 12   AST (SGOT) U/L 13 13     Arterial Blood Gases:        Results from last 7 days   Lab Units 10/01/19  0445   HEMOGLOBIN A1C % 5.60       No results found for: LACTATE    Relevant imaging studies and labs from 10/01/19 were reviewed and interpreted by me    Medications (drips):    niCARdipine Last Rate: Stopped (09/30/19 2032)         aspirin 325 mg Oral Daily   Or      aspirin 300 mg Rectal Daily   atorvastatin 80 mg Oral Nightly   pantoprazole 40 mg Oral BID AC   sodium chloride 10 mL Intravenous Q12H       Assessment/Plan   IMPRESSION / PLAN     Inpatient Problem List:  64 y.o.female:  Active Hospital Problems    Diagnosis   • **Acute  ischemic stroke (CMS/HCC)   • Carotid artery disease (CMS/HCC)     1.  Carotid duplex study August 31, 2012  · R ICA less than 50%  · LICA less than 50%  · Bilateral common carotid arteries reveal no evidence of significant stenosis patent vertebral arteries bilaterally  ·      • Tobacco abuse   • Essential hypertension   • GERD (gastroesophageal reflux disease)   • Hyperlipemia   • Coronary artery disease of native heart with stable angina pectoris (CMS/HCC)     1. [facility] 3-16-12  · One vessel CAD with 60-70% eccentric stenosis of the mid LAD  · LVEF 65-70%    2. [facility] 1-26-15  · Coronary angioplasty with stenting to the mid LAD  · Entry into the ABSORB bio-absorbable drug eluding stent trial          Impression:  64 y.o.female with relevant PMH of HTN, HLD, GERD, CAD w/cardiac stents and ongoing tobacco abuse admitted 9/30/2019 w/ expressive aphasia w/ initial NIHSS of 6.  S/p TPA after blood pressure control.    Plan:  Possible Stroke - per Neurology    CAD - on plavix at home, not asa    BP / Glycemic Control    PT/OT    To tele    Nutrition - Diet Regular; Thin; Cardiac    Plan of care and goals reviewed with mulitdisciplinary team at daily rounds           Aniket Brito MD  Intensive Care Medicine  10/01/19 9:52 AM         Electronically signed by Aniket Brito MD at 10/01/19 0994

## 2019-10-01 NOTE — PLAN OF CARE
Problem: Patient Care Overview  Goal: Plan of Care Review  Outcome: Ongoing (interventions implemented as appropriate)   10/01/19 0424   Coping/Psychosocial   Plan of Care Reviewed With patient   Plan of Care Review   Progress no change   OTHER   Outcome Summary VSS. Neurological assessment stable. Headache improved with tylenol. Will coontinue to monitor.

## 2019-10-01 NOTE — THERAPY DISCHARGE NOTE
Acute Care - Occupational Therapy Initial Eval/Discharge  [facility]     Patient Name: Jasvir Hamilton  : 1955  MRN: [id number]  Today's Date: 10/1/2019  Onset of Illness/Injury or Date of Surgery: 19  Date of Referral to OT: 19  Referring Physician: Martha Teran MD      Admit Date: 2019       ICD-10-CM ICD-9-CM   1. Acute ischemic stroke (CMS/HCC) I63.9 434.91   2. Impaired mobility and ADLs Z74.09 799.89     Patient Active Problem List   Diagnosis   • Essential hypertension   • GERD (gastroesophageal reflux disease)   • Hyperlipemia   • Cobalamin deficiency   • History of vitamin D deficiency   • Coronary artery disease of native heart with stable angina pectoris (CMS/HCC)   • Bilateral carotid bruits   • Tobacco abuse   • Obesity (BMI 30.0-34.9)   • Carotid artery disease (CMS/HCC)   • Acute ischemic stroke (CMS/HCC)     Past Medical History:   Diagnosis Date   • Chicken pox    • Diverticulitis    • Easy bruising    • Gall stones    • GERD (gastroesophageal reflux disease)    • Glaucoma    • Hyperlipemia    • Hypertension    • Measles    • Menopause    • Mumps    • Positive PPD    • Scarlet fever      Past Surgical History:   Procedure Laterality Date   • CARDIAC CATHETERIZATION     • CARPAL TUNNEL RELEASE      both hands   • CHOLECYSTECTOMY     • COLONOSCOPY     • ECTOPIC PREGNANCY     • ENDOMETRIAL ABLATION     • HAND SURGERY     • OTHER SURGICAL HISTORY      lap and leep   • TUBAL ABDOMINAL LIGATION      with L salpingo-oophorectomy          OT ASSESSMENT FLOWSHEET (last 12 hours)      Occupational Therapy Evaluation     Row Name 10/01/19 0915                   OT Evaluation Time/Intention    Subjective Information  no complaints  -CL        Document Type  evaluation;discharge evaluation/summary  -CL        Mode of Treatment  occupational therapy  -CL        Patient Effort  excellent  -CL        Symptoms Noted During/After Treatment  none  -CL           General  Information    Patient Profile Reviewed?  yes  -CL        Onset of Illness/Injury or Date of Surgery  09/30/19  -CL        Referring Physician  Martha Teran MD  -CL        Prior Level of Function  independent:;all household mobility;transfer;ADL's;dressing;bathing  -CL        Equipment Currently Used at Home  none  -CL        Existing Precautions/Restrictions  no known precautions/restrictions  -CL        Risks Reviewed  patient:;LOB;dizziness;increased discomfort;change in vital signs;nausea/vomiting;lines disloged  -CL        Benefits Reviewed  patient:;improve function;increase independence;increase balance;decrease pain;increase strength;increase knowledge  -CL        Barriers to Rehab  none identified  -CL           Relationship/Environment    Lives With  spouse  -CL           Resource/Environmental Concerns    Current Living Arrangements  home/apartment/condo  -CL           Home Main Entrance    Number of Stairs, Main Entrance  one  -CL           Cognitive Assessment/Interventions    Additional Documentation  Cognitive Assessment/Intervention (Group)  -CL           Cognitive Assessment/Intervention- PT/OT    Affect/Mental Status (Cognitive)  WFL  -CL        Orientation Status (Cognition)  oriented x 4  -CL        Follows Commands (Cognition)  WFL  -CL        Cognitive Function (Cognitive)  WFL  -CL           Bed Mobility Assessment/Treatment    Bed Mobility Assessment/Treatment  supine-sit  -CL        Supine-Sit [place] (Bed Mobility)  conditional independence  -CL        Assistive Device (Bed Mobility)  head of bed elevated  -CL           Functional Mobility    Functional Mobility- Ind. Level  independent  -CL        Functional Mobility-Distance (Feet)  -- to/from the bathroom  -CL        Functional Mobility- Comment  Pt transitioned to PT treatment.   -CL           Transfer Assessment/Treatment    Transfer Assessment/Treatment  sit-stand transfer;stand-sit transfer;toilet transfer  -CL            Sit-Stand Transfer    Sit-Stand [place] (Transfers)  independent  -CL           Stand-Sit Transfer    Stand-Sit [place] (Transfers)  independent  -CL           Toilet Transfer    Type (Toilet Transfer)  sit-stand;stand-sit  -CL        [place] Level (Toilet Transfer)  independent  -CL           ADL Assessment/Intervention    BADL Assessment/Intervention  toileting  -CL           Toileting Assessment/Training    [place] Level (Toileting)  perform perineal hygiene;adjust/manage clothing;independent  -CL        Toileting Position  unsupported sitting;unsupported standing  -CL           General ROM    GENERAL ROM COMMENTS  BUE grossly WFL.   -CL           MMT (Manual Muscle Testing)    General MMT Comments  BUE grossly WFL.   -CL           Motor Assessment/Interventions    Additional Documentation  Balance (Group);Therapeutic Exercise (Group);Gross Motor Coordination (Group)  -CL           Gross Motor Coordination    Gross Motor Impairments  finger to nose  -CL        Gross Motor Skill, Impairments Detail  BUE WFL  -CL           Balance    Balance  static sitting balance;static standing balance  -CL           Static Sitting Balance    Level of [place] (Unsupported Sitting, Static Balance)  independent  -CL        Sitting Position (Unsupported Sitting, Static Balance)  sitting on edge of bed  -CL           Static Standing Balance    Level of [place] (Supported Standing, Static Balance)  independent  -CL           Sensory Assessment/Intervention    Sensory General Assessment  no sensation deficits identified  -CL        Additional Documentation  Vision Assessment/Intervention (Group)  -CL           Vision Assessment/Intervention    Visual Impairment/Limitations  WFL  -CL           Positioning and Restraints    Pre-Treatment Position  in bed  -CL        Post Treatment Position  other  -CL        Other Position  with other staff w/ PT  -CL           Pain Assessment    Additional Documentation   Pain Scale 2: FACES Pre/Post-Treatment (Group)  -CL           Pain Scale 2: FACES Pre/Post-Treatment    Pain 2: FACES Scale, Pretreatment  0-->no hurt  -CL        Pain 2: FACES Scale, Post-Treatment  0-->no hurt  -CL           Clinical Impression (OT)    Date of Referral to OT  09/30/19  -CL        OT Diagnosis  Pt at baseline ADL performance.   -CL        Criteria for Skilled Therapeutic Interventions Met (OT Eval)  no  -CL        Anticipated Discharge Disposition (OT)  home with assist  -CL           Vital Signs    Pre Systolic BP Rehab  167  -CL        Pre Treatment Diastolic BP  86  -CL        Post Systolic BP Rehab  -- w/ PT  -CL        Pretreatment Heart Rate (beats/min)  67  -CL        Pre SpO2 (%)  97  -CL        O2 Delivery Pre Treatment  room air  -CL        Pre Patient Position  Supine  -CL        Intra Patient Position  Sitting  -CL        Post Patient Position  Standing  -CL           OT Goals    Transfer Goal Selection (OT)  --  -CL           Living Environment    Home Accessibility  stairs to enter home  -CL          User Key  (r) = Recorded By, (t) = Taken By, (c) = Cosigned By    Initials Name Effective Dates    CL Mariana Turner, ARIANNE 04/03/18 -           Occupational Therapy Education     Title: PT OT SLP Therapies (In Progress)     Topic: Occupational Therapy (In Progress)     Point: ADL training (Done)     Description: Instruct learner(s) on proper safety adaptation and remediation techniques during self care or transfers.   Instruct in proper use of assistive devices.    Learning Progress Summary           Patient Acceptance, E, VU by CL at 10/1/2019  9:15 AM   Family Acceptance, E, VU by CL at 10/1/2019  9:15 AM                   Point: Precautions (Done)     Description: Instruct learner(s) on prescribed precautions during self-care and functional transfers.    Learning Progress Summary           Patient Acceptance, E, VU by CL at 10/1/2019  9:15 AM   Family Acceptance, E, VU by CL at 10/1/2019   9:15 AM                   Point: Body mechanics (Done)     Description: Instruct learner(s) on proper positioning and spine alignment during self-care, functional mobility activities and/or exercises.    Learning Progress Summary           Patient Acceptance, E, VU by CL at 10/1/2019  9:15 AM   Family Acceptance, E, VU by CL at 10/1/2019  9:15 AM                               User Key     Initials Effective Dates Name Provider Type Discipline     04/03/18 -  Mariana Turner OT Occupational Therapist OT                OT Recommendation and Plan  Outcome Summary/Treatment Plan (OT)  Anticipated Discharge Disposition (OT): home with assist  Plan of Care Review  Plan of Care Reviewed With: patient  Plan of Care Reviewed With: patient  Outcome Summary: OT completed a brief chart review. Pt is independent w/ t/fs and ADLs. Pt has no further deficits which require skilled IPOT intervention, will sign off. Recommend pt DC home w/ assist.      Rehab Goal Summary     Row Name 10/01/19 0915             Occupational Therapy Goals    Transfer Goal Selection (OT)  --  -CL        User Key  (r) = Recorded By, (t) = Taken By, (c) = Cosigned By    Initials Name Provider Type Discipline    CL Mariana Turner, ARIANNE Occupational Therapist OT          Outcome Measures     Row Name 10/01/19 0915             How much help from another is currently needed...    Putting on and taking off regular lower body clothing?  4  -CL      Bathing (including washing, rinsing, and drying)  4  -CL      Toileting (which includes using toilet bed pan or urinal)  4  -CL      Putting on and taking off regular upper body clothing  4  -CL      Taking care of personal grooming (such as brushing teeth)  4  -CL      Eating meals  4  -CL      AM-PAC 6 Clicks Score (OT)  24  -CL         Modified [place] Scale    Pre-Stroke Modified [place] Scale  0 - No Symptoms at all.  -CL      Modified [place] Scale  0 - No Symptoms at all.  -CL         Functional Assessment    Outcome  Measure Options  AM-PAC 6 Clicks Daily Activity (OT);Modified [place]  -CL        User Key  (r) = Recorded By, (t) = Taken By, (c) = Cosigned By    Initials Name Provider Type    Mariana Greco OT Occupational Therapist          Time Calculation:   Time Calculation- OT     Row Name 10/01/19 0915             Time Calculation- OT    OT Start Time  0915  -CL      OT Received On  10/01/19  -CL        User Key  (r) = Recorded By, (t) = Taken By, (c) = Cosigned By    Initials Name Provider Type    Mariana Greco OT Occupational Therapist        Therapy Suggested Charges     Code   Minutes Charges    None           Therapy Charges for Today     Code Description Service Date Service Provider Modifiers Qty    [id number]  OT EVAL LOW COMPLEXITY 3 10/1/2019 Mariana Turner OT GO 1    [id number]  OT THER SUPP EA 15 MIN 10/1/2019 Mariana Turner OT GO 1               OT Discharge Summary  Anticipated Discharge Disposition (OT): home with assist  Reason for Discharge: At baseline function  Outcomes Achieved: Refer to plan of care for updates on goals achieved  Discharge Destination: Home with assist    Mariana Turner OT  10/1/2019

## 2019-10-01 NOTE — THERAPY EVALUATION
Acute Care - Speech Language Pathology Initial Evaluation  [facility]   Cognitive-Communication Evaluation     Patient Name: Jasvir Hamilton  : 1955  MRN: [id number]  Today's Date: 10/1/2019  Onset of Illness/Injury or Date of Surgery: 19     Referring Physician: Martha Teran MD      Admit Date: 2019     Visit Dx:    ICD-10-CM ICD-9-CM   1. Acute ischemic stroke (CMS/HCC) I63.9 434.91   2. Impaired mobility and ADLs Z74.09 799.89     Patient Active Problem List   Diagnosis   • Essential hypertension   • GERD (gastroesophageal reflux disease)   • Hyperlipemia   • Cobalamin deficiency   • History of vitamin D deficiency   • Coronary artery disease of native heart with stable angina pectoris (CMS/HCC)   • Bilateral carotid bruits   • Tobacco abuse   • Obesity (BMI 30.0-34.9)   • Carotid artery disease (CMS/HCC)   • Acute ischemic stroke (CMS/HCC)     Past Medical History:   Diagnosis Date   • Chicken pox    • Diverticulitis    • Easy bruising    • Gall stones    • GERD (gastroesophageal reflux disease)    • Glaucoma    • Hyperlipemia    • Hypertension    • Measles    • Menopause    • Mumps    • Positive PPD    • Scarlet fever      Past Surgical History:   Procedure Laterality Date   • CARDIAC CATHETERIZATION     • CARPAL TUNNEL RELEASE      both hands   • CHOLECYSTECTOMY     • COLONOSCOPY     • ECTOPIC PREGNANCY     • ENDOMETRIAL ABLATION     • HAND SURGERY     • OTHER SURGICAL HISTORY      lap and leep   • TUBAL ABDOMINAL LIGATION      with L salpingo-oophorectomy        SLP EVALUATION (last 72 hours)      SLP SLC Evaluation     Row Name 10/01/19 1515                   Communication Assessment/Intervention    Document Type  evaluation  -AC        Subjective Information  no complaints  -AC        Patient Observations  alert;cooperative  -AC        Patient/Family Observations  Pt's spouse @ bedside.  -AC        Patient Effort  good  -AC           General Information    Patient  Profile Reviewed  yes  -AC        Pertinent History Of Current Problem  63yo adm for stroke w/u. Pt passed RN CVA swallow screen. NIH 0. S/p tPA.   -AC        Precautions/Limitations, Vision  WFL with corrective lenses;for purposes of eval  -AC        Precautions/Limitations, Hearing  WFL;for purposes of eval  -AC        Patient Level of Education  Pt works as phlebotomist.  -AC        Prior Level of Function-Communication  WFL  -AC        Plans/Goals Discussed with  patient;spouse/S.O.;agreed upon  -AC        Barriers to Rehab  none identified  -AC        Patient's Goals for Discharge  return to home;return to work;return to all previous roles/activities  -AC        Family Goals for Discharge  family did not state  -AC           Pain Scale: Numbers Pre/Post-Treatment    Pain Scale: Numbers, Pretreatment  0/10 - no pain  -AC        Pain Scale: Numbers, Post-Treatment  0/10 - no pain  -AC           Comprehension Assessment/Intervention    Comprehension Assessment/Intervention  Reading Comprehension;Auditory Comprehension  -AC           Auditory Comprehension Assessment/Intervention    Auditory Comprehension (Communication)  WFL  -AC        Answers Questions (Communication)  WFL;complex;yes/no;wh questions;personal  -AC        Able to Follow Commands (Communication)  WFL;2-step  -AC        Narrative Discourse  WFL;conversational level  -AC           Reading Comprehension Assessment/Intervention    Reading Comprehension (Communication)  WFL  -AC        Functional Reading Tasks  WFL  -AC           Expression Assessment/Intervention    Expression Assessment/Intervention  verbal expression;graphic expression  -AC           Verbal Expression Assessment/Intervention    Verbal Expression  WFL  -AC        Automatic Speech (Communication)  WFL;response to greeting  -AC        Responsive Naming  WFL;simple  -AC        Confrontational Naming  WFL;high frequency  -AC        Conversational Discourse/Fluency  WFL  -AC            Graphic Expression Assessment/Intervention    Graphic Expression  WFL;dominant hand  -AC        Biographical Information  WFL;name  -AC        Graphic Expression, Comment  Pt able to generate complex written sentence using 2 key words w/ 100% acc.  -AC           Motor Speech Assessment/Intervention    Motor Speech Function  WFL;unfamiliar listener  -AC        Conversational Speech (Communication)  WFL;moderate complexity  -AC        Motor Speech, Comment  100% intelligible  -AC           Cognitive Assessment Intervention- SLP    Cognitive Function (Cognition)  WFL  -AC        Orientation Status (Cognition)  WFL;person;place;time;situation  -AC        Memory (Cognitive)  WFL;delayed;immediate;short-term;other (see comments) immediate/3-min delay recall: 5/5 words  -AC        Attention (Cognitive)  WFL;sustained  -AC        Thought Organization (Cognitive)  WFL;concrete divergent;abstract convergent;drawing conclusions  -AC        Reasoning (Cognitive)  WFL;mod-complex;deductive  -AC        Problem Solving (Cognitive)  WFL;temporal  -AC           SLP Clinical Impressions    SLP Diagnosis  Functional cognitive-communication skills. Pt reported mild acute difficulty w/ word-finding seems to have resolved. Pt declined further SLP services @ this time.  -AC        SLC Criteria for Skilled Therapy Interventions Met  no problems identified which require skilled intervention  -           Recommendations    Anticipated Dischage Disposition  unknown  -          User Key  (r) = Recorded By, (t) = Taken By, (c) = Cosigned By    Initials Name Effective Dates     Holly Gilbert MS CCC-SLP 07/27/17 -              EDUCATION  The patient has been educated in the following areas:     Cognitive Impairment Communication Impairment.    SLP Recommendation and Plan  SLP Diagnosis: Functional cognitive-communication skills. Pt reported mild acute difficulty w/ word-finding seems to have resolved. Pt declined further SLP services @  this time.     SLC Criteria for Skilled Therapy Interventions Met: no problems identified which require skilled intervention  Anticipated Dischage Disposition: unknown          Plan of Care Reviewed With: patient, spouse        Time Calculation:     Time Calculation- SLP     Row Name 10/01/19 1600             Time Calculation- SLP    SLP Start Time  1515  -      SLP Received On  10/01/19  -        User Key  (r) = Recorded By, (t) = Taken By, (c) = Cosigned By    Initials Name Provider Type     Holly Gilbert MS CCC-SLP Speech and Language Pathologist          Therapy Charges for Today     Code Description Service Date Service Provider Modifiers Qty    [id number]  ST EVAL SPEECH AND PROD W LANG  3 10/1/2019 Holly Gilbert, MS CCC-SLP GN 1                     Holly Gilbert MS CCC-SLP  10/1/2019

## 2019-10-01 NOTE — PLAN OF CARE
Problem: Patient Care Overview  Goal: Plan of Care Review  Outcome: Ongoing (interventions implemented as appropriate)   10/01/19 0590   Coping/Psychosocial   Plan of Care Reviewed With patient   Plan of Care Review   Progress improving   OTHER   Outcome Summary OT completed a brief chart review. Pt is independent w/ t/fs and ADLs. Pt has no further deficits which require skilled IPOT intervention, will sign off. Recommend pt DC home w/ assist.

## 2019-10-01 NOTE — PLAN OF CARE
Problem: Patient Care Overview  Goal: Plan of Care Review  Outcome: Ongoing (interventions implemented as appropriate)   10/01/19 2396   Coping/Psychosocial   Plan of Care Reviewed With patient;spouse   Plan of Care Review   Progress improving   OTHER   Outcome Summary NIH 0 this morning. Only neurological deficit is she occassionally has a delay in getting her words out. VSS, home BP medications restarted. MRI ordered, CTA head/neck and 24 hr post tpa scan at 1700. She has ambulated several times throughout the day so far. She was able to do 800 feet without stopping and minimal fatigue. Will continue to monitor.     Goal: Individualization and Mutuality  Outcome: Ongoing (interventions implemented as appropriate)    Goal: Discharge Needs Assessment  Outcome: Ongoing (interventions implemented as appropriate)    Goal: Interprofessional Rounds/Family Conf  Outcome: Ongoing (interventions implemented as appropriate)

## 2019-10-02 ENCOUNTER — APPOINTMENT (OUTPATIENT)
Dept: MRI IMAGING | Facility: HOSPITAL | Age: 64
End: 2019-10-02

## 2019-10-02 VITALS
DIASTOLIC BLOOD PRESSURE: 78 MMHG | HEART RATE: 59 BPM | HEIGHT: 61 IN | BODY MASS INDEX: 30.93 KG/M2 | RESPIRATION RATE: 16 BRPM | OXYGEN SATURATION: 94 % | SYSTOLIC BLOOD PRESSURE: 143 MMHG | TEMPERATURE: 98.1 F | WEIGHT: 163.8 LBS

## 2019-10-02 PROBLEM — I63.9 ACUTE ISCHEMIC STROKE: Status: RESOLVED | Noted: 2019-09-30 | Resolved: 2019-10-02

## 2019-10-02 LAB
ALBUMIN SERPL-MCNC: 4 G/DL (ref 3.5–5.2)
ALBUMIN/GLOB SERPL: 1.8 G/DL
ALP SERPL-CCNC: 62 U/L (ref 39–117)
ALT SERPL W P-5'-P-CCNC: 10 U/L (ref 1–33)
ANION GAP SERPL CALCULATED.3IONS-SCNC: 11 MMOL/L (ref 5–15)
AST SERPL-CCNC: 13 U/L (ref 1–32)
BASOPHILS # BLD AUTO: 0.02 10*3/MM3 (ref 0–0.2)
BASOPHILS NFR BLD AUTO: 0.4 % (ref 0–1.5)
BILIRUB SERPL-MCNC: 0.6 MG/DL (ref 0.2–1.2)
BUN BLD-MCNC: 14 MG/DL (ref 8–23)
BUN/CREAT SERPL: 18.9 (ref 7–25)
CALCIUM SPEC-SCNC: 9.5 MG/DL (ref 8.6–10.5)
CHLORIDE SERPL-SCNC: 106 MMOL/L (ref 98–107)
CO2 SERPL-SCNC: 24 MMOL/L (ref 22–29)
CREAT BLD-MCNC: 0.74 MG/DL (ref 0.57–1)
DEPRECATED RDW RBC AUTO: 46.6 FL (ref 37–54)
EOSINOPHIL # BLD AUTO: 0.11 10*3/MM3 (ref 0–0.4)
EOSINOPHIL NFR BLD AUTO: 2 % (ref 0.3–6.2)
ERYTHROCYTE [DISTWIDTH] IN BLOOD BY AUTOMATED COUNT: 14.6 % (ref 12.3–15.4)
GFR SERPL CREATININE-BSD FRML MDRD: 80 ML/MIN/1.73
GLOBULIN UR ELPH-MCNC: 2.2 GM/DL
GLUCOSE BLD-MCNC: 120 MG/DL (ref 65–99)
HCT VFR BLD AUTO: 39.8 % (ref 34–46.6)
HGB BLD-MCNC: 12.9 G/DL (ref 12–15.9)
IMM GRANULOCYTES # BLD AUTO: 0.02 10*3/MM3 (ref 0–0.05)
IMM GRANULOCYTES NFR BLD AUTO: 0.4 % (ref 0–0.5)
LYMPHOCYTES # BLD AUTO: 1.11 10*3/MM3 (ref 0.7–3.1)
LYMPHOCYTES NFR BLD AUTO: 20 % (ref 19.6–45.3)
MAGNESIUM SERPL-MCNC: 2 MG/DL (ref 1.6–2.4)
MCH RBC QN AUTO: 28.6 PG (ref 26.6–33)
MCHC RBC AUTO-ENTMCNC: 32.4 G/DL (ref 31.5–35.7)
MCV RBC AUTO: 88.2 FL (ref 79–97)
MONOCYTES # BLD AUTO: 0.48 10*3/MM3 (ref 0.1–0.9)
MONOCYTES NFR BLD AUTO: 8.6 % (ref 5–12)
NEUTROPHILS # BLD AUTO: 3.81 10*3/MM3 (ref 1.7–7)
NEUTROPHILS NFR BLD AUTO: 68.6 % (ref 42.7–76)
NRBC BLD AUTO-RTO: 0 /100 WBC (ref 0–0.2)
PHOSPHATE SERPL-MCNC: 2.9 MG/DL (ref 2.5–4.5)
PLATELET # BLD AUTO: 176 10*3/MM3 (ref 140–450)
PMV BLD AUTO: 10.5 FL (ref 6–12)
POTASSIUM BLD-SCNC: 3.9 MMOL/L (ref 3.5–5.2)
PROT SERPL-MCNC: 6.2 G/DL (ref 6–8.5)
RBC # BLD AUTO: 4.51 10*6/MM3 (ref 3.77–5.28)
SODIUM BLD-SCNC: 141 MMOL/L (ref 136–145)
WBC NRBC COR # BLD: 5.55 10*3/MM3 (ref 3.4–10.8)

## 2019-10-02 PROCEDURE — 84100 ASSAY OF PHOSPHORUS: CPT | Performed by: INTERNAL MEDICINE

## 2019-10-02 PROCEDURE — 99231 SBSQ HOSP IP/OBS SF/LOW 25: CPT | Performed by: PSYCHIATRY & NEUROLOGY

## 2019-10-02 PROCEDURE — 70551 MRI BRAIN STEM W/O DYE: CPT

## 2019-10-02 PROCEDURE — 80053 COMPREHEN METABOLIC PANEL: CPT | Performed by: INTERNAL MEDICINE

## 2019-10-02 PROCEDURE — 85025 COMPLETE CBC W/AUTO DIFF WBC: CPT | Performed by: INTERNAL MEDICINE

## 2019-10-02 PROCEDURE — 83735 ASSAY OF MAGNESIUM: CPT | Performed by: INTERNAL MEDICINE

## 2019-10-02 PROCEDURE — 99239 HOSP IP/OBS DSCHRG MGMT >30: CPT | Performed by: INTERNAL MEDICINE

## 2019-10-02 RX ORDER — ROSUVASTATIN CALCIUM 20 MG/1
40 TABLET, COATED ORAL NIGHTLY
Status: DISCONTINUED | OUTPATIENT
Start: 2019-10-02 | End: 2019-10-02 | Stop reason: HOSPADM

## 2019-10-02 RX ORDER — ASPIRIN 81 MG/1
81 TABLET ORAL DAILY
Start: 2019-10-02 | End: 2019-11-11 | Stop reason: SDUPTHER

## 2019-10-02 RX ORDER — CLOPIDOGREL BISULFATE 75 MG/1
75 TABLET ORAL DAILY
Status: DISCONTINUED | OUTPATIENT
Start: 2019-10-02 | End: 2019-10-02 | Stop reason: HOSPADM

## 2019-10-02 RX ADMIN — LOSARTAN POTASSIUM 50 MG: 25 TABLET, FILM COATED ORAL at 09:17

## 2019-10-02 RX ADMIN — PANTOPRAZOLE SODIUM 40 MG: 40 TABLET, DELAYED RELEASE ORAL at 05:45

## 2019-10-02 RX ADMIN — ISOSORBIDE MONONITRATE 30 MG: 30 TABLET, EXTENDED RELEASE ORAL at 09:17

## 2019-10-02 RX ADMIN — METOPROLOL TARTRATE 50 MG: 50 TABLET ORAL at 09:17

## 2019-10-02 NOTE — PLAN OF CARE
Problem: Stroke (Ischemic) (Adult)  Goal: Signs and Symptoms of Listed Potential Problems Will be Absent, Minimized or Managed (Stroke)  Outcome: Ongoing (interventions implemented as appropriate)      Problem: Patient Care Overview  Goal: Plan of Care Review  Outcome: Ongoing (interventions implemented as appropriate)   10/02/19 0346   Coping/Psychosocial   Plan of Care Reviewed With patient   Plan of Care Review   Progress no change   OTHER   Outcome Summary Patient arrived from ICU last night. VSS. NSR on tele. NIHSS 0. No neuro changes. 24 hour post TPA CT scan not resulted as of now; aspirin on hold. Still needs to go for MRI of the brain. Up with stand by assist. No issues through the night.

## 2019-10-02 NOTE — PROGRESS NOTES
Case Management Discharge Note    Final Note: Plan is home with family. Family will transport. Denies any dischaarge needs.    Destination      No service has been selected for the patient.      Durable Medical Equipment      No service has been selected for the patient.      Dialysis/Infusion      No service has been selected for the patient.      Home Medical Care      No service has been selected for the patient.      Therapy      No service has been selected for the patient.      Community Resources      No service has been selected for the patient.             Final Discharge Disposition Code: 01 - home or self-care

## 2019-10-02 NOTE — DISCHARGE SUMMARY
[facility]  DISCHARGE SUMMARY    Patient Name: Jasvir Hamilton  : 1955  MRN: [id number]    Date of Admission: 2019  Date of Discharge:  10/2/19  Primary Care Physician: Valeria Gentile PA-C    Consults     Date and Time Order Name Status Description    2019 1536 Inpatient Neurology Consult Stroke Completed           Hospital Course     Presenting Problem:   Acute ischemic stroke (CMS/HCC) [I63.9]    Active Hospital Problems    Diagnosis  POA   • Carotid artery disease (CMS/HCC) [I73.9]  Yes   • Tobacco abuse [Z72.0]  Yes   • Essential hypertension [I10]  Yes   • GERD (gastroesophageal reflux disease) [K21.9]  Yes   • Hyperlipemia [E78.5]  Yes   • Coronary artery disease of native heart with stable angina pectoris (CMS/HCC) [I25.118]  Yes      Resolved Hospital Problems    Diagnosis Date Resolved POA   • **Acute ischemic stroke (CMS/HCC) [I63.9] 10/02/2019 Yes          Hospital Course:  64 y.o.female with relevant PMH of HTN, HLD, GERD, CAD w/cardiac stents and ongoing tobacco abuse admitted 2019 w/ expressive aphasia w/ initial NIHSS of 6.  S/p TPA after blood pressure control.    Malignant hypertension  -Improved blood pressure control obtained but may need some further titrating her blood pressure medicines for a target of 120/80.  She can follow-up with PCP later this week or early next week with further adjustments to her antihypertensive medications    Ophthalmic migraine  -Tums now improved probably triggered by her malignant hypertension.    Intracranial carotid stenosis  -  CTA head imaging showed focal calcification of the intracavernous left ICA and proximal supraclinoid ICA, difficult to assess clearly for stenosis, thought to represent 50% or less stenosis.  - continue dual antiplatelet therapy and high intensity statin  - smoking cessation advised    Addendum 10/4/19    Thyroid nodule  - incidentally noted on CT scan. Will  likely need followup ultrasound  - TSH 1.4  - Called both patient and PCP, both were actually already aware of the nodule and KEYANNA Gentile is planning appropriate followup      Discharge Follow Up Recommendations for labs/diagnostics:  Goal /80, please reassess at PCP followup.     Day of Discharge     HPI:   Feels much better today. Denies headache. No focal weakness. Denies visual changes    Review of Systems  Gen- No fevers, chills  CV- No chest pain, palpitations  Resp- No cough, dyspnea  GI- No N/V/D, abd pain        Vital Signs:   Temp:  [97.8 °F (36.6 °C)-98.6 °F (37 °C)] 98.1 °F (36.7 °C)  Heart Rate:  [59-72] 59  Resp:  [16-18] 16  BP: (123-178)/(65-85) 143/78     Physical Exam:  Constitutional -no acute distress, non toxic, sitting up on edge of the bed  HEENT-NCAT, mucous membranes moist  CV-RRR, S1 S2 normal, no m/r/g  Resp-CTAB, no wheezes, rhonchi or rales  Abd-soft, non-tender, non-distended, normo active bowel sounds  Ext-No lower extremity cyanosis, clubbing or edema bilaterally  Neuro-alert and oriented x 3, speech clear, moves all extremities   Psych-normal affect   Skin- No rash on exposed UE or LE bilaterally      Pertinent  and/or Most Recent Results     Results from last 7 days   Lab Units 10/02/19  0913 10/02/19  0540 10/01/19  0445 09/30/19  1549 09/30/19  1540   WBC 10*3/mm3  --  5.55 4.80 4.13  --    HEMOGLOBIN g/dL  --  12.9 12.6 11.8*  --    HEMOGLOBIN, POC g/dL  --   --   --   --  12.2   HEMATOCRIT %  --  39.8 39.0 36.2  --    HEMATOCRIT POC %  --   --   --   --  36*   PLATELETS 10*3/mm3  --  176 164 171  --    SODIUM mmol/L 141  --  145  --   --    POTASSIUM mmol/L 3.9  --  4.3  --   --    CHLORIDE mmol/L 106  --  105  --   --    CO2 mmol/L 24.0  --  30.0*  --   --    BUN mg/dL 14  --  15  --   --    CREATININE mg/dL 0.74  --  0.71  --  0.80   GLUCOSE mg/dL 120*  --  98  --   --    CALCIUM mg/dL 9.5  --  9.3  --   --      Results from last 7 days   Lab Units 10/02/19  0900  10/01/19  0445 09/30/19  1549 09/30/19  1539   BILIRUBIN mg/dL 0.6 0.5  --   --    ALK PHOS U/L 62 59  --   --    ALT (SGPT) U/L 10 12 12  --    AST (SGOT) U/L 13 13 13  --    PROTIME seconds  --   --   --  11.9*   INR   --   --   --  1.0   APTT seconds  --   --  29.8  --      Results from last 7 days   Lab Units 10/01/19  0445   CHOLESTEROL mg/dL 127   TRIGLYCERIDES mg/dL 111   HDL CHOL mg/dL 40     Results from last 7 days   Lab Units 10/01/19  0445 09/30/19  1549   TSH uIU/mL 1.410  --    HEMOGLOBIN A1C % 5.60  --    TROPONIN T ng/mL  --  <0.010       Brief Urine Lab Results     None          Microbiology Results Abnormal     None          Imaging Results (all)     Procedure Component Value Units Date/Time    CT Head Without Contrast [[id number]] Collected:  10/02/19 0738     Updated:  10/02/19 0932    Narrative:       EXAMINATION: CT ANGIOGRAM HEAD W WO CONTRAST-, CT ANGIOGRAM NECK W WO  CONTRAST-, CT HEAD WO CONTRAST- 10/01/2019     INDICATION: I63.9-Cerebral infarction, unspecified; Z74.09-Other reduced  mobility; CVA     TECHNIQUE: Unenhanced 5 mm axial images through the brain, subsequent  pre and post contrast 0.75 mm axial images through the head and neck  with sagittal and coronal 2-D angiographic reconstructions.     The radiation dose reduction device was turned on for each scan per the  ALARA (As Low as Reasonably Achievable) protocol.     COMPARISON: 09/30/2019 head CT scan     FINDINGS: Previous head CT scan report indicated no acute intracranial  disease.     HEAD CT SCAN WITHOUT CONTRAST: The calvarium appears intact. The  included paranasal sinuses and mastoids appear clear. Soft tissue window  images show no evidence of hemorrhage, contusion, mass or mass effect,  infarct, hydrocephalus, or abnormal extra-axial collection. Axial images  of the posterior fossa give the impression of slightly increased low  density changes of the right cerebellar peduncle compared to left,  however, this is thought  to represent skull base streak artifact. Brain  MRI, which has already been performed as follow-up shows no restricted  diffusion or edema here.       Impression:       Skull base streak artifact as noted. No evidence of acute  intracranial disease.     CTA OF THE HEAD WITH 2-D RECONSTRUCTIONS: The anterior and middle  cerebral arteries, and proximal branches appear within normal limits.  There is a dominant left and smaller right vertebral artery. Posterior  cerebral arteries appear within normal limits. There is mild  calcification of the right ICA, but no evidence of hemodynamically  significant stenosis. Axial thin section images show focal calcification  of the intracavernous left ICA and proximal supraclinoid ICA, difficult  to assess clearly for stenosis, thought to represent 50% or less  stenosis.     IMPRESSION: Calcification of the distal left intracranial ICA, with  suspected 50% or less stenosis. No other evidence of significant  intracranial or vascular stenosis.     CTA OF THE NECK WITH 2-D RECONSTRUCTIONS: The brachiocephalic vessels  appear to arise normally from the aortic arch. Proximal right and left  subclavian arteries appear normally patent.     On the right, there is mild circumferential plaquing of the carotid bulb  but no evidence of hemodynamically significant common or internal  carotid artery stenosis. There is focal origin stenosis of the right  PCA, best seen on the sagittal reconstruction images, potentially 70% or  greater. On the left, there is again mild circumferential noncalcified  plaquing of the left carotid bulb but no evidence of hemodynamically  significant common internal or external carotid artery stenosis.     There is a dominant left and smaller right vertebral artery with no  obvious stenosis.     Incidental note is made of an approximately 12 mm left thyroid nodule or  cyst of the lower pole.     IMPRESSION:   1. 70% or greater right external carotid artery origin  stenosis.  2. Circumferential soft plaque of both carotid bulbs, but no evidence of  hemodynamically significant common, internal, or left external carotid  artery stenosis is seen.  3. Incidentally noted small left thyroid lobe cyst or nodule.     D:  10/02/2019  E:  10/02/2019       CT Angiogram Head With & Without Contrast [[id number]] Collected:  10/02/19 0738     Updated:  10/02/19 0932    Narrative:       EXAMINATION: CT ANGIOGRAM HEAD W WO CONTRAST-, CT ANGIOGRAM NECK W WO  CONTRAST-, CT HEAD WO CONTRAST- 10/01/2019     INDICATION: I63.9-Cerebral infarction, unspecified; Z74.09-Other reduced  mobility; CVA     TECHNIQUE: Unenhanced 5 mm axial images through the brain, subsequent  pre and post contrast 0.75 mm axial images through the head and neck  with sagittal and coronal 2-D angiographic reconstructions.     The radiation dose reduction device was turned on for each scan per the  ALARA (As Low as Reasonably Achievable) protocol.     COMPARISON: 09/30/2019 head CT scan     FINDINGS: Previous head CT scan report indicated no acute intracranial  disease.     HEAD CT SCAN WITHOUT CONTRAST: The calvarium appears intact. The  included paranasal sinuses and mastoids appear clear. Soft tissue window  images show no evidence of hemorrhage, contusion, mass or mass effect,  infarct, hydrocephalus, or abnormal extra-axial collection. Axial images  of the posterior fossa give the impression of slightly increased low  density changes of the right cerebellar peduncle compared to left,  however, this is thought to represent skull base streak artifact. Brain  MRI, which has already been performed as follow-up shows no restricted  diffusion or edema here.       Impression:       Skull base streak artifact as noted. No evidence of acute  intracranial disease.     CTA OF THE HEAD WITH 2-D RECONSTRUCTIONS: The anterior and middle  cerebral arteries, and proximal branches appear within normal limits.  There is a dominant left  and smaller right vertebral artery. Posterior  cerebral arteries appear within normal limits. There is mild  calcification of the right ICA, but no evidence of hemodynamically  significant stenosis. Axial thin section images show focal calcification  of the intracavernous left ICA and proximal supraclinoid ICA, difficult  to assess clearly for stenosis, thought to represent 50% or less  stenosis.     IMPRESSION: Calcification of the distal left intracranial ICA, with  suspected 50% or less stenosis. No other evidence of significant  intracranial or vascular stenosis.     CTA OF THE NECK WITH 2-D RECONSTRUCTIONS: The brachiocephalic vessels  appear to arise normally from the aortic arch. Proximal right and left  subclavian arteries appear normally patent.     On the right, there is mild circumferential plaquing of the carotid bulb  but no evidence of hemodynamically significant common or internal  carotid artery stenosis. There is focal origin stenosis of the right  PCA, best seen on the sagittal reconstruction images, potentially 70% or  greater. On the left, there is again mild circumferential noncalcified  plaquing of the left carotid bulb but no evidence of hemodynamically  significant common internal or external carotid artery stenosis.     There is a dominant left and smaller right vertebral artery with no  obvious stenosis.     Incidental note is made of an approximately 12 mm left thyroid nodule or  cyst of the lower pole.     IMPRESSION:   1. 70% or greater right external carotid artery origin stenosis.  2. Circumferential soft plaque of both carotid bulbs, but no evidence of  hemodynamically significant common, internal, or left external carotid  artery stenosis is seen.  3. Incidentally noted small left thyroid lobe cyst or nodule.     D:  10/02/2019  E:  10/02/2019       CT Angiogram Neck With & Without Contrast [[id number]] Collected:  10/02/19 0738     Updated:  10/02/19 0932    Narrative:        EXAMINATION: CT ANGIOGRAM HEAD W WO CONTRAST-, CT ANGIOGRAM NECK W WO  CONTRAST-, CT HEAD WO CONTRAST- 10/01/2019     INDICATION: I63.9-Cerebral infarction, unspecified; Z74.09-Other reduced  mobility; CVA     TECHNIQUE: Unenhanced 5 mm axial images through the brain, subsequent  pre and post contrast 0.75 mm axial images through the head and neck  with sagittal and coronal 2-D angiographic reconstructions.     The radiation dose reduction device was turned on for each scan per the  ALARA (As Low as Reasonably Achievable) protocol.     COMPARISON: 09/30/2019 head CT scan     FINDINGS: Previous head CT scan report indicated no acute intracranial  disease.     HEAD CT SCAN WITHOUT CONTRAST: The calvarium appears intact. The  included paranasal sinuses and mastoids appear clear. Soft tissue window  images show no evidence of hemorrhage, contusion, mass or mass effect,  infarct, hydrocephalus, or abnormal extra-axial collection. Axial images  of the posterior fossa give the impression of slightly increased low  density changes of the right cerebellar peduncle compared to left,  however, this is thought to represent skull base streak artifact. Brain  MRI, which has already been performed as follow-up shows no restricted  diffusion or edema here.       Impression:       Skull base streak artifact as noted. No evidence of acute  intracranial disease.     CTA OF THE HEAD WITH 2-D RECONSTRUCTIONS: The anterior and middle  cerebral arteries, and proximal branches appear within normal limits.  There is a dominant left and smaller right vertebral artery. Posterior  cerebral arteries appear within normal limits. There is mild  calcification of the right ICA, but no evidence of hemodynamically  significant stenosis. Axial thin section images show focal calcification  of the intracavernous left ICA and proximal supraclinoid ICA, difficult  to assess clearly for stenosis, thought to represent 50% or less  stenosis.     IMPRESSION:  Calcification of the distal left intracranial ICA, with  suspected 50% or less stenosis. No other evidence of significant  intracranial or vascular stenosis.     CTA OF THE NECK WITH 2-D RECONSTRUCTIONS: The brachiocephalic vessels  appear to arise normally from the aortic arch. Proximal right and left  subclavian arteries appear normally patent.     On the right, there is mild circumferential plaquing of the carotid bulb  but no evidence of hemodynamically significant common or internal  carotid artery stenosis. There is focal origin stenosis of the right  PCA, best seen on the sagittal reconstruction images, potentially 70% or  greater. On the left, there is again mild circumferential noncalcified  plaquing of the left carotid bulb but no evidence of hemodynamically  significant common internal or external carotid artery stenosis.     There is a dominant left and smaller right vertebral artery with no  obvious stenosis.     Incidental note is made of an approximately 12 mm left thyroid nodule or  cyst of the lower pole.     IMPRESSION:   1. 70% or greater right external carotid artery origin stenosis.  2. Circumferential soft plaque of both carotid bulbs, but no evidence of  hemodynamically significant common, internal, or left external carotid  artery stenosis is seen.  3. Incidentally noted small left thyroid lobe cyst or nodule.     D:  10/02/2019  E:  10/02/2019       MRI Brain Without Contrast [[id number]] Updated:  10/02/19 0639    XR Chest 1 View [[id number]] Collected:  09/30/19 1658     Updated:  10/01/19 1554    Narrative:       EXAMINATION: XR CHEST 1 VW-      INDICATION: Acute stroke protocol (onset < 12 hrs); I63.9-Cerebral  infarction, unspecified.      COMPARISON: None.     FINDINGS: Portable chest reveals cardiac and mediastinal silhouettes  within normal limits. The lung fields are grossly clear. No focal  parenchymal opacification is present.  No pleural effusion or  pneumothorax. Degenerative  change is seen within the spine. Pulmonary  vascularity is within normal limits.           Impression:       No acute cardiopulmonary disease.     D:  09/30/2019  E:  10/01/2019     This report was finalized on 10/1/2019 3:51 PM by Dr. Mervat Dumont MD.       CT Head Without Contrast Stroke Protocol [[id number]] Collected:  09/30/19 1546     Updated:  09/30/19 1703    Narrative:       EXAMINATION: CT HEAD WO CONTRAST, STROKE PROTOCOL-09/30/2019:      INDICATION: Stroke.      TECHNIQUE: CT head without intravenous contrast following stroke  protocol.     The radiation dose reduction device was turned on for each scan per the  ALARA (As Low as Reasonably Achievable) protocol.     COMPARISON: NONE.     FINDINGS: Midline structures are symmetric without evidence of mass,  mass effect or midline shift. Ventricles and sulci within normal limits.  No intra-axial hemorrhage or extra-axial fluid collection. Globes and  orbits are unremarkable. The visualized paranasal sinuses and mastoid  air cells are grossly clear and well pneumatized. Calvarium intact.       Impression:       No acute intracranial abnormality, specifically no acute  hemorrhage.     Scan performed on 09/30/2019 at 1537 hours. Scan report given to ER  physician and team in person by Dr. Petty on 09/30/2019 at 1542 hours.     D:  09/30/2019  E:  09/30/2019           This report was finalized on 9/30/2019 5:00 PM by Dr. Lew Petty.             Results for orders placed during the hospital encounter of 09/30/19   Bilateral Carotid Duplex    Narrative · No evidence of hemodynamically significant stenosis of bilateral   internal carotid arteries.  · Bilateral mild to moderate irregular plaque is noted.  · Right external carotid artery velocity is significantly elevated   consistent with moderate to severe stenosis.          Results for orders placed during the hospital encounter of 09/30/19   Bilateral Carotid Duplex    Narrative · No evidence of  hemodynamically significant stenosis of bilateral   internal carotid arteries.  · Bilateral mild to moderate irregular plaque is noted.  · Right external carotid artery velocity is significantly elevated   consistent with moderate to severe stenosis.          Results for orders placed during the hospital encounter of 09/30/19   Adult Transthoracic Echo Complete W/ Cont if Necessary Per Protocol (With Agitated Saline)    Narrative · Mild mitral valve regurgitation is present.  · Mild tricuspid valve regurgitation is present.  · Calculated right ventricular systolic pressure from tricuspid   regurgitation is 24 mmHg.  · Estimated EF = 68%.  · Left ventricular systolic function is normal.  · Left ventricular wall thickness is consistent with mild concentric   hypertrophy.  · Normal right ventricular cavity size, wall thickness, systolic function   and septal motion noted.  · Left ventricular diastolic dysfunction is abnormal consistent with: age.  · Saline test results are negative.  · The aortic valve exhibits mild sclerosis.  · No evidence of pulmonary hypertension is present.  · There is no evidence of pericardial effusion.            Discharge Details        Discharge Medications      New Medications      Instructions Start Date   aspirin 81 MG EC tablet   81 mg, Oral, Daily         Changes to Medications      Instructions Start Date   pantoprazole 40 MG EC tablet  Commonly known as:  PROTONIX  What changed:    · how much to take  · how to take this  · when to take this   TAKE 1 TABLET TWICE A DAY         Continue These Medications      Instructions Start Date   cholecalciferol 1000 units tablet  Commonly known as:  VITAMIN D3   1,000 Units, Oral, Daily      clopidogrel 75 MG tablet  Commonly known as:  PLAVIX   75 mg, Oral, Daily      ibuprofen 600 MG tablet  Commonly known as:  ADVIL,MOTRIN   600 mg, Oral, Every 8 Hours PRN      isosorbide mononitrate 30 MG 24 hr tablet  Commonly known as:  IMDUR   TAKE 1 TABLET  DAILY      losartan 50 MG tablet  Commonly known as:  COZAAR   50 mg, Oral, Daily      metoprolol tartrate 50 MG tablet  Commonly known as:  LOPRESSOR   TAKE 1 TABLET TWICE A DAY      nitroglycerin 0.4 MG SL tablet  Commonly known as:  NITROSTAT   0.4 mg, Sublingual, Every 5 Minutes PRN      ondansetron ODT 8 MG disintegrating tablet  Commonly known as:  ZOFRAN ODT   8 mg, Oral, Every 8 Hours PRN      rosuvastatin 40 MG tablet  Commonly known as:  CRESTOR   40 mg, Oral, Daily      sucralfate 1 g tablet  Commonly known as:  CARAFATE   1 g, Oral, Nightly             No Known Allergies      Discharge Disposition:  Home or Self Care    Diet:  Hospital:  Diet Order   Procedures   • Diet Regular; Thin; Cardiac     Discharge:     Discharge Activity:         CODE STATUS:    Code Status and Medical Interventions:   Ordered at: 09/30/19 1722     Code Status:    CPR     Medical Interventions (Level of Support Prior to Arrest):    Full         Future Appointments   Date Time Provider Department Center   4/24/2020  2:30 PM Chiara Montalvo MD [facility] None   10/28/2020  8:15 AM Valeria Gentile PA-C [facility] None       Additional Instructions for the Follow-ups that You Need to Schedule     Discharge Follow-up with PCP   As directed       Currently Documented PCP:    Valeria Gentile PA-C    PCP Phone Number:    [phone]     Follow Up Details:  follow up PCP one week               Time Spent on Discharge:  40 minutes    Electronically signed by Parth Esteban MD, 10/02/19, 2:26 PM.

## 2019-10-02 NOTE — PROGRESS NOTES
Neurology       Patient Care Team:  Valeria Gentile PA-C as PCP - General (Physician Assistant)    Chief complaint: Transient aphasia, resolved post TPA    History: Patient is stable and ready to go home.      Past Medical History:   Diagnosis Date   • Chicken pox    • Diverticulitis    • Easy bruising    • Gall stones    • GERD (gastroesophageal reflux disease)    • Glaucoma    • Hyperlipemia    • Hypertension    • Measles    • Menopause    • Mumps    • Positive PPD    • Scarlet fever        Vital Signs   Vitals:    10/02/19 0045 10/02/19 0337 10/02/19 0817 10/02/19 1155   BP: 143/67 125/67 144/85 143/78   BP Location: Right arm Right arm Left arm Right arm   Patient Position: Lying Lying Lying Lying   Pulse: 66 60 67 59   Resp: 16 16 16 16   Temp: 97.8 °F (36.6 °C) 98.2 °F (36.8 °C) 98.1 °F (36.7 °C) 98.1 °F (36.7 °C)   TempSrc: Oral Oral Oral Oral   SpO2: 98% 94%     Weight:       Height:           Physical Exam:   General: Awake and alert              Neuro: Oriented to person place and time.    Speech is normal with no receptive speech problems.    Face is symmetrical.    She moves all extremities well.        Results Review:  CT angiogram of the head shows a 70% stenosis of the left internal carotid intracranially.    CT angiogram of the neck shows a 70% stenosis of the right external carotid.  Results from last 7 days   Lab Units 10/02/19  0540   WBC 10*3/mm3 5.55   HEMOGLOBIN g/dL 12.9   HEMATOCRIT % 39.8   PLATELETS 10*3/mm3 176     Results from last 7 days   Lab Units 10/02/19  0913 10/01/19  0445 09/30/19  1549 09/30/19  1540   SODIUM mmol/L 141 145  --   --    POTASSIUM mmol/L 3.9 4.3  --   --    CHLORIDE mmol/L 106 105  --   --    CO2 mmol/L 24.0 30.0*  --   --    BUN mg/dL 14 15  --   --    CREATININE mg/dL 0.74 0.71  --  0.80   CALCIUM mg/dL 9.5 9.3  --   --    BILIRUBIN mg/dL 0.6 0.5  --   --    ALK PHOS U/L 62 59  --   --    ALT (SGPT) U/L 10 12 12  --    AST (SGOT) U/L 13 13 13  --    GLUCOSE  mg/dL 120* 98  --   --        Imaging Results (last 24 hours)     Procedure Component Value Units Date/Time    CT Head Without Contrast [[id number]] Collected:  10/02/19 0738     Updated:  10/02/19 0932    Narrative:       EXAMINATION: CT ANGIOGRAM HEAD W WO CONTRAST-, CT ANGIOGRAM NECK W WO  CONTRAST-, CT HEAD WO CONTRAST- 10/01/2019     INDICATION: I63.9-Cerebral infarction, unspecified; Z74.09-Other reduced  mobility; CVA     TECHNIQUE: Unenhanced 5 mm axial images through the brain, subsequent  pre and post contrast 0.75 mm axial images through the head and neck  with sagittal and coronal 2-D angiographic reconstructions.     The radiation dose reduction device was turned on for each scan per the  ALARA (As Low as Reasonably Achievable) protocol.     COMPARISON: 09/30/2019 head CT scan     FINDINGS: Previous head CT scan report indicated no acute intracranial  disease.     HEAD CT SCAN WITHOUT CONTRAST: The calvarium appears intact. The  included paranasal sinuses and mastoids appear clear. Soft tissue window  images show no evidence of hemorrhage, contusion, mass or mass effect,  infarct, hydrocephalus, or abnormal extra-axial collection. Axial images  of the posterior fossa give the impression of slightly increased low  density changes of the right cerebellar peduncle compared to left,  however, this is thought to represent skull base streak artifact. Brain  MRI, which has already been performed as follow-up shows no restricted  diffusion or edema here.       Impression:       Skull base streak artifact as noted. No evidence of acute  intracranial disease.     CTA OF THE HEAD WITH 2-D RECONSTRUCTIONS: The anterior and middle  cerebral arteries, and proximal branches appear within normal limits.  There is a dominant left and smaller right vertebral artery. Posterior  cerebral arteries appear within normal limits. There is mild  calcification of the right ICA, but no evidence of hemodynamically  significant  stenosis. Axial thin section images show focal calcification  of the intracavernous left ICA and proximal supraclinoid ICA, difficult  to assess clearly for stenosis, thought to represent 50% or less  stenosis.     IMPRESSION: Calcification of the distal left intracranial ICA, with  suspected 50% or less stenosis. No other evidence of significant  intracranial or vascular stenosis.     CTA OF THE NECK WITH 2-D RECONSTRUCTIONS: The brachiocephalic vessels  appear to arise normally from the aortic arch. Proximal right and left  subclavian arteries appear normally patent.     On the right, there is mild circumferential plaquing of the carotid bulb  but no evidence of hemodynamically significant common or internal  carotid artery stenosis. There is focal origin stenosis of the right  PCA, best seen on the sagittal reconstruction images, potentially 70% or  greater. On the left, there is again mild circumferential noncalcified  plaquing of the left carotid bulb but no evidence of hemodynamically  significant common internal or external carotid artery stenosis.     There is a dominant left and smaller right vertebral artery with no  obvious stenosis.     Incidental note is made of an approximately 12 mm left thyroid nodule or  cyst of the lower pole.     IMPRESSION:   1. 70% or greater right external carotid artery origin stenosis.  2. Circumferential soft plaque of both carotid bulbs, but no evidence of  hemodynamically significant common, internal, or left external carotid  artery stenosis is seen.  3. Incidentally noted small left thyroid lobe cyst or nodule.     D:  10/02/2019  E:  10/02/2019       CT Angiogram Head With & Without Contrast [[id number]] Collected:  10/02/19 0738     Updated:  10/02/19 0932    Narrative:       EXAMINATION: CT ANGIOGRAM HEAD W WO CONTRAST-, CT ANGIOGRAM NECK W WO  CONTRAST-, CT HEAD WO CONTRAST- 10/01/2019     INDICATION: I63.9-Cerebral infarction, unspecified; Z74.09-Other  reduced  mobility; CVA     TECHNIQUE: Unenhanced 5 mm axial images through the brain, subsequent  pre and post contrast 0.75 mm axial images through the head and neck  with sagittal and coronal 2-D angiographic reconstructions.     The radiation dose reduction device was turned on for each scan per the  ALARA (As Low as Reasonably Achievable) protocol.     COMPARISON: 09/30/2019 head CT scan     FINDINGS: Previous head CT scan report indicated no acute intracranial  disease.     HEAD CT SCAN WITHOUT CONTRAST: The calvarium appears intact. The  included paranasal sinuses and mastoids appear clear. Soft tissue window  images show no evidence of hemorrhage, contusion, mass or mass effect,  infarct, hydrocephalus, or abnormal extra-axial collection. Axial images  of the posterior fossa give the impression of slightly increased low  density changes of the right cerebellar peduncle compared to left,  however, this is thought to represent skull base streak artifact. Brain  MRI, which has already been performed as follow-up shows no restricted  diffusion or edema here.       Impression:       Skull base streak artifact as noted. No evidence of acute  intracranial disease.     CTA OF THE HEAD WITH 2-D RECONSTRUCTIONS: The anterior and middle  cerebral arteries, and proximal branches appear within normal limits.  There is a dominant left and smaller right vertebral artery. Posterior  cerebral arteries appear within normal limits. There is mild  calcification of the right ICA, but no evidence of hemodynamically  significant stenosis. Axial thin section images show focal calcification  of the intracavernous left ICA and proximal supraclinoid ICA, difficult  to assess clearly for stenosis, thought to represent 50% or less  stenosis.     IMPRESSION: Calcification of the distal left intracranial ICA, with  suspected 50% or less stenosis. No other evidence of significant  intracranial or vascular stenosis.     CTA OF THE NECK WITH  2-D RECONSTRUCTIONS: The brachiocephalic vessels  appear to arise normally from the aortic arch. Proximal right and left  subclavian arteries appear normally patent.     On the right, there is mild circumferential plaquing of the carotid bulb  but no evidence of hemodynamically significant common or internal  carotid artery stenosis. There is focal origin stenosis of the right  PCA, best seen on the sagittal reconstruction images, potentially 70% or  greater. On the left, there is again mild circumferential noncalcified  plaquing of the left carotid bulb but no evidence of hemodynamically  significant common internal or external carotid artery stenosis.     There is a dominant left and smaller right vertebral artery with no  obvious stenosis.     Incidental note is made of an approximately 12 mm left thyroid nodule or  cyst of the lower pole.     IMPRESSION:   1. 70% or greater right external carotid artery origin stenosis.  2. Circumferential soft plaque of both carotid bulbs, but no evidence of  hemodynamically significant common, internal, or left external carotid  artery stenosis is seen.  3. Incidentally noted small left thyroid lobe cyst or nodule.     D:  10/02/2019  E:  10/02/2019       CT Angiogram Neck With & Without Contrast [[id number]] Collected:  10/02/19 0738     Updated:  10/02/19 0932    Narrative:       EXAMINATION: CT ANGIOGRAM HEAD W WO CONTRAST-, CT ANGIOGRAM NECK W WO  CONTRAST-, CT HEAD WO CONTRAST- 10/01/2019     INDICATION: I63.9-Cerebral infarction, unspecified; Z74.09-Other reduced  mobility; CVA     TECHNIQUE: Unenhanced 5 mm axial images through the brain, subsequent  pre and post contrast 0.75 mm axial images through the head and neck  with sagittal and coronal 2-D angiographic reconstructions.     The radiation dose reduction device was turned on for each scan per the  ALARA (As Low as Reasonably Achievable) protocol.     COMPARISON: 09/30/2019 head CT scan     FINDINGS: Previous head  CT scan report indicated no acute intracranial  disease.     HEAD CT SCAN WITHOUT CONTRAST: The calvarium appears intact. The  included paranasal sinuses and mastoids appear clear. Soft tissue window  images show no evidence of hemorrhage, contusion, mass or mass effect,  infarct, hydrocephalus, or abnormal extra-axial collection. Axial images  of the posterior fossa give the impression of slightly increased low  density changes of the right cerebellar peduncle compared to left,  however, this is thought to represent skull base streak artifact. Brain  MRI, which has already been performed as follow-up shows no restricted  diffusion or edema here.       Impression:       Skull base streak artifact as noted. No evidence of acute  intracranial disease.     CTA OF THE HEAD WITH 2-D RECONSTRUCTIONS: The anterior and middle  cerebral arteries, and proximal branches appear within normal limits.  There is a dominant left and smaller right vertebral artery. Posterior  cerebral arteries appear within normal limits. There is mild  calcification of the right ICA, but no evidence of hemodynamically  significant stenosis. Axial thin section images show focal calcification  of the intracavernous left ICA and proximal supraclinoid ICA, difficult  to assess clearly for stenosis, thought to represent 50% or less  stenosis.     IMPRESSION: Calcification of the distal left intracranial ICA, with  suspected 50% or less stenosis. No other evidence of significant  intracranial or vascular stenosis.     CTA OF THE NECK WITH 2-D RECONSTRUCTIONS: The brachiocephalic vessels  appear to arise normally from the aortic arch. Proximal right and left  subclavian arteries appear normally patent.     On the right, there is mild circumferential plaquing of the carotid bulb  but no evidence of hemodynamically significant common or internal  carotid artery stenosis. There is focal origin stenosis of the right  PCA, best seen on the sagittal  reconstruction images, potentially 70% or  greater. On the left, there is again mild circumferential noncalcified  plaquing of the left carotid bulb but no evidence of hemodynamically  significant common internal or external carotid artery stenosis.     There is a dominant left and smaller right vertebral artery with no  obvious stenosis.     Incidental note is made of an approximately 12 mm left thyroid nodule or  cyst of the lower pole.     IMPRESSION:   1. 70% or greater right external carotid artery origin stenosis.  2. Circumferential soft plaque of both carotid bulbs, but no evidence of  hemodynamically significant common, internal, or left external carotid  artery stenosis is seen.  3. Incidentally noted small left thyroid lobe cyst or nodule.     D:  10/02/2019  E:  10/02/2019       MRI Brain Without Contrast [[id number]] Updated:  10/02/19 0639    XR Chest 1 View [[id number]] Collected:  09/30/19 1658     Updated:  10/01/19 1554    Narrative:       EXAMINATION: XR CHEST 1 VW-      INDICATION: Acute stroke protocol (onset < 12 hrs); I63.9-Cerebral  infarction, unspecified.      COMPARISON: None.     FINDINGS: Portable chest reveals cardiac and mediastinal silhouettes  within normal limits. The lung fields are grossly clear. No focal  parenchymal opacification is present.  No pleural effusion or  pneumothorax. Degenerative change is seen within the spine. Pulmonary  vascularity is within normal limits.           Impression:       No acute cardiopulmonary disease.     D:  09/30/2019  E:  10/01/2019     This report was finalized on 10/1/2019 3:51 PM by Dr. Mervat Dumont MD.             Assessment:  Intracranial carotid stenosis, 50%,?  Symptomatic    Malignant hypertension.    Ophthalmic migraine.        Plan:  The patient could be discharged on dual antiplatelet therapy which based on her imaging studies would likely need to be continued indefinitely.    Blood pressure needs better control with target to  120/80.    She is strongly encouraged to stop smoking    Comment:  It is not absolutely clear that the intracranial carotid stenosis is symptomatic, but given the uncertainty dual antiplatelet therapy better blood pressure control and smoking cessation are strongly recommended         I discussed the patients findings and my recommendations with patient, family and primary care team    Aramis Varela MD  10/02/19  1:38 PM

## 2019-10-02 NOTE — PAYOR COMM NOTE
"Ref # [id number]  Mariely Brito RN, BSN  Phone # [phone]  Fax # [phone]  Phillipgracieprosper Jasvir MCGINNIS (64 y.o. Female)     Date of Birth Social Security Number Address Home Phone MRN    1955  [address] [place], [place] [id number] [phone] [id number]    Sabianism Marital Status          None        Admission Date Admission Type Admitting Provider Attending Provider Department, Room/Bed    9/30/19 Emergency Parth Esteban MD Sloan, Walker E, MD [facility] 3G, S367/1    Discharge Date Discharge Disposition Discharge Destination         Home or Self Care              Attending Provider:  Parth Esteban MD    Allergies:  No Known Allergies    Isolation:  None   Infection:  None   Code Status:  CPR    Ht:  154.9 cm (60.98\")   Wt:  74.3 kg (163 lb 12.8 oz)    Admission Cmt:  None   Principal Problem:  Acute ischemic stroke (CMS/[facility]) [I63.9]                        Physician Progress Notes (last 48 hours) (Notes from 09/30/19 1434 through 10/02/19 1434)      Aramis Varela MD at 10/02/19 1338          Neurology       Patient Care Team:  Valeria Gentile PA-C as PCP - General (Physician Assistant)    Chief complaint: Transient aphasia, resolved post TPA    History: Patient is stable and ready to go home.      Past Medical History:   Diagnosis Date   • Chicken pox    • Diverticulitis    • Easy bruising    • Gall stones    • GERD (gastroesophageal reflux disease)    • Glaucoma    • Hyperlipemia    • Hypertension    • Measles    • Menopause    • Mumps    • Positive PPD    • Scarlet fever        Vital Signs   Vitals:    10/02/19 0045 10/02/19 0337 10/02/19 0817 10/02/19 1155   BP: 143/67 125/67 144/85 143/78   BP Location: Right arm Right arm Left arm Right arm   Patient Position: Lying Lying Lying Lying   Pulse: 66 60 67 59   Resp: 16 16 16 16   Temp: 97.8 °F (36.6 °C) 98.2 °F (36.8 °C) 98.1 °F (36.7 °C) 98.1 °F (36.7 °C)   TempSrc: Oral Oral Oral Oral   SpO2: 98% " 94%     Weight:       Height:           Physical Exam:   General: Awake and alert              Neuro: Oriented to person place and time.    Speech is normal with no receptive speech problems.    Face is symmetrical.    She moves all extremities well.        Results Review:  CT angiogram of the head shows a 70% stenosis of the left internal carotid intracranially.    CT angiogram of the neck shows a 70% stenosis of the right external carotid.  Results from last 7 days   Lab Units 10/02/19  0540   WBC 10*3/mm3 5.55   HEMOGLOBIN g/dL 12.9   HEMATOCRIT % 39.8   PLATELETS 10*3/mm3 176     Results from last 7 days   Lab Units 10/02/19  0913 10/01/19  0445 09/30/19  1549 09/30/19  1540   SODIUM mmol/L 141 145  --   --    POTASSIUM mmol/L 3.9 4.3  --   --    CHLORIDE mmol/L 106 105  --   --    CO2 mmol/L 24.0 30.0*  --   --    BUN mg/dL 14 15  --   --    CREATININE mg/dL 0.74 0.71  --  0.80   CALCIUM mg/dL 9.5 9.3  --   --    BILIRUBIN mg/dL 0.6 0.5  --   --    ALK PHOS U/L 62 59  --   --    ALT (SGPT) U/L 10 12 12  --    AST (SGOT) U/L 13 13 13  --    GLUCOSE mg/dL 120* 98  --   --        Imaging Results (last 24 hours)     Procedure Component Value Units Date/Time    CT Head Without Contrast [[id number]] Collected:  10/02/19 0738     Updated:  10/02/19 0932    Narrative:       EXAMINATION: CT ANGIOGRAM HEAD W WO CONTRAST-, CT ANGIOGRAM NECK W WO  CONTRAST-, CT HEAD WO CONTRAST- 10/01/2019     INDICATION: I63.9-Cerebral infarction, unspecified; Z74.09-Other reduced  mobility; CVA     TECHNIQUE: Unenhanced 5 mm axial images through the brain, subsequent  pre and post contrast 0.75 mm axial images through the head and neck  with sagittal and coronal 2-D angiographic reconstructions.     The radiation dose reduction device was turned on for each scan per the  ALARA (As Low as Reasonably Achievable) protocol.     COMPARISON: 09/30/2019 head CT scan     FINDINGS: Previous head CT scan report indicated no acute  intracranial  disease.     HEAD CT SCAN WITHOUT CONTRAST: The calvarium appears intact. The  included paranasal sinuses and mastoids appear clear. Soft tissue window  images show no evidence of hemorrhage, contusion, mass or mass effect,  infarct, hydrocephalus, or abnormal extra-axial collection. Axial images  of the posterior fossa give the impression of slightly increased low  density changes of the right cerebellar peduncle compared to left,  however, this is thought to represent skull base streak artifact. Brain  MRI, which has already been performed as follow-up shows no restricted  diffusion or edema here.       Impression:       Skull base streak artifact as noted. No evidence of acute  intracranial disease.     CTA OF THE HEAD WITH 2-D RECONSTRUCTIONS: The anterior and middle  cerebral arteries, and proximal branches appear within normal limits.  There is a dominant left and smaller right vertebral artery. Posterior  cerebral arteries appear within normal limits. There is mild  calcification of the right ICA, but no evidence of hemodynamically  significant stenosis. Axial thin section images show focal calcification  of the intracavernous left ICA and proximal supraclinoid ICA, difficult  to assess clearly for stenosis, thought to represent 50% or less  stenosis.     IMPRESSION: Calcification of the distal left intracranial ICA, with  suspected 50% or less stenosis. No other evidence of significant  intracranial or vascular stenosis.     CTA OF THE NECK WITH 2-D RECONSTRUCTIONS: The brachiocephalic vessels  appear to arise normally from the aortic arch. Proximal right and left  subclavian arteries appear normally patent.     On the right, there is mild circumferential plaquing of the carotid bulb  but no evidence of hemodynamically significant common or internal  carotid artery stenosis. There is focal origin stenosis of the right  PCA, best seen on the sagittal reconstruction images, potentially 70%  or  greater. On the left, there is again mild circumferential noncalcified  plaquing of the left carotid bulb but no evidence of hemodynamically  significant common internal or external carotid artery stenosis.     There is a dominant left and smaller right vertebral artery with no  obvious stenosis.     Incidental note is made of an approximately 12 mm left thyroid nodule or  cyst of the lower pole.     IMPRESSION:   1. 70% or greater right external carotid artery origin stenosis.  2. Circumferential soft plaque of both carotid bulbs, but no evidence of  hemodynamically significant common, internal, or left external carotid  artery stenosis is seen.  3. Incidentally noted small left thyroid lobe cyst or nodule.     D:  10/02/2019  E:  10/02/2019       CT Angiogram Head With & Without Contrast [[id number]] Collected:  10/02/19 0738     Updated:  10/02/19 0932    Narrative:       EXAMINATION: CT ANGIOGRAM HEAD W WO CONTRAST-, CT ANGIOGRAM NECK W WO  CONTRAST-, CT HEAD WO CONTRAST- 10/01/2019     INDICATION: I63.9-Cerebral infarction, unspecified; Z74.09-Other reduced  mobility; CVA     TECHNIQUE: Unenhanced 5 mm axial images through the brain, subsequent  pre and post contrast 0.75 mm axial images through the head and neck  with sagittal and coronal 2-D angiographic reconstructions.     The radiation dose reduction device was turned on for each scan per the  ALARA (As Low as Reasonably Achievable) protocol.     COMPARISON: 09/30/2019 head CT scan     FINDINGS: Previous head CT scan report indicated no acute intracranial  disease.     HEAD CT SCAN WITHOUT CONTRAST: The calvarium appears intact. The  included paranasal sinuses and mastoids appear clear. Soft tissue window  images show no evidence of hemorrhage, contusion, mass or mass effect,  infarct, hydrocephalus, or abnormal extra-axial collection. Axial images  of the posterior fossa give the impression of slightly increased low  density changes of the right  cerebellar peduncle compared to left,  however, this is thought to represent skull base streak artifact. Brain  MRI, which has already been performed as follow-up shows no restricted  diffusion or edema here.       Impression:       Skull base streak artifact as noted. No evidence of acute  intracranial disease.     CTA OF THE HEAD WITH 2-D RECONSTRUCTIONS: The anterior and middle  cerebral arteries, and proximal branches appear within normal limits.  There is a dominant left and smaller right vertebral artery. Posterior  cerebral arteries appear within normal limits. There is mild  calcification of the right ICA, but no evidence of hemodynamically  significant stenosis. Axial thin section images show focal calcification  of the intracavernous left ICA and proximal supraclinoid ICA, difficult  to assess clearly for stenosis, thought to represent 50% or less  stenosis.     IMPRESSION: Calcification of the distal left intracranial ICA, with  suspected 50% or less stenosis. No other evidence of significant  intracranial or vascular stenosis.     CTA OF THE NECK WITH 2-D RECONSTRUCTIONS: The brachiocephalic vessels  appear to arise normally from the aortic arch. Proximal right and left  subclavian arteries appear normally patent.     On the right, there is mild circumferential plaquing of the carotid bulb  but no evidence of hemodynamically significant common or internal  carotid artery stenosis. There is focal origin stenosis of the right  PCA, best seen on the sagittal reconstruction images, potentially 70% or  greater. On the left, there is again mild circumferential noncalcified  plaquing of the left carotid bulb but no evidence of hemodynamically  significant common internal or external carotid artery stenosis.     There is a dominant left and smaller right vertebral artery with no  obvious stenosis.     Incidental note is made of an approximately 12 mm left thyroid nodule or  cyst of the lower pole.      IMPRESSION:   1. 70% or greater right external carotid artery origin stenosis.  2. Circumferential soft plaque of both carotid bulbs, but no evidence of  hemodynamically significant common, internal, or left external carotid  artery stenosis is seen.  3. Incidentally noted small left thyroid lobe cyst or nodule.     D:  10/02/2019  E:  10/02/2019       CT Angiogram Neck With & Without Contrast [[id number]] Collected:  10/02/19 0738     Updated:  10/02/19 0932    Narrative:       EXAMINATION: CT ANGIOGRAM HEAD W WO CONTRAST-, CT ANGIOGRAM NECK W WO  CONTRAST-, CT HEAD WO CONTRAST- 10/01/2019     INDICATION: I63.9-Cerebral infarction, unspecified; Z74.09-Other reduced  mobility; CVA     TECHNIQUE: Unenhanced 5 mm axial images through the brain, subsequent  pre and post contrast 0.75 mm axial images through the head and neck  with sagittal and coronal 2-D angiographic reconstructions.     The radiation dose reduction device was turned on for each scan per the  ALARA (As Low as Reasonably Achievable) protocol.     COMPARISON: 09/30/2019 head CT scan     FINDINGS: Previous head CT scan report indicated no acute intracranial  disease.     HEAD CT SCAN WITHOUT CONTRAST: The calvarium appears intact. The  included paranasal sinuses and mastoids appear clear. Soft tissue window  images show no evidence of hemorrhage, contusion, mass or mass effect,  infarct, hydrocephalus, or abnormal extra-axial collection. Axial images  of the posterior fossa give the impression of slightly increased low  density changes of the right cerebellar peduncle compared to left,  however, this is thought to represent skull base streak artifact. Brain  MRI, which has already been performed as follow-up shows no restricted  diffusion or edema here.       Impression:       Skull base streak artifact as noted. No evidence of acute  intracranial disease.     CTA OF THE HEAD WITH 2-D RECONSTRUCTIONS: The anterior and middle  cerebral arteries, and  proximal branches appear within normal limits.  There is a dominant left and smaller right vertebral artery. Posterior  cerebral arteries appear within normal limits. There is mild  calcification of the right ICA, but no evidence of hemodynamically  significant stenosis. Axial thin section images show focal calcification  of the intracavernous left ICA and proximal supraclinoid ICA, difficult  to assess clearly for stenosis, thought to represent 50% or less  stenosis.     IMPRESSION: Calcification of the distal left intracranial ICA, with  suspected 50% or less stenosis. No other evidence of significant  intracranial or vascular stenosis.     CTA OF THE NECK WITH 2-D RECONSTRUCTIONS: The brachiocephalic vessels  appear to arise normally from the aortic arch. Proximal right and left  subclavian arteries appear normally patent.     On the right, there is mild circumferential plaquing of the carotid bulb  but no evidence of hemodynamically significant common or internal  carotid artery stenosis. There is focal origin stenosis of the right  PCA, best seen on the sagittal reconstruction images, potentially 70% or  greater. On the left, there is again mild circumferential noncalcified  plaquing of the left carotid bulb but no evidence of hemodynamically  significant common internal or external carotid artery stenosis.     There is a dominant left and smaller right vertebral artery with no  obvious stenosis.     Incidental note is made of an approximately 12 mm left thyroid nodule or  cyst of the lower pole.     IMPRESSION:   1. 70% or greater right external carotid artery origin stenosis.  2. Circumferential soft plaque of both carotid bulbs, but no evidence of  hemodynamically significant common, internal, or left external carotid  artery stenosis is seen.  3. Incidentally noted small left thyroid lobe cyst or nodule.     D:  10/02/2019  E:  10/02/2019       MRI Brain Without Contrast [[id number]] Updated:  10/02/19 0639     XR Chest 1 View [[id number]] Collected:  09/30/19 1658     Updated:  10/01/19 1554    Narrative:       EXAMINATION: XR CHEST 1 VW-      INDICATION: Acute stroke protocol (onset < 12 hrs); I63.9-Cerebral  infarction, unspecified.      COMPARISON: None.     FINDINGS: Portable chest reveals cardiac and mediastinal silhouettes  within normal limits. The lung fields are grossly clear. No focal  parenchymal opacification is present.  No pleural effusion or  pneumothorax. Degenerative change is seen within the spine. Pulmonary  vascularity is within normal limits.           Impression:       No acute cardiopulmonary disease.     D:  09/30/2019  E:  10/01/2019     This report was finalized on 10/1/2019 3:51 PM by Dr. Mervat Dumont MD.             Assessment:  Intracranial carotid stenosis, 50%,?  Symptomatic    Malignant hypertension.    Ophthalmic migraine.        Plan:  The patient could be discharged on dual antiplatelet therapy which based on her imaging studies would likely need to be continued indefinitely.    Blood pressure needs better control with target to 120/80.    She is strongly encouraged to stop smoking    Comment:  It is not absolutely clear that the intracranial carotid stenosis is symptomatic, but given the uncertainty dual antiplatelet therapy better blood pressure control and smoking cessation are strongly recommended         I discussed the patients findings and my recommendations with patient, family and primary care team    Aramis Varela MD  10/02/19  1:38 PM          Electronically signed by Aramis Varela MD at 10/02/19 1342     Aniket Brito MD at 10/01/19 0985          INTENSIVIST / PULMONARY FOLLOW UP NOTE     Hospital:  LOS: 1 day   Ms. Jasvir Hamilton, 64 y.o. female is followed for:     Acute ischemic stroke (CMS/HCC)    Essential hypertension    GERD (gastroesophageal reflux disease)    Hyperlipemia    Coronary artery disease of native heart with stable  angina pectoris (CMS/HCC)    Tobacco abuse    Carotid artery disease (CMS/HCC)       Subjective   SUBJECTIVE   Approaching neuro baseline    The patient's relevant past medical, surgical, family, and social history were reviewed    Allergies and medications were reviewed    ROS:  Per subjective, all other systems were reviewed and were negative     Objective   OBJECTIVE     Vital Sign Min/Max for last 24 hours:  Temp  Min: 97.6 °F (36.4 °C)  Max: 98.3 °F (36.8 °C)   BP  Min: 121/68  Max: 232/118   Pulse  Min: 64  Max: 80   Resp  Min: 12  Max: 21   SpO2  Min: 92 %  Max: 99 %   No Data Recorded     Physical Exam:  General Appearance:  Conversant, in no acute distress  Eyes:  No scleral icterus or pallor, pupils normal  Ears, Nose, Mouth, Throat:  Atraumatic, oropharynx clear  Neck:  Trachea midline, thyroid normal  Respiratory:  Clear to auscultation bilaterally, normal effort, no tenderness to palpation  Cardiovascular:  Regular rate and rhythm, no murmurs, no peripheral edema, no thrill  Gastrointestinal:  Soft, non-tender, non-distended, no hepatosplenomegaly  Skin:  Normal temperature, no rash  Psychiatric:  Alert and oriented x 3, normal judgement and insight  Neuro:    Interval: (shift change)  1a. Level of Consciousness: 0-->Alert, keenly responsive  1b. LOC Questions: 0-->Answers both questions correctly  1c. LOC Commands: 0-->Performs both tasks correctly  2. Best Gaze: 0-->Normal  3. Visual: 0-->No visual loss  4. Facial Palsy: 0-->Normal symmetrical movements  5a. Motor Arm, Left: 0-->No drift, limb holds 90 (or 45) degrees for full 10 secs  5b. Motor Arm, Right: 0-->No drift, limb holds 90 (or 45) degrees for full 10 secs  6a. Motor Leg, Left: 0-->No drift, leg holds 30 degree position for full 5 secs  6b. Motor Leg, Right: 0-->No drift, leg holds 30 degree position for full 5 secs  7. Limb Ataxia: 0-->Absent  8. Sensory: 0-->Normal, no sensory loss  9. Best Language: 0-->No aphasia, normal  10.  Dysarthria: 0-->Normal  11. Extinction and Inattention (formerly Neglect): 0-->No abnormality    Total (NIH Stroke Scale): 0      Telemetry:              Hemodynamics:   CVP:     PAP:     PAOP:     CO:     CI:     SVI:     SVR:       SpO2: 95 % SpO2  Min: 92 %  Max: 99 %   Device:      Flow Rate:   No Data Recorded     Mechanical Ventilator Settings:                                         Intake/Ouptut 24 hrs (7:00AM - 6:59 AM)  Intake & Output (last 3 days)       09/28 0701 - 09/29 0700 09/29 0701 - 09/30 0700 09/30 0701 - 10/01 0700 10/01 0701 - 10/02 0700    I.V. (mL/kg)   56.4 (0.8)     Total Intake(mL/kg)   56.4 (0.8)     Urine (mL/kg/hr)   1500     Total Output   1500     Net   -1443.6             Urine Unmeasured Occurrence   1 x           Lines, Drains & Airways    Active LDAs     Name:   Placement date:   Placement time:   Site:   Days:    Peripheral IV 09/30/19 1540 Right Antecubital   09/30/19    1540    Antecubital   less than 1    Peripheral IV 09/30/19 1601 Right Arm   09/30/19    1601    Arm   less than 1                Hematology:  Results from last 7 days   Lab Units 10/01/19  0445 09/30/19  1549 09/30/19  1540   WBC 10*3/mm3 4.80 4.13  --    HEMOGLOBIN g/dL 12.6 11.8*  --    HEMOGLOBIN, POC g/dL  --   --  12.2   HEMATOCRIT % 39.0 36.2  --    HEMATOCRIT POC %  --   --  36*   PLATELETS 10*3/mm3 164 171  --      Electrolytes, Magnesium and Phosphorus:  Results from last 7 days   Lab Units 10/01/19  0445   SODIUM mmol/L 145   CHLORIDE mmol/L 105   POTASSIUM mmol/L 4.3   CO2 mmol/L 30.0*     Renal:  Results from last 7 days   Lab Units 10/01/19  0445 09/30/19  1540   CREATININE mg/dL 0.71 0.80   BUN mg/dL 15  --      Estimated Creatinine Clearance: 75.7 mL/min (by C-G formula based on SCr of 0.71 mg/dL).  Hepatic:  Results from last 7 days   Lab Units 10/01/19  0445 09/30/19  1549   ALK PHOS U/L 59  --    BILIRUBIN mg/dL 0.5  --    ALT (SGPT) U/L 12 12   AST (SGOT) U/L 13 13     Arterial Blood Gases:         Results from last 7 days   Lab Units 10/01/19  0445   HEMOGLOBIN A1C % 5.60       No results found for: LACTATE    Relevant imaging studies and labs from 10/01/19 were reviewed and interpreted by me    Medications (drips):    niCARdipine Last Rate: Stopped (09/30/19 2032)         aspirin 325 mg Oral Daily   Or      aspirin 300 mg Rectal Daily   atorvastatin 80 mg Oral Nightly   pantoprazole 40 mg Oral BID AC   sodium chloride 10 mL Intravenous Q12H       Assessment/Plan   IMPRESSION / PLAN     Inpatient Problem List:  64 y.o.female:  Active Hospital Problems    Diagnosis   • **Acute ischemic stroke (CMS/HCC)   • Carotid artery disease (CMS/[facility])     1.  Carotid duplex study August 31, 2012  · R ICA less than 50%  · LICA less than 50%  · Bilateral common carotid arteries reveal no evidence of significant stenosis patent vertebral arteries bilaterally  ·      • Tobacco abuse   • Essential hypertension   • GERD (gastroesophageal reflux disease)   • Hyperlipemia   • Coronary artery disease of native heart with stable angina pectoris (CMS/[facility])     1. [facility] 3-16-12  · One vessel CAD with 60-70% eccentric stenosis of the mid LAD  · LVEF 65-70%    2. [facility] 1-26-15  · Coronary angioplasty with stenting to the mid LAD  · Entry into the ABSORB bio-absorbable drug eluding stent trial          Impression:  64 y.o.female with relevant PMH of HTN, HLD, GERD, CAD w/cardiac stents and ongoing tobacco abuse admitted 9/30/2019 w/ expressive aphasia w/ initial NIHSS of 6.  S/p TPA after blood pressure control.    Plan:  Possible Stroke - per Neurology    CAD - on plavix at home, not asa    BP / Glycemic Control    PT/OT    To tele    Nutrition - Diet Regular; Thin; Cardiac    Plan of care and goals reviewed with mulitdisciplinary team at daily rounds           Aniket Brito MD  Intensive Care Medicine  10/01/19 9:52 AM         Electronically signed by Aniket Brito  MD Jonathan at 10/01/19 0956          Consult Notes (last 48 hours) (Notes from 09/30/19 1434 through 10/02/19 1434)      Aramis Varela MD at 10/01/19 1157      Consult Orders    1. Inpatient Neurology Consult Stroke [[id number]] ordered by Ted Carlos MD at 09/30/19 1536                Neurology    Referring provider:   No referring provider defined for this encounter.    Reason for Consultation: Stroke except speech difficulty    Chief complaint: Speech difficulty with right leg weakness    History of present illness: 65-year-old woman seen for Dr. Poole for evaluation of stroke.    She was at work taking a break when she had a funny feeling and developed a visual scotoma that was silvery and circular.  These have occurred in the past have expanded to gone away in about 20 minutes.  She had her ophthalmologist saw her and told her that this was an ophthalmic migraine.  She gets occasional sinus headaches but is unaware of any major headache pain.  She had no headache with this episode.    She was said to have facial droop on the left but she is not aware of that.    She is had no trouble with receptive speech.  Visual symptoms have resolved.  She denies focal numbness or weakness.  She is had no upper extremity symptoms.    She does smoke three quarters of a pack of cigarettes daily.    She had coronary stenting in 2014 for single artery.    She is on Crestor 40 and takes multiple medications for her blood pressure.  She tells me that her blood pressure runs in the 130s.    She denies palpitations.        Review of Systems: All systems are reviewed and are negative other than the [facility]    Home meds:   Medications Prior to Admission   Medication Sig Dispense Refill Last Dose   • cholecalciferol (VITAMIN D3) 1000 units tablet Take 1,000 Units by mouth Daily.   9/30/2019 at Unknown time   • clopidogrel (PLAVIX) 75 MG tablet Take 1 tablet by mouth Daily. 90 tablet 3 9/30/2019 at Unknown time   •  ibuprofen (ADVIL,MOTRIN) 600 MG tablet Take 1 tablet by mouth Every 8 (Eight) Hours As Needed for Mild Pain  or Moderate Pain . 90 tablet 0 9/29/2019 at Unknown time   • isosorbide mononitrate (IMDUR) 30 MG 24 hr tablet TAKE 1 TABLET DAILY 90 tablet 1 9/30/2019 at Unknown time   • losartan (COZAAR) 50 MG tablet Take 1 tablet by mouth Daily. 90 tablet 1 9/29/2019 at Unknown time   • metoprolol tartrate (LOPRESSOR) 50 MG tablet TAKE 1 TABLET TWICE A  tablet 3 9/30/2019 at Unknown time   • pantoprazole (PROTONIX) 40 MG EC tablet TAKE 1 TABLET TWICE A DAY (Patient taking differently: take 1 tablet twice a day) 180 tablet 1 9/30/2019 at Unknown time   • rosuvastatin (CRESTOR) 40 MG tablet Take 1 tablet by mouth Daily. 90 tablet 3 9/29/2019 at Unknown time   • sucralfate (CARAFATE) 1 g tablet Take 1 tablet by mouth Every Night. 90 tablet 1 9/30/2019 at Unknown time   • nitroglycerin (NITROSTAT) 0.4 MG SL tablet Place 0.4 mg under the tongue Every 5 (Five) Minutes As Needed.   More than a month at Unknown time   • ondansetron ODT (ZOFRAN ODT) 8 MG disintegrating tablet Take 1 tablet by mouth Every 8 (Eight) Hours As Needed for Nausea or Vomiting. 20 tablet 0 Unknown at Unknown time       History  Past Medical History:   Diagnosis Date   • Chicken pox    • Diverticulitis    • Easy bruising    • Gall stones    • GERD (gastroesophageal reflux disease)    • Glaucoma    • Hyperlipemia    • Hypertension    • Measles    • Menopause    • Mumps    • Positive PPD    • Scarlet fever    ,   Past Surgical History:   Procedure Laterality Date   • CARDIAC CATHETERIZATION     • CARPAL TUNNEL RELEASE      both hands   • CHOLECYSTECTOMY     • COLONOSCOPY  2009   • ECTOPIC PREGNANCY     • ENDOMETRIAL ABLATION     • HAND SURGERY     • OTHER SURGICAL HISTORY      lap and leep   • TUBAL ABDOMINAL LIGATION  1993    with L salpingo-oophorectomy   ,   Family History   Problem Relation Age of Onset   • Arthritis Mother    • Hypertension  "Mother    • Heart attack Father    • Hypertension Father    • Obesity Father    • Stroke Father         massive   • Arthritis Sister    • Hypertension Sister    • No Known Problems Sister    • Breast cancer Neg Hx    ,   Social History     Tobacco Use   • Smoking status: Current Every Day Smoker     Packs/day: 0.75   • Smokeless tobacco: Never Used   Substance Use Topics   • Alcohol use: Yes     Comment: occassionally   • Drug use: No    and Allergies:  Patient has no known allergies.,    Vital Signs   Blood pressure 169/81, pulse 71, temperature 97.7 °F (36.5 °C), temperature source Oral, resp. rate 18, height 154.9 cm (60.98\"), weight 74.3 kg (163 lb 12.8 oz), SpO2 95 %.  Body mass index is 30.97 kg/m².    Physical Exam:   General: Pleasant white female in no distress.  Blood pressure at onset was 228/107              Head: No signs of trauma              Neck: No bruit              Resp: Normal breath sounds              Cor: Regular rhythm              Extremities: No edema              Skin: Warm and dry              Neuro: Mentally alert and oriented memory attention and concentration.    Speech appears normal to me but the  says that she has difficulty finishing her sentences even this morning.  I did not note that.    Coordination is normal finger-nose and fine finger movements.    Cranial nerves show benign fundi full visual fields normal eye movements and equal pupils.    Facial movement and sensation are normal.    Palate elevates normally tongue protrudes normally.    Reflexes are 3+ and equal bilaterally.    Motor testing shows symmetrical strength and tone in all muscle groups with no pronator drift.    Sensory testing is intact bilaterally including double simultaneous stimulation.        Results Review: CT of the head shows no changes.    Transthoracic echocardiogram shows no evidence of pulmonary hypertension.  Saline test is negative.  Left atrium is normal size.    There is mild left " ventricular hypertrophy.        Labs:  Lab Results (last 72 hours)     Procedure Component Value Units Date/Time    POC Glucose Once [[id number]]  (Normal) Collected:  10/01/19 1116    Specimen:  Blood Updated:  10/01/19 1117     Glucose 117 mg/dL     TSH [[id number]]  (Normal) Collected:  10/01/19 0445    Specimen:  Blood Updated:  10/01/19 0607     TSH 1.410 uIU/mL     POC Glucose Once [[id number]]  (Normal) Collected:  10/01/19 0546    Specimen:  Blood Updated:  10/01/19 0553     Glucose 98 mg/dL     Lipid Panel [[id number]] Collected:  10/01/19 0445    Specimen:  Blood Updated:  10/01/19 0553     Total Cholesterol 127 mg/dL      Triglycerides 111 mg/dL      HDL Cholesterol 40 mg/dL      LDL Cholesterol  65 mg/dL      VLDL Cholesterol 22.2 mg/dL      LDL/HDL Ratio 1.62    Narrative:       Cholesterol Reference Ranges  (U.S. Department of Health and Human Services ATP III Classifications)    Desirable          <200 mg/dL  Borderline High    200-239 mg/dL  High Risk          >240 mg/dL      Triglyceride Reference Ranges  (U.S. Department of Health and Human Services ATP III Classifications)    Normal           <150 mg/dL  Borderline High  150-199 mg/dL  High             200-499 mg/dL  Very High        >500 mg/dL    HDL Reference Ranges  (U.S. Department of Health and Human Services ATP III Classifcations)    Low     <40 mg/dl (major risk factor for CHD)  High    >60 mg/dl ('negative' risk factor for CHD)        LDL Reference Ranges  (U.S. Department of Health and Human Services ATP III Classifcations)    Optimal          <100 mg/dL  Near Optimal     100-129 mg/dL  Borderline High  130-159 mg/dL  High             160-189 mg/dL  Very High        >189 mg/dL    Comprehensive Metabolic Panel [[id number]]  (Abnormal) Collected:  10/01/19 0445    Specimen:  Blood Updated:  10/01/19 0553     Glucose 98 mg/dL      BUN 15 mg/dL      Creatinine 0.71 mg/dL      Sodium 145 mmol/L      Potassium 4.3 mmol/L      Chloride 105 mmol/L       CO2 30.0 mmol/L      Calcium 9.3 mg/dL      Total Protein 6.1 g/dL      Albumin 4.10 g/dL      ALT (SGPT) 12 U/L      AST (SGOT) 13 U/L      Alkaline Phosphatase 59 U/L      Total Bilirubin 0.5 mg/dL      eGFR Non African Amer 83 mL/min/1.73      Globulin 2.0 gm/dL      A/G Ratio 2.1 g/dL      BUN/Creatinine Ratio 21.1     Anion Gap 10.0 mmol/L     Narrative:       GFR Normal >60  Chronic Kidney Disease <60  Kidney Failure <15    Hemoglobin A1c [[id number]]  (Normal) Collected:  10/01/19 0445    Specimen:  Blood Updated:  10/01/19 0547     Hemoglobin A1C 5.60 %     Narrative:       Hemoglobin A1C Ranges:    Increased Risk for Diabetes  5.7% to 6.4%  Diabetes                     >= 6.5%  Diabetic Goal                < 7.0%    CBC (No Diff) [[id number]]  (Normal) Collected:  10/01/19 0445    Specimen:  Blood Updated:  10/01/19 0531     WBC 4.80 10*3/mm3      RBC 4.43 10*6/mm3      Hemoglobin 12.6 g/dL      Hematocrit 39.0 %      MCV 88.0 fL      MCH 28.4 pg      MCHC 32.3 g/dL      RDW 14.4 %      RDW-SD 45.6 fl      MPV 10.2 fL      Platelets 164 10*3/mm3     POC Glucose Once [[id number]]  (Abnormal) Collected:  09/30/19 2349    Specimen:  Blood Updated:  09/30/19 2350     Glucose 153 mg/dL     POC Glucose Once [[id number]]  (Normal) Collected:  09/30/19 1747    Specimen:  Blood Updated:  09/30/19 1749     Glucose 113 mg/dL     [place] Draw [[id number]] Collected:  09/30/19 1549    Specimen:  Blood Updated:  09/30/19 1703    Narrative:       The following orders were created for panel order [place] Draw.  Procedure                               Abnormality         Status                     ---------                               -----------         ------                     Light Blue Top[[id number]]                                   Final result               Green Top (Gel)[[id number]]                                  Final result               Lavender Top[[id number]]                                     Final result                Gold Top - SST[[id number]]                                   Final result               Green Top (No Gel)[[id number]]                                                            Please view results for these tests on the individual orders.    Light Blue Top [[id number]] Collected:  09/30/19 1549    Specimen:  Blood Updated:  09/30/19 1703     Extra Tube hold for add-on     Comment: Auto resulted       Lavender Top [[id number]] Collected:  09/30/19 1549    Specimen:  Blood Updated:  09/30/19 1703     Extra Tube hold for add-on     Comment: Auto resulted       Green Top (Gel) [[id number]] Collected:  09/30/19 1549    Specimen:  Blood Updated:  09/30/19 1703     Extra Tube Hold for add-ons.     Comment: Auto resulted.       Gold Top - SST [[id number]] Collected:  09/30/19 1549    Specimen:  Blood Updated:  09/30/19 1703     Extra Tube Hold for add-ons.     Comment: Auto resulted.       aPTT [[id number]]  (Normal) Collected:  09/30/19 1549    Specimen:  Blood Updated:  09/30/19 1618     PTT 29.8 seconds     Narrative:       PTT = The equivalent PTT values for the therapeutic range of heparin levels at 0.3 to 0.5 U/ml are 55 to 70 seconds.    AST [[id number]]  (Normal) Collected:  09/30/19 1549    Specimen:  Blood Updated:  09/30/19 1617     AST (SGOT) 13 U/L      Comment: Specimen hemolyzed.  Results may be affected.       ALT [[id number]]  (Normal) Collected:  09/30/19 1549    Specimen:  Blood Updated:  09/30/19 1617     ALT (SGPT) 12 U/L     Troponin [[id number]]  (Normal) Collected:  09/30/19 1549    Specimen:  Blood Updated:  09/30/19 1616     Troponin T <0.010 ng/mL     Narrative:       Troponin T Reference Range:  <= 0.03 ng/mL-   Negative for AMI  >0.03 ng/mL-     Abnormal for myocardial necrosis.  Clinicians would have to utilize clinical acumen, EKG, Troponin and serial changes to determine if it is an Acute Myocardial Infarction or myocardial injury due to an underlying chronic condition.     CBC &  Differential [[id number]] Collected:  09/30/19 1549    Specimen:  Blood Updated:  09/30/19 1600    Narrative:       The following orders were created for panel order CBC & Differential.  Procedure                               Abnormality         Status                     ---------                               -----------         ------                     CBC Auto Differential[[id number]]        Abnormal            Final result                 Please view results for these tests on the individual orders.    CBC Auto Differential [[id number]]  (Abnormal) Collected:  09/30/19 1549    Specimen:  Blood Updated:  09/30/19 1600     WBC 4.13 10*3/mm3      RBC 4.09 10*6/mm3      Hemoglobin 11.8 g/dL      Hematocrit 36.2 %      MCV 88.5 fL      MCH 28.9 pg      MCHC 32.6 g/dL      RDW 14.5 %      RDW-SD 46.4 fl      MPV 10.3 fL      Platelets 171 10*3/mm3      Neutrophil % 63.7 %      Lymphocyte % 24.5 %      Monocyte % 8.0 %      Eosinophil % 3.1 %      Basophil % 0.5 %      Immature Grans % 0.2 %      Neutrophils, Absolute 2.63 10*3/mm3      Lymphocytes, Absolute 1.01 10*3/mm3      Monocytes, Absolute 0.33 10*3/mm3      Eosinophils, Absolute 0.13 10*3/mm3      Basophils, Absolute 0.02 10*3/mm3      Immature Grans, Absolute 0.01 10*3/mm3      nRBC 0.0 /100 WBC     POC CHEM 8 [[id number]]  (Abnormal) Collected:  09/30/19 1540    Specimen:  Blood Updated:  09/30/19 1544     Glucose 103 mg/dL      BUN 15 mg/dL      Creatinine 0.80 mg/dL      Sodium 141 mmol/L      Potassium 3.4 mmol/L      Chloride 105 mmol/L      Total CO2 26 mmol/L      Hemoglobin 12.2 g/dL      Comment: Serial Number: [id number]:  [id number]        Hematocrit 36 %      Ionized Calcium 1.20 mmol/L     POC Protime / INR [[id number]]  (Abnormal) Collected:  09/30/19 1539    Specimen:  Blood Updated:  09/30/19 1543     Protime 11.9 seconds      INR 1.0     Comment: Serial Number: [id number]:  [id number]             Rads:  Imaging Results (last 72 hours)      Procedure Component Value Units Date/Time    XR Chest 1 View [[id number]] Collected:  09/30/19 1658     Updated:  10/01/19 0810    Narrative:       EXAMINATION: XR CHEST 1 VW-      INDICATION: Acute stroke protocol (onset < 12 hrs); I63.9-Cerebral  infarction, unspecified.      COMPARISON: None.     FINDINGS: Portable chest reveals cardiac and mediastinal silhouettes  within normal limits. The lung fields are grossly clear. No focal  parenchymal opacification is present.  No pleural effusion or  pneumothorax. Degenerative change is seen within the spine. Pulmonary  vascularity is within normal limits.           Impression:       No acute cardiopulmonary disease.     D:  09/30/2019  E:  10/01/2019          CT Head Without Contrast Stroke Protocol [[id number]] Collected:  09/30/19 1546     Updated:  09/30/19 1703    Narrative:       EXAMINATION: CT HEAD WO CONTRAST, STROKE PROTOCOL-09/30/2019:      INDICATION: Stroke.      TECHNIQUE: CT head without intravenous contrast following stroke  protocol.     The radiation dose reduction device was turned on for each scan per the  ALARA (As Low as Reasonably Achievable) protocol.     COMPARISON: NONE.     FINDINGS: Midline structures are symmetric without evidence of mass,  mass effect or midline shift. Ventricles and sulci within normal limits.  No intra-axial hemorrhage or extra-axial fluid collection. Globes and  orbits are unremarkable. The visualized paranasal sinuses and mastoid  air cells are grossly clear and well pneumatized. Calvarium intact.       Impression:       No acute intracranial abnormality, specifically no acute  hemorrhage.     Scan performed on 09/30/2019 at 1537 hours. Scan report given to ER  physician and team in person by Dr. Petty on 09/30/2019 at 1542 hours.     D:  09/30/2019  E:  09/30/2019           This report was finalized on 9/30/2019 5:00 PM by Dr. Lew Petty.               Assessment: Malignant hypertension    Ophthalmic  migraine    Expressive a aphasia, questionable cause       Plan:    MRI brain without infusion.    CT angiogram head and neck.        Comment:   Interesting mix of the issues.    She certainly has ophthalmic migraine and with the malignant blood pressure reading may well have triggered a complex migraine event.    She has significant coronary disease in the past although she is on Lipitor with good results and is not diabetic.  She does smoke cigarettes however.    Certainly conceivable that she could have had a an acute stroke either secondary to the malignant hypertension or to a complex migraine.    Certainly need to stop smoking and needs a target blood pressure at 120/80.    I discussed the patients findings and my recommendations with patient, family and nursing staff      Aramis Varela MD  10/01/19  11:57 AM          Electronically signed by Aramis Varela MD at 10/01/19 1202        Trey Robles, RN      Case Management   Progress Notes   Signed   Date of Service:  10/02/19 0925   Creation Time:  10/02/19 0925            Signed             Show:Clear all  []Manual[x]Template[]Copied    Added by:  [x]Trey Robles, RN    []ver for details  Continued Stay Note  [facility]     Patient Name: Jasvir Hamilton                       MRN: [id number]  Today's Date: 10/2/2019                     Admit Date: 9/30/2019          Discharge Plan      Row Name 10/02/19 0924           Plan     Plan  Home     Patient/Family in Agreement with Plan  yes     Plan Comments  Spoke with patient at spouse at bedside. Plan is home with family. Denies any needs at this time. CM will continue to follow.     Final Discharge Disposition Code  01 - home or self-care          Discharge Codes    No documentation.              Expected Discharge Date and Time      Expected Discharge Date Expected Discharge Time     Oct 4, 2019                  Trey Robles, RN

## 2019-10-02 NOTE — PROGRESS NOTES
Continued Stay Note   [place]     Patient Name: Jasvir Hamilton  MRN: [id number]  Today's Date: 10/2/2019    Admit Date: 9/30/2019    Discharge Plan     Row Name 10/02/19 0924       Plan    Plan  Home    Patient/Family in Agreement with Plan  yes    Plan Comments  Spoke with patient at spouse at bedside. Plan is home with family. Denies any needs at this time. CM will continue to follow.    Final Discharge Disposition Code  01 - home or self-care        Discharge Codes    No documentation.       Expected Discharge Date and Time     Expected Discharge Date Expected Discharge Time    Oct 4, 2019             Trey Robles RN

## 2019-10-03 ENCOUNTER — TRANSITIONAL CARE MANAGEMENT TELEPHONE ENCOUNTER (OUTPATIENT)
Dept: FAMILY MEDICINE CLINIC | Facility: CLINIC | Age: 64
End: 2019-10-03

## 2019-10-03 ENCOUNTER — READMISSION MANAGEMENT (OUTPATIENT)
Dept: CALL CENTER | Facility: HOSPITAL | Age: 64
End: 2019-10-03

## 2019-10-03 RX ORDER — LOSARTAN POTASSIUM 50 MG/1
50 TABLET ORAL DAILY
Qty: 90 TABLET | Refills: 1 | Status: SHIPPED | OUTPATIENT
Start: 2019-10-03 | End: 2019-11-01 | Stop reason: SDUPTHER

## 2019-10-03 RX ORDER — ROSUVASTATIN CALCIUM 40 MG/1
TABLET, COATED ORAL
Qty: 90 TABLET | Refills: 3 | Status: SHIPPED | OUTPATIENT
Start: 2019-10-03 | End: 2020-07-29 | Stop reason: SDUPTHER

## 2019-10-03 NOTE — OUTREACH NOTE
Prep Survey      Responses   Facility patient discharged from?  [facility]   Is patient eligible?  Yes   Discharge diagnosis  Acute ischemic stroke    Does the patient have one of the following disease processes/diagnoses(primary or secondary)?  Stroke (TIA)   Does the patient have Home health ordered?  No   Is there a DME ordered?  No   Prep survey completed?  Yes          Josefina Pitt RN

## 2019-10-03 NOTE — OUTREACH NOTE
"Spoke with pt today, OF NOTE: states she does feel better, but still \"foggy\" when trying to get her thoughts together to talk. Also pt requests a call prior to her 10/10/19 [facility] for results of ECHO performed during her hospital stay. Confirmed receipt and understanding of d/c orders and confirms [facility] on 10/10/19.  "

## 2019-10-03 NOTE — PAYOR COMM NOTE
"Willie Bishop (64 y.o. Female)     Date of Birth Social Security Number Address Home Phone MRN    1955  [address] [place] [id number] [phone] [id number]    [employer] Marital Status          None        Admission Date Admission Type Admitting Provider Attending Provider Department, Room/Bed    19 Emergency Parth Esteban MD  [facility] 3G, S367/1    Discharge Date Discharge Disposition Discharge Destination        10/2/2019 Home or Self Care              Attending Provider:  (none)   Allergies:  No Known Allergies    Isolation:  None   Infection:  None   Code Status:  Prior    Ht:  154.9 cm (60.98\")   Wt:  74.3 kg (163 lb 12.8 oz)    Admission Cmt:  None   Principal Problem:  Acute ischemic stroke (CMS/[facility]ly [facility]) [I63.9]                 Active Insurance as of 2019     Primary Coverage     Payor Plan Insurance Group Employer/Plan Group    [employer]CIAL AETNA [id number]     Payor Plan Address Payor Plan Phone Number Payor Plan Fax Number Effective Dates    [address] [phone]  2019 - None Entered    [place] [address]       Subscriber Name Subscriber Birth Date Member ID       WILLIE BISHOP 1955 [id number]                 Emergency Contacts      (Rel.) Home Phone Work Phone Mobile Phone    Pushpa Bishop (Relative) [phone] -- [phone]    Minerva Herron (Other) -- -- [phone]    Fransico Dykes (Spouse) -- -- [phone]               Discharge Summary      Parth Esteban MD at 10/02/19 [facility]              [facility]  DISCHARGE SUMMARY    Patient Name: Willie Bishop  : 1955  MRN: [id number]    Date of Admission: 2019  Date of Discharge:  10/2/19  Primary Care Physician: Valeria Gentile PA-C    Consults     Date and Time Order Name Status Description    2019 1536 Inpatient Neurology Consult Stroke Completed           Hospital " Course     Presenting Problem:   Acute ischemic stroke (CMS/HCC) [I63.9]    Active Hospital Problems    Diagnosis  POA   • Carotid artery disease (CMS/HCC) [I73.9]  Yes   • Tobacco abuse [Z72.0]  Yes   • Essential hypertension [I10]  Yes   • GERD (gastroesophageal reflux disease) [K21.9]  Yes   • Hyperlipemia [E78.5]  Yes   • Coronary artery disease of native heart with stable angina pectoris (CMS/HCC) [I25.118]  Yes      Resolved Hospital Problems    Diagnosis Date Resolved POA   • **Acute ischemic stroke (CMS/HCC) [I63.9] 10/02/2019 Yes          Hospital Course:  64 y.o.female with relevant PMH of HTN, HLD, GERD, CAD w/cardiac stents and ongoing tobacco abuse admitted 9/30/2019 w/ expressive aphasia w/ initial NIHSS of 6.  S/p TPA after blood pressure control.    Malignant hypertension  -Improved blood pressure control obtained but may need some further titrating her blood pressure medicines for a target of 120/80.  She can follow-up with PCP later this week or early next week with further adjustments to her antihypertensive medications    Ophthalmic migraine  -Tums now improved probably triggered by her malignant hypertension.    Intracranial carotid stenosis  -  CTA head imaging showed focal calcification of the intracavernous left ICA and proximal supraclinoid ICA, difficult to assess clearly for stenosis, thought to represent 50% or less stenosis.  - continue dual antiplatelet therapy and high intensity statin  - smoking cessation advised        Discharge Follow Up Recommendations for labs/diagnostics:  Goal /80, please reassess at PCP followup.     Day of Discharge     HPI:   Feels much better today. Denies headache. No focal weakness. Denies visual changes    Review of Systems  Gen- No fevers, chills  CV- No chest pain, palpitations  Resp- No cough, dyspnea  GI- No N/V/D, abd pain        Vital Signs:   Temp:  [97.8 °F (36.6 °C)-98.6 °F (37 °C)] 98.1 °F (36.7 °C)  Heart Rate:  [59-72] 59  Resp:  [16-18]  16  BP: (123-178)/(65-85) 143/78     Physical Exam:  Constitutional -no acute distress, non toxic, sitting up on edge of the bed  HEENT-NCAT, mucous membranes moist  CV-RRR, S1 S2 normal, no m/r/g  Resp-CTAB, no wheezes, rhonchi or rales  Abd-soft, non-tender, non-distended, normo active bowel sounds  Ext-No lower extremity cyanosis, clubbing or edema bilaterally  Neuro-alert and oriented x 3, speech clear, moves all extremities   Psych-normal affect   Skin- No rash on exposed UE or LE bilaterally      Pertinent  and/or Most Recent Results     Results from last 7 days   Lab Units 10/02/19  0913 10/02/19  0540 10/01/19  0445 09/30/19  1549 09/30/19  1540   WBC 10*3/mm3  --  5.55 4.80 4.13  --    HEMOGLOBIN g/dL  --  12.9 12.6 11.8*  --    HEMOGLOBIN, POC g/dL  --   --   --   --  12.2   HEMATOCRIT %  --  39.8 39.0 36.2  --    HEMATOCRIT POC %  --   --   --   --  36*   PLATELETS 10*3/mm3  --  176 164 171  --    SODIUM mmol/L 141  --  145  --   --    POTASSIUM mmol/L 3.9  --  4.3  --   --    CHLORIDE mmol/L 106  --  105  --   --    CO2 mmol/L 24.0  --  30.0*  --   --    BUN mg/dL 14  --  15  --   --    CREATININE mg/dL 0.74  --  0.71  --  0.80   GLUCOSE mg/dL 120*  --  98  --   --    CALCIUM mg/dL 9.5  --  9.3  --   --      Results from last 7 days   Lab Units 10/02/19  0913 10/01/19  0445 09/30/19  1549 09/30/19  1539   BILIRUBIN mg/dL 0.6 0.5  --   --    ALK PHOS U/L 62 59  --   --    ALT (SGPT) U/L 10 12 12  --    AST (SGOT) U/L 13 13 13  --    PROTIME seconds  --   --   --  11.9*   INR   --   --   --  1.0   APTT seconds  --   --  29.8  --      Results from last 7 days   Lab Units 10/01/19  0445   CHOLESTEROL mg/dL 127   TRIGLYCERIDES mg/dL 111   HDL CHOL mg/dL 40     Results from last 7 days   Lab Units 10/01/19  0445 09/30/19  1549   TSH uIU/mL 1.410  --    HEMOGLOBIN A1C % 5.60  --    TROPONIN T ng/mL  --  <0.010       Brief Urine Lab Results     None          Microbiology Results Abnormal     None           Imaging Results (all)     Procedure Component Value Units Date/Time    CT Head Without Contrast [[id number]] Collected:  10/02/19 0738     Updated:  10/02/19 0932    Narrative:       EXAMINATION: CT ANGIOGRAM HEAD W WO CONTRAST-, CT ANGIOGRAM NECK W WO  CONTRAST-, CT HEAD WO CONTRAST- 10/01/2019     INDICATION: I63.9-Cerebral infarction, unspecified; Z74.09-Other reduced  mobility; CVA     TECHNIQUE: Unenhanced 5 mm axial images through the brain, subsequent  pre and post contrast 0.75 mm axial images through the head and neck  with sagittal and coronal 2-D angiographic reconstructions.     The radiation dose reduction device was turned on for each scan per the  ALARA (As Low as Reasonably Achievable) protocol.     COMPARISON: 09/30/2019 head CT scan     FINDINGS: Previous head CT scan report indicated no acute intracranial  disease.     HEAD CT SCAN WITHOUT CONTRAST: The calvarium appears intact. The  included paranasal sinuses and mastoids appear clear. Soft tissue window  images show no evidence of hemorrhage, contusion, mass or mass effect,  infarct, hydrocephalus, or abnormal extra-axial collection. Axial images  of the posterior fossa give the impression of slightly increased low  density changes of the right cerebellar peduncle compared to left,  however, this is thought to represent skull base streak artifact. Brain  MRI, which has already been performed as follow-up shows no restricted  diffusion or edema here.       Impression:       Skull base streak artifact as noted. No evidence of acute  intracranial disease.     CTA OF THE HEAD WITH 2-D RECONSTRUCTIONS: The anterior and middle  cerebral arteries, and proximal branches appear within normal limits.  There is a dominant left and smaller right vertebral artery. Posterior  cerebral arteries appear within normal limits. There is mild  calcification of the right ICA, but no evidence of hemodynamically  significant stenosis. Axial thin section images  show focal calcification  of the intracavernous left ICA and proximal supraclinoid ICA, difficult  to assess clearly for stenosis, thought to represent 50% or less  stenosis.     IMPRESSION: Calcification of the distal left intracranial ICA, with  suspected 50% or less stenosis. No other evidence of significant  intracranial or vascular stenosis.     CTA OF THE NECK WITH 2-D RECONSTRUCTIONS: The brachiocephalic vessels  appear to arise normally from the aortic arch. Proximal right and left  subclavian arteries appear normally patent.     On the right, there is mild circumferential plaquing of the carotid bulb  but no evidence of hemodynamically significant common or internal  carotid artery stenosis. There is focal origin stenosis of the right  PCA, best seen on the sagittal reconstruction images, potentially 70% or  greater. On the left, there is again mild circumferential noncalcified  plaquing of the left carotid bulb but no evidence of hemodynamically  significant common internal or external carotid artery stenosis.     There is a dominant left and smaller right vertebral artery with no  obvious stenosis.     Incidental note is made of an approximately 12 mm left thyroid nodule or  cyst of the lower pole.     IMPRESSION:   1. 70% or greater right external carotid artery origin stenosis.  2. Circumferential soft plaque of both carotid bulbs, but no evidence of  hemodynamically significant common, internal, or left external carotid  artery stenosis is seen.  3. Incidentally noted small left thyroid lobe cyst or nodule.     D:  10/02/2019  E:  10/02/2019       CT Angiogram Head With & Without Contrast [[id number]] Collected:  10/02/19 0738     Updated:  10/02/19 0932    Narrative:       EXAMINATION: CT ANGIOGRAM HEAD W WO CONTRAST-, CT ANGIOGRAM NECK W WO  CONTRAST-, CT HEAD WO CONTRAST- 10/01/2019     INDICATION: I63.9-Cerebral infarction, unspecified; Z74.09-Other reduced  mobility; CVA     TECHNIQUE: Unenhanced 5  mm axial images through the brain, subsequent  pre and post contrast 0.75 mm axial images through the head and neck  with sagittal and coronal 2-D angiographic reconstructions.     The radiation dose reduction device was turned on for each scan per the  ALARA (As Low as Reasonably Achievable) protocol.     COMPARISON: 09/30/2019 head CT scan     FINDINGS: Previous head CT scan report indicated no acute intracranial  disease.     HEAD CT SCAN WITHOUT CONTRAST: The calvarium appears intact. The  included paranasal sinuses and mastoids appear clear. Soft tissue window  images show no evidence of hemorrhage, contusion, mass or mass effect,  infarct, hydrocephalus, or abnormal extra-axial collection. Axial images  of the posterior fossa give the impression of slightly increased low  density changes of the right cerebellar peduncle compared to left,  however, this is thought to represent skull base streak artifact. Brain  MRI, which has already been performed as follow-up shows no restricted  diffusion or edema here.       Impression:       Skull base streak artifact as noted. No evidence of acute  intracranial disease.     CTA OF THE HEAD WITH 2-D RECONSTRUCTIONS: The anterior and middle  cerebral arteries, and proximal branches appear within normal limits.  There is a dominant left and smaller right vertebral artery. Posterior  cerebral arteries appear within normal limits. There is mild  calcification of the right ICA, but no evidence of hemodynamically  significant stenosis. Axial thin section images show focal calcification  of the intracavernous left ICA and proximal supraclinoid ICA, difficult  to assess clearly for stenosis, thought to represent 50% or less  stenosis.     IMPRESSION: Calcification of the distal left intracranial ICA, with  suspected 50% or less stenosis. No other evidence of significant  intracranial or vascular stenosis.     CTA OF THE NECK WITH 2-D RECONSTRUCTIONS: The brachiocephalic  vessels  appear to arise normally from the aortic arch. Proximal right and left  subclavian arteries appear normally patent.     On the right, there is mild circumferential plaquing of the carotid bulb  but no evidence of hemodynamically significant common or internal  carotid artery stenosis. There is focal origin stenosis of the right  PCA, best seen on the sagittal reconstruction images, potentially 70% or  greater. On the left, there is again mild circumferential noncalcified  plaquing of the left carotid bulb but no evidence of hemodynamically  significant common internal or external carotid artery stenosis.     There is a dominant left and smaller right vertebral artery with no  obvious stenosis.     Incidental note is made of an approximately 12 mm left thyroid nodule or  cyst of the lower pole.     IMPRESSION:   1. 70% or greater right external carotid artery origin stenosis.  2. Circumferential soft plaque of both carotid bulbs, but no evidence of  hemodynamically significant common, internal, or left external carotid  artery stenosis is seen.  3. Incidentally noted small left thyroid lobe cyst or nodule.     D:  10/02/2019  E:  10/02/2019       CT Angiogram Neck With & Without Contrast [[id number]] Collected:  10/02/19 0738     Updated:  10/02/19 0932    Narrative:       EXAMINATION: CT ANGIOGRAM HEAD W WO CONTRAST-, CT ANGIOGRAM NECK W WO  CONTRAST-, CT HEAD WO CONTRAST- 10/01/2019     INDICATION: I63.9-Cerebral infarction, unspecified; Z74.09-Other reduced  mobility; CVA     TECHNIQUE: Unenhanced 5 mm axial images through the brain, subsequent  pre and post contrast 0.75 mm axial images through the head and neck  with sagittal and coronal 2-D angiographic reconstructions.     The radiation dose reduction device was turned on for each scan per the  ALARA (As Low as Reasonably Achievable) protocol.     COMPARISON: 09/30/2019 head CT scan     FINDINGS: Previous head CT scan report indicated no acute  intracranial  disease.     HEAD CT SCAN WITHOUT CONTRAST: The calvarium appears intact. The  included paranasal sinuses and mastoids appear clear. Soft tissue window  images show no evidence of hemorrhage, contusion, mass or mass effect,  infarct, hydrocephalus, or abnormal extra-axial collection. Axial images  of the posterior fossa give the impression of slightly increased low  density changes of the right cerebellar peduncle compared to left,  however, this is thought to represent skull base streak artifact. Brain  MRI, which has already been performed as follow-up shows no restricted  diffusion or edema here.       Impression:       Skull base streak artifact as noted. No evidence of acute  intracranial disease.     CTA OF THE HEAD WITH 2-D RECONSTRUCTIONS: The anterior and middle  cerebral arteries, and proximal branches appear within normal limits.  There is a dominant left and smaller right vertebral artery. Posterior  cerebral arteries appear within normal limits. There is mild  calcification of the right ICA, but no evidence of hemodynamically  significant stenosis. Axial thin section images show focal calcification  of the intracavernous left ICA and proximal supraclinoid ICA, difficult  to assess clearly for stenosis, thought to represent 50% or less  stenosis.     IMPRESSION: Calcification of the distal left intracranial ICA, with  suspected 50% or less stenosis. No other evidence of significant  intracranial or vascular stenosis.     CTA OF THE NECK WITH 2-D RECONSTRUCTIONS: The brachiocephalic vessels  appear to arise normally from the aortic arch. Proximal right and left  subclavian arteries appear normally patent.     On the right, there is mild circumferential plaquing of the carotid bulb  but no evidence of hemodynamically significant common or internal  carotid artery stenosis. There is focal origin stenosis of the right  PCA, best seen on the sagittal reconstruction images, potentially 70%  or  greater. On the left, there is again mild circumferential noncalcified  plaquing of the left carotid bulb but no evidence of hemodynamically  significant common internal or external carotid artery stenosis.     There is a dominant left and smaller right vertebral artery with no  obvious stenosis.     Incidental note is made of an approximately 12 mm left thyroid nodule or  cyst of the lower pole.     IMPRESSION:   1. 70% or greater right external carotid artery origin stenosis.  2. Circumferential soft plaque of both carotid bulbs, but no evidence of  hemodynamically significant common, internal, or left external carotid  artery stenosis is seen.  3. Incidentally noted small left thyroid lobe cyst or nodule.     D:  10/02/2019  E:  10/02/2019       MRI Brain Without Contrast [[id number]] Updated:  10/02/19 0639    XR Chest 1 View [[id number]] Collected:  09/30/19 1658     Updated:  10/01/19 1554    Narrative:       EXAMINATION: XR CHEST 1 VW-      INDICATION: Acute stroke protocol (onset < 12 hrs); I63.9-Cerebral  infarction, unspecified.      COMPARISON: None.     FINDINGS: Portable chest reveals cardiac and mediastinal silhouettes  within normal limits. The lung fields are grossly clear. No focal  parenchymal opacification is present.  No pleural effusion or  pneumothorax. Degenerative change is seen within the spine. Pulmonary  vascularity is within normal limits.           Impression:       No acute cardiopulmonary disease.     D:  09/30/2019  E:  10/01/2019     This report was finalized on 10/1/2019 3:51 PM by Dr. Mervat Dumont MD.       CT Head Without Contrast Stroke Protocol [[id number]] Collected:  09/30/19 1546     Updated:  09/30/19 1703    Narrative:       EXAMINATION: CT HEAD WO CONTRAST, STROKE PROTOCOL-09/30/2019:      INDICATION: Stroke.      TECHNIQUE: CT head without intravenous contrast following stroke  protocol.     The radiation dose reduction device was turned on for each scan per  the  ALARA (As Low as Reasonably Achievable) protocol.     COMPARISON: NONE.     FINDINGS: Midline structures are symmetric without evidence of mass,  mass effect or midline shift. Ventricles and sulci within normal limits.  No intra-axial hemorrhage or extra-axial fluid collection. Globes and  orbits are unremarkable. The visualized paranasal sinuses and mastoid  air cells are grossly clear and well pneumatized. Calvarium intact.       Impression:       No acute intracranial abnormality, specifically no acute  hemorrhage.     Scan performed on 09/30/2019 at 1537 hours. Scan report given to ER  physician and team in person by Dr. Petty on 09/30/2019 at 1542 hours.     D:  09/30/2019  E:  09/30/2019           This report was finalized on 9/30/2019 5:00 PM by Dr. Lew Petty.             Results for orders placed during the hospital encounter of 09/30/19   Bilateral Carotid Duplex    Narrative · No evidence of hemodynamically significant stenosis of bilateral   internal carotid arteries.  · Bilateral mild to moderate irregular plaque is noted.  · Right external carotid artery velocity is significantly elevated   consistent with moderate to severe stenosis.          Results for orders placed during the hospital encounter of 09/30/19   Bilateral Carotid Duplex    Narrative · No evidence of hemodynamically significant stenosis of bilateral   internal carotid arteries.  · Bilateral mild to moderate irregular plaque is noted.  · Right external carotid artery velocity is significantly elevated   consistent with moderate to severe stenosis.          Results for orders placed during the hospital encounter of 09/30/19   Adult Transthoracic Echo Complete W/ Cont if Necessary Per Protocol (With Agitated Saline)    Narrative · Mild mitral valve regurgitation is present.  · Mild tricuspid valve regurgitation is present.  · Calculated right ventricular systolic pressure from tricuspid   regurgitation is 24 mmHg.  · Estimated EF =  68%.  · Left ventricular systolic function is normal.  · Left ventricular wall thickness is consistent with mild concentric   hypertrophy.  · Normal right ventricular cavity size, wall thickness, systolic function   and septal motion noted.  · Left ventricular diastolic dysfunction is abnormal consistent with: age.  · Saline test results are negative.  · The aortic valve exhibits mild sclerosis.  · No evidence of pulmonary hypertension is present.  · There is no evidence of pericardial effusion.            Discharge Details        Discharge Medications      New Medications      Instructions Start Date   aspirin 81 MG EC tablet   81 mg, Oral, Daily         Changes to Medications      Instructions Start Date   pantoprazole 40 MG EC tablet  Commonly known as:  PROTONIX  What changed:    · how much to take  · how to take this  · when to take this   TAKE 1 TABLET TWICE A DAY         Continue These Medications      Instructions Start Date   cholecalciferol 1000 units tablet  Commonly known as:  VITAMIN D3   1,000 Units, Oral, Daily      clopidogrel 75 MG tablet  Commonly known as:  PLAVIX   75 mg, Oral, Daily      ibuprofen 600 MG tablet  Commonly known as:  ADVIL,MOTRIN   600 mg, Oral, Every 8 Hours PRN      isosorbide mononitrate 30 MG 24 hr tablet  Commonly known as:  IMDUR   TAKE 1 TABLET DAILY      losartan 50 MG tablet  Commonly known as:  COZAAR   50 mg, Oral, Daily      metoprolol tartrate 50 MG tablet  Commonly known as:  LOPRESSOR   TAKE 1 TABLET TWICE A DAY      nitroglycerin 0.4 MG SL tablet  Commonly known as:  NITROSTAT   0.4 mg, Sublingual, Every 5 Minutes PRN      ondansetron ODT 8 MG disintegrating tablet  Commonly known as:  ZOFRAN ODT   8 mg, Oral, Every 8 Hours PRN      rosuvastatin 40 MG tablet  Commonly known as:  CRESTOR   40 mg, Oral, Daily      sucralfate 1 g tablet  Commonly known as:  CARAFATE   1 g, Oral, Nightly             No Known Allergies      Discharge Disposition:  Home or Self  Care    Diet:  Hospital:  Diet Order   Procedures   • Diet Regular; Thin; Cardiac     Discharge:     Discharge Activity:         CODE STATUS:    Code Status and Medical Interventions:   Ordered at: 09/30/19 1722     Code Status:    CPR     Medical Interventions (Level of Support Prior to Arrest):    Full         Future Appointments   Date Time Provider Department Center   4/24/2020  2:30 PM Chiara Montalvo MD [facility] None   10/28/2020  8:15 AM Valeria Gentile PA-C [facility] None       Additional Instructions for the Follow-ups that You Need to Schedule     Discharge Follow-up with PCP   As directed       Currently Documented PCP:    Valeria Gentile PA-C    PCP Phone Number:    [phone]     Follow Up Details:  follow up PCP one week               Time Spent on Discharge:  40 minutes    Electronically signed by Parth Esteban MD, 10/02/19, 2:26 PM.        Electronically signed by Parth Esteban MD at 10/02/19 4521

## 2019-10-07 ENCOUNTER — READMISSION MANAGEMENT (OUTPATIENT)
Dept: CALL CENTER | Facility: HOSPITAL | Age: 64
End: 2019-10-07

## 2019-10-07 NOTE — OUTREACH NOTE
Stroke Week 1 Survey      Responses   Facility patient discharged from?  [facility]   Does the patient have one of the following disease processes/diagnoses(primary or secondary)?  Stroke (TIA)   Is there a successful TCM telephone encounter documented?  Yes          Stefany Mace RN

## 2019-10-09 ENCOUNTER — READMISSION MANAGEMENT (OUTPATIENT)
Dept: CALL CENTER | Facility: HOSPITAL | Age: 64
End: 2019-10-09

## 2019-10-09 NOTE — OUTREACH NOTE
Stroke Week 2 Survey      Responses   Facility patient discharged from?  [facility]   Does the patient have one of the following disease processes/diagnoses(primary or secondary)?  Stroke (TIA)   Week 2 attempt successful?  No   Unsuccessful attempts  Attempt 1          Tamara Candelaria RN

## 2019-10-10 ENCOUNTER — OFFICE VISIT (OUTPATIENT)
Dept: INTERNAL MEDICINE | Facility: CLINIC | Age: 64
End: 2019-10-10

## 2019-10-10 VITALS
SYSTOLIC BLOOD PRESSURE: 110 MMHG | HEIGHT: 61 IN | OXYGEN SATURATION: 96 % | HEART RATE: 71 BPM | TEMPERATURE: 97.7 F | WEIGHT: 161.2 LBS | RESPIRATION RATE: 17 BRPM | BODY MASS INDEX: 30.43 KG/M2 | DIASTOLIC BLOOD PRESSURE: 72 MMHG

## 2019-10-10 DIAGNOSIS — I63.9 ISCHEMIC STROKE (HCC): Primary | ICD-10-CM

## 2019-10-10 DIAGNOSIS — I65.23 BILATERAL CAROTID ARTERY STENOSIS: ICD-10-CM

## 2019-10-10 DIAGNOSIS — E04.1 LEFT THYROID NODULE: ICD-10-CM

## 2019-10-10 DIAGNOSIS — Z72.0 TOBACCO ABUSE: ICD-10-CM

## 2019-10-10 PROCEDURE — 99495 TRANSJ CARE MGMT MOD F2F 14D: CPT | Performed by: PHYSICIAN ASSISTANT

## 2019-10-10 RX ORDER — ASPIRIN 81 MG/1
TABLET, CHEWABLE ORAL
COMMUNITY
Start: 2019-10-03 | End: 2019-10-10

## 2019-10-10 NOTE — PROGRESS NOTES
"Transitional Care Follow Up Visit  Subjective     Jasvir Hamilton is a 64 y.o. female who presents for a transitional care management visit.    Within 48 business hours after discharge our office contacted her via telephone to coordinate her care and needs.      I reviewed and discussed the details of that call along with the discharge summary, hospital problems, inpatient lab results, inpatient diagnostic studies, and consultation reports with Jasvir.     Current outpatient and discharge medications have been reconciled for the patient.  Reviewed by: Valeria Gentile PA-C      Date of TCM Phone Call 10/3/2019   [facility]   Date of Admission 9/30/2019   Date of Discharge 10/2/2019   Discharge Disposition Home or Self Care     Risk for Readmission (LACE) No Data Recorded    History of Present Illness   Course During Hospital Stay:  Pt admitted to [facility] from 9/30/2019 to 10/2/2019 for acute CVA with ischemic stroke. Hospital course as follows per discharge summary: \"64 y.o.female with relevant PMH of HTN, HLD, GERD, CAD w/cardiac stents and ongoing tobacco abuse admitted 9/30/2019 w/ expressive aphasia w/ initial NIHSS of 6.  S/p TPA after blood pressure control.  Malignant hypertension  -Improved blood pressure control obtained but may need some further titrating her blood pressure medicines for a target of 120/80.  She can follow-up with PCP later this week or early next week with further adjustments to her antihypertensive medications  Ophthalmic migraine  -Tums now improved probably triggered by her malignant hypertension.  Intracranial carotid stenosis  -  CTA head imaging showed focal calcification of the intracavernous left ICA and proximal supraclinoid ICA, difficult to assess clearly for stenosis, thought to represent 50% or less stenosis.  - continue dual antiplatelet therapy and high intensity statin  - smoking cessation advised.\"    Pt reports she is doing fairly well since discharge. She " "still feels quite fatigued and stressed due to her recent CVA. She does not report any physical weakness or weakness of extremities. She does feel that she is having difficulty \"finding the right words\" and sometimes stops mid-sentence for a few seconds before she can find the right word. She does not have any difficulty with forming the words physically with her mouth, and denies any slurred speech or facial droop. Outside of fatigue and difficulty with words, she denies any residual symptoms from her stroke. Pt is checking her BP at home. AM readings before meds 150s/90s, and early PM readings 120s/70s. She does have a headache today but attributes this to a change in the strength of her contacts yesterday, had vision check with Dr. Tito Messina at [facility] with change in Rx of contacts. She has not had any other headaches since d/c from hospital. Pt denies fever, chills, chest pain, SOA, abdominal pain, N/V/D, changes in urination, numbness, tingling, muscle weakness or myalgias.     Of note, small 12mm left thyroid nodule noted on CT angiogram on 10/1/2019.      The following portions of the patient's history were reviewed and updated as appropriate: allergies, current medications, past medical history, past social history and problem list.       Current Outpatient Medications:   •  aspirin 81 MG EC tablet, Take 1 tablet by mouth Daily., Disp: , Rfl:   •  cholecalciferol (VITAMIN D3) 1000 units tablet, Take 1,000 Units by mouth Daily., Disp: , Rfl:   •  clopidogrel (PLAVIX) 75 MG tablet, Take 1 tablet by mouth Daily., Disp: 90 tablet, Rfl: 3  •  ibuprofen (ADVIL,MOTRIN) 600 MG tablet, Take 1 tablet by mouth Every 8 (Eight) Hours As Needed for Mild Pain  or Moderate Pain ., Disp: 90 tablet, Rfl: 0  •  isosorbide mononitrate (IMDUR) 30 MG 24 hr tablet, TAKE 1 TABLET DAILY, Disp: 90 tablet, Rfl: 1  •  losartan (COZAAR) 50 MG tablet, Take 1 tablet by mouth Daily., Disp: 90 tablet, Rfl: 1  •  metoprolol tartrate " "(LOPRESSOR) 50 MG tablet, TAKE 1 TABLET TWICE A DAY, Disp: 180 tablet, Rfl: 3  •  ondansetron ODT (ZOFRAN ODT) 8 MG disintegrating tablet, Take 1 tablet by mouth Every 8 (Eight) Hours As Needed for Nausea or Vomiting., Disp: 20 tablet, Rfl: 0  •  rosuvastatin (CRESTOR) 40 MG tablet, TAKE 1 TABLET DAILY, Disp: 90 tablet, Rfl: 3  •  sucralfate (CARAFATE) 1 g tablet, Take 1 tablet by mouth Every Night., Disp: 90 tablet, Rfl: 1  •  nicotine polacrilex (NICORETTE) 4 MG gum, Chew 1 each As Needed for Smoking Cessation., Disp: 50 each, Rfl: 1  •  nitroglycerin (NITROSTAT) 0.4 MG SL tablet, Place 0.4 mg under the tongue Every 5 (Five) Minutes As Needed., Disp: , Rfl:   •  pantoprazole (PROTONIX) 40 MG EC tablet, Take 1 tablet by mouth 2 (Two) Times a Day., Disp: 180 tablet, Rfl: 1      Review of Systems   Constitutional: Positive for fatigue. Negative for chills, fever and unexpected weight change.   HENT: Negative for sore throat and trouble swallowing.    Eyes: Negative for pain and visual disturbance.   Respiratory: Negative for cough, shortness of breath and wheezing.    Cardiovascular: Negative for chest pain, palpitations and leg swelling.   Gastrointestinal: Negative for abdominal pain, nausea and vomiting.   Genitourinary: Negative for dysuria.   Musculoskeletal: Negative for gait problem, myalgias, neck pain and neck stiffness.   Skin: Negative for rash.   Neurological: Positive for speech difficulty and headaches. Negative for dizziness, facial asymmetry, weakness, light-headedness and numbness.   Psychiatric/Behavioral:        +stress       Objective    Vitals:    10/10/19 1404   BP: 110/72   BP Location: Right arm   Patient Position: Sitting   Cuff Size: Adult   Pulse: 71   Resp: 17   Temp: 97.7 °F (36.5 °C)   TempSrc: Temporal   SpO2: 96%   Weight: 73.1 kg (161 lb 3.2 oz)   Height: 154.9 cm (60.98\")       Physical Exam   Constitutional: She appears well-developed and well-nourished.   HENT:   Head: " Normocephalic and atraumatic.   Right Ear: Tympanic membrane, external ear and ear canal normal.   Left Ear: Tympanic membrane, external ear and ear canal normal.   Mouth/Throat: Oropharynx is clear and moist and mucous membranes are normal.   Eyes: Conjunctivae and EOM are normal. Pupils are equal, round, and reactive to light.   Neck: Neck supple.   Cardiovascular: Normal rate, regular rhythm and intact distal pulses.   No murmur heard.  Pulmonary/Chest: Effort normal and breath sounds normal. She has no wheezes. She has no rales.   Abdominal: Normal appearance. There is no hepatosplenomegaly. There is no tenderness.   Lymphadenopathy:     She has no cervical adenopathy.   Neurological: She has normal strength. No cranial nerve deficit. Coordination and gait normal.   CN II-XII grossly intact. No facial droop or asymmetry.   Upper motor strength normal and equal bilaterally with bicep/tricep testing, handgrip strength.  Lower motor strength normal and equal bilaterally with quad/ hamstring strength.     Skin: No rash noted.   Psychiatric: She has a normal mood and affect. Her behavior is normal.       Assessment/Plan   Jasvir was seen today for cerebrovascular accident.    Diagnoses and all orders for this visit:    Ischemic stroke (CMS/[facility])  - Continue Plavix as directed.     Left thyroid nodule  -     TSH; Future  -     T4, Free; Future  -     T3, Free; Future  -     Cancel: US Thyroid    Tobacco abuse  -     nicotine polacrilex (NICORETTE) 4 MG gum; Chew 1 each As Needed for Smoking Cessation.    Bilateral carotid artery stenosis      Return for Follow up.      Transitional Care Management Certification  I certify that the following are true:  1. Communication was made within 2 business days of discharge.  2. Complexity of Medical Decision Making is moderate.  3. Face to face visit occurred within 8 days.    *Note: 52747 is for high complexity patients with a face to face visit within 7 days of discharge.   54450 is for high complexity patients with a face to face on days 8-14 post discharge or medium complexity with face to face visit within 14 days post discharge.

## 2019-10-11 ENCOUNTER — TELEPHONE (OUTPATIENT)
Dept: INTERNAL MEDICINE | Facility: CLINIC | Age: 64
End: 2019-10-11

## 2019-10-11 ENCOUNTER — READMISSION MANAGEMENT (OUTPATIENT)
Dept: CALL CENTER | Facility: HOSPITAL | Age: 64
End: 2019-10-11

## 2019-10-11 DIAGNOSIS — E04.1 LEFT THYROID NODULE: Primary | ICD-10-CM

## 2019-10-11 NOTE — OUTREACH NOTE
Stroke Week 2 Survey      Responses   Facility patient discharged from?  [facility]   Does the patient have one of the following disease processes/diagnoses(primary or secondary)?  Stroke (TIA)   Week 2 attempt successful?  Yes   Call start time  1613   Call end time  1629   Discharge diagnosis  Acute ischemic stroke    Meds reviewed with patient/caregiver?  Yes   Is the patient having any side effects they believe may be caused by any medication additions or changes?  No   Does the patient have all medications ordered at discharge?  Yes   Is the patient taking all medications as directed (includes completed medication regime)?  Yes   Does the patient have a primary care provider?   Yes   Comments regarding PCP  Pt saw PCP and increased Cozaar to 50mg.   Has the patient kept scheduled appointments due by today?  Yes   Comments  Pt has a nodule on thyroid and will have a scan.    Psychosocial issues?  No   Does the patient require any assistance with activities of daily living such as eating, bathing, dressing, walking, etc.?  Yes   Does the patient have any residual symptoms from stroke/TIA?  No   Does the patient understand the diet ordered at discharge?  Yes   Comments  Pt continues to have daily headaches.  She states her vision is ok.   Did the patient receive a copy of their discharge instructions?  Yes   What is the patient's perception of their health status since discharge?  Improving   Is the patient able to teach back FAST for Stroke?  Yes   Is the patient/caregiver able to teach back the risk factors for a stroke?  High blood pressure-goal below 120/80, Smoking, Diabetes, High Cholesterol, Carotid or other artery disease, Physical inactivity and obesity, History of TIAs, History of Afib, HIgh red blood cell count, Excessive alcohol intake, Illegal drug use, Sleep apnea   Is the patient/caregiver able to teach back signs and symptoms related to disease process for when to call PCP?  Yes   Is the  patient/caregiver able to teach back signs and symptoms related to disease process for when to call 911?  Yes   Is the patient/caregiver able to teach back the hierarchy of who to call/visit for symptoms/problems? PCP, Specialist, Home health nurse, Urgent Care, ED, 911  Yes   Additional teach back comments  Pt states she continues to check BP.  Today was 138/74.    Week 2 call completed?  Yes   Wrap up additional comments  Pt had multiple questions during this call.  All questions answered and she verbalized understanding.           Stefany Mace, RN

## 2019-10-11 NOTE — TELEPHONE ENCOUNTER
Jasvir called to inform us that Minna from [facility] is faxing [facility] papers stating pt should be off until 11/11/2019. Please complete and fax back ASAP.      Minna can be reached at [phone] with questions.

## 2019-10-17 DIAGNOSIS — K21.9 GASTROESOPHAGEAL REFLUX DISEASE WITHOUT ESOPHAGITIS: ICD-10-CM

## 2019-10-17 RX ORDER — PANTOPRAZOLE SODIUM 40 MG/1
40 TABLET, DELAYED RELEASE ORAL 2 TIMES DAILY
Qty: 180 TABLET | Refills: 1 | Status: SHIPPED | OUTPATIENT
Start: 2019-10-17 | End: 2020-04-03

## 2019-10-21 ENCOUNTER — READMISSION MANAGEMENT (OUTPATIENT)
Dept: CALL CENTER | Facility: HOSPITAL | Age: 64
End: 2019-10-21

## 2019-10-21 NOTE — OUTREACH NOTE
Stroke Week 3 Survey      Responses   Facility patient discharged from?  [place]   Does the patient have one of the following disease processes/diagnoses(primary or secondary)?  Stroke (TIA)   Week 3 attempt successful?  No   Unsuccessful attempts  Attempt 1          Marly Nguyen RN

## 2019-10-22 ENCOUNTER — HOSPITAL ENCOUNTER (OUTPATIENT)
Dept: ULTRASOUND IMAGING | Facility: HOSPITAL | Age: 64
Discharge: HOME | End: 2019-10-22
Admitting: PHYSICIAN ASSISTANT

## 2019-10-22 PROCEDURE — 76536 US EXAM OF HEAD AND NECK: CPT

## 2019-10-24 ENCOUNTER — READMISSION MANAGEMENT (OUTPATIENT)
Dept: CALL CENTER | Facility: HOSPITAL | Age: 64
End: 2019-10-24

## 2019-10-24 PROBLEM — I63.9 ISCHEMIC STROKE (HCC): Status: ACTIVE | Noted: 2019-10-24

## 2019-10-24 NOTE — OUTREACH NOTE
Stroke Week 3 Survey      Responses   Facility patient discharged from?  [place] [facility]   Does the patient have one of the following disease processes/diagnoses(primary or secondary)?  Stroke (TIA)   Week 3 attempt successful?  Yes   Call start time  1822   Call end time  1827   Discharge diagnosis  Acute ischemic stroke    Meds reviewed with patient/caregiver?  Yes   Is the patient taking all medications as directed (includes completed medication regime)?  Yes   Has the patient kept scheduled appointments due by today?  Yes   Psychosocial issues?  No   Does the patient require any assistance with activities of daily living such as eating, bathing, dressing, walking, etc.?  No   Does the patient have any residual symptoms from stroke/TIA?  No   Does the patient understand the diet ordered at discharge?  Yes   What is the patient's perception of their health status since discharge?  Improving [Pt can tell her BP is better controlled.]   Nursing interventions  Nurse provided patient education   Is the patient able to teach back FAST for Stroke?  Yes   Is the patient/caregiver able to teach back the risk factors for a stroke?  Sleep apnea, Excessive alcohol intake, High Cholesterol, Smoking, High blood pressure-goal below 120/80, Physical inactivity and obesity, Illegal drug use   Is the patient/caregiver able to teach back signs and symptoms related to disease process for when to call 911?  Yes   If the patient is a current smoker, are they able to teach back resources for cessation?  -- [Pt has slowed down smoking]   Is the patient/caregiver able to teach back the hierarchy of who to call/visit for symptoms/problems? PCP, Specialist, Home health nurse, Urgent Care, ED, 911  Yes   Week 3 call completed?  Yes   Wrap up additional comments  Pt reports she still has some trouble with speech. Sometimes she stutters and sometimes she rambles, and has difficulty getting her words out.           Tami Henry RN

## 2019-10-30 ENCOUNTER — TRANSCRIBE ORDERS (OUTPATIENT)
Dept: ADMINISTRATIVE | Facility: HOSPITAL | Age: 64
End: 2019-10-30

## 2019-10-30 DIAGNOSIS — Z12.31 VISIT FOR SCREENING MAMMOGRAM: Primary | ICD-10-CM

## 2019-10-31 ENCOUNTER — READMISSION MANAGEMENT (OUTPATIENT)
Dept: CALL CENTER | Facility: HOSPITAL | Age: 64
End: 2019-10-31

## 2019-10-31 NOTE — OUTREACH NOTE
Stroke Week 4 Survey      Responses   Facility patient discharged from?  [place]   Does the patient have one of the following disease processes/diagnoses(primary or secondary)?  Stroke (TIA)   Week 4 attempt successful?  Yes   Call start time  1154   Call end time  1156   Discharge diagnosis  Acute ischemic stroke    Meds reviewed with patient/caregiver?  Yes   Is the patient having any side effects they believe may be caused by any medication additions or changes?  No   Is the patient taking all medications as directed (includes completed medication regime)?  Yes   Has the patient kept scheduled appointments due by today?  Yes   Psychosocial issues?  No   Does the patient require any assistance with activities of daily living such as eating, bathing, dressing, walking, etc.?  Yes   Does the patient have any residual symptoms from stroke/TIA?  No   Does the patient understand the diet ordered at discharge?  Yes   Comments  headaches quick and ot everyday   What is the patient's perception of their health status since discharge?  Same   Nursing interventions  Nurse provided patient education   Is the patient able to teach back FAST for Stroke?  No   Is the patient/caregiver able to teach back the risk factors for a stroke?  High blood pressure-goal below 120/80   Is the patient/caregiver able to teach back signs and symptoms related to disease process for when to call PCP?  Yes   Is the patient/caregiver able to teach back signs and symptoms related to disease process for when to call 911?  Yes   Is the patient/caregiver able to teach back the hierarchy of who to call/visit for symptoms/problems? PCP, Specialist, Home health nurse, Urgent Care, ED, 911  Yes   Week 4 Call Completed?  Yes   Would the patient like one additional call?  No   Graduated  Yes   Did the patient feel the follow up calls were helpful during their recovery period?  Yes   Was the number of calls appropriate?  Yes          Josefina Pitt RN

## 2019-11-01 RX ORDER — LOSARTAN POTASSIUM 50 MG/1
TABLET ORAL
Qty: 90 TABLET | Refills: 1 | Status: SHIPPED | OUTPATIENT
Start: 2019-11-01 | End: 2019-12-09 | Stop reason: SDUPTHER

## 2019-11-11 ENCOUNTER — OFFICE VISIT (OUTPATIENT)
Dept: INTERNAL MEDICINE | Facility: CLINIC | Age: 64
End: 2019-11-11

## 2019-11-11 VITALS
TEMPERATURE: 97.8 F | RESPIRATION RATE: 16 BRPM | HEIGHT: 61 IN | WEIGHT: 156 LBS | DIASTOLIC BLOOD PRESSURE: 60 MMHG | BODY MASS INDEX: 29.45 KG/M2 | OXYGEN SATURATION: 96 % | SYSTOLIC BLOOD PRESSURE: 120 MMHG | HEART RATE: 62 BPM

## 2019-11-11 DIAGNOSIS — R29.818 FINE MOTOR IMPAIRMENT: ICD-10-CM

## 2019-11-11 DIAGNOSIS — Z86.73 HISTORY OF ARTERIAL ISCHEMIC STROKE: Primary | ICD-10-CM

## 2019-11-11 DIAGNOSIS — R29.898 FINE MOTOR IMPAIRMENT: ICD-10-CM

## 2019-11-11 DIAGNOSIS — I65.23 BILATERAL CAROTID ARTERY STENOSIS: ICD-10-CM

## 2019-11-11 DIAGNOSIS — R47.89 OTHER SPEECH DISTURBANCE: ICD-10-CM

## 2019-11-11 PROBLEM — I63.9 ISCHEMIC STROKE: Status: RESOLVED | Noted: 2019-10-24 | Resolved: 2019-11-11

## 2019-11-11 PROCEDURE — 99214 OFFICE O/P EST MOD 30 MIN: CPT | Performed by: PHYSICIAN ASSISTANT

## 2019-11-11 RX ORDER — ASPIRIN 81 MG/1
81 TABLET ORAL DAILY
Qty: 90 TABLET | Refills: 3 | Status: SHIPPED | OUTPATIENT
Start: 2019-11-11 | End: 2020-07-29 | Stop reason: SDUPTHER

## 2019-11-11 NOTE — PROGRESS NOTES
"Chief Complaint   Patient presents with   • Cerebrovascular Accident     x1 month F/U stroke, discuss possible damage,focus, dizziness, speech, memory        Subjective       History of Present Illness     Jasvir Hamilton is a 64 y.o. female. She presents for follow up of CVA on 9/20/2019. Pt continues to have difficulty post-stroke. She reports that she still has difficulty finding the right words at times, and often mixes up words. She does not have difficulty forming the words physically, and no facial droop. She states that she stutters at times, repeating the first syllable or word in a sentence. She has trouble focusing or concentrating on things for very long. She does not any long-term memory issues, but reports some short-term memory concerns with remembering to run errands she intended to do on a specific day, or with small tasks around the home. She does not get lost while driving, does not have difficulty remembering appointments, and denies forgetting things said in conversation with friends or family members. She has mild dizziness with a \"head rush\" when she moves her head too quickly, particularly when moving her head from a downward facing position to upright. She also reports frequently dropping small objects, although does not have any difficulty with fine motor activities such as applying makeup or writing. 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 She denies blurred vision or visual disturbance, headache, neck pain, chest pain, SOA. No weakness of extremities. She does feel off balance often, mostly with quick movements, although no difficulty with ambulation.     Pt is checking her BP daily and this is running 130/70 in the AM before meds, and 110/70 in the evening. She is taking losartan, metoprolol, Plavix, ASA and Imdur as " directed. She is followed by Dr. Montalvo in cardiology, next appt April 2020.       The following portions of the patient's history were reviewed and updated as appropriate: allergies, current medications, past family history, past medical history, past social history and problem list.    No Known Allergies  Social History     Tobacco Use   • Smoking status: Current Every Day Smoker     Packs/day: 0.75   • Smokeless tobacco: Never Used   Substance Use Topics   • Alcohol use: Yes     Comment: occassionally         Current Outpatient Medications:   •  aspirin 81 MG EC tablet, Take 1 tablet by mouth Daily., Disp: 90 tablet, Rfl: 3  •  cholecalciferol (VITAMIN D3) 1000 units tablet, Take 1,000 Units by mouth Daily., Disp: , Rfl:   •  clopidogrel (PLAVIX) 75 MG tablet, Take 1 tablet by mouth Daily., Disp: 90 tablet, Rfl: 3  •  ibuprofen (ADVIL,MOTRIN) 600 MG tablet, Take 1 tablet by mouth Every 8 (Eight) Hours As Needed for Mild Pain  or Moderate Pain ., Disp: 90 tablet, Rfl: 0  •  isosorbide mononitrate (IMDUR) 30 MG 24 hr tablet, TAKE 1 TABLET DAILY, Disp: 90 tablet, Rfl: 1  •  losartan (COZAAR) 50 MG tablet, TAKE 1 TABLET DAILY, Disp: 90 tablet, Rfl: 1  •  metoprolol tartrate (LOPRESSOR) 50 MG tablet, TAKE 1 TABLET TWICE A DAY, Disp: 180 tablet, Rfl: 3  •  nicotine polacrilex (NICORETTE) 4 MG gum, Chew 1 each As Needed for Smoking Cessation., Disp: 50 each, Rfl: 1  •  nitroglycerin (NITROSTAT) 0.4 MG SL tablet, Place 0.4 mg under the tongue Every 5 (Five) Minutes As Needed., Disp: , Rfl:   •  ondansetron ODT (ZOFRAN ODT) 8 MG disintegrating tablet, Take 1 tablet by mouth Every 8 (Eight) Hours As Needed for Nausea or Vomiting., Disp: 20 tablet, Rfl: 0  •  pantoprazole (PROTONIX) 40 MG EC tablet, Take 1 tablet by mouth 2 (Two) Times a Day., Disp: 180 tablet, Rfl: 1  •  rosuvastatin (CRESTOR) 40 MG tablet, TAKE 1 TABLET DAILY, Disp: 90 tablet, Rfl: 3  •  sucralfate (CARAFATE) 1 g tablet, Take 1 tablet by mouth Every  Night., Disp: 90 tablet, Rfl: 1    Review of Systems   Constitutional: Negative for chills, fatigue and fever.   HENT: Negative for congestion, ear pain, sore throat and trouble swallowing.    Eyes: Negative for blurred vision, pain and visual disturbance.   Respiratory: Negative for cough, shortness of breath and wheezing.    Cardiovascular: Negative for chest pain, palpitations and leg swelling.   Gastrointestinal: Negative for abdominal pain, diarrhea, nausea and vomiting.   Genitourinary: Negative for dysuria and hematuria.   Skin: Negative for rash.   Neurological: Positive for dizziness, speech difficulty and memory problem. Negative for syncope, weakness, light-headedness, numbness and headache.        +difficulty finding words   Psychiatric/Behavioral: Positive for decreased concentration. The patient is not nervous/anxious.        Objective   Vitals:    11/11/19 1045   BP: 120/60   Pulse: 62   Resp: 16   Temp: 97.8 °F (36.6 °C)   SpO2: 96%     Physical Exam   Constitutional: She appears well-developed and well-nourished.   HENT:   Head: Normocephalic and atraumatic.   Right Ear: Tympanic membrane, external ear and ear canal normal.   Left Ear: Tympanic membrane, external ear and ear canal normal.   Nose: Nose normal.   Mouth/Throat: Oropharynx is clear and moist and mucous membranes are normal.   Eyes: Conjunctivae are normal. Pupils are equal, round, and reactive to light.   Neck: Normal range of motion. Neck supple. Carotid bruit is present.   Cardiovascular: Normal rate, regular rhythm and intact distal pulses.   No murmur heard.  Pulmonary/Chest: Effort normal and breath sounds normal. She has no wheezes. She has no rales.   Abdominal: Soft. There is no hepatosplenomegaly. There is no tenderness.   Lymphadenopathy:     She has no cervical adenopathy.   Neurological: She has normal strength. No cranial nerve deficit. She displays a negative Romberg sign. Coordination and gait normal.   Upper motor  strength normal and equal bilaterally with bicep/tricep testing, handgrip strength.  CN II-XII grossly intact.   No apparent slurring of speech, stuttering, or other speech abnormality noted.    Skin: No rash noted.   Psychiatric: She has a normal mood and affect. Her behavior is normal.       Assessment/Plan   Jasvir was seen today for cerebrovascular accident.    Diagnoses and all orders for this visit:    History of arterial ischemic stroke  -     Ambulatory Referral to Speech Therapy  -     aspirin 81 MG EC tablet; Take 1 tablet by mouth Daily.  -     Ambulatory Referral to Occupational Therapy    Other speech disturbance  -     Ambulatory Referral to Speech Therapy    Bilateral carotid artery stenosis  -     aspirin 81 MG EC tablet; Take 1 tablet by mouth Daily.    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 She does report frequently dropping small objects, and OT may benefit the patient to prepare for return to work.   May also benefit from physical therapy, although I do not see any issues with coordination or gait today so we will defer at this time.   Waist circumference: 99cm/ 39 inches.          Return in about 4 weeks (around 12/9/2019) for Follow up- CVA.

## 2019-11-12 ENCOUNTER — TELEPHONE (OUTPATIENT)
Dept: INTERNAL MEDICINE | Facility: CLINIC | Age: 64
End: 2019-11-12

## 2019-11-12 NOTE — TELEPHONE ENCOUNTER
Patient calling to double check on paperwork that JORGE needs for her FMLA.  Jorge told patient it was faxed to the office yesterday.    Please call patient to confirm receipt [phone]

## 2019-11-14 NOTE — TELEPHONE ENCOUNTER
This has already been addressed and faxed once. I have filled this out again for short-term disability (FMLA has been addressed with separate paperwork).  Please fax this, and place copy in scan pile for records.

## 2019-11-15 NOTE — TELEPHONE ENCOUNTER
Jasvir Hamilton [phone]  Spoke to pt, advised of clinical message. Confirmed form was faxed yesterday, with fax confirmation received. Will re-fax form to be on the safe side, unfortunately once we receive a fax confirmation that confirms the fax was successful we include that in the chart. Pt is in agreement with plan, advised to return our call if she had any additional questions or concerns. Good verbal understanding. Form has been refaxed, faxed confirmation has been received, placed in scan pile.

## 2019-11-18 ENCOUNTER — OFFICE VISIT (OUTPATIENT)
Dept: PHYSICAL THERAPY | Facility: CLINIC | Age: 64
End: 2019-11-18

## 2019-11-18 DIAGNOSIS — Z86.73 HISTORY OF ARTERIAL ISCHEMIC STROKE: ICD-10-CM

## 2019-11-18 DIAGNOSIS — R47.89 OTHER SPEECH DISTURBANCE: ICD-10-CM

## 2019-11-18 DIAGNOSIS — R41.841 COGNITIVE COMMUNICATION DEFICIT: Primary | ICD-10-CM

## 2019-11-18 PROCEDURE — 92523 SPEECH SOUND LANG COMPREHEN: CPT | Performed by: SPEECH-LANGUAGE PATHOLOGIST

## 2019-11-18 NOTE — PROGRESS NOTES
Outpatient Speech Language Pathology   Adult Speech Language Cognitive Initial Evaluation       Patient Name: Jasvir Hamilton  : 1955  MRN: [id number]  Today's Date: 2019        Visit Date: 2019   Patient Active Problem List   Diagnosis   • Essential hypertension   • GERD (gastroesophageal reflux disease)   • Hyperlipemia   • Cobalamin deficiency   • History of vitamin D deficiency   • Coronary artery disease of native heart with stable angina pectoris (CMS/HCC)   • Bilateral carotid bruits   • Tobacco abuse   • Obesity (BMI 30.0-34.9)   • Carotid artery disease (CMS/HCC)   • History of arterial ischemic stroke        Past Medical History:   Diagnosis Date   • Chicken pox    • Diverticulitis    • Easy bruising    • Gall stones    • GERD (gastroesophageal reflux disease)    • Glaucoma    • Hyperlipemia    • Hypertension    • Ischemic stroke (CMS/HCC) 10/24/2019   • Measles    • Menopause    • Mumps    • Positive PPD    • Scarlet fever         Past Surgical History:   Procedure Laterality Date   • CARDIAC CATHETERIZATION     • CARPAL TUNNEL RELEASE      both hands   • CHOLECYSTECTOMY     • COLONOSCOPY     • ECTOPIC PREGNANCY     • ENDOMETRIAL ABLATION     • HAND SURGERY     • OTHER SURGICAL HISTORY      lap and leep   • TUBAL ABDOMINAL LIGATION      with L salpingo-oophorectomy         Visit Dx:    ICD-10-CM ICD-9-CM   1. Cognitive communication deficit R41.841 799.52   2. Other speech disturbance R47.89 784.59   3. History of arterial ischemic stroke Z86.73 V12.54           SLP SLC Evaluation - 19 0900        Communication Assessment/Intervention    Document Type  evaluation   -RB    Total Evaluation Minutes, SLP  50   -RB    Subjective Information  -- Pt reports she is feeling okay today. Memory concerns    Pt reports she is feeling okay today. Memory concerns  -RB    Patient Observations  alert;cooperative;agree to therapy   -RB    Patient Effort  excellent   -RB    Symptoms  Noted During/After Treatment  none   -RB       General Information    Patient Profile Reviewed  yes   -RB    Pertinent History Of Current Problem  arterial ischemic stroke   -RB    Precautions/Limitations, Vision  WFL;for purposes of eval   -RB    Precautions/Limitations, Hearing  WFL;for purposes of eval   -RB    Prior Level of Function-Communication  WFL   -RB    Plans/Goals Discussed with  patient   -RB    Barriers to Rehab  none identified   -RB    Patient's Goals for Discharge  return to work   -RB    Standardized Assessment Used  RBANS   -RB       Oral Motor Structure and Function    Oral Motor Structure and Function  WFL   -RB    Dentition Assessment  natural, present and adequate   -RB    Mucosal Quality  moist, healthy   -RB       Oral Musculature and Cranial Nerve Assessment    Oral Motor General Assessment  WFL   -RB       Motor Speech Assessment/Intervention    Motor Speech Function  WFL   -RB       Cursory Voice Assessment/Intervention    Quality and Resonance (Voice)  WFL   -RB       Speaking Valve    Respiratory Status  room air   -RB       Cognitive Assessment Intervention- SLP    Cognitive Function (Cognition)  mild impairment   -RB    Orientation Status (Cognition)  WFL   -RB    Memory (Cognitive)  short-term;immediate;functional;delayed;visual;auditory;prospective;mild impairment   -RB    Attention (Cognitive)  attention to detail;distracting environment;quiet environment;divided;sustained;alternating;mild impairment   -RB    Thought Organization (Cognitive)  mild impairment;mental manipulation   -RB    Reasoning (Cognitive)  mild impairment;mental flexibility;complex   -RB    Problem Solving (Cognitive)  mild impairment;temporal;multifactorial   -RB    Executive Function (Cognition)  home management activities;self-monitoring/correction;planning;mild impairment   -RB    Pragmatics (Communication)  WFL   -RB    Right Hemisphere Function  visuo-spatial;mild impairment;visuo-perceptual   -RB     Cognition, Comment  mild cognitive communication deficit   -RB       Standardized Tests    Cognitive/Memory Tests  RBANS: Repeatable Battery for the Assessment of Neuropsychological Status   -RB       RBANS- Repeatable Battery for the Assessment of Neuropsychological Status    Immediate Memory Index Score  87   -RB    Immediate Memory Percentile  19 %   -RB    Immediate Memory Qualitative Description  low average   -RB    Visuospatial Index Score  81   -RB    Visuospatial Percentile  12 %   -RB    Visuospatial Qualitative Description  low average   -RB    Language Index Score  96   -RB    Language Percentile  39 %   -RB    Language Qualitative Description  average   -RB    Attention Index Score  91   -RB    Attention Percentile  27 %   -RB    Attention Qualitative Description  average   -RB    Delayed Memory Index Score  92   -RB    Delayed Memory Percentile  30 %   -RB    Delayed Memory Qualitative Description  average   -RB    Total Index Score  447   -RB    Total Percentile  16 %   -RB    Total Qualitative Description  low average   -RB    RBANS Comments  demonstrated deficits with attention and visuospatial skills   -RB      User Key  (r) = Recorded By, (t) = Taken By, (c) = Cosigned By    Initials Name Provider Type    RB Staci Martinez SLP Speech and Language Pathologist                         OP SLP Education     Row Name 11/18/19 0900       Education    Barriers to Learning  No barriers identified  -RB    Education Provided  Described results of evaluation;Patient expressed understanding of evaluation;Patient participated in establishing goals and treatment plan  -RB    Assessed  Learning needs;Learning motivation;Learning preferences;Learning readiness  -RB    Learning Motivation  Strong  -RB    Learning Method  Explanation;Demonstration  -RB    Teaching Response  Verbalized understanding;Demonstrated understanding  -RB    Education Comments  discussed evaluation results, goals, and plan of care  -RB       User Key  (r) = Recorded By, (t) = Taken By, (c) = Cosigned By    Initials Name Effective Dates    Staci Mahmood SLP 11/05/19 -           SLP OP Goals     Row Name 11/18/19 0900          Goal Type Needed    Goal Type Needed  Other Adult Goals;Attention/Orientation;Memory;Executive Function  -RB        Subjective Comments    Subjective Comments  feeling okay today  -RB        Subjective Pain    Able to rate subjective pain?  yes  -RB     Pre-Treatment Pain Level  0  -RB     Post-Treatment Pain Level  0  -RB        Executive Function Goals    Executive Function LTG's  Patient will be able to use high level cognitive skills to allow patient to return to work  -RB     Patient will be able to use high level cognitive skills to allow patient to return to work  90%:;with intermittent cues  -RB     Status: Patient will be able to use high level cognitive skills to allow patient to return to work  New  -RB     Comments: Patient will be able to use high level cognitive skills to allow patient to return to work  new goal  -RB     Executive Function STG's  Patient will improve executive functioning skills by using planning strategies prior to beginning tasks;Patient will improve executive functioning skills by using self-monitoring strategies during functional tasks  -RB     Patient will improve executive functioning skills by using planning strategies prior to beginning tasks  90%:;without cues  -RB     Status: Patient will improve executive functioning skills by using planning strategies prior to beginning tasks  New  -RB     Patient will improve executive functioning skills by using self-monitoring strategies during functional tasks  90%:;without cues  -RB     Status: Patient will improve executive functioning skills by using self-monitoring strategies during functional tasks  New  -RB        Memory Goals    Memory LTG's  Patient will be able to remember information needed to return to work and function on  work-related tasks;Patient and family will implement compensatory strategies to maximize patient’s Memory function so patient can continue to participate in daily activities  -RB     Patient will be able to remember information needed to return to work and function on work-related tasks  90%:;without cues  -RB     Status: Patient will be able to remember information needed to return to work and function on work-related tasks  New  -RB     Patient and family will implement compensatory strategies to maximize patient’s Memory function so patient can continue to participate in daily activities  90%:;without cues  -RB     Status: Patient and family will implement compensatory strategies to maximize patient’s Memory function so patient can continue to participate in daily activities  New  -RB     Memory STG's  Patient will demonstrate improved ability to recall information by listening to paragraph and answering yes/no questions;Patient’s memory skills will be enhanced as reported by patient by using external memory aides  -RB     Patient’s memory skills will be enhanced as reported by patient by using external memory aides  90%:;without cues  -RB     Status: Patient’s memory skills will be enhanced as reported by patient by using external memory aides  New  -RB     Patient will demonstrate improved ability to recall information by listening to paragraph and answering yes/no questions  90%:;without cues  -RB     Status: Patient will demonstrate improved ability to recall information by listening to paragraph and answering yes/no questions  New  -RB        Attention/Orientation Goals    Attention/Orientation LTG's  Patient will be able to use high level cognitive skills to allow patient to return to work;Patient will be able to execute all activities necessary to manage a home  -RB     Patient will be able to execute all activities necessary to manage a home  Independently  -RB     Status: Patient will be able to execute  all activities necessary to manage a home  New  -RB     Patient will be able to use high level cognitive skills to allow patient to return to work  Independently  -RB     Status: Patient will be able to use high level cognitive skills to allow patient to return to work  New  -RB     Attention/Orientation STG's  Patient will improve attention skills by sustaining focus to high-level cognitive tasks in order to complete task;Patient will improve attention skills by dividing focus and responding simultaneously to multiple tasks or in order to complete task;Patient will improve attention skills by alternating or shifting focus between two different tasks in order to complete both tasks;Patient will improve attention skills by focusing to selective target/task when presented with competing stimuli or in a distracting environment in order to complete task;Patient will improve attention skills by sustaining consistent behavioral response during continuous and repetitive activity (e.g., listening for target words, auditory/reading comprehension tasks)  -RB     Patient will improve attention skills by sustaining consistent behavioral response during continuous and repetitive activity (e.g., listening for target words, auditory/reading comprehension tasks)  without cues  -RB     Status: Patient will improve attention skills by sustaining consistent behavioral response during continuous and repetitive activity (e.g., listening for target words, auditory/reading comprehension tasks)  New  -RB     Patient will improve attention skills by focusing to selective target/task when presented with competing stimuli or in a distracting environment in order to complete task  90%:;without cues  -RB     Status: Patient will improve attention skills by focusing to selective target/task when presented with competing stimuli or in a distracting environment in order to complete task  New  -RB     Patient will improve attention skills by  alternating or shifting focus between two different tasks in order to complete both tasks  90%:;without cues  -RB     Status: Patient will improve attention skills by alternating or shifting focus between two different tasks in order to complete both tasks  New  -RB     Patient will improve attention skills by dividing focus and responding simultaneously to multiple tasks or in order to complete task  90%:;without cues  -RB     Status: Patient will improve attention skills by dividing focus and responding simultaneously to multiple tasks or in order to complete task  New  -RB     Patient will improve attention skills by sustaining focus to high-level cognitive tasks in order to complete task  90%:;without cues  -RB     Status: Patient will improve attention skills by sustaining focus to high-level cognitive tasks in order to complete task  New  -RB        Other Goals    Other Adult Goal- 1  Pt will particiapte in continued high level assessment of cognitive communication skills as warranted  -RB     Status: Other Adult Goal- 1  New  -RB     Other Adult Goal- 2  Pt will demosntrate visuospatial skills with 80% accuracy with intermittent cues  -RB     Status: Other Adult Goal- 2  New  -RB        SLP Time Calculation    SLP Goal Re-Cert Due Date  02/15/20  -RB       User Key  (r) = Recorded By, (t) = Taken By, (c) = Cosigned By    Initials Name Provider Type    Staci Mahmood, SLP Speech and Language Pathologist          OP SLP Assessment/Plan - 11/18/19 0900        SLP Assessment    Functional Problems  Speech Language- Adult/Cognition   -RB    Impact on Function: Adult Speech Language/Cognition  Poor attention to task;Trouble learning or remembering new information;Difficulty participating in avocational activities   -RB    Clinical Impression: Speech Language-Adult/Congnition  Cognitive Communication Impairment;Mild:   -RB    Functional Problems Comment  Therapy is medically necessary to prepare patient for  returning to workforce   -RB    Clinical Impression Comments  Pt presents with a mild-moderate cognitive communication deficit    -RB    SLP Diagnosis  mild cognitive communication deficit   -RB    Prognosis  Good (comment)   -RB    Patient/caregiver participated in establishment of treatment plan and goals  Yes   -RB    Patient would benefit from skilled therapy intervention  Yes   -RB       SLP Plan    Frequency  1-2x weekly   -RB    Duration  12 weeks   -RB    Planned CPT's?  SLP INDIVIDUAL SPEECH THERAPY: 63815   -RB    Expected Duration Therapy Session - minutes  45-60 minutes   -RB    Plan Comments  plan to see patient approximately 2x weekly.   -RB      User Key  (r) = Recorded By, (t) = Taken By, (c) = Cosigned By    Initials Name Provider Type    RB Staci Martinez, SLP Speech and Language Pathologist                 Time Calculation:    Time spent with patient: 855am-0945am                 Staci Martinez MA CCC-SLP  11/18/2019

## 2019-11-19 ENCOUNTER — TREATMENT (OUTPATIENT)
Dept: PHYSICAL THERAPY | Facility: CLINIC | Age: 64
End: 2019-11-19

## 2019-11-19 DIAGNOSIS — Z78.9 DECREASED INDEPENDENCE WITH ACTIVITIES OF DAILY LIVING: ICD-10-CM

## 2019-11-19 DIAGNOSIS — R27.8 DECREASED COORDINATION: Primary | ICD-10-CM

## 2019-11-19 PROCEDURE — 97165 OT EVAL LOW COMPLEX 30 MIN: CPT | Performed by: OCCUPATIONAL THERAPIST

## 2019-11-19 PROCEDURE — 97110 THERAPEUTIC EXERCISES: CPT | Performed by: OCCUPATIONAL THERAPIST

## 2019-11-19 NOTE — PROGRESS NOTES
Outpatient Occupational Therapy - Initial Evaluation    Patient: Jasvir Hamilton   : 1955  Diagnosis/ICD-10 Code:  Decreased coordination [R27.9]  Referring practitioner: Valeria Gentile PA-C  Date of Initial Visit: 2019  Today's Date: 2019  Patient seen for 1 sessions                 Subjective Evaluation    History of Present Illness  Date of onset: 2019  Mechanism of injury: Pt reports she had a stroke 2019. She received TPA and thought she was doing really well. She did not have any therapy until her evaluation with SLP yesterday. She reports she is eager to go back to work but is still having trouble dropping things with her hands and short term memory and word finding. She reports she has always been a clumsy person and she has had numbness/tingling in L UE before the stroke.       Patient Occupation: phlebotamist, scrapbook, crafting, grandchildren Quality of life: good    Pain  No pain reported    Social Support  Lives in: one-story house  Lives with: spouse ( gone during the week - )    Hand dominance: right    Patient Goals  Patient goals for therapy: independence with ADLs/IADLs and return to sport/leisure activities  Patient goal: wants to go back to work         Subjective Questionnaire: ABC: 91%    Objective       Strength/Myotome Testing     Left Wrist/Hand      (2nd hand position)     Trial 1: 31 lbs    Trial 2: 40 lbs    Trial 3: 42 lbs    Average: 37.67 lbs    Thumb Strength  Key/Lateral Pinch     Trial 1: 11 lbs    Trial 2: 12 lbs    Trial 3: 11 lbs    Average: 11.33 lbs    Right Wrist/Hand      (2nd hand position)     Trial 1: 46 lbs    Trial 2: 44 lbs    Trial 3: 47 lbs    Average: 45.67 lbs    Thumb Strength   Key/Lateral Pinch     Trial 1: 12 lbs    Trial 2: 13 lbs    Trial 3: 13 lbs    Average: 12.67 lbs    General Comments     Shoulder Comments   B UE AROM WFL  B UE gross MMT 4/5  Pt reports numbness/tingling in L UE that has  been there for years.          Assessment & Plan     Assessment  Impairments: abnormal coordination  Other impairment: work related tasks - taking blood  Assessment details: Pt is a 64 year old female who experienced a recent stroke. Pt demonstrates decreased independence, safety, and satisfaction with ADL tasks and engaging in desired occupations due to cognitive impairments and high level FMC difficulty. Pt is seeing SLP to address cognitive deficits. Pt would benefit from skilled occupational therapy services to prepare her to return to work.     Barriers to therapy: cognition which is being addressed by SLP  Prognosis: good    Goals  Plan Goals: Short Term Goals 4 weeks    1. Pt will complete high level FMC exercises with independence to prepare for return to work  2. Pt will increase [employer] pegboard  For assembly portion to 30 pieces to indicate increased bilateral coordination.   3. Pt will demo increased  strength bilaterally to 40# on L hand and 50# on R hand.    No Long term goals d/t short LOS.       Plan  Planned therapy interventions: ADL retraining, fine motor coordination training, home exercise program, IADL retraining, therapeutic activities, strengthening, neuromuscular re-education and motor coordination training  Frequency: 1x week  Duration in visits: 4  Treatment plan discussed with: patient  Plan details: Recommend skilled OT services to address established goals a specified to return to work.         Timed Codes        Manual Therapy:         mins  73856;    Therapeutic Exercise:    30     mins  43833;     Neuromuscular Audrey:        mins  18732;    Therapeutic Activity:          mins  45923;      Ultrasound:          mins  51596;    Community/ work         mins  69569  Self Care/ Grant         mins  26833  Sensory Re-Int.                  mins   38035  Cognitve Re-train         mins  67180     Un-timed Codes  Electrical Stimulation:         mins 9 [id number] ( );        Timed Treatment:    30   mins   Total Treatment:     60   mins    OT SIGNATURE: Elin Ryan, OTR   DATE TREATMENT INITIATED: 11/19/2019    Initial Certification  Certification Period: 2/17/2020  I certify that the therapy services are furnished while this patient is under my care.  The services outlined above are required by this patient, and will be reviewed every 90 days.     PHYSICIAN: Valeria Gentile PA-C      DATE:     Please sign and return via fax to  .. Thank you, [facility] Occupational Therapy.

## 2019-11-27 ENCOUNTER — OFFICE VISIT (OUTPATIENT)
Dept: PHYSICAL THERAPY | Facility: CLINIC | Age: 64
End: 2019-11-27

## 2019-11-27 ENCOUNTER — TREATMENT (OUTPATIENT)
Dept: PHYSICAL THERAPY | Facility: CLINIC | Age: 64
End: 2019-11-27

## 2019-11-27 DIAGNOSIS — R41.841 COGNITIVE COMMUNICATION DEFICIT: Primary | ICD-10-CM

## 2019-11-27 DIAGNOSIS — Z78.9 DECREASED INDEPENDENCE WITH ACTIVITIES OF DAILY LIVING: ICD-10-CM

## 2019-11-27 DIAGNOSIS — R27.8 DECREASED COORDINATION: Primary | ICD-10-CM

## 2019-11-27 PROCEDURE — 92507 TX SP LANG VOICE COMM INDIV: CPT | Performed by: SPEECH-LANGUAGE PATHOLOGIST

## 2019-11-27 PROCEDURE — 97110 THERAPEUTIC EXERCISES: CPT | Performed by: OCCUPATIONAL THERAPIST

## 2019-11-27 PROCEDURE — 97530 THERAPEUTIC ACTIVITIES: CPT | Performed by: OCCUPATIONAL THERAPIST

## 2019-11-27 NOTE — PROGRESS NOTES
Outpatient Speech Language Pathology   Adult Speech Language Cognitive Treatment Note       Patient Name: Jasvir Hamilton  : 1955  MRN: [id number]  Today's Date: 2019         Visit Date: 2019   Patient Active Problem List   Diagnosis   • Essential hypertension   • GERD (gastroesophageal reflux disease)   • Hyperlipemia   • Cobalamin deficiency   • History of vitamin D deficiency   • Coronary artery disease of native heart with stable angina pectoris (CMS/HCC)   • Bilateral carotid bruits   • Tobacco abuse   • Obesity (BMI 30.0-34.9)   • Carotid artery disease (CMS/HCC)   • History of arterial ischemic stroke          Visit Dx:    ICD-10-CM ICD-9-CM   1. Cognitive communication deficit R41.841 799.52         SLP OP Goals     Row Name 19 0930          Goal Type Needed    Goal Type Needed  Other Adult Goals;Attention/Orientation;Memory;Executive Function  -RB        Subjective Comments    Subjective Comments  Planning on going back to work the week of  for a half week. The fiirst full week will be the week of the .   -RB        Subjective Pain    Able to rate subjective pain?  yes  -RB     Pre-Treatment Pain Level  0  -RB     Post-Treatment Pain Level  0  -RB        Executive Function Goals    Executive Function LTG's  Patient will be able to use high level cognitive skills to allow patient to return to work  -RB     Patient will be able to use high level cognitive skills to allow patient to return to work  90%:;with intermittent cues  -RB     Status: Patient will be able to use high level cognitive skills to allow patient to return to work  Progressing as expected  -RB     Comments: Patient will be able to use high level cognitive skills to allow patient to return to work  : Targeted activities and skills that she will need prior to returning to work.  -RB     Executive Function STG's  Patient will improve executive functioning skills by using planning strategies  prior to beginning tasks;Patient will improve executive functioning skills by using self-monitoring strategies during functional tasks  -RB     Patient will improve executive functioning skills by using planning strategies prior to beginning tasks  90%:;without cues  -RB     Status: Patient will improve executive functioning skills by using planning strategies prior to beginning tasks  Progressing as expected  -RB     Comments: Patient will improve executive functioning skills by using planning strategies prior to beginning tasks  11/27: Pt reports use of journal and calendar at home to write to do list for the day, important dates and times, and writing down other items that happen during the day. Report using 80% of the time  -RB     Patient will improve executive functioning skills by using self-monitoring strategies during functional tasks  90%:;without cues  -RB     Status: Patient will improve executive functioning skills by using self-monitoring strategies during functional tasks  Progressing as expected  -RB     Comments: Patient will improve executive functioning skills by using self-monitoring strategies during functional tasks  11/27: Able to use self monitoring tasks and talk through her thought process 75% of the time  -RB        Memory Goals    Memory LTG's  Patient will be able to remember information needed to return to work and function on work-related tasks;Patient and family will implement compensatory strategies to maximize patient’s Memory function so patient can continue to participate in daily activities  -RB     Patient will be able to remember information needed to return to work and function on work-related tasks  90%:;without cues  -RB     Status: Patient will be able to remember information needed to return to work and function on work-related tasks  Progressing as expected  -RB     Comments: Patient will be able to remember information needed to return to work and function on  work-related tasks  11/27: Adressed memory goals to aid with her return to work  -RB     Patient and family will implement compensatory strategies to maximize patient’s Memory function so patient can continue to participate in daily activities  90%:;without cues  -RB     Status: Patient and family will implement compensatory strategies to maximize patient’s Memory function so patient can continue to participate in daily activities  Progressing as expected  -RB     Comments: Patient and family will implement compensatory strategies to maximize patient’s Memory function so patient can continue to participate in daily activities  11/27: Introduced compensatory strategies to use at home and at work.  -RB     Memory STG's  Patient will demonstrate improved ability to recall information by listening to paragraph and answering yes/no questions;Patient’s memory skills will be enhanced as reported by patient by using external memory aides  -RB     Patient’s memory skills will be enhanced as reported by patient by using external memory aides  90%:;without cues  -RB     Status: Patient’s memory skills will be enhanced as reported by patient by using external memory aides  Progressing as expected  -RB     Comments: Patient’s memory skills will be enhanced as reported by patient by using external memory aides  11/27: Reports use of external memory aids 80% of the time with no cues needed. Using journal, calendar, and phone alarms.  -RB     Patient will demonstrate improved ability to recall information by listening to paragraph and answering yes/no questions  90%:;without cues  -RB     Status: Patient will demonstrate improved ability to recall information by listening to paragraph and answering yes/no questions  Progressing as expected  -RB     Comments: Patient will demonstrate improved ability to recall information by listening to paragraph and answering yes/no questions  11/27: 70% without cues   -RB         Attention/Orientation Goals    Attention/Orientation LTG's  Patient will be able to execute all activities necessary to manage a home  -RB     Patient will be able to execute all activities necessary to manage a home  Independently  -RB     Status: Patient will be able to execute all activities necessary to manage a home  Progressing as expected  -RB     Comments: Patient will be able to execute all activities necessary to manage a home  11/27: Able to execute tasks with use of compensatory strategies and external memory aids. Adress goal x1  -RB     Patient will be able to use high level cognitive skills to allow patient to return to work  Independently  -RB     Status: Patient will be able to use high level cognitive skills to allow patient to return to work  Discontinued  -RB     Comments: Patient will be able to use high level cognitive skills to allow patient to return to work  duplicate goal   -RB     Attention/Orientation STG's  Patient will improve attention skills by sustaining focus to high-level cognitive tasks in order to complete task;Patient will improve attention skills by dividing focus and responding simultaneously to multiple tasks or in order to complete task;Patient will improve attention skills by alternating or shifting focus between two different tasks in order to complete both tasks;Patient will improve attention skills by focusing to selective target/task when presented with competing stimuli or in a distracting environment in order to complete task;Patient will improve attention skills by sustaining consistent behavioral response during continuous and repetitive activity (e.g., listening for target words, auditory/reading comprehension tasks)  -RB     Patient will improve attention skills by sustaining consistent behavioral response during continuous and repetitive activity (e.g., listening for target words, auditory/reading comprehension tasks)  without cues  -RB     Status: Patient will  improve attention skills by sustaining consistent behavioral response during continuous and repetitive activity (e.g., listening for target words, auditory/reading comprehension tasks)  Progressing as expected  -RB     Comments: Patient will improve attention skills by sustaining consistent behavioral response during continuous and repetitive activity (e.g., listening for target words, auditory/reading comprehension tasks)  11/27: minimal verbal cues needed to complete tasks   -RB     Patient will improve attention skills by focusing to selective target/task when presented with competing stimuli or in a distracting environment in order to complete task  90%:;without cues  -RB     Status: Patient will improve attention skills by focusing to selective target/task when presented with competing stimuli or in a distracting environment in order to complete task  Progressing as expected  -RB     Comments: Patient will improve attention skills by focusing to selective target/task when presented with competing stimuli or in a distracting environment in order to complete task  11/27: SLP provided distractions to mirror distractions she will face at work when completing tasks. 75% today.  -RB     Patient will improve attention skills by alternating or shifting focus between two different tasks in order to complete both tasks  90%:;without cues  -RB     Status: Patient will improve attention skills by alternating or shifting focus between two different tasks in order to complete both tasks  Progressing as expected  -RB     Comments: Patient will improve attention skills by alternating or shifting focus between two different tasks in order to complete both tasks  11/27: able to alternate attention and focus 80% of the time  -RB     Patient will improve attention skills by dividing focus and responding simultaneously to multiple tasks or in order to complete task  90%:;without cues  -RB     Status: Patient will improve attention  skills by dividing focus and responding simultaneously to multiple tasks or in order to complete task  Progressing as expected  -RB     Comments: Patient will improve attention skills by dividing focus and responding simultaneously to multiple tasks or in order to complete task  11/27: SLP provided distractions during various tasks to work on sustaining attention and focus. Worked on   -RB     Patient will improve attention skills by sustaining focus to high-level cognitive tasks in order to complete task  90%:;without cues  -RB     Status: Patient will improve attention skills by sustaining focus to high-level cognitive tasks in order to complete task  Progressing as expected  -RB     Comments: Patient will improve attention skills by sustaining focus to high-level cognitive tasks in order to complete task  11/27: 80% accuracy with no cues throughout tasks  -RB        Other Goals    Other Adult Goal- 1  Pt will particiapte in continued high level assessment of cognitive communication skills as warranted  -RB     Status: Other Adult Goal- 1  Progressing as expected  -RB     Comments: Other Adult Goal- 1  11/27: Assessed medicine management. No difficulties noted.   -RB     Other Adult Goal- 2  Pt will demosntrate visuospatial skills with 80% accuracy with intermittent cues  -RB     Status: Other Adult Goal- 2  Progressing as expected  -RB     Comments: Other Adult Goal- 2  11/27: 75% accuracy throughout activities today  -RB        SLP Time Calculation    SLP Goal Re-Cert Due Date  02/15/20  -RB       User Key  (r) = Recorded By, (t) = Taken By, (c) = Cosigned By    Initials Name Provider Type    Staci Mahmood SLP Speech and Language Pathologist          OP SLP Education     Row Name 11/27/19 1000       Education    Barriers to Learning  No barriers identified  -RB    Education Provided  Described results of evaluation;Patient participated in establishing goals and treatment plan;Patient requires further  education on strategies, risks;Patient demonstrated recommended strategies  -RB    Assessed  Learning needs;Learning motivation;Learning preferences;Learning readiness  -RB    Learning Motivation  Strong  -RB    Learning Method  Explanation;Demonstration;Written materials  -RB    Teaching Response  Verbalized understanding;Demonstrated understanding;Reinforcement needed  -RB    Education Comments  Provided handouts on memory and attention strategies, discussed use of journal and calendar   -RB      User Key  (r) = Recorded By, (t) = Taken By, (c) = Cosigned By    Initials Name Effective Dates    RB Staci Martinez SLP 11/05/19 -           OP SLP Assessment/Plan - 11/27/19 1000        SLP Assessment    Functional Problems  Speech Language- Adult/Cognition   -RB    Impact on Function: Adult Speech Language/Cognition  Poor attention to task;Difficulty participating in avocational activities;Unable to complete specified job requirements   -RB    Clinical Impression: Speech Language-Adult/Congnition  Mild:;Cognitive Communication Impairment   -RB    Functional Problems Comment  Pt exhbits poor attention to tasks which could impact her work when she returns to her job   -RB    Clinical Impression Comments  Stimulable for strategies.   -RB    Please refer to paper survey for additional self-reported information  Yes   -RB    Please refer to items scanned into chart for additional diagnostic informaiton and handouts as provided by clinician  Yes   -RB    SLP Diagnosis  mild cognitive communication deficit    -RB    Prognosis  Good (comment)   -RB    Patient/caregiver participated in establishment of treatment plan and goals  Yes   -RB    Patient would benefit from skilled therapy intervention  Yes   -RB       SLP Plan    Frequency  1-2x/weekly   -RB    Duration  3 more vists   -RB    Planned CPT's?  SLP INDIVIDUAL SPEECH THERAPY: 86512   -RB    Expected Duration Therapy Session - minutes  45-60 minutes   -RB    Plan  Comments  plan to see patient 3 more vists   -RB      User Key  (r) = Recorded By, (t) = Taken By, (c) = Cosigned By    Initials Name Provider Type    Staci Mahmood SLP Speech and Language Pathologist                    Staci Martinez MA CCC-SLP  11/27/2019

## 2019-11-27 NOTE — PROGRESS NOTES
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       Assessment & Plan     Assessment  Assessment details: Pt demo good understanding and compliance w/ HEPs. She demo good progress toward goals.     Plan  Plan details: Continue per POC to further progress toward established goals. Further address FMC, multi-tasking, and prepare for d/c          Manual Therapy:          mins  64060;  Therapeutic Exercise:     15     mins  52098;     Neuromuscular Audrey:         mins  88035;    Therapeutic Activity:      30     mins  15557;     Gait Training:            mins  38390;     Ultrasound:           mins  28848;    Electrical Stimulation:          mins  07033 ( );  Dry Needling           mins self-pay  Traction           mins 37012  Canalith Repositioning         mins [id number]      Timed Treatment:   45   mins   Total Treatment:     45   mins    GINGER Wang License #: [id number]    Occupational Therapist

## 2019-12-03 ENCOUNTER — TREATMENT (OUTPATIENT)
Dept: PHYSICAL THERAPY | Facility: CLINIC | Age: 64
End: 2019-12-03

## 2019-12-03 ENCOUNTER — OFFICE VISIT (OUTPATIENT)
Dept: PHYSICAL THERAPY | Facility: CLINIC | Age: 64
End: 2019-12-03

## 2019-12-03 DIAGNOSIS — R27.8 DECREASED COORDINATION: Primary | ICD-10-CM

## 2019-12-03 DIAGNOSIS — R41.841 COGNITIVE COMMUNICATION DEFICIT: Primary | ICD-10-CM

## 2019-12-03 DIAGNOSIS — Z78.9 DECREASED INDEPENDENCE WITH ACTIVITIES OF DAILY LIVING: ICD-10-CM

## 2019-12-03 PROCEDURE — 97110 THERAPEUTIC EXERCISES: CPT | Performed by: OCCUPATIONAL THERAPIST

## 2019-12-03 PROCEDURE — 92507 TX SP LANG VOICE COMM INDIV: CPT | Performed by: SPEECH-LANGUAGE PATHOLOGIST

## 2019-12-03 NOTE — PROGRESS NOTES
OccupationalTherapy Daily Progress Note/Discharge  Visits:3        Jasvir Hamilton reports she plans to go back to work next week and she fells like she isn't having in other trouble with anything.     Pain: 0/10    Subjective     Objective   See Exercise, Manual, and Modality Logs for complete treatment.       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     Barriers to therapy: cognition which is being addressed by SLP  Prognosis: good    Goals  Plan Goals: Short Term Goals 4 weeks    1. Pt will complete high level FMC exercises with independence to prepare for return to work - met  2. Pt will increase Purdue pegboard  For assembly portion to 30 pieces to indicate increased bilateral coordination. - formal test not completed  3. Pt will demo increased  strength bilaterally to 40# on L hand and 50# on R hand. - formal test not completed    No Long term goals d/t short LOS.       Plan  Planned therapy interventions: ADL retraining, fine motor coordination training, home exercise program, IADL retraining, therapeutic activities, strengthening, neuromuscular re-education and motor coordination training  Treatment plan discussed with: patient  Plan details: Discharge w/ HEPs and encouraged to return to work if she gets approval for from SLP.         Manual Therapy:          mins  02019;  Therapeutic Exercise:     30     mins  09861;     Neuromuscular Audrey:         mins  78340;    Therapeutic Activity:           mins  44173;     Gait Training:            mins  77963;     Ultrasound:           mins  47422;    Electrical Stimulation:          mins  32153 ( );  Dry Needling           mins self-pay  Traction           mins 46757  Canalith Repositioning         mins 20946      Timed Treatment:   30    mins   Total Treatment:     30   mins    Elin Ryan, OTR  [place] License #: [id number]    Occupational Therapist

## 2019-12-03 NOTE — PROGRESS NOTES
"Outpatient Speech Language Pathology   Adult Speech Language Cognitive Treatment Note       Patient Name: Jasvir Hamilton  : 1955  MRN: [id number]  Today's Date: 12/3/2019         Visit Date: 2019   Patient Active Problem List   Diagnosis   • Essential hypertension   • GERD (gastroesophageal reflux disease)   • Hyperlipemia   • Cobalamin deficiency   • History of vitamin D deficiency   • Coronary artery disease of native heart with stable angina pectoris (CMS/HCC)   • Bilateral carotid bruits   • Tobacco abuse   • Obesity (BMI 30.0-34.9)   • Carotid artery disease (CMS/HCC)   • History of arterial ischemic stroke          Visit Dx:    ICD-10-CM ICD-9-CM   1. Cognitive communication deficit R41.841 799.52         SLP OP Goals     Row Name 19 1010 19 1000       Goal Type Needed    Goal Type Needed  Other Adult Goals;Memory;Attention/Orientation;Executive Function  -RB  --       Subjective Comments    Subjective Comments  Plans on going back to work next week. Feels that this will be good for \"stimulation for her brain\".   -RB  --       Subjective Pain    Able to rate subjective pain?  yes  -RB  --    Pre-Treatment Pain Level  0  -RB  --    Post-Treatment Pain Level  0  -RB  --       Executive Function Goals    Executive Function LTG's  Patient will be able to use high level cognitive skills to allow patient to return to work  -RB  --    Patient will be able to use high level cognitive skills to allow patient to return to work  90%:;with intermittent cues  -RB  --    Status: Patient will be able to use high level cognitive skills to allow patient to return to work  Progressing as expected  -RB  --    Comments: Patient will be able to use high level cognitive skills to allow patient to return to work  12/3: continues to make great progress towards goals. Continued ot target high leve lskills for her return to work.  -RB  --    Executive Function STG's  Patient will improve executive " functioning skills by using planning strategies prior to beginning tasks;Patient will improve executive functioning skills by using self-monitoring strategies during functional tasks  -RB  --    Patient will improve executive functioning skills by using planning strategies prior to beginning tasks  90%:;without cues  -RB  --    Status: Patient will improve executive functioning skills by using planning strategies prior to beginning tasks  Progressing as expected  -RB  --    Comments: Patient will improve executive functioning skills by using planning strategies prior to beginning tasks  12/3: Reports using planning strategies 85% of the time. Used in the session approximately 85% of the time throughout high level activities. No cues given.  -RB  --    Patient will improve executive functioning skills by using self-monitoring strategies during functional tasks  90%:;without cues  -RB  --    Status: Patient will improve executive functioning skills by using self-monitoring strategies during functional tasks  Progressing as expected  -RB  --    Comments: Patient will improve executive functioning skills by using self-monitoring strategies during functional tasks  12/3: Self monitored during high level tasks 85% of the time. She was able to ask questions when difficulties came up during tasks.  -RB  --       Memory Goals    Memory LTG's  Patient will be able to remember information needed to return to work and function on work-related tasks;Patient and family will implement compensatory strategies to maximize patient’s Memory function so patient can continue to participate in daily activities  -RB  --    Patient will be able to remember information needed to return to work and function on work-related tasks  90%:;without cues  -RB  --    Status: Patient will be able to remember information needed to return to work and function on work-related tasks  Progressing as expected  -RB  --    Comments: Patient will be able to  remember information needed to return to work and function on work-related tasks  12/3: continued to address memory goals prior to return to work  -RB  --    Patient and family will implement compensatory strategies to maximize patient’s Memory function so patient can continue to participate in daily activities  90%:;without cues  -RB  --    Status: Patient and family will implement compensatory strategies to maximize patient’s Memory function so patient can continue to participate in daily activities  Progressing as expected  -RB  --    Comments: Patient and family will implement compensatory strategies to maximize patient’s Memory function so patient can continue to participate in daily activities  12/3: Pt reports use of compensatory strategies at home 80% of the time. Able to use strategies in the session approximately 85% of the time.   -RB  --    Memory STG's  Patient will demonstrate improved ability to recall information by listening to paragraph and answering yes/no questions;Patient’s memory skills will be enhanced as reported by patient by using external memory aides  -RB  --    Patient’s memory skills will be enhanced as reported by patient by using external memory aides  90%:;without cues  -RB  --    Status: Patient’s memory skills will be enhanced as reported by patient by using external memory aides  Progressing as expected  -RB  --    Comments: Patient’s memory skills will be enhanced as reported by patient by using external memory aides  12/3: Reports use of external memory aids greater than 90% of the time at home to remember appointments, groceries, and picking up grandchildren from school. Uses phone alarams and calendar consistently. met x1.   -RB  --    Patient will demonstrate improved ability to recall information by listening to paragraph and answering yes/no questions  90%:;without cues  -RB  --    Status: Patient will demonstrate improved ability to recall information by listening to  paragraph and answering yes/no questions  Progressing as expected  -RB  --    Comments: Patient will demonstrate improved ability to recall information by listening to paragraph and answering yes/no questions  12/3: 80% with no cues from SLP. Able to ID breakdowns and difficulties.  -RB  --       Attention/Orientation Goals    Attention/Orientation LTG's  Patient will be able to execute all activities necessary to manage a home  -RB  --    Patient will be able to execute all activities necessary to manage a home  Independently  -RB  --    Status: Patient will be able to execute all activities necessary to manage a home  Achieved  -RB  --    Comments: Patient will be able to execute all activities necessary to manage a home  12/3: GOAL MET  -RB  --    Patient will be able to use high level cognitive skills to allow patient to return to work  Independently  -RB  --    Status: Patient will be able to use high level cognitive skills to allow patient to return to work  Progressing as expected  -RB  --    Comments: Patient will be able to use high level cognitive skills to allow patient to return to work  12/3: Continuing to address goals to help her return to work  -RB  --    Attention/Orientation STG's  Patient will improve attention skills by sustaining focus to high-level cognitive tasks in order to complete task;Patient will improve attention skills by dividing focus and responding simultaneously to multiple tasks or in order to complete task;Patient will improve attention skills by alternating or shifting focus between two different tasks in order to complete both tasks;Patient will improve attention skills by focusing to selective target/task when presented with competing stimuli or in a distracting environment in order to complete task;Patient will improve attention skills by sustaining consistent behavioral response during continuous and repetitive activity (e.g., listening for target words, auditory/reading  comprehension tasks)  -RB  --    Patient will improve attention skills by sustaining consistent behavioral response during continuous and repetitive activity (e.g., listening for target words, auditory/reading comprehension tasks)  without cues  -RB  --    Status: Patient will improve attention skills by sustaining consistent behavioral response during continuous and repetitive activity (e.g., listening for target words, auditory/reading comprehension tasks)  Progressing as expected  -RB  --    Comments: Patient will improve attention skills by sustaining consistent behavioral response during continuous and repetitive activity (e.g., listening for target words, auditory/reading comprehension tasks)  12/3: 85% throughout tasks. She was able to self monitor and plan during activities and ask for help as needed. She was able to demonstrate use of strategies throughout.   -RB  --    Patient will improve attention skills by focusing to selective target/task when presented with competing stimuli or in a distracting environment in order to complete task  90%:;without cues  -RB  --    Status: Patient will improve attention skills by focusing to selective target/task when presented with competing stimuli or in a distracting environment in order to complete task  Progressing as expected  -RB  --    Comments: Patient will improve attention skills by focusing to selective target/task when presented with competing stimuli or in a distracting environment in order to complete task  12/3: 80% when SLP provided distractions she may face at work (people interrupting, sound of typing, loud noise, etc).  -RB  --    Patient will improve attention skills by alternating or shifting focus between two different tasks in order to complete both tasks  90%:;without cues  -RB  --    Status: Patient will improve attention skills by alternating or shifting focus between two different tasks in order to complete both tasks  Progressing as expected   -RB  --    Comments: Patient will improve attention skills by alternating or shifting focus between two different tasks in order to complete both tasks  12/3: 85% with no cues needed to shift focus between two tasks.  -RB  --    Patient will improve attention skills by dividing focus and responding simultaneously to multiple tasks or in order to complete task  90%:;without cues  -RB  --    Status: Patient will improve attention skills by dividing focus and responding simultaneously to multiple tasks or in order to complete task  Progressing as expected  -RB  --    Comments: Patient will improve attention skills by dividing focus and responding simultaneously to multiple tasks or in order to complete task  12/3: 85% today with no cues from SLP. SLP provided distractions she may face in the work place. Disucussed compensatory strategies to use at work.  -RB  --    Patient will improve attention skills by sustaining focus to high-level cognitive tasks in order to complete task  90%:;without cues  -RB  --    Status: Patient will improve attention skills by sustaining focus to high-level cognitive tasks in order to complete task  Progressing as expected  -RB  --    Comments: Patient will improve attention skills by sustaining focus to high-level cognitive tasks in order to complete task  12/3: 90% today. Able to ID breakdowns and difficulties. No cues needed. met x1.  -RB  --       Other Goals    Other Adult Goal- 1  Pt will particiapte in continued high level assessment of cognitive communication skills as warranted  -RB  --    Status: Other Adult Goal- 1  Achieved  -RB  --    Comments: Other Adult Goal- 1  12/3: assessed financial management/check writing. No diffiuclties noted. goal met.  -RB  --    Other Adult Goal- 2  Pt will demosntrate visuospatial skills with 80% accuracy with intermittent cues  -RB  --    Status: Other Adult Goal- 2  Progressing as expected  -RB  --    Comments: Other Adult Goal- 2  12/3: 85%  today with no cues from SLP.  -RB  --       SLP Time Calculation    SLP Goal Re-Cert Due Date  02/15/20  -RB  02/15/20  -RB      User Key  (r) = Recorded By, (t) = Taken By, (c) = Cosigned By    Initials Name Provider Type    Staci Mahmood SLP Speech and Language Pathologist          OP SLP Education     Row Name 12/03/19 1000       Education    Barriers to Learning  No barriers identified  -RB    Education Provided  Patient demonstrated recommended strategies;Patient requires further education on strategies, risks  -RB    Assessed  Learning needs;Learning motivation;Learning preferences;Learning readiness  -RB    Learning Motivation  Strong  -RB    Learning Method  Explanation;Demonstration;Written materials  -RB    Teaching Response  Verbalized understanding;Demonstrated understanding;Reinforcement needed  -RB    Education Comments  Provided high leve lactvities to work on at home. education ongoing  -RB      User Key  (r) = Recorded By, (t) = Taken By, (c) = Cosigned By    Initials Name Effective Dates    Staci Mahmood SLP 11/05/19 -           OP SLP Assessment/Plan - 12/03/19 1000        SLP Assessment    Functional Problems  Speech Language- Adult/Cognition   -RB    Impact on Function: Adult Speech Language/Cognition  Poor attention to task;Difficulty participating in avocational activities   -RB    Clinical Impression: Speech Language-Adult/Congnition  Mild:;Cognitive Communication Impairment   -RB    Functional Problems Comment  Pt exhbits diffiuclties with attention and focus which could impact her performance at work    -RB    Clinical Impression Comments  Pt is making great progress and showing greater awareness.   -RB    Please refer to paper survey for additional self-reported information  Yes   -RB    Please refer to items scanned into chart for additional diagnostic informaiton and handouts as provided by clinician  Yes   -RB    SLP Diagnosis  mild cognitive deficit   -RB    Prognosis  Good  (comment)   -RB    Patient/caregiver participated in establishment of treatment plan and goals  Yes   -RB    Patient would benefit from skilled therapy intervention  Yes   -RB       SLP Plan    Frequency  1-2x/weekly   -RB    Duration  2 more visits    -RB    Planned CPT's?  SLP INDIVIDUAL SPEECH THERAPY: [id number]   -RB    Expected Duration Therapy Session - minutes  45-60 minutes   -RB    Plan Comments  2 more visits then discharge   -RB      User Key  (r) = Recorded By, (t) = Taken By, (c) = Cosigned By    Initials Name Provider Type    RB Staci Martinez, SLP Speech and Language Pathologist                Staci Martinez MA CCC- SLP  12/3/2019

## 2019-12-05 ENCOUNTER — OFFICE VISIT (OUTPATIENT)
Dept: PHYSICAL THERAPY | Facility: CLINIC | Age: 64
End: 2019-12-05

## 2019-12-05 DIAGNOSIS — R41.841 COGNITIVE COMMUNICATION DEFICIT: Primary | ICD-10-CM

## 2019-12-05 PROCEDURE — 92507 TX SP LANG VOICE COMM INDIV: CPT | Performed by: SPEECH-LANGUAGE PATHOLOGIST

## 2019-12-05 NOTE — PROGRESS NOTES
Outpatient Speech Language Pathology   Adult Speech Language Cognitive Treatment Note       Patient Name: Jasvir Hamilton  : 1955  MRN: [id number]  Today's Date: 2019         Visit Date: 2019   Patient Active Problem List   Diagnosis   • Essential hypertension   • GERD (gastroesophageal reflux disease)   • Hyperlipemia   • Cobalamin deficiency   • History of vitamin D deficiency   • Coronary artery disease of native heart with stable angina pectoris (CMS/HCC)   • Bilateral carotid bruits   • Tobacco abuse   • Obesity (BMI 30.0-34.9)   • Carotid artery disease (CMS/HCC)   • History of arterial ischemic stroke          Visit Dx:    ICD-10-CM ICD-9-CM   1. Cognitive communication deficit R41.841 799.52           SLP OP Goals     Row Name 19 0850          Goal Type Needed    Goal Type Needed  Other Adult Goals;Memory;Attention/Orientation;Executive Function  -RB        Subjective Comments    Subjective Comments  Plan is to start work next week.   -RB        Subjective Pain    Able to rate subjective pain?  yes  -RB     Pre-Treatment Pain Level  0  -RB     Post-Treatment Pain Level  0  -RB        Executive Function Goals    Executive Function LTG's  Patient will be able to use high level cognitive skills to allow patient to return to work  -RB     Patient will be able to use high level cognitive skills to allow patient to return to work  90%:;with intermittent cues  -RB     Status: Patient will be able to use high level cognitive skills to allow patient to return to work  Progressing as expected  -RB     Comments: Patient will be able to use high level cognitive skills to allow patient to return to work  : GOAL MET  -RB     Executive Function STG's  Patient will improve executive functioning skills by using planning strategies prior to beginning tasks;Patient will improve executive functioning skills by using self-monitoring strategies during functional tasks  -RB     Patient will  improve executive functioning skills by using planning strategies prior to beginning tasks  90%:;without cues  -RB     Status: Patient will improve executive functioning skills by using planning strategies prior to beginning tasks  Achieved  -RB     Comments: Patient will improve executive functioning skills by using planning strategies prior to beginning tasks  12/5: MET. Pt is using planning strategies independently 90% of the time before completing tasks.  -RB     Patient will improve executive functioning skills by using self-monitoring strategies during functional tasks  90%:;without cues  -RB     Status: Patient will improve executive functioning skills by using self-monitoring strategies during functional tasks  Achieved  -RB     Comments: Patient will improve executive functioning skills by using self-monitoring strategies during functional tasks  12/5: MET. Patient is able to self monitor and ID areas of difficulty. This helps with planning before initiating tasks.  -RB        Memory Goals    Memory LTG's  Patient will be able to remember information needed to return to work and function on work-related tasks;Patient and family will implement compensatory strategies to maximize patient’s Memory function so patient can continue to participate in daily activities  -RB     Patient will be able to remember information needed to return to work and function on work-related tasks  90%:;without cues  -RB     Status: Patient will be able to remember information needed to return to work and function on work-related tasks  Progressing as expected  -RB     Comments: Patient will be able to remember information needed to return to work and function on work-related tasks  12/5: continued to address memory goals prior to return to work  -RB     Patient and family will implement compensatory strategies to maximize patient’s Memory function so patient can continue to participate in daily activities  90%:;without cues  -RB      Status: Patient and family will implement compensatory strategies to maximize patient’s Memory function so patient can continue to participate in daily activities  Achieved  -RB     Comments: Patient and family will implement compensatory strategies to maximize patient’s Memory function so patient can continue to participate in daily activities  12/5: Pt is using compensatory strategies independelty. MET  -RB     Memory STG's  Patient will demonstrate improved ability to recall information by listening to paragraph and answering yes/no questions;Patient’s memory skills will be enhanced as reported by patient by using external memory aides  -RB     Patient’s memory skills will be enhanced as reported by patient by using external memory aides  90%:;without cues  -RB     Status: Patient’s memory skills will be enhanced as reported by patient by using external memory aides  Achieved  -RB     Comments: Patient’s memory skills will be enhanced as reported by patient by using external memory aides  12/5: Pt reports use of external memory aids at home 90% of the time (labels, sticky notes, journals, calendars).  -RB     Patient will demonstrate improved ability to recall information by listening to paragraph and answering yes/no questions  90%:;without cues  -RB     Status: Patient will demonstrate improved ability to recall information by listening to paragraph and answering yes/no questions  Progressing as expected  -RB     Comments: Patient will demonstrate improved ability to recall information by listening to paragraph and answering yes/no questions  12/5: current progress 85% with no cues from SLP. Able to ID breakdowns and difficulties.  -RB        Attention/Orientation Goals    Attention/Orientation LTG's  Patient will be able to execute all activities necessary to manage a home  -RB     Patient will be able to execute all activities necessary to manage a home  Independently  -RB     Status: Patient will be able  to execute all activities necessary to manage a home  Achieved  -RB     Comments: Patient will be able to execute all activities necessary to manage a home  12/3: GOAL MET  -RB     Patient will be able to use high level cognitive skills to allow patient to return to work  Independently  -RB     Status: Patient will be able to use high level cognitive skills to allow patient to return to work  Progressing as expected  -RB     Comments: Patient will be able to use high level cognitive skills to allow patient to return to work  12/5: Continuing to address goals to help her return to work  -RB     Attention/Orientation STG's  Patient will improve attention skills by sustaining focus to high-level cognitive tasks in order to complete task;Patient will improve attention skills by dividing focus and responding simultaneously to multiple tasks or in order to complete task;Patient will improve attention skills by alternating or shifting focus between two different tasks in order to complete both tasks;Patient will improve attention skills by focusing to selective target/task when presented with competing stimuli or in a distracting environment in order to complete task;Patient will improve attention skills by sustaining consistent behavioral response during continuous and repetitive activity (e.g., listening for target words, auditory/reading comprehension tasks)  -RB     Patient will improve attention skills by sustaining consistent behavioral response during continuous and repetitive activity (e.g., listening for target words, auditory/reading comprehension tasks)  without cues  -RB     Status: Patient will improve attention skills by sustaining consistent behavioral response during continuous and repetitive activity (e.g., listening for target words, auditory/reading comprehension tasks)  Achieved  -RB     Comments: Patient will improve attention skills by sustaining consistent behavioral response during continuous and  repetitive activity (e.g., listening for target words, auditory/reading comprehension tasks)  12/5: 90% throughout tasks. She is able to do independently without cues. MET  -RB     Patient will improve attention skills by focusing to selective target/task when presented with competing stimuli or in a distracting environment in order to complete task  90%:;without cues  -RB     Status: Patient will improve attention skills by focusing to selective target/task when presented with competing stimuli or in a distracting environment in order to complete task  Progressing as expected  -RB     Comments: Patient will improve attention skills by focusing to selective target/task when presented with competing stimuli or in a distracting environment in order to complete task  12/5: 85% when SLP provides distractions during tasks that she may face in the work setting. No cues needed.  -RB     Patient will improve attention skills by alternating or shifting focus between two different tasks in order to complete both tasks  90%:;without cues  -RB     Status: Patient will improve attention skills by alternating or shifting focus between two different tasks in order to complete both tasks  Achieved  -RB     Comments: Patient will improve attention skills by alternating or shifting focus between two different tasks in order to complete both tasks  12/5: 90% with no cues. MET. Pt is showing greater overall awareness of deficts and difficulties which is aiding in her planning and task initiation leading to better attention and focus during tasks.  -RB     Patient will improve attention skills by dividing focus and responding simultaneously to multiple tasks or in order to complete task  90%:;without cues  -RB     Status: Patient will improve attention skills by dividing focus and responding simultaneously to multiple tasks or in order to complete task  Achieved  -RB     Comments: Patient will improve attention skills by dividing focus  and responding simultaneously to multiple tasks or in order to complete task  12/5: 90% with no cues needed from SLP. MET.  -RB     Patient will improve attention skills by sustaining focus to high-level cognitive tasks in order to complete task  90%:;without cues  -RB     Status: Patient will improve attention skills by sustaining focus to high-level cognitive tasks in order to complete task  Achieved  -RB     Comments: Patient will improve attention skills by sustaining focus to high-level cognitive tasks in order to complete task  12/5: Able to complete high level tasks in an appropriate time with no cues with 90% accuracy and greater than 90% at times,  -RB        Other Goals    Other Adult Goal- 1  Pt will particiapte in continued high level assessment of cognitive communication skills as warranted  -RB     Status: Other Adult Goal- 1  Achieved  -RB     Comments: Other Adult Goal- 1  12/3: assessed financial management/check writing. No diffiuclties noted. goal met.  -RB     Other Adult Goal- 2  Pt will demosntrate visuospatial skills with 80% accuracy with intermittent cues  -RB     Status: Other Adult Goal- 2  Achieved  -RB     Comments: Other Adult Goal- 2  12/5: Various visuospatial tasks completed at 90% accuracy or greater with no cues needed and when given a time limit. MET.  -RB        SLP Time Calculation    SLP Goal Re-Cert Due Date  02/15/20  -RB       User Key  (r) = Recorded By, (t) = Taken By, (c) = Cosigned By    Initials Name Provider Type    Staci Mahmood SLP Speech and Language Pathologist          OP SLP Education     Row Name 12/05/19 0900       Education    Barriers to Learning  No barriers identified  -RB    Education Provided  Patient requires further education on strategies, risks;Patient demonstrated recommended strategies  -RB    Assessed  Learning motivation;Learning needs;Learning preferences;Learning readiness  -RB    Learning Motivation  Strong  -RB    Learning Method   Explanation;Written materials;Demonstration  -RB    Teaching Response  Verbalized understanding;Demonstrated understanding;Reinforcement needed  -RB    Education Comments  Provided excercises to work on at home and bring back at next session  -RB      User Key  (r) = Recorded By, (t) = Taken By, (c) = Cosigned By    Initials Name Effective Dates    Staci Mahmood SLP 11/05/19 -           OP SLP Assessment/Plan - 12/05/19 0850        SLP Assessment    Functional Problems  Speech Language- Adult/Cognition   -RB    Impact on Function: Adult Speech Language/Cognition  Poor attention to task;Difficulty participating in avocational activities   -RB    Clinical Impression: Speech Language-Adult/Congnition  Mild:;Cognitive Communication Impairment   -RB    Functional Problems Comment  Pt exhbits attention and memory deficits taht could impact her ability to work   -RB    Clinical Impression Comments  Pt has made significant progress and shows greater awareness.   -RB    Please refer to paper survey for additional self-reported information  Yes   -RB    Please refer to items scanned into chart for additional diagnostic informaiton and handouts as provided by clinician  Yes   -RB    SLP Diagnosis  mild cognitive communication deficit   -RB    Prognosis  Excellent (comment)   -RB    Patient/caregiver participated in establishment of treatment plan and goals  Yes   -RB    Patient would benefit from skilled therapy intervention  Yes   -RB       SLP Plan    Frequency  1x/weekly   -RB    Duration  1 more visit then discharge   -RB    Planned CPT's?  SLP INDIVIDUAL SPEECH THERAPY: 82835   -RB    Expected Duration Therapy Session - minutes  45-60 minutes   -RB    Plan Comments  1 more visit and then discharge   -RB      User Key  (r) = Recorded By, (t) = Taken By, (c) = Cosigned By    Initials Name Provider Type    Staci Mahmood SLP Speech and Language Pathologist                Staci Martinez MA CCC-SLP  12/5/2019

## 2019-12-09 ENCOUNTER — OFFICE VISIT (OUTPATIENT)
Dept: INTERNAL MEDICINE | Facility: CLINIC | Age: 64
End: 2019-12-09

## 2019-12-09 ENCOUNTER — OFFICE VISIT (OUTPATIENT)
Dept: PHYSICAL THERAPY | Facility: CLINIC | Age: 64
End: 2019-12-09

## 2019-12-09 VITALS
TEMPERATURE: 97.4 F | SYSTOLIC BLOOD PRESSURE: 120 MMHG | HEIGHT: 61 IN | RESPIRATION RATE: 16 BRPM | DIASTOLIC BLOOD PRESSURE: 68 MMHG | OXYGEN SATURATION: 98 % | BODY MASS INDEX: 29.07 KG/M2 | HEART RATE: 74 BPM | WEIGHT: 154 LBS

## 2019-12-09 DIAGNOSIS — I10 ESSENTIAL HYPERTENSION: Primary | ICD-10-CM

## 2019-12-09 DIAGNOSIS — Z86.73 HISTORY OF ARTERIAL ISCHEMIC STROKE: ICD-10-CM

## 2019-12-09 DIAGNOSIS — R41.841 COGNITIVE COMMUNICATION DEFICIT: Primary | ICD-10-CM

## 2019-12-09 PROCEDURE — 92507 TX SP LANG VOICE COMM INDIV: CPT | Performed by: SPEECH-LANGUAGE PATHOLOGIST

## 2019-12-09 PROCEDURE — 99213 OFFICE O/P EST LOW 20 MIN: CPT | Performed by: PHYSICIAN ASSISTANT

## 2019-12-09 RX ORDER — LOSARTAN POTASSIUM 50 MG/1
50 TABLET ORAL DAILY
Qty: 90 TABLET | Refills: 1 | Status: SHIPPED | OUTPATIENT
Start: 2019-12-09 | End: 2020-05-11

## 2019-12-09 NOTE — PROGRESS NOTES
Outpatient Speech Language Pathology   Adult Speech Language Cognitive Progress Note/Discharge Summary       Patient Name: Jasvir Hamilton  : 1955  MRN: [id number]  Today's Date: 2019         Visit Date: 2019   Patient Active Problem List   Diagnosis   • Essential hypertension   • GERD (gastroesophageal reflux disease)   • Hyperlipemia   • Cobalamin deficiency   • History of vitamin D deficiency   • Coronary artery disease of native heart with stable angina pectoris (CMS/HCC)   • Bilateral carotid bruits   • Tobacco abuse   • Obesity (BMI 30.0-34.9)   • Carotid artery disease (CMS/HCC)   • History of arterial ischemic stroke          Visit Dx:    ICD-10-CM ICD-9-CM   1. Cognitive communication deficit R41.841 799.52             SLP OP Goals     Row Name 19 1040          Subjective Comments    Subjective Comments  Pt starts work this week and is excited to go back. Pt reports improvement with memory, attention,  -RB        Subjective Pain    Able to rate subjective pain?  yes  -RB     Pre-Treatment Pain Level  0  -RB     Post-Treatment Pain Level  0  -RB        Executive Function Goals    Executive Function LTG's  Patient will be able to use high level cognitive skills to allow patient to return to work  -RB     Patient will be able to use high level cognitive skills to allow patient to return to work  90%:;with intermittent cues  -RB     Status: Patient will be able to use high level cognitive skills to allow patient to return to work  Achieved  -RB     Comments: Patient will be able to use high level cognitive skills to allow patient to return to work  : GOAL MET  -RB     Executive Function STG's  Patient will improve executive functioning skills by using planning strategies prior to beginning tasks;Patient will improve executive functioning skills by using self-monitoring strategies during functional tasks  -RB     Patient will improve executive functioning skills by using  planning strategies prior to beginning tasks  90%:;without cues  -RB     Status: Patient will improve executive functioning skills by using planning strategies prior to beginning tasks  Achieved  -RB     Comments: Patient will improve executive functioning skills by using planning strategies prior to beginning tasks  12/5: MET. Pt is using planning strategies independently 90% of the time before completing tasks.  -RB     Patient will improve executive functioning skills by using self-monitoring strategies during functional tasks  90%:;without cues  -RB     Status: Patient will improve executive functioning skills by using self-monitoring strategies during functional tasks  Achieved  -RB     Comments: Patient will improve executive functioning skills by using self-monitoring strategies during functional tasks  12/5: MET. Patient is able to self monitor and ID areas of difficulty. This helps with planning before initiating tasks.  -RB        Memory Goals    Memory LTG's  Patient will be able to remember information needed to return to work and function on work-related tasks;Patient and family will implement compensatory strategies to maximize patient’s Memory function so patient can continue to participate in daily activities  -RB     Patient will be able to remember information needed to return to work and function on work-related tasks  90%:;without cues  -RB     Status: Patient will be able to remember information needed to return to work and function on work-related tasks  Achieved  -RB     Comments: Patient will be able to remember information needed to return to work and function on work-related tasks  12/9: MET. No cues needed. Able to demonstrate memory skills that are functional for return to work   -RB     Patient and family will implement compensatory strategies to maximize patient’s Memory function so patient can continue to participate in daily activities  90%:;without cues  -RB     Status: Patient and  family will implement compensatory strategies to maximize patient’s Memory function so patient can continue to participate in daily activities  Achieved  -RB     Comments: Patient and family will implement compensatory strategies to maximize patient’s Memory function so patient can continue to participate in daily activities  12/5: Pt is using compensatory strategies independelty. MET  -RB     Memory STG's  Patient will demonstrate improved ability to recall information by listening to paragraph and answering yes/no questions;Patient’s memory skills will be enhanced as reported by patient by using external memory aides  -RB     Patient’s memory skills will be enhanced as reported by patient by using external memory aides  90%:;without cues  -RB     Status: Patient’s memory skills will be enhanced as reported by patient by using external memory aides  Achieved  -RB     Comments: Patient’s memory skills will be enhanced as reported by patient by using external memory aides  12/5: Pt reports use of external memory aids at home 90% of the time (labels, sticky notes, journals, calendars).  -RB     Patient will demonstrate improved ability to recall information by listening to paragraph and answering yes/no questions  90%:;without cues  -RB     Status: Patient will demonstrate improved ability to recall information by listening to paragraph and answering yes/no questions  Achieved  -RB     Comments: Patient will demonstrate improved ability to recall information by listening to paragraph and answering yes/no questions  12/9: 90% with no cues needed. MET  -RB        Attention/Orientation Goals    Attention/Orientation LTG's  Patient will be able to execute all activities necessary to manage a home  -RB     Patient will be able to execute all activities necessary to manage a home  Independently  -RB     Status: Patient will be able to execute all activities necessary to manage a home  Achieved  -RB     Comments: Patient  will be able to execute all activities necessary to manage a home  12/3: GOAL MET  -RB     Patient will be able to use high level cognitive skills to allow patient to return to work  Independently  -RB     Status: Patient will be able to use high level cognitive skills to allow patient to return to work  Achieved  -RB     Comments: Patient will be able to use high level cognitive skills to allow patient to return to work  12/9: Able to demonstrate attention and focus skills necessary for return to work. MET  -RB     Attention/Orientation STG's  Patient will improve attention skills by sustaining focus to high-level cognitive tasks in order to complete task;Patient will improve attention skills by dividing focus and responding simultaneously to multiple tasks or in order to complete task;Patient will improve attention skills by alternating or shifting focus between two different tasks in order to complete both tasks;Patient will improve attention skills by focusing to selective target/task when presented with competing stimuli or in a distracting environment in order to complete task;Patient will improve attention skills by sustaining consistent behavioral response during continuous and repetitive activity (e.g., listening for target words, auditory/reading comprehension tasks)  -RB     Patient will improve attention skills by sustaining consistent behavioral response during continuous and repetitive activity (e.g., listening for target words, auditory/reading comprehension tasks)  without cues  -RB     Status: Patient will improve attention skills by sustaining consistent behavioral response during continuous and repetitive activity (e.g., listening for target words, auditory/reading comprehension tasks)  Achieved  -RB     Comments: Patient will improve attention skills by sustaining consistent behavioral response during continuous and repetitive activity (e.g., listening for target words, auditory/reading  comprehension tasks)  12/5: 90% throughout tasks. She is able to do independently without cues. MET  -RB     Patient will improve attention skills by focusing to selective target/task when presented with competing stimuli or in a distracting environment in order to complete task  90%:;without cues  -RB     Status: Patient will improve attention skills by focusing to selective target/task when presented with competing stimuli or in a distracting environment in order to complete task  Achieved  -RB     Comments: Patient will improve attention skills by focusing to selective target/task when presented with competing stimuli or in a distracting environment in order to complete task  12/9: greater than 90% without cues when given distractions. MET  -RB     Patient will improve attention skills by alternating or shifting focus between two different tasks in order to complete both tasks  90%:;without cues  -RB     Status: Patient will improve attention skills by alternating or shifting focus between two different tasks in order to complete both tasks  Achieved  -RB     Comments: Patient will improve attention skills by alternating or shifting focus between two different tasks in order to complete both tasks  12/5: 90% with no cues. MET. Pt is showing greater overall awareness of deficts and difficulties which is aiding in her planning and task initiation leading to better attention and focus during tasks.  -RB     Patient will improve attention skills by dividing focus and responding simultaneously to multiple tasks or in order to complete task  90%:;without cues  -RB     Status: Patient will improve attention skills by dividing focus and responding simultaneously to multiple tasks or in order to complete task  Achieved  -RB     Comments: Patient will improve attention skills by dividing focus and responding simultaneously to multiple tasks or in order to complete task  12/5: 90% with no cues needed from SLP. MET.  -RB      Patient will improve attention skills by sustaining focus to high-level cognitive tasks in order to complete task  90%:;without cues  -RB     Status: Patient will improve attention skills by sustaining focus to high-level cognitive tasks in order to complete task  Achieved  -RB     Comments: Patient will improve attention skills by sustaining focus to high-level cognitive tasks in order to complete task  12/5: Able to complete high level tasks in an appropriate time with no cues with 90% accuracy and greater than 90% at times,  -RB        Other Goals    Other Adult Goal- 1  Pt will particiapte in continued high level assessment of cognitive communication skills as warranted  -RB     Status: Other Adult Goal- 1  Achieved  -RB     Comments: Other Adult Goal- 1  12/3: assessed financial management/check writing. No diffiuclties noted. goal met.  -RB     Other Adult Goal- 2  Pt will demosntrate visuospatial skills with 80% accuracy with intermittent cues  -RB     Status: Other Adult Goal- 2  Achieved  -RB     Comments: Other Adult Goal- 2  12/5: Various visuospatial tasks completed at 90% accuracy or greater with no cues needed and when given a time limit. MET.  -RB     Other Adult Goal- 3  Pt will increase RBANS scores  -RB     Status: Other Adult Goal- 3  Achieved  -RB     Comments: Other Adult Goal- 3  12/9: Reasessment to show progress to pt before discharge. Immediate memory score: 117, Visuospatial: 92, Langauge: 120, Attention: 115, delayed memory: 106. Each score improved and pt notes improvement at home.   -RB        SLP Time Calculation    SLP Goal Re-Cert Due Date  02/15/20  -RB       User Key  (r) = Recorded By, (t) = Taken By, (c) = Cosigned By    Initials Name Provider Type    Staci Mahmood SLP Speech and Language Pathologist          OP SLP Education     Row Name 12/09/19 1100       Education    Barriers to Learning  No barriers identified  -RB    Education Provided  Patient demonstrated recommended  strategies  -RB    Assessed  Learning needs;Learning preferences;Learning motivation;Learning readiness  -RB    Learning Motivation  Strong  -RB    Learning Method  Explanation;Demonstration;Teach back;Written materials  -RB    Teaching Response  Verbalized understanding;Demonstrated understanding  -RB    Education Comments  educated extensively on progress made and strategies to continue  -RB      User Key  (r) = Recorded By, (t) = Taken By, (c) = Cosigned By    Initials Name Effective Dates    RB Staci Martinez SLP 11/05/19 -           OP SLP Assessment/Plan - 12/09/19 1100        SLP Assessment    Functional Problems  Speech Language- Adult/Cognition   -RB    Clinical Impression: Speech Language-Adult/Congnition  Mild:;Cognitive Communication Impairment   -RB    Functional Problems Comment  Pt has met all goals and has made significant improvements towards memory, attention, and exectuve function skills. She shows greater awareness nad is independent with self monitoring, planning, and using strategies.    -RB    Clinical Impression Comments  Pt is being discharged from skilled services   -RB    Please refer to paper survey for additional self-reported information  Yes   -RB    Please refer to items scanned into chart for additional diagnostic informaiton and handouts as provided by clinician  Yes   -RB    SLP Diagnosis  mild cognitive communication deficit    -RB    Prognosis  Excellent (comment)   -RB    Patient/caregiver participated in establishment of treatment plan and goals  Yes   -RB    Patient would benefit from skilled therapy intervention  No    discharge   -RB       SLP Plan    Plan Comments  discharge from skilled services    -RB      User Key  (r) = Recorded By, (t) = Taken By, (c) = Cosigned By    Initials Name Provider Type    Staci Mahmood SLP Speech and Language Pathologist                   OP SLP Discharge Summary  Date of Discharge: 12/09/19  Reason for Discharge: all goals and outcomes  met, no further needs identified  Progress Toward Achieving Short/long Term Goals: all goals met within established timelines  Discharge Destination: home  Discharge Instructions: contact doctor if memory skills worsen to re consult      KAYLA Bangura  12/9/2019

## 2019-12-20 ENCOUNTER — CALL CENTER PROGRAMS (OUTPATIENT)
Dept: CALL CENTER | Facility: HOSPITAL | Age: 64
End: 2019-12-20

## 2019-12-20 NOTE — OUTREACH NOTE
Stroke [place] Survey      Responses   Facility patient discharged from?  [facility]   Attempt successful  Yes   Call start time  1059   Person spoke with today (if not patient) and relationship  Patient   Did the patient die within 30 days of admission?  No   Did the patient die within 30 days of discharge?  No   Call end time  1103   Patient location 30 days post discharge if known  Home   Was the patient readmitted within 30 days of discharge?  No   Could you live alone without any help from another person?  Yes   Can you do everything that you were doing right before your stroke even if slower and not as much?  Yes   Are you completely back to the way you were right before your stroke?  Yes   Can you walk from one room to another without help from another person?  Yes   Can the patient sit up in bed without any help?  Yes   Call Center Tony Score  0   [place] score call completed  Yes   Comments  Patient reports no residual stroke symptoms.  She has completed SLP/OT and has returned to work.          Nova Vasquez RN

## 2019-12-23 RX ORDER — ASPIRIN 81 MG/1
TABLET, CHEWABLE ORAL
Qty: 90 TABLET | Refills: 1 | Status: SHIPPED | OUTPATIENT
Start: 2019-12-23 | End: 2021-01-04

## 2020-01-04 PROBLEM — Z87.891 FORMER SMOKER: Status: ACTIVE | Noted: 2018-09-20

## 2020-01-14 RX ORDER — ISOSORBIDE MONONITRATE 30 MG/1
TABLET, EXTENDED RELEASE ORAL
Qty: 90 TABLET | Refills: 1 | Status: SHIPPED | OUTPATIENT
Start: 2020-01-14 | End: 2020-06-22 | Stop reason: SDUPTHER

## 2020-01-16 ENCOUNTER — APPOINTMENT (OUTPATIENT)
Dept: MAMMOGRAPHY | Facility: HOSPITAL | Age: 65
End: 2020-01-16

## 2020-03-11 ENCOUNTER — OFFICE VISIT (OUTPATIENT)
Dept: INTERNAL MEDICINE | Facility: CLINIC | Age: 65
End: 2020-03-11

## 2020-03-11 VITALS
SYSTOLIC BLOOD PRESSURE: 102 MMHG | HEIGHT: 61 IN | OXYGEN SATURATION: 99 % | DIASTOLIC BLOOD PRESSURE: 60 MMHG | TEMPERATURE: 97.6 F | WEIGHT: 145 LBS | RESPIRATION RATE: 16 BRPM | BODY MASS INDEX: 27.38 KG/M2 | HEART RATE: 69 BPM

## 2020-03-11 DIAGNOSIS — E04.2 MULTINODULAR GOITER: ICD-10-CM

## 2020-03-11 DIAGNOSIS — Z86.73 HISTORY OF ARTERIAL ISCHEMIC STROKE: Primary | ICD-10-CM

## 2020-03-11 DIAGNOSIS — E78.2 MIXED HYPERLIPIDEMIA: ICD-10-CM

## 2020-03-11 DIAGNOSIS — I10 ESSENTIAL HYPERTENSION: ICD-10-CM

## 2020-03-11 DIAGNOSIS — I25.118 CORONARY ARTERY DISEASE OF NATIVE ARTERY OF NATIVE HEART WITH STABLE ANGINA PECTORIS (HCC): ICD-10-CM

## 2020-03-11 PROCEDURE — 99214 OFFICE O/P EST MOD 30 MIN: CPT | Performed by: PHYSICIAN ASSISTANT

## 2020-03-11 NOTE — PROGRESS NOTES
Chief Complaint   Patient presents with   • Cerebrovascular Accident     3 month F/U       Subjective       History of Present Illness     Jasvir Hamilton is a 64 y.o. female. She presents for follow up of CVA, HLD, hypertension, and multinodular goiter. Pt had an ischemic stroke on 9/20/2019. She was experiencing difficulty finding the right words and with stuttering, as well as with her memory following the stroke. She also reported difficulty with fine motor skills, such as dropping small objects at times. These issues have improved considerably with speech therapy and OT. Her fine motor skills have improved, and no longer dropping objects or having any difficulty with day to day activities such as writing or applying makeup. She returned to work in December 2019 and has been doing well. She still has some fatigue but believes this is due to a very busy schedule and increased volume at work. She does check her BP at home, running 130s/80s on average. She is taking metoprolol, losartan, Imdur, Plavix, ASA 81mg as directed. She is also taking Crestor for HLD without s/e or other concern. She denies chest pain, SOA, HA, vision change, swelling of extremities. She does follow with cardiology, scheduled with Dr. Serna 4/24/2020 for routine follow up. No new concerns today.    Patient was also found to have a multinodular goiter on US thyroid in October 2019. Radiology read suggests repeat in 6 months for stability. Her thyroid function labs were WNL in October 2019 and she is not currently taking synthroid.       The following portions of the patient's history were reviewed and updated as appropriate: allergies, current medications, past medical history, past social history and problem list.    Allergies   Allergen Reactions   • Pravastatin Myalgia   • Influenza Vac Split Quad Hives     Social History     Tobacco Use   • Smoking status: Current Every Day Smoker     Packs/day: 0.75     Years: 40.00     Pack years:  30.00     Types: Cigarettes     Last attempt to quit: 2019     Years since quittin.5   • Smokeless tobacco: Never Used   Substance Use Topics   • Alcohol use: Yes     Comment: occassionally         Current Outpatient Medications:   •  aspirin 81 MG EC tablet, Take 1 tablet by mouth Daily., Disp: 90 tablet, Rfl: 3  •  cholecalciferol (VITAMIN D3) 1000 units tablet, Take 1,000 Units by mouth Daily., Disp: , Rfl:   •  clopidogrel (PLAVIX) 75 MG tablet, Take 1 tablet by mouth Daily., Disp: 90 tablet, Rfl: 3  •  ibuprofen (ADVIL,MOTRIN) 600 MG tablet, Take 1 tablet by mouth Every 8 (Eight) Hours As Needed for Mild Pain  or Moderate Pain ., Disp: 90 tablet, Rfl: 0  •  isosorbide mononitrate (IMDUR) 30 MG 24 hr tablet, TAKE 1 TABLET DAILY, Disp: 90 tablet, Rfl: 1  •  losartan (COZAAR) 50 MG tablet, Take 1 tablet by mouth Daily., Disp: 90 tablet, Rfl: 1  •  metoprolol tartrate (LOPRESSOR) 50 MG tablet, TAKE 1 TABLET TWICE A DAY, Disp: 180 tablet, Rfl: 3  •  nicotine polacrilex (NICORETTE) 4 MG gum, Chew 1 each As Needed for Smoking Cessation., Disp: 50 each, Rfl: 1  •  nitroglycerin (NITROSTAT) 0.4 MG SL tablet, Place 0.4 mg under the tongue Every 5 (Five) Minutes As Needed., Disp: , Rfl:   •  ondansetron ODT (ZOFRAN ODT) 8 MG disintegrating tablet, Take 1 tablet by mouth Every 8 (Eight) Hours As Needed for Nausea or Vomiting., Disp: 20 tablet, Rfl: 0  •  pantoprazole (PROTONIX) 40 MG EC tablet, Take 1 tablet by mouth 2 (Two) Times a Day., Disp: 180 tablet, Rfl: 1  •  rosuvastatin (CRESTOR) 40 MG tablet, TAKE 1 TABLET DAILY, Disp: 90 tablet, Rfl: 3  •  sucralfate (CARAFATE) 1 g tablet, Take 1 tablet by mouth Every Night., Disp: 90 tablet, Rfl: 1  •  aspirin 81 MG chewable tablet, CHEW 1 TABLET DAILY, Disp: 90 tablet, Rfl: 1    Review of Systems   Constitutional: Positive for fatigue. Negative for chills and fever.   HENT: Negative for congestion, ear pain, sore throat and trouble swallowing.    Eyes: Negative for  pain and visual disturbance.   Respiratory: Negative for cough, shortness of breath and wheezing.    Cardiovascular: Negative for chest pain, palpitations and leg swelling.   Gastrointestinal: Negative for abdominal pain, diarrhea, nausea and vomiting.   Genitourinary: Negative for dysuria and hematuria.   Neurological: Negative for dizziness, syncope, speech difficulty, weakness, headache and memory problem.   Psychiatric/Behavioral: The patient is not nervous/anxious.        Objective   Vitals:    03/11/20 1635   BP: 102/60   Pulse: 69   Resp: 16   Temp: 97.6 °F (36.4 °C)   SpO2: 99%     Physical Exam   Constitutional: She appears well-developed and well-nourished.   HENT:   Head: Normocephalic and atraumatic.   Right Ear: Tympanic membrane, external ear and ear canal normal.   Left Ear: Tympanic membrane, external ear and ear canal normal.   Mouth/Throat: Oropharynx is clear and moist and mucous membranes are normal.   Eyes: Conjunctivae are normal.   Neck: Neck supple. Carotid bruit is present (bilateral). Thyromegaly present.   Cardiovascular: Normal rate, regular rhythm and intact distal pulses.   No murmur heard.  Pulmonary/Chest: Effort normal and breath sounds normal. She has no wheezes. She has no rales.   Abdominal: Soft. Bowel sounds are normal. She exhibits no distension and no mass. There is no hepatosplenomegaly. There is no tenderness.   Lymphadenopathy:     She has no cervical adenopathy.   Skin: No rash noted.   Psychiatric: She has a normal mood and affect. Her behavior is normal.             Assessment/Plan   Jasvir was seen today for cerebrovascular accident.    Diagnoses and all orders for this visit:    History of arterial ischemic stroke  - Continue Plavix, Crestor, ASA.     Coronary artery disease of native artery of native heart with stable angina pectoris (CMS/HCC)  - Continue Imdur, ASA, Plavix, Crestor.     Essential hypertension  - Continue metoprolol, losartan, Imdur.     Mixed  hyperlipidemia  - Continue Crestor.     Multinodular goiter    Patient due for US thyroid repeat for stability. Would like to wait until next visit to order this due to financial constraints. Will discuss again at next visit July 2020.   Continue all routine meds.            Return in about 4 months (around 7/11/2020) for Annual physical.

## 2020-03-13 ENCOUNTER — TELEPHONE (OUTPATIENT)
Dept: INTERNAL MEDICINE | Facility: CLINIC | Age: 65
End: 2020-03-13

## 2020-03-13 NOTE — TELEPHONE ENCOUNTER
Georgia Hamilton [phone]  Attempted to contact pt, LVM requesting pt call back. Advising on VM of clinical message, advised to schedule OV, or UTC over the weekend if possible. Office number given for call back, [phone].

## 2020-03-13 NOTE — TELEPHONE ENCOUNTER
We do like to see patients in office for this, as these are often viral in nature and do not require Abx. She needs to be tested for strep in office, or at [facility] this weekend.

## 2020-03-13 NOTE — TELEPHONE ENCOUNTER
Pt is calling because she woke up with a scratchy sore throat and she states that it looks white in the back. She is requesting a antibiotic for her symptoms. Did try to schedule an appt but pt would like to know if she could be prescribed something.      Please call and adv/ pt is at work VM can be left.   [phone]     If order is possible please send to   [facility]  PH: [phone]   FAX: [phone]

## 2020-03-28 PROBLEM — E04.2 MULTINODULAR GOITER: Status: ACTIVE | Noted: 2020-03-28

## 2020-04-03 DIAGNOSIS — K21.9 GASTROESOPHAGEAL REFLUX DISEASE WITHOUT ESOPHAGITIS: ICD-10-CM

## 2020-04-03 RX ORDER — PANTOPRAZOLE SODIUM 40 MG/1
TABLET, DELAYED RELEASE ORAL
Qty: 180 TABLET | Refills: 1 | Status: SHIPPED | OUTPATIENT
Start: 2020-04-03 | End: 2020-07-29 | Stop reason: SDUPTHER

## 2020-04-06 ENCOUNTER — PRIOR AUTHORIZATION (OUTPATIENT)
Dept: INTERNAL MEDICINE | Facility: CLINIC | Age: 65
End: 2020-04-06

## 2020-04-06 DIAGNOSIS — Z00.00 HEALTHCARE MAINTENANCE: ICD-10-CM

## 2020-04-06 RX ORDER — CLOPIDOGREL BISULFATE 75 MG/1
75 TABLET ORAL DAILY
Qty: 90 TABLET | Refills: 1 | Status: SHIPPED | OUTPATIENT
Start: 2020-04-06 | End: 2020-07-29 | Stop reason: SDUPTHER

## 2020-04-06 NOTE — TELEPHONE ENCOUNTER
WILLIE BISHOP (Key: [id number])   pantoprazole (PROTONIX) 40 MG EC tablet     This request has received a Favorable outcome.  Please note any additional information provided by Nicol at the bottom of this request.

## 2020-04-17 RX ORDER — IBUPROFEN 600 MG/1
600 TABLET ORAL EVERY 8 HOURS PRN
Qty: 90 TABLET | Refills: 0 | Status: SHIPPED | OUTPATIENT
Start: 2020-04-17 | End: 2020-06-03

## 2020-05-11 DIAGNOSIS — I10 ESSENTIAL HYPERTENSION: ICD-10-CM

## 2020-05-11 RX ORDER — LOSARTAN POTASSIUM 50 MG/1
TABLET ORAL
Qty: 90 TABLET | Refills: 1 | Status: SHIPPED | OUTPATIENT
Start: 2020-05-11 | End: 2020-07-29 | Stop reason: SDUPTHER

## 2020-05-21 RX ORDER — SUCRALFATE 1 G/1
TABLET ORAL
Qty: 90 TABLET | Refills: 1 | Status: SHIPPED | OUTPATIENT
Start: 2020-05-21 | End: 2021-10-14

## 2020-05-26 ENCOUNTER — TRANSCRIBE ORDERS (OUTPATIENT)
Dept: ADMINISTRATIVE | Facility: HOSPITAL | Age: 65
End: 2020-05-26

## 2020-06-01 NOTE — TELEPHONE ENCOUNTER
Can you please call pt and see how often she is taking ibuprofen/ what she is primarily taking this for? She just had a refill on 4/17/2020 with #90, meaning she's taking nearly 3 of these per day. With her GERD and ASA use, this is really too much as she should only be relying on ibuprofen for acute issues, and not has a routine medication.

## 2020-06-02 NOTE — TELEPHONE ENCOUNTER
Jasvir Joy [phone]  Spoke to pt, she confirmed she only takes 2 tablets prn at the max, but the medication is not taken daily. Pt confirmed she did not request the medication to be refilled, this is an electronic request from the mail order pharmacy [facility]. Pt states she is not out of the medication, but provider can call it in to renew the prescription with the pharmacy. Good verbal understanding. Please advise?

## 2020-06-03 RX ORDER — IBUPROFEN 600 MG/1
TABLET ORAL
Qty: 90 TABLET | Refills: 0 | Status: SHIPPED | OUTPATIENT
Start: 2020-06-03 | End: 2020-06-22 | Stop reason: SDUPTHER

## 2020-06-22 DIAGNOSIS — R10.32 LLQ PAIN: ICD-10-CM

## 2020-06-22 DIAGNOSIS — I25.118 CORONARY ARTERY DISEASE OF NATIVE ARTERY OF NATIVE HEART WITH STABLE ANGINA PECTORIS (HCC): ICD-10-CM

## 2020-06-22 DIAGNOSIS — Z00.00 HEALTHCARE MAINTENANCE: Primary | ICD-10-CM

## 2020-06-22 RX ORDER — ISOSORBIDE MONONITRATE 30 MG/1
30 TABLET, EXTENDED RELEASE ORAL DAILY
Qty: 30 TABLET | Refills: 0 | Status: SHIPPED | OUTPATIENT
Start: 2020-06-22 | End: 2020-07-29 | Stop reason: SDUPTHER

## 2020-06-22 RX ORDER — METOPROLOL TARTRATE 50 MG/1
50 TABLET, FILM COATED ORAL 2 TIMES DAILY
Qty: 60 TABLET | Refills: 0 | Status: SHIPPED | OUTPATIENT
Start: 2020-06-22 | End: 2020-07-29 | Stop reason: SDUPTHER

## 2020-06-22 RX ORDER — IBUPROFEN 600 MG/1
600 TABLET ORAL EVERY 8 HOURS PRN
Qty: 90 TABLET | Refills: 0 | Status: SHIPPED | OUTPATIENT
Start: 2020-06-22 | End: 2021-02-08 | Stop reason: SDUPTHER

## 2020-07-29 ENCOUNTER — OFFICE VISIT (OUTPATIENT)
Dept: INTERNAL MEDICINE | Facility: CLINIC | Age: 65
End: 2020-07-29

## 2020-07-29 VITALS
DIASTOLIC BLOOD PRESSURE: 60 MMHG | WEIGHT: 145 LBS | HEIGHT: 61 IN | SYSTOLIC BLOOD PRESSURE: 120 MMHG | BODY MASS INDEX: 27.38 KG/M2 | TEMPERATURE: 97.1 F | HEART RATE: 78 BPM | OXYGEN SATURATION: 96 % | RESPIRATION RATE: 18 BRPM

## 2020-07-29 DIAGNOSIS — E04.2 MULTINODULAR GOITER: ICD-10-CM

## 2020-07-29 DIAGNOSIS — E78.2 MIXED HYPERLIPIDEMIA: ICD-10-CM

## 2020-07-29 DIAGNOSIS — Z00.00 ENCOUNTER FOR HEALTH MAINTENANCE EXAMINATION: Primary | ICD-10-CM

## 2020-07-29 DIAGNOSIS — I25.118 CORONARY ARTERY DISEASE OF NATIVE ARTERY OF NATIVE HEART WITH STABLE ANGINA PECTORIS (HCC): ICD-10-CM

## 2020-07-29 DIAGNOSIS — K21.9 GASTROESOPHAGEAL REFLUX DISEASE WITHOUT ESOPHAGITIS: ICD-10-CM

## 2020-07-29 DIAGNOSIS — I10 ESSENTIAL HYPERTENSION: ICD-10-CM

## 2020-07-29 DIAGNOSIS — E55.9 VITAMIN D DEFICIENCY: ICD-10-CM

## 2020-07-29 DIAGNOSIS — F17.200 CURRENT EVERY DAY SMOKER: ICD-10-CM

## 2020-07-29 DIAGNOSIS — Z86.73 HISTORY OF ARTERIAL ISCHEMIC STROKE: ICD-10-CM

## 2020-07-29 PROCEDURE — 99396 PREV VISIT EST AGE 40-64: CPT | Performed by: PHYSICIAN ASSISTANT

## 2020-07-29 RX ORDER — LOSARTAN POTASSIUM 50 MG/1
50 TABLET ORAL DAILY
Qty: 90 TABLET | Refills: 1 | Status: SHIPPED | OUTPATIENT
Start: 2020-07-29 | End: 2021-06-29 | Stop reason: SDUPTHER

## 2020-07-29 RX ORDER — METOPROLOL TARTRATE 50 MG/1
50 TABLET, FILM COATED ORAL 2 TIMES DAILY
Qty: 180 TABLET | Refills: 1 | Status: SHIPPED | OUTPATIENT
Start: 2020-07-29 | End: 2021-01-04

## 2020-07-29 RX ORDER — ROSUVASTATIN CALCIUM 40 MG/1
40 TABLET, COATED ORAL DAILY
Qty: 90 TABLET | Refills: 3 | Status: SHIPPED | OUTPATIENT
Start: 2020-07-29 | End: 2021-11-29 | Stop reason: SDUPTHER

## 2020-07-29 RX ORDER — PANTOPRAZOLE SODIUM 40 MG/1
40 TABLET, DELAYED RELEASE ORAL 2 TIMES DAILY
Qty: 180 TABLET | Refills: 1 | Status: SHIPPED | OUTPATIENT
Start: 2020-07-29 | End: 2021-03-21

## 2020-07-29 RX ORDER — MELATONIN
1000 DAILY
Qty: 90 TABLET | Refills: 1 | Status: SHIPPED | OUTPATIENT
Start: 2020-07-29

## 2020-07-29 RX ORDER — ISOSORBIDE MONONITRATE 30 MG/1
30 TABLET, EXTENDED RELEASE ORAL DAILY
Qty: 90 TABLET | Refills: 1 | Status: SHIPPED | OUTPATIENT
Start: 2020-07-29 | End: 2021-01-25

## 2020-07-29 RX ORDER — CLOPIDOGREL BISULFATE 75 MG/1
75 TABLET ORAL DAILY
Qty: 90 TABLET | Refills: 1 | Status: SHIPPED | OUTPATIENT
Start: 2020-07-29 | End: 2021-03-28 | Stop reason: SDUPTHER

## 2020-08-05 DIAGNOSIS — E04.2 MULTINODULAR GOITER: Primary | ICD-10-CM

## 2020-08-19 ENCOUNTER — HOSPITAL ENCOUNTER (OUTPATIENT)
Dept: CT IMAGING | Facility: HOSPITAL | Age: 65
Discharge: HOME | End: 2020-08-19
Admitting: PHYSICIAN ASSISTANT

## 2020-08-19 ENCOUNTER — HOSPITAL ENCOUNTER (OUTPATIENT)
Dept: ULTRASOUND IMAGING | Facility: HOSPITAL | Age: 65
Discharge: HOME | End: 2020-08-19

## 2020-08-19 DIAGNOSIS — F17.200 CURRENT EVERY DAY SMOKER: ICD-10-CM

## 2020-08-19 DIAGNOSIS — E04.2 MULTINODULAR GOITER: ICD-10-CM

## 2020-08-19 PROCEDURE — 76536 US EXAM OF HEAD AND NECK: CPT

## 2020-08-19 PROCEDURE — G0297 LDCT FOR LUNG CA SCREEN: HCPCS

## 2020-08-21 ENCOUNTER — TELEPHONE (OUTPATIENT)
Dept: INTERNAL MEDICINE | Facility: CLINIC | Age: 65
End: 2020-08-21

## 2020-08-21 NOTE — TELEPHONE ENCOUNTER
Please call pt- her thyroid US is stable as compared to previous. We will continue with annual thyroid US to monitor stability every 12 months.

## 2020-08-21 NOTE — TELEPHONE ENCOUNTER
Georgia Hamilton [phone]  Spoke to pt, advised of clinical message. Pt is in agreement with plan. Good verbal understanding.

## 2020-08-22 ENCOUNTER — TELEPHONE (OUTPATIENT)
Dept: INTERNAL MEDICINE | Facility: CLINIC | Age: 65
End: 2020-08-22

## 2020-09-08 ENCOUNTER — APPOINTMENT (OUTPATIENT)
Dept: MAMMOGRAPHY | Facility: HOSPITAL | Age: 65
End: 2020-09-08

## 2020-10-08 ENCOUNTER — HOSPITAL ENCOUNTER (OUTPATIENT)
Dept: MAMMOGRAPHY | Facility: HOSPITAL | Age: 65
Discharge: HOME | End: 2020-10-08
Admitting: PHYSICIAN ASSISTANT

## 2020-10-08 DIAGNOSIS — Z12.31 VISIT FOR SCREENING MAMMOGRAM: ICD-10-CM

## 2020-10-08 PROCEDURE — 77067 SCR MAMMO BI INCL CAD: CPT

## 2020-10-08 PROCEDURE — 77063 BREAST TOMOSYNTHESIS BI: CPT | Performed by: RADIOLOGY

## 2020-10-08 PROCEDURE — 77063 BREAST TOMOSYNTHESIS BI: CPT

## 2020-10-08 PROCEDURE — 77067 SCR MAMMO BI INCL CAD: CPT | Performed by: RADIOLOGY

## 2020-12-28 PROCEDURE — U0004 COV-19 TEST NON-CDC HGH THRU: HCPCS | Performed by: NURSE PRACTITIONER

## 2020-12-30 ENCOUNTER — TELEPHONE (OUTPATIENT)
Dept: INTERNAL MEDICINE | Facility: CLINIC | Age: 65
End: 2020-12-30

## 2020-12-30 NOTE — TELEPHONE ENCOUNTER
PATIENT IS REQUESTING A CALL BACK TO DISCUSS HER PLAN OF TREATMENT AFTER BEING SEEN IN URGENT CARE ON Monday 12/28/20.    CALL BACK  NUMBER [phone]

## 2020-12-30 NOTE — TELEPHONE ENCOUNTER
Jasvir Castañedanorah [phone]    Pt was seen at the [facility] on 12/28/2020, pt was diagnosed with:  Viral illness [B34.9],Pharyngitis, unspecified etiology [J02.9],Close exposure to COVID-19 virus [Z20.828]. Pt was negative for COVID and strep.     Pt states she's still taking the mucinex, flonase, and albuterol inhaler prescribed by [facility]. Pt's currently experiencing headache, cough, congestion, feels warm, no wheezing, along with post nasal drip. Pt would like to know what the next step would be regarding her current worsening symptoms. Advised PCP is out of office this week, will send message to covering provider Dr Brennan for confirmation, and I will return her call.     Pharmacy: [facility] [place]     Please advise?

## 2020-12-31 ENCOUNTER — TELEMEDICINE (OUTPATIENT)
Dept: INTERNAL MEDICINE | Facility: CLINIC | Age: 65
End: 2020-12-31

## 2020-12-31 DIAGNOSIS — J01.10 ACUTE FRONTAL SINUSITIS, RECURRENCE NOT SPECIFIED: Primary | ICD-10-CM

## 2020-12-31 PROCEDURE — 99213 OFFICE O/P EST LOW 20 MIN: CPT | Performed by: INTERNAL MEDICINE

## 2020-12-31 RX ORDER — AZITHROMYCIN 250 MG/1
TABLET, FILM COATED ORAL
Qty: 6 TABLET | Refills: 0 | Status: SHIPPED | OUTPATIENT
Start: 2020-12-31 | End: 2021-03-10

## 2020-12-31 NOTE — TELEPHONE ENCOUNTER
Unfortunately if patient is clinically getting worse especially with respiratory symptoms she will need to be seen and evaluated.  Either here at clinic or at the urgent treatment center

## 2020-12-31 NOTE — PROGRESS NOTES
Subjective   Jasvir Hamilton is a 65 y.o. female.     History of Present Illness     The following portions of the patient's history were reviewed and updated as appropriate: allergies, current medications, past family history, past medical history, past social history, past surgical history and problem list.    Patient is consented for video MyChart    Cough, runny nose congestion-  Duration 1 week  Symptoms: Patient says that she has been having the following symptoms for the past 1 week of runny nose, cough, congestion, sinus pressure patient has been trying over-the-counter medications and this is provided minimal relief.            Review of Systems   All other systems reviewed and are negative.      Objective   Physical Exam  Vitals signs and nursing note reviewed.   Constitutional:       Appearance: Normal appearance. She is normal weight.   HENT:      Head: Normocephalic.   Neurological:      Mental Status: She is alert.           Assessment/Plan   Diagnoses and all orders for this visit:    1. Acute frontal sinusitis, recurrence not specified (Primary)  -     azithromycin (Zithromax) 250 MG tablet; Take 2 tablets by mouth x1, then 1 tablet by mouth once a day x4 days  Dispense: 6 tablet; Refill: 0    Supportive care  Advance diet as tolerated with emphasis on hydration.  Monitor for signs for dehydration.  Continue with Tylenol and or Motrin for fever reduction and or pain control.  Return to clinic if symptoms do not improve.

## 2020-12-31 NOTE — TELEPHONE ENCOUNTER
Informed the patient of this recommendation, she is unable to come in for an appointment and if she feels the need she will go back to [facility] to be seen again

## 2021-01-02 DIAGNOSIS — I10 ESSENTIAL HYPERTENSION: Primary | ICD-10-CM

## 2021-01-02 DIAGNOSIS — Z00.00 ENCOUNTER FOR HEALTH MAINTENANCE EXAMINATION: ICD-10-CM

## 2021-01-02 DIAGNOSIS — I63.9 ISCHEMIC STROKE (HCC): ICD-10-CM

## 2021-01-04 RX ORDER — METOPROLOL TARTRATE 50 MG/1
TABLET, FILM COATED ORAL
Qty: 180 TABLET | Refills: 1 | Status: SHIPPED | OUTPATIENT
Start: 2021-01-04 | End: 2021-06-29 | Stop reason: SDUPTHER

## 2021-01-04 RX ORDER — ASPIRIN 81 MG/1
TABLET, CHEWABLE ORAL
Qty: 90 TABLET | Refills: 1 | Status: SHIPPED | OUTPATIENT
Start: 2021-01-04 | End: 2022-11-14

## 2021-01-25 DIAGNOSIS — I25.118 CORONARY ARTERY DISEASE OF NATIVE ARTERY OF NATIVE HEART WITH STABLE ANGINA PECTORIS (HCC): ICD-10-CM

## 2021-01-25 RX ORDER — ISOSORBIDE MONONITRATE 30 MG/1
TABLET, EXTENDED RELEASE ORAL
Qty: 90 TABLET | Refills: 1 | Status: SHIPPED | OUTPATIENT
Start: 2021-01-25 | End: 2021-04-09 | Stop reason: SDUPTHER

## 2021-02-08 DIAGNOSIS — Z00.00 HEALTHCARE MAINTENANCE: ICD-10-CM

## 2021-02-09 RX ORDER — IBUPROFEN 600 MG/1
600 TABLET ORAL EVERY 8 HOURS PRN
Qty: 90 TABLET | Refills: 0 | Status: SHIPPED | OUTPATIENT
Start: 2021-02-09 | End: 2021-03-13

## 2021-02-09 NOTE — TELEPHONE ENCOUNTER
LOV:07/29/2020  NOV:03/10/2021  Labs:12/28/2020     Return in about 6 months (around 1/29/2021) for Follow up.

## 2021-03-10 ENCOUNTER — OFFICE VISIT (OUTPATIENT)
Dept: INTERNAL MEDICINE | Facility: CLINIC | Age: 66
End: 2021-03-10

## 2021-03-10 VITALS
HEIGHT: 61 IN | SYSTOLIC BLOOD PRESSURE: 142 MMHG | WEIGHT: 149 LBS | OXYGEN SATURATION: 99 % | BODY MASS INDEX: 28.13 KG/M2 | RESPIRATION RATE: 16 BRPM | HEART RATE: 71 BPM | DIASTOLIC BLOOD PRESSURE: 70 MMHG | TEMPERATURE: 98 F

## 2021-03-10 DIAGNOSIS — K21.9 GASTROESOPHAGEAL REFLUX DISEASE, UNSPECIFIED WHETHER ESOPHAGITIS PRESENT: ICD-10-CM

## 2021-03-10 DIAGNOSIS — I65.23 BILATERAL CAROTID ARTERY STENOSIS: Primary | ICD-10-CM

## 2021-03-10 DIAGNOSIS — I10 ESSENTIAL HYPERTENSION: ICD-10-CM

## 2021-03-10 DIAGNOSIS — E04.2 MULTINODULAR GOITER: ICD-10-CM

## 2021-03-10 DIAGNOSIS — I25.118 CORONARY ARTERY DISEASE OF NATIVE ARTERY OF NATIVE HEART WITH STABLE ANGINA PECTORIS (HCC): ICD-10-CM

## 2021-03-10 DIAGNOSIS — E78.2 MIXED HYPERLIPIDEMIA: ICD-10-CM

## 2021-03-10 PROCEDURE — 99214 OFFICE O/P EST MOD 30 MIN: CPT | Performed by: PHYSICIAN ASSISTANT

## 2021-03-10 RX ORDER — NITROGLYCERIN 0.4 MG/1
0.4 TABLET SUBLINGUAL
Qty: 25 TABLET | Refills: 1 | Status: SHIPPED | OUTPATIENT
Start: 2021-03-10 | End: 2022-10-26 | Stop reason: SDUPTHER

## 2021-03-10 RX ORDER — ASPIRIN 81 MG/1
TABLET ORAL
COMMUNITY
End: 2021-03-10 | Stop reason: SDUPTHER

## 2021-03-10 NOTE — PROGRESS NOTES
Chief Complaint   Patient presents with   • Hypothyroidism     3 month F/U, Refill Ibuprofen   • Hypertension     3 month F/U, Refill Nitrostat rx        Subjective       History of Present Illness     Jasvir Hamilton is a 65 y.o. female. She presents for follow up of HTN, HLD/ CAD, GERD, and thyroid nodules. Pt taking metoprolol and losartan for HTN. She is checking her BP at home, running 130s/80s with no high reads. She denies chest pain, SOA, HA, vision change or swelling of extremities. She does endorse WOODY.     Pt with HLD/ CAD with hx of ischemic stroke and known carotid artery disease bilaterally. She is taking Crestor, Imdur and Plavix. She is tolerating these medications well. She does follow with Dr. Montalvo in cardiology.  Denies chest pain, HA, fatigue, myalgias or weakness. No new concerns.     Regarding GERD, pt is taking pantoprazole as directed. She denies heartburn, reflux, abdominal pain, N/V, cough, sore throat, voice change or difficulty swallowing while on this medication. No new concerns.     Patient was also found to have a multinodular goiter on US thyroid in 2019. Her TSH/ T4 have remained WNL. Due for repeat US in 2021.       The following portions of the patient's history were reviewed and updated as appropriate: allergies, current medications, past family history, past medical history, past social history, past surgical history and problem list.    Allergies   Allergen Reactions   • Pravastatin Myalgia   • Influenza Vac Split Quad Hives     Social History     Tobacco Use   • Smoking status: Current Every Day Smoker     Packs/day: 0.75     Years: 40.00     Pack years: 30.00     Types: Cigarettes     Last attempt to quit: 2019     Years since quittin.5   • Smokeless tobacco: Never Used   Substance Use Topics   • Alcohol use: Yes     Comment: occassionally         Current Outpatient Medications:   •  aspirin 81 MG chewable tablet, CHEW 1 TABLET DAILY, Disp:  90 tablet, Rfl: 1  •  cholecalciferol (VITAMIN D3) 25 MCG (1000 UT) tablet, Take 1 tablet by mouth Daily., Disp: 90 tablet, Rfl: 1  •  isosorbide mononitrate (IMDUR) 30 MG 24 hr tablet, TAKE 1 TABLET DAILY, Disp: 90 tablet, Rfl: 1  •  losartan (COZAAR) 50 MG tablet, Take 1 tablet by mouth Daily., Disp: 90 tablet, Rfl: 1  •  metoprolol tartrate (LOPRESSOR) 50 MG tablet, TAKE 1 TABLET TWICE A DAY, Disp: 180 tablet, Rfl: 1  •  nicotine polacrilex (NICORETTE) 4 MG gum, Chew 1 each As Needed for Smoking Cessation., Disp: 50 each, Rfl: 1  •  rosuvastatin (CRESTOR) 40 MG tablet, Take 1 tablet by mouth Daily., Disp: 90 tablet, Rfl: 3  •  sucralfate (CARAFATE) 1 g tablet, TAKE 1 TABLET BY MOUTH EVERY DAY AT NIGHT, Disp: 90 tablet, Rfl: 1  •  clopidogrel (PLAVIX) 75 MG tablet, Take 1 tablet by mouth Daily., Disp: 90 tablet, Rfl: 1  •  ibuprofen (ADVIL,MOTRIN) 600 MG tablet, TAKE 1 TABLET EVERY 8 HOURSAS NEEDED FOR MILD PAIN, Disp: 90 tablet, Rfl: 0  •  nitroglycerin (NITROSTAT) 0.4 MG SL tablet, Place 1 tablet under the tongue Every 5 (Five) Minutes As Needed for Chest Pain., Disp: 25 tablet, Rfl: 1  •  pantoprazole (PROTONIX) 40 MG EC tablet, TAKE 1 TABLET TWICE A DAY, Disp: 180 tablet, Rfl: 1    Review of Systems   Constitutional: Negative for chills, fatigue, fever, unexpected weight gain and unexpected weight loss.   HENT: Negative for congestion, ear pain, sore throat and trouble swallowing.    Eyes: Negative for pain.   Respiratory: Negative for cough, shortness of breath and wheezing.         +WOODY   Cardiovascular: Negative for chest pain, palpitations and leg swelling.   Gastrointestinal: Negative for abdominal pain, diarrhea, nausea and vomiting.   Endocrine: Negative for cold intolerance and heat intolerance.   Genitourinary: Negative for dysuria and hematuria.   Musculoskeletal: Negative for back pain.   Skin: Negative for rash.   Allergic/Immunologic: Negative for immunocompromised state.   Neurological: Negative  for dizziness, syncope, weakness and headache.   Psychiatric/Behavioral: Negative for depressed mood. The patient is not nervous/anxious.        Objective   Vitals:    03/10/21 1724   BP: 142/70   Pulse: 71   Resp: 16   Temp: 98 °F (36.7 °C)   SpO2: 99%     Physical Exam  Constitutional:       Appearance: Normal appearance. She is well-developed.   HENT:      Head: Normocephalic and atraumatic.      Right Ear: Tympanic membrane, ear canal and external ear normal.      Left Ear: Tympanic membrane, ear canal and external ear normal.      Nose: Nose normal.      Mouth/Throat:      Mouth: Mucous membranes are moist.      Pharynx: Oropharynx is clear.   Eyes:      Conjunctiva/sclera: Conjunctivae normal.   Neck:      Thyroid: Thyroid mass present. No thyromegaly.      Vascular: Carotid bruit present.      Comments: +bilateral carotid bruits   +enlarged thyroid with multiple nodules which are stable in size  Cardiovascular:      Rate and Rhythm: Normal rate and regular rhythm.      Heart sounds: No murmur heard.     Pulmonary:      Effort: Pulmonary effort is normal.      Breath sounds: Normal breath sounds. No wheezing or rales.   Abdominal:      Palpations: Abdomen is soft. There is no mass.      Tenderness: There is no abdominal tenderness.   Musculoskeletal:      Cervical back: Neck supple.   Lymphadenopathy:      Cervical: No cervical adenopathy.   Skin:     Findings: No rash.   Psychiatric:         Behavior: Behavior normal.               Assessment/Plan   Diagnoses and all orders for this visit:    1. Bilateral carotid artery stenosis (Primary)  - Continue Crestor.     2. Coronary artery disease of native artery of native heart with stable angina pectoris (CMS/HCC)  -     nitroglycerin (NITROSTAT) 0.4 MG SL tablet; Place 1 tablet under the tongue Every 5 (Five) Minutes As Needed for Chest Pain.  Dispense: 25 tablet; Refill: 1  - Continue Crestor, Imdur, Plavix.     3. Essential hypertension  - Continue metoprolol,  losartan.     4. Mixed hyperlipidemia  - Continue Crestor.     5. Multinodular goiter  - Repeat US thyroid in August 2021. Clinically euthyroid per labs.     6. Gastroesophageal reflux disease, unspecified whether esophagitis present  - Continue Protonix.              Return in about 5 months (around 8/10/2021) for Annual physical.

## 2021-03-12 DIAGNOSIS — Z00.00 HEALTHCARE MAINTENANCE: ICD-10-CM

## 2021-03-13 RX ORDER — IBUPROFEN 600 MG/1
TABLET ORAL
Qty: 90 TABLET | Refills: 0 | Status: SHIPPED | OUTPATIENT
Start: 2021-03-13 | End: 2022-06-01 | Stop reason: SDUPTHER

## 2021-03-19 DIAGNOSIS — K21.9 GASTROESOPHAGEAL REFLUX DISEASE WITHOUT ESOPHAGITIS: ICD-10-CM

## 2021-03-21 RX ORDER — PANTOPRAZOLE SODIUM 40 MG/1
TABLET, DELAYED RELEASE ORAL
Qty: 180 TABLET | Refills: 1 | Status: SHIPPED | OUTPATIENT
Start: 2021-03-21 | End: 2021-06-29 | Stop reason: SDUPTHER

## 2021-03-28 DIAGNOSIS — Z00.00 ENCOUNTER FOR HEALTH MAINTENANCE EXAMINATION: ICD-10-CM

## 2021-03-28 DIAGNOSIS — Z86.73 HISTORY OF ARTERIAL ISCHEMIC STROKE: ICD-10-CM

## 2021-03-29 RX ORDER — CLOPIDOGREL BISULFATE 75 MG/1
75 TABLET ORAL DAILY
Qty: 90 TABLET | Refills: 1 | Status: SHIPPED | OUTPATIENT
Start: 2021-03-29 | End: 2021-07-27 | Stop reason: SDUPTHER

## 2021-04-09 DIAGNOSIS — I25.118 CORONARY ARTERY DISEASE OF NATIVE ARTERY OF NATIVE HEART WITH STABLE ANGINA PECTORIS (HCC): ICD-10-CM

## 2021-04-09 RX ORDER — ISOSORBIDE MONONITRATE 30 MG/1
30 TABLET, EXTENDED RELEASE ORAL DAILY
Qty: 90 TABLET | Refills: 1 | Status: SHIPPED | OUTPATIENT
Start: 2021-04-09 | End: 2021-07-27 | Stop reason: SDUPTHER

## 2021-04-09 NOTE — TELEPHONE ENCOUNTER
Caller: Jasvir Hamilton    Relationship: Self    Best call back number: [phone]    Medication needed:   Requested Prescriptions     Pending Prescriptions Disp Refills   • isosorbide mononitrate (IMDUR) 30 MG 24 hr tablet 90 tablet 1     Sig: Take 1 tablet by mouth Daily.       When do you need the refill by: ASAP    What additional details did the patient provide when requesting the medication: PATIENT STATED THAT REFILL WOULD BE REQUESTED BEFORE [facility]     PATIENT STATED THAT PHARMACY WOULD BE ABLE TO FILL THIS WITH INSURANCE IF WE COULD GET PRESCRIPTION INTO [facility] TODAY    PLEASE ADVISE    Does the patient have less than a 3 day supply:  [x] Yes  [] No    What is the patient's preferred pharmacy: [id number] - [place] [address] [facility] AT [facility] [address] & [facility] [phone] [facility] [phone] FX

## 2021-06-29 ENCOUNTER — TELEPHONE (OUTPATIENT)
Dept: INTERNAL MEDICINE | Facility: CLINIC | Age: 66
End: 2021-06-29

## 2021-06-29 ENCOUNTER — PATIENT MESSAGE (OUTPATIENT)
Dept: INTERNAL MEDICINE | Facility: CLINIC | Age: 66
End: 2021-06-29

## 2021-06-29 DIAGNOSIS — I10 ESSENTIAL HYPERTENSION: ICD-10-CM

## 2021-06-29 DIAGNOSIS — K21.9 GASTROESOPHAGEAL REFLUX DISEASE WITHOUT ESOPHAGITIS: ICD-10-CM

## 2021-06-29 RX ORDER — LOSARTAN POTASSIUM 50 MG/1
50 TABLET ORAL DAILY
Qty: 90 TABLET | Refills: 1 | Status: SHIPPED | OUTPATIENT
Start: 2021-06-29 | End: 2021-11-05

## 2021-06-29 RX ORDER — PANTOPRAZOLE SODIUM 40 MG/1
40 TABLET, DELAYED RELEASE ORAL 2 TIMES DAILY
Qty: 180 TABLET | Refills: 1 | Status: SHIPPED | OUTPATIENT
Start: 2021-06-29 | End: 2022-01-07 | Stop reason: SDUPTHER

## 2021-06-29 RX ORDER — METOPROLOL TARTRATE 50 MG/1
50 TABLET, FILM COATED ORAL 2 TIMES DAILY
Qty: 180 TABLET | Refills: 1 | Status: SHIPPED | OUTPATIENT
Start: 2021-06-29 | End: 2021-10-09

## 2021-06-29 NOTE — TELEPHONE ENCOUNTER
----- Message from Jasvir Hamilton sent at 6/29/2021  8:20 AM EDT -----  Regarding: Prescription Question  Contact: [phone]  Please refill my medications and send to my new Pharmacy. [facility] [phone] [facility].   I'm out of losartan and need protonix and metoprolol. I have transferred my isosorbide to [facility].   Thank you  Jasvir Hamilton  1955

## 2021-06-29 NOTE — TELEPHONE ENCOUNTER
From: Jasvir Hamilton  To: Valeria Gentiel PA-C  Sent: 6/29/2021 8:20 AM EDT  Subject: Prescription Question    Please refill my medications and send to my new Pharmacy. [facility] [phone] [facility].   I'm out of losartan and need protonix and metoprolol. I have transferred my isosorbide to [facility].   Thank you  Jasvir Hamilton  1955

## 2021-06-30 ENCOUNTER — DOCUMENTATION (OUTPATIENT)
Dept: PHYSICAL THERAPY | Facility: CLINIC | Age: 66
End: 2021-06-30

## 2021-06-30 NOTE — PROGRESS NOTES
Occupational Therapy Initial Evaluation and Plan of Care      Entered in error to check on insurance issues with OT/PT/SLP

## 2021-07-27 ENCOUNTER — TELEPHONE (OUTPATIENT)
Dept: INTERNAL MEDICINE | Facility: CLINIC | Age: 66
End: 2021-07-27

## 2021-07-27 DIAGNOSIS — Z00.00 ENCOUNTER FOR HEALTH MAINTENANCE EXAMINATION: ICD-10-CM

## 2021-07-27 DIAGNOSIS — I25.118 CORONARY ARTERY DISEASE OF NATIVE ARTERY OF NATIVE HEART WITH STABLE ANGINA PECTORIS (HCC): ICD-10-CM

## 2021-07-27 DIAGNOSIS — Z86.73 HISTORY OF ARTERIAL ISCHEMIC STROKE: ICD-10-CM

## 2021-07-27 RX ORDER — ISOSORBIDE MONONITRATE 30 MG/1
30 TABLET, EXTENDED RELEASE ORAL DAILY
Qty: 90 TABLET | Refills: 1 | Status: SHIPPED | OUTPATIENT
Start: 2021-07-27 | End: 2022-03-04 | Stop reason: SDUPTHER

## 2021-07-27 RX ORDER — CLOPIDOGREL BISULFATE 75 MG/1
75 TABLET ORAL DAILY
Qty: 90 TABLET | Refills: 1 | Status: SHIPPED | OUTPATIENT
Start: 2021-07-27 | End: 2022-01-07 | Stop reason: SDUPTHER

## 2021-07-27 NOTE — TELEPHONE ENCOUNTER
Caller: Jasvir Hamilton    Relationship: Self    Best call back number: [phone]    Medication needed:   Requested Prescriptions     Pending Prescriptions Disp Refills   • clopidogrel (PLAVIX) 75 MG tablet 90 tablet 1     Sig: Take 1 tablet by mouth Daily.   • isosorbide mononitrate (IMDUR) 30 MG 24 hr tablet 90 tablet 1     Sig: Take 1 tablet by mouth Daily.       When do you need the refill by: ASAP    What additional details did the patient provide when requesting the medication: PATIENT NEEDS THIS TO GO TO THE NEW PHARMACY. PATIENT STATES THIS MEDICATION WAS MISSED AND THE ISOSORBIDE WAS SENT FOR A 30 DAY SUPPLY BUT PATIENT WOULD LIKE THE 90 DAY. PATIENT IS COMPLETELY OUT OF THE PLAVIX AND WOULD LIKE A 90 DAY SUPPLY    Does the patient have less than a 3 day supply:  [x] Yes  [] No    [facility] the [facility] [address] [phone] [place] [phone]

## 2021-08-27 ENCOUNTER — TRANSCRIBE ORDERS (OUTPATIENT)
Dept: ADMINISTRATIVE | Facility: HOSPITAL | Age: 66
End: 2021-08-27

## 2021-08-27 DIAGNOSIS — Z12.31 VISIT FOR SCREENING MAMMOGRAM: Primary | ICD-10-CM

## 2021-10-09 DIAGNOSIS — I10 ESSENTIAL HYPERTENSION: ICD-10-CM

## 2021-10-09 RX ORDER — METOPROLOL TARTRATE 50 MG/1
TABLET, FILM COATED ORAL
Qty: 180 TABLET | Refills: 1 | Status: SHIPPED | OUTPATIENT
Start: 2021-10-09 | End: 2022-03-14 | Stop reason: SDUPTHER

## 2021-10-13 ENCOUNTER — APPOINTMENT (OUTPATIENT)
Dept: GENERAL RADIOLOGY | Facility: HOSPITAL | Age: 66
End: 2021-10-13

## 2021-10-13 ENCOUNTER — HOSPITAL ENCOUNTER (EMERGENCY)
Facility: HOSPITAL | Age: 66
Discharge: HOME | End: 2021-10-14
Attending: STUDENT IN AN ORGANIZED HEALTH CARE EDUCATION/TRAINING PROGRAM | Admitting: STUDENT IN AN ORGANIZED HEALTH CARE EDUCATION/TRAINING PROGRAM

## 2021-10-13 DIAGNOSIS — R07.9 CHEST PAIN, UNSPECIFIED TYPE: ICD-10-CM

## 2021-10-13 DIAGNOSIS — R10.13 EPIGASTRIC PAIN: Primary | ICD-10-CM

## 2021-10-13 DIAGNOSIS — I15.9 SECONDARY HYPERTENSION: ICD-10-CM

## 2021-10-13 LAB
ALBUMIN SERPL-MCNC: 3.9 G/DL (ref 3.5–5.2)
ALBUMIN/GLOB SERPL: 1.3 G/DL
ALP SERPL-CCNC: 74 U/L (ref 39–117)
ALT SERPL W P-5'-P-CCNC: 13 U/L (ref 1–33)
ANION GAP SERPL CALCULATED.3IONS-SCNC: 12 MMOL/L (ref 5–15)
AST SERPL-CCNC: 16 U/L (ref 1–32)
BASOPHILS # BLD AUTO: 0.03 10*3/MM3 (ref 0–0.2)
BASOPHILS NFR BLD AUTO: 0.6 % (ref 0–1.5)
BILIRUB SERPL-MCNC: 0.7 MG/DL (ref 0–1.2)
BUN SERPL-MCNC: 15 MG/DL (ref 8–23)
BUN/CREAT SERPL: 18.8 (ref 7–25)
CALCIUM SPEC-SCNC: 9.2 MG/DL (ref 8.6–10.5)
CHLORIDE SERPL-SCNC: 102 MMOL/L (ref 98–107)
CO2 SERPL-SCNC: 24 MMOL/L (ref 22–29)
CREAT SERPL-MCNC: 0.8 MG/DL (ref 0.57–1)
DEPRECATED RDW RBC AUTO: 43.7 FL (ref 37–54)
EOSINOPHIL # BLD AUTO: 0.18 10*3/MM3 (ref 0–0.4)
EOSINOPHIL NFR BLD AUTO: 3.4 % (ref 0.3–6.2)
ERYTHROCYTE [DISTWIDTH] IN BLOOD BY AUTOMATED COUNT: 13.4 % (ref 12.3–15.4)
GFR SERPL CREATININE-BSD FRML MDRD: 72 ML/MIN/1.73
GLOBULIN UR ELPH-MCNC: 2.9 GM/DL
GLUCOSE SERPL-MCNC: 93 MG/DL (ref 65–99)
HCT VFR BLD AUTO: 39.4 % (ref 34–46.6)
HGB BLD-MCNC: 13.1 G/DL (ref 12–15.9)
HOLD SPECIMEN: NORMAL
HOLD SPECIMEN: NORMAL
IMM GRANULOCYTES # BLD AUTO: 0.02 10*3/MM3 (ref 0–0.05)
IMM GRANULOCYTES NFR BLD AUTO: 0.4 % (ref 0–0.5)
LIPASE SERPL-CCNC: 49 U/L (ref 13–60)
LYMPHOCYTES # BLD AUTO: 1.16 10*3/MM3 (ref 0.7–3.1)
LYMPHOCYTES NFR BLD AUTO: 22.1 % (ref 19.6–45.3)
MCH RBC QN AUTO: 29.4 PG (ref 26.6–33)
MCHC RBC AUTO-ENTMCNC: 33.2 G/DL (ref 31.5–35.7)
MCV RBC AUTO: 88.3 FL (ref 79–97)
MONOCYTES # BLD AUTO: 0.5 10*3/MM3 (ref 0.1–0.9)
MONOCYTES NFR BLD AUTO: 9.5 % (ref 5–12)
NEUTROPHILS NFR BLD AUTO: 3.36 10*3/MM3 (ref 1.7–7)
NEUTROPHILS NFR BLD AUTO: 64 % (ref 42.7–76)
NRBC BLD AUTO-RTO: 0 /100 WBC (ref 0–0.2)
NT-PROBNP SERPL-MCNC: 75.6 PG/ML (ref 0–900)
PLATELET # BLD AUTO: 201 10*3/MM3 (ref 140–450)
PMV BLD AUTO: 10 FL (ref 6–12)
POTASSIUM SERPL-SCNC: 4.1 MMOL/L (ref 3.5–5.2)
PROT SERPL-MCNC: 6.8 G/DL (ref 6–8.5)
RBC # BLD AUTO: 4.46 10*6/MM3 (ref 3.77–5.28)
SODIUM SERPL-SCNC: 138 MMOL/L (ref 136–145)
TROPONIN T SERPL-MCNC: <0.01 NG/ML (ref 0–0.03)
WBC # BLD AUTO: 5.25 10*3/MM3 (ref 3.4–10.8)
WHOLE BLOOD HOLD SPECIMEN: NORMAL
WHOLE BLOOD HOLD SPECIMEN: NORMAL

## 2021-10-13 PROCEDURE — 96375 TX/PRO/DX INJ NEW DRUG ADDON: CPT

## 2021-10-13 PROCEDURE — 25010000002 KETOROLAC TROMETHAMINE PER 15 MG: Performed by: STUDENT IN AN ORGANIZED HEALTH CARE EDUCATION/TRAINING PROGRAM

## 2021-10-13 PROCEDURE — 71045 X-RAY EXAM CHEST 1 VIEW: CPT

## 2021-10-13 PROCEDURE — 93005 ELECTROCARDIOGRAM TRACING: CPT | Performed by: STUDENT IN AN ORGANIZED HEALTH CARE EDUCATION/TRAINING PROGRAM

## 2021-10-13 PROCEDURE — 85025 COMPLETE CBC W/AUTO DIFF WBC: CPT

## 2021-10-13 PROCEDURE — 96374 THER/PROPH/DIAG INJ IV PUSH: CPT

## 2021-10-13 PROCEDURE — 80053 COMPREHEN METABOLIC PANEL: CPT | Performed by: STUDENT IN AN ORGANIZED HEALTH CARE EDUCATION/TRAINING PROGRAM

## 2021-10-13 PROCEDURE — 96361 HYDRATE IV INFUSION ADD-ON: CPT

## 2021-10-13 PROCEDURE — 25010000002 ONDANSETRON PER 1 MG: Performed by: STUDENT IN AN ORGANIZED HEALTH CARE EDUCATION/TRAINING PROGRAM

## 2021-10-13 PROCEDURE — 83690 ASSAY OF LIPASE: CPT

## 2021-10-13 PROCEDURE — 99283 EMERGENCY DEPT VISIT LOW MDM: CPT

## 2021-10-13 PROCEDURE — 93005 ELECTROCARDIOGRAM TRACING: CPT

## 2021-10-13 PROCEDURE — 84484 ASSAY OF TROPONIN QUANT: CPT

## 2021-10-13 PROCEDURE — 83880 ASSAY OF NATRIURETIC PEPTIDE: CPT

## 2021-10-13 PROCEDURE — 99284 EMERGENCY DEPT VISIT MOD MDM: CPT

## 2021-10-13 RX ORDER — HYDROMORPHONE HYDROCHLORIDE 1 MG/ML
0.25 INJECTION, SOLUTION INTRAMUSCULAR; INTRAVENOUS; SUBCUTANEOUS ONCE
Status: DISCONTINUED | OUTPATIENT
Start: 2021-10-13 | End: 2021-10-14 | Stop reason: HOSPADM

## 2021-10-13 RX ORDER — ONDANSETRON 2 MG/ML
4 INJECTION INTRAMUSCULAR; INTRAVENOUS ONCE
Status: COMPLETED | OUTPATIENT
Start: 2021-10-13 | End: 2021-10-13

## 2021-10-13 RX ORDER — ASPIRIN 81 MG/1
324 TABLET, CHEWABLE ORAL ONCE
Status: DISCONTINUED | OUTPATIENT
Start: 2021-10-13 | End: 2021-10-14 | Stop reason: HOSPADM

## 2021-10-13 RX ORDER — HYDRALAZINE HYDROCHLORIDE 20 MG/ML
10 INJECTION INTRAMUSCULAR; INTRAVENOUS ONCE
Status: COMPLETED | OUTPATIENT
Start: 2021-10-13 | End: 2021-10-14

## 2021-10-13 RX ORDER — KETOROLAC TROMETHAMINE 15 MG/ML
15 INJECTION, SOLUTION INTRAMUSCULAR; INTRAVENOUS ONCE
Status: COMPLETED | OUTPATIENT
Start: 2021-10-13 | End: 2021-10-13

## 2021-10-13 RX ORDER — SODIUM CHLORIDE 0.9 % (FLUSH) 0.9 %
10 SYRINGE (ML) INJECTION AS NEEDED
Status: DISCONTINUED | OUTPATIENT
Start: 2021-10-13 | End: 2021-10-14 | Stop reason: HOSPADM

## 2021-10-13 RX ADMIN — KETOROLAC TROMETHAMINE 15 MG: 15 INJECTION, SOLUTION INTRAMUSCULAR; INTRAVENOUS at 23:39

## 2021-10-13 RX ADMIN — ONDANSETRON 4 MG: 2 INJECTION INTRAMUSCULAR; INTRAVENOUS at 23:39

## 2021-10-13 RX ADMIN — LIDOCAINE HYDROCHLORIDE: 20 SOLUTION ORAL; TOPICAL at 23:38

## 2021-10-13 RX ADMIN — SODIUM CHLORIDE 500 ML: 9 INJECTION, SOLUTION INTRAVENOUS at 23:38

## 2021-10-14 VITALS
BODY MASS INDEX: 27.94 KG/M2 | TEMPERATURE: 98.1 F | RESPIRATION RATE: 16 BRPM | WEIGHT: 148 LBS | SYSTOLIC BLOOD PRESSURE: 134 MMHG | HEIGHT: 61 IN | HEART RATE: 68 BPM | DIASTOLIC BLOOD PRESSURE: 78 MMHG | OXYGEN SATURATION: 100 %

## 2021-10-14 LAB
FLUAV RNA RESP QL NAA+PROBE: NOT DETECTED
FLUBV RNA RESP QL NAA+PROBE: NOT DETECTED
HOLD SPECIMEN: NORMAL
QT INTERVAL: 398 MS
QT INTERVAL: 406 MS
QTC INTERVAL: 403 MS
QTC INTERVAL: 418 MS
SARS-COV-2 RNA RESP QL NAA+PROBE: NOT DETECTED
TROPONIN T SERPL-MCNC: <0.01 NG/ML (ref 0–0.03)

## 2021-10-14 PROCEDURE — 96375 TX/PRO/DX INJ NEW DRUG ADDON: CPT

## 2021-10-14 PROCEDURE — 93005 ELECTROCARDIOGRAM TRACING: CPT | Performed by: STUDENT IN AN ORGANIZED HEALTH CARE EDUCATION/TRAINING PROGRAM

## 2021-10-14 PROCEDURE — 87636 SARSCOV2 & INF A&B AMP PRB: CPT | Performed by: PHYSICIAN ASSISTANT

## 2021-10-14 PROCEDURE — 25010000002 HYDRALAZINE PER 20 MG: Performed by: STUDENT IN AN ORGANIZED HEALTH CARE EDUCATION/TRAINING PROGRAM

## 2021-10-14 PROCEDURE — C9803 HOPD COVID-19 SPEC COLLECT: HCPCS | Performed by: PHYSICIAN ASSISTANT

## 2021-10-14 PROCEDURE — 84484 ASSAY OF TROPONIN QUANT: CPT | Performed by: STUDENT IN AN ORGANIZED HEALTH CARE EDUCATION/TRAINING PROGRAM

## 2021-10-14 RX ORDER — ONDANSETRON 4 MG/1
4 TABLET, ORALLY DISINTEGRATING ORAL 4 TIMES DAILY PRN
Qty: 15 TABLET | Refills: 0 | Status: SHIPPED | OUTPATIENT
Start: 2021-10-14 | End: 2022-04-13

## 2021-10-14 RX ORDER — FAMOTIDINE 20 MG/1
20 TABLET, FILM COATED ORAL 2 TIMES DAILY
Qty: 30 TABLET | Refills: 0 | Status: SHIPPED | OUTPATIENT
Start: 2021-10-14 | End: 2022-06-22

## 2021-10-14 RX ORDER — SUCRALFATE 1 G/1
1 TABLET ORAL 4 TIMES DAILY
Qty: 60 TABLET | Refills: 0 | Status: SHIPPED | OUTPATIENT
Start: 2021-10-14 | End: 2022-10-26 | Stop reason: SDUPTHER

## 2021-10-14 RX ADMIN — HYDRALAZINE HYDROCHLORIDE 10 MG: 20 INJECTION INTRAMUSCULAR; INTRAVENOUS at 00:08

## 2021-10-14 NOTE — ED PROVIDER NOTES
EMERGENCY DEPARTMENT ENCOUNTER    Pt Name: Jasvir Hamilton  MRN: [id number]  Pt :   1955  Room Number:    Date of encounter:  10/13/2021  PCP: Valeria Gentile PA-C  ED Provider: Benito Hope MD    Historian: Patient      HPI:  Chief Complaint: Abdominal pain, hypertension        Context: Jasvir Hamilton is a 66 y.o. female who presents to the ED c/o intermittent but severe epigastric and stomach pain that has been going on since Saturday. She has history of MI and CHF and says she has chronic chest discomfort and is currently experiencing that but has no new chest pain. She is nauseous but has not appreciated any changes in her pain with eating. Denies diarrhea or constipation. Denies fevers or systemic symptoms. She checked her blood pressure at home and it was elevated with systolic around 200 and so she presents for evaluation of this.      PAST MEDICAL HISTORY  Past Medical History:   Diagnosis Date   • Allergic 80s    Seasonal   • Chicken pox    • Diverticulitis    • Diverticulosis    • Easy bruising    • Gall stones    • GERD (gastroesophageal reflux disease)    • Glaucoma    • Hyperlipemia    • Hypertension    • Ischemic stroke (HCC) 10/24/2019   • Low back pain 2016   • Measles    • Menopause    • Mumps    • Positive PPD    • Scarlet fever    • Tuberculosis 1986    Exposure         PAST SURGICAL HISTORY  Past Surgical History:   Procedure Laterality Date   • CARDIAC CATHETERIZATION     • CARPAL TUNNEL RELEASE      both hands   • CHOLECYSTECTOMY     • COLONOSCOPY     • CORONARY STENT PLACEMENT     • ECTOPIC PREGNANCY     • ENDOMETRIAL ABLATION     • HAND SURGERY     • OOPHORECTOMY      unilateral   • OTHER SURGICAL HISTORY      lap and leep   • SUBTOTAL HYSTERECTOMY      Ablation   • TUBAL ABDOMINAL LIGATION      with L salpingo-oophorectomy         FAMILY HISTORY  Family History   Problem Relation Age of Onset   • Arthritis Mother    • Hypertension  Mother    • Hyperlipidemia Mother    • Heart attack Father    • Hypertension Father    • Obesity Father    • Stroke Father         massive   • Heart disease Father    • Hyperlipidemia Father    • Arthritis Sister    • Hypertension Sister    • No Known Problems Sister    • Breast cancer Neg Hx    • Ovarian cancer Neg Hx          SOCIAL HISTORY  Social History     Socioeconomic History   • Marital status:    Tobacco Use   • Smoking status: Current Every Day Smoker     Packs/day: 0.75     Years: 40.00     Pack years: 30.00     Types: Cigarettes     Last attempt to quit: 2019     Years since quittin.0   • Smokeless tobacco: Never Used   Substance and Sexual Activity   • Alcohol use: Yes     Comment: occassionally   • Drug use: No   • Sexual activity: Defer         ALLERGIES  Pravastatin and Influenza vac split quad        REVIEW OF SYSTEMS  Review of Systems       All systems reviewed and negative except for those discussed in HPI.       PHYSICAL EXAM    I have reviewed the triage vital signs and nursing notes.    ED Triage Vitals [10/13/21 2156]   Temp Heart Rate Resp BP SpO2   98.1 °F (36.7 °C) 66 16 (!) 237/112 96 %      Temp src Heart Rate Source Patient Position BP Location FiO2 (%)   Oral Monitor Sitting Left arm --       Physical Exam  GENERAL:   Appears awake alert in no acute distress  HENT: Nares patent. Moist mucous membranes  EYES: No scleral icterus. Extraocular movements intact  CV: Regular rhythm, regular rate.  RESPIRATORY: Normal effort.  No audible wheezes, rales or rhonchi.  ABDOMEN: Soft, nontender, hyperactive bowel sounds  MUSCULOSKELETAL: No deformities.   NEURO: Alert, moves all extremities, follows commands.  SKIN: Warm, dry, no rash visualized.        LAB RESULTS  Recent Results (from the past 24 hour(s))   Troponin    Collection Time: 10/13/21 10:06 PM    Specimen: Blood   Result Value Ref Range    Troponin T <0.010 0.000 - 0.030 ng/mL   Comprehensive Metabolic Panel     Collection Time: 10/13/21 10:06 PM    Specimen: Blood   Result Value Ref Range    Glucose 93 65 - 99 mg/dL    BUN 15 8 - 23 mg/dL    Creatinine 0.80 0.57 - 1.00 mg/dL    Sodium 138 136 - 145 mmol/L    Potassium 4.1 3.5 - 5.2 mmol/L    Chloride 102 98 - 107 mmol/L    CO2 24.0 22.0 - 29.0 mmol/L    Calcium 9.2 8.6 - 10.5 mg/dL    Total Protein 6.8 6.0 - 8.5 g/dL    Albumin 3.90 3.50 - 5.20 g/dL    ALT (SGPT) 13 1 - 33 U/L    AST (SGOT) 16 1 - 32 U/L    Alkaline Phosphatase 74 39 - 117 U/L    Total Bilirubin 0.7 0.0 - 1.2 mg/dL    eGFR Non African Amer 72 >60 mL/min/1.73    Globulin 2.9 gm/dL    A/G Ratio 1.3 g/dL    BUN/Creatinine Ratio 18.8 7.0 - 25.0    Anion Gap 12.0 5.0 - 15.0 mmol/L   Lipase    Collection Time: 10/13/21 10:06 PM    Specimen: Blood   Result Value Ref Range    Lipase 49 13 - 60 U/L   BNP    Collection Time: 10/13/21 10:06 PM    Specimen: Blood   Result Value Ref Range    proBNP 75.6 0.0 - 900.0 pg/mL   Green Top (Gel)    Collection Time: 10/13/21 10:06 PM   Result Value Ref Range    Extra Tube Hold for add-ons.    Lavender Top    Collection Time: 10/13/21 10:06 PM   Result Value Ref Range    Extra Tube hold for add-on    Gold Top - SST    Collection Time: 10/13/21 10:06 PM   Result Value Ref Range    Extra Tube Hold for add-ons.    Light Blue Top    Collection Time: 10/13/21 10:06 PM   Result Value Ref Range    Extra Tube hold for add-on    CBC Auto Differential    Collection Time: 10/13/21 10:06 PM    Specimen: Blood   Result Value Ref Range    WBC 5.25 3.40 - 10.80 10*3/mm3    RBC 4.46 3.77 - 5.28 10*6/mm3    Hemoglobin 13.1 12.0 - 15.9 g/dL    Hematocrit 39.4 34.0 - 46.6 %    MCV 88.3 79.0 - 97.0 fL    MCH 29.4 26.6 - 33.0 pg    MCHC 33.2 31.5 - 35.7 g/dL    RDW 13.4 12.3 - 15.4 %    RDW-SD 43.7 37.0 - 54.0 fl    MPV 10.0 6.0 - 12.0 fL    Platelets 201 140 - 450 10*3/mm3    Neutrophil % 64.0 42.7 - 76.0 %    Lymphocyte % 22.1 19.6 - 45.3 %    Monocyte % 9.5 5.0 - 12.0 %    Eosinophil % 3.4 0.3 -  6.2 %    Basophil % 0.6 0.0 - 1.5 %    Immature Grans % 0.4 0.0 - 0.5 %    Neutrophils, Absolute 3.36 1.70 - 7.00 10*3/mm3    Lymphocytes, Absolute 1.16 0.70 - 3.10 10*3/mm3    Monocytes, Absolute 0.50 0.10 - 0.90 10*3/mm3    Eosinophils, Absolute 0.18 0.00 - 0.40 10*3/mm3    Basophils, Absolute 0.03 0.00 - 0.20 10*3/mm3    Immature Grans, Absolute 0.02 0.00 - 0.05 10*3/mm3    nRBC 0.0 0.0 - 0.2 /100 WBC   Troponin    Collection Time: 10/14/21 12:08 AM    Specimen: Blood   Result Value Ref Range    Troponin T <0.010 0.000 - 0.030 ng/mL       If labs were ordered, I independently reviewed the results.        RADIOLOGY  XR Chest 1 View    Result Date: 10/13/2021  CR Chest 1 Vw INDICATION: Chest pain COMPARISON:  None available. FINDINGS: Portable AP view(s) of the chest.  The heart and mediastinal contours are normal. The lungs are clear. No pneumothorax or pleural effusion.     No acute cardiopulmonary findings. Signer Name: Danyelle Gardner MD  Signed: 10/13/2021 11:14 PM  Workstation Name: [id number]  [facility]      I ordered and reviewed the above noted radiographic studies.      I viewed images of chest x-ray which revealed clear lung fields, no cardiomegaly, or other abnormalities that I could appreciate    See radiologist's dictation for official interpretation.        PROCEDURES    Procedures    ECG 12 Lead         ECG 12 Lead             MEDICATIONS GIVEN IN ER    Medications   sodium chloride 0.9 % flush 10 mL (has no administration in time range)   aspirin chewable tablet 324 mg (324 mg Oral Not Given 10/13/21 2328)   HYDROmorphone (DILAUDID) injection 0.25 mg (0.25 mg Intravenous Not Given 10/13/21 2341)   sodium chloride 0.9 % bolus 500 mL (0 mL Intravenous Stopped 10/14/21 0100)   ondansetron (ZOFRAN) injection 4 mg (4 mg Intravenous Given 10/13/21 2339)   aluminum-magnesium hydroxide-simethicone (MAALOX MAX) 400-400-40 MG/5ML 30 mL, Lidocaine Viscous HCl (XYLOCAINE) 2 % 15 mL  suspension ( Oral Given 10/13/21 2338)   ketorolac (TORADOL) injection 15 mg (15 mg Intravenous Given 10/13/21 2339)   hydrALAZINE (APRESOLINE) injection 10 mg (10 mg Intravenous Given 10/14/21 0008)         PROGRESS, DATA ANALYSIS, CONSULTS, AND MEDICAL DECISION MAKING    All labs have been independently reviewed by me.  All radiology studies have been reviewed by me and the radiologist dictating the report.   EKG's have been independently viewed and interpreted by me.      Differential diagnoses: Hypertension with endorgan damage, MI, pancreatitis, hepatitis, gastritis, peptic ulcers      ED Course as of 10/14/21 0106   Wed Oct 13, 2021   2256 EKG shows anterior Q waves, but normal rate, rhythm, axis and is unchanged from prior EKG. [CC]      ED Course User Index  [CC] Benito Hope MD       In summary this is a 66-year-old woman who presents to the emergency department because of intermittent epigastric pain since Saturday and hypertension that she discovered today. Upon arrival here she was hypertensive with systolic around 200 and was given 1 dose of hydralazine with subsequent normotension. Her pain was treated with Zofran, Toradol, GI cocktail with significant improvement in her abdominal pain. Labs including CBC, CMP, lipase, troponin, BNP revealed no abnormalities. I told her that I was reassured by her findings and told her that I did not think imaging was required at this time but offered CT scan if she felt her symptoms were bad enough. She was agreeable to deferring imaging at this time. I think this is likely gastritis or peptic ulcer disease. Provided her with Zofran, famotidine, Carafate and instructed to follow-up with PCP. Her heart score was 3 but she does not have a cardiologist here in town so was provided referral to establish care. She was counseled on return precautions verbally expressed understanding of these.      AS OF 01:06 EDT VITALS:    BP - 134/78  HR - 68  TEMP - 98.1 °F  (36.7 °C) (Oral)  O2 SATS - 100%        DIAGNOSIS  Final diagnoses:   Epigastric pain   Chest pain, unspecified type   Secondary hypertension         DISPOSITION  DISCHARGE    Patient discharged in stable condition.    Reviewed implications of results, diagnosis, meds, responsibility to follow up, warning signs and symptoms of possible worsening, potential complications and reasons to return to ER.    Patient/Family voiced understanding of above instructions.    Discussed plan for discharge, as there is no emergent indication for admission.  Pt/family is agreeable and understands need for follow up and possible repeat testing.  Pt/family is aware that discharge does not mean that nothing is wrong but that it indicates no emergency is currently present that requires admission and they must continue care with follow-up as given below or with a physician of their choice.     FOLLOW-UP  Valeria Gentile, JOHANN  [address] [facility]  [address]  [facility] [address] [phone]    Call       Chucho Quintanilla MD  [address] [facility] [address]  [facility] [id number] [phone]    Call            Medication List      New Prescriptions    famotidine 20 MG tablet  Commonly known as: PEPCID  Take 1 tablet by mouth 2 (Two) Times a Day.     ondansetron ODT 4 MG disintegrating tablet  Commonly known as: ZOFRAN-ODT  Place 1 tablet on the tongue 4 (Four) Times a Day As Needed for Nausea or Vomiting.     sucralfate 1 g tablet  Commonly known as: CARAFATE  Take 1 tablet by mouth 4 (Four) Times a Day. Thoroughly crush pill and mix in small amount of water prior to swallowing.           Where to Get Your Medications      [facility] [address] [place] [address] [address] [place] [address] AT [address] [phone] [facility] [phone] [place] [address]    Phone: [phone]   · famotidine 20 MG tablet  · ondansetron ODT 4 MG disintegrating tablet  · sucralfate 1 g tablet                   Benito Hope MD  10/14/21 0112

## 2021-10-14 NOTE — DISCHARGE INSTRUCTIONS
While today's work-up was reassuring should your symptoms worsen in any way or you develop any new concerning symptoms like fevers please return to the ED or seek other medical care. Continue to monitor your blood pressure and follow-up with your PCP regarding this. Try the provided medications for symptom relief.

## 2021-10-27 ENCOUNTER — HOSPITAL ENCOUNTER (OUTPATIENT)
Dept: MAMMOGRAPHY | Facility: HOSPITAL | Age: 66
Discharge: HOME | End: 2021-10-27
Admitting: PHYSICIAN ASSISTANT

## 2021-10-27 DIAGNOSIS — Z12.31 VISIT FOR SCREENING MAMMOGRAM: ICD-10-CM

## 2021-10-27 PROCEDURE — 77067 SCR MAMMO BI INCL CAD: CPT | Performed by: RADIOLOGY

## 2021-10-27 PROCEDURE — 77067 SCR MAMMO BI INCL CAD: CPT

## 2021-10-27 PROCEDURE — 77063 BREAST TOMOSYNTHESIS BI: CPT

## 2021-10-27 PROCEDURE — 77063 BREAST TOMOSYNTHESIS BI: CPT | Performed by: RADIOLOGY

## 2021-11-05 ENCOUNTER — OFFICE VISIT (OUTPATIENT)
Dept: INTERNAL MEDICINE | Facility: CLINIC | Age: 66
End: 2021-11-05

## 2021-11-05 VITALS
HEIGHT: 61 IN | BODY MASS INDEX: 28.4 KG/M2 | TEMPERATURE: 97.9 F | OXYGEN SATURATION: 98 % | SYSTOLIC BLOOD PRESSURE: 152 MMHG | DIASTOLIC BLOOD PRESSURE: 90 MMHG | WEIGHT: 150.4 LBS | HEART RATE: 61 BPM

## 2021-11-05 DIAGNOSIS — I10 ESSENTIAL HYPERTENSION: ICD-10-CM

## 2021-11-05 DIAGNOSIS — I25.118 CORONARY ARTERY DISEASE OF NATIVE ARTERY OF NATIVE HEART WITH STABLE ANGINA PECTORIS (HCC): ICD-10-CM

## 2021-11-05 DIAGNOSIS — E04.2 MULTINODULAR THYROID: Primary | ICD-10-CM

## 2021-11-05 PROCEDURE — 99214 OFFICE O/P EST MOD 30 MIN: CPT | Performed by: PHYSICIAN ASSISTANT

## 2021-11-05 RX ORDER — LOSARTAN POTASSIUM 100 MG/1
100 TABLET ORAL DAILY
Qty: 90 TABLET | Refills: 1 | Status: SHIPPED | OUTPATIENT
Start: 2021-11-05 | End: 2022-03-14 | Stop reason: SDUPTHER

## 2021-11-29 DIAGNOSIS — Z86.73 HISTORY OF ARTERIAL ISCHEMIC STROKE: ICD-10-CM

## 2021-11-29 DIAGNOSIS — E78.2 MIXED HYPERLIPIDEMIA: ICD-10-CM

## 2021-11-29 RX ORDER — ROSUVASTATIN CALCIUM 40 MG/1
40 TABLET, COATED ORAL DAILY
Qty: 90 TABLET | Refills: 3 | Status: SHIPPED | OUTPATIENT
Start: 2021-11-29 | End: 2022-03-04 | Stop reason: SDUPTHER

## 2022-01-07 DIAGNOSIS — Z00.00 ENCOUNTER FOR HEALTH MAINTENANCE EXAMINATION: ICD-10-CM

## 2022-01-07 DIAGNOSIS — Z86.73 HISTORY OF ARTERIAL ISCHEMIC STROKE: ICD-10-CM

## 2022-01-07 DIAGNOSIS — K21.9 GASTROESOPHAGEAL REFLUX DISEASE WITHOUT ESOPHAGITIS: ICD-10-CM

## 2022-01-07 RX ORDER — PANTOPRAZOLE SODIUM 40 MG/1
40 TABLET, DELAYED RELEASE ORAL 2 TIMES DAILY
Qty: 180 TABLET | Refills: 1 | Status: SHIPPED | OUTPATIENT
Start: 2022-01-07 | End: 2022-03-16 | Stop reason: SDUPTHER

## 2022-01-07 RX ORDER — CLOPIDOGREL BISULFATE 75 MG/1
75 TABLET ORAL DAILY
Qty: 90 TABLET | Refills: 1 | Status: SHIPPED | OUTPATIENT
Start: 2022-01-07 | End: 2022-03-30 | Stop reason: SDUPTHER

## 2022-02-08 ENCOUNTER — OFFICE VISIT (OUTPATIENT)
Dept: INTERNAL MEDICINE | Facility: CLINIC | Age: 67
End: 2022-02-08

## 2022-02-08 VITALS
BODY MASS INDEX: 29.66 KG/M2 | SYSTOLIC BLOOD PRESSURE: 148 MMHG | TEMPERATURE: 97.1 F | RESPIRATION RATE: 18 BRPM | DIASTOLIC BLOOD PRESSURE: 78 MMHG | HEART RATE: 56 BPM | WEIGHT: 157 LBS | OXYGEN SATURATION: 98 %

## 2022-02-08 DIAGNOSIS — M54.41 RIGHT-SIDED LOW BACK PAIN WITH RIGHT-SIDED SCIATICA, UNSPECIFIED CHRONICITY: Primary | ICD-10-CM

## 2022-02-08 DIAGNOSIS — Z23 NEED FOR TDAP VACCINATION: ICD-10-CM

## 2022-02-08 LAB
BILIRUB BLD-MCNC: NEGATIVE MG/DL
CLARITY, POC: ABNORMAL
COLOR UR: YELLOW
EXPIRATION DATE: ABNORMAL
GLUCOSE UR STRIP-MCNC: NEGATIVE MG/DL
KETONES UR QL: NEGATIVE
LEUKOCYTE EST, POC: NEGATIVE
Lab: ABNORMAL
NITRITE UR-MCNC: NEGATIVE MG/ML
PH UR: 6 [PH] (ref 5–8)
PROT UR STRIP-MCNC: NEGATIVE MG/DL
RBC # UR STRIP: ABNORMAL /UL
SP GR UR: 1.01 (ref 1–1.03)
UROBILINOGEN UR QL: ABNORMAL

## 2022-02-08 PROCEDURE — 81003 URINALYSIS AUTO W/O SCOPE: CPT | Performed by: NURSE PRACTITIONER

## 2022-02-08 PROCEDURE — 99213 OFFICE O/P EST LOW 20 MIN: CPT | Performed by: NURSE PRACTITIONER

## 2022-02-08 PROCEDURE — 87086 URINE CULTURE/COLONY COUNT: CPT | Performed by: NURSE PRACTITIONER

## 2022-02-08 PROCEDURE — 81015 MICROSCOPIC EXAM OF URINE: CPT | Performed by: NURSE PRACTITIONER

## 2022-02-08 RX ORDER — CYCLOBENZAPRINE HCL 10 MG
10 TABLET ORAL NIGHTLY PRN
Qty: 30 TABLET | Refills: 0 | Status: SHIPPED | OUTPATIENT
Start: 2022-02-08 | End: 2022-08-16 | Stop reason: SDUPTHER

## 2022-02-08 RX ORDER — NITROFURANTOIN 25; 75 MG/1; MG/1
100 CAPSULE ORAL 2 TIMES DAILY
Qty: 14 CAPSULE | Refills: 0 | Status: SHIPPED | OUTPATIENT
Start: 2022-02-08 | End: 2022-04-13

## 2022-02-08 RX ORDER — METHYLPREDNISOLONE 4 MG/1
TABLET ORAL
Qty: 1 EACH | Refills: 0 | Status: SHIPPED | OUTPATIENT
Start: 2022-02-08 | End: 2022-04-13

## 2022-02-08 NOTE — PROGRESS NOTES
Chief Complaint  nerve pain (siactic)    Subjective          Jasvir Hamilton presents to [facility]  History of Present Illness  Patient here today with complaints of sciatic nerve pain.    Patient is a patient of Julio Gentile and new to me.  The patient's vital signs today note an increase of 7 pounds weight and an elevated blood pressure.    4d pmh lbp L4 and R to right upper thigh and taking tylenol and one motirn and helps just a little no weakness numbness no change in bowele bladder no precipatiting events  Like prlonged driving walking.     She has had intermittent chronic low grade low back pain no radicular pain    No rashes    Standing alleviating  Aggravating events -  Does waken through the night    Review of systems negative except low back I am very glad to eat dinner pain as noted above    Objective   Vital Signs:   /78 (BP Location: Right arm, Patient Position: Sitting, Cuff Size: Adult)   Pulse 56   Temp 97.1 °F (36.2 °C) (Temporal)   Resp 18   Wt 71.2 kg (157 lb)   SpO2 98%   BMI 29.66 kg/m²     Physical Exam   Result Review :                 Assessment and Plan    Diagnoses and all orders for this visit:    1. Right-sided low back pain with right-sided sciatica, unspecified chronicity (Primary)  -     POC Urinalysis Dipstick, Automated  -     Urinalysis, Microscopic Only - Urine, Clean Catch  -     Urine Culture - Urine, Urine, Clean Catch  -     methylPREDNISolone (MEDROL) 4 MG dose pack; Take as directed on package instructions.  Dispense: 1 each; Refill: 0  -     cyclobenzaprine (FLEXERIL) 10 MG tablet; Take 1 tablet by mouth At Night As Needed for Muscle Spasms.  Dispense: 30 tablet; Refill: 0  -     nitrofurantoin, macrocrystal-monohydrate, (Macrobid) 100 MG capsule; Take 1 capsule by mouth 2 (Two) Times a Day.  Dispense: 14 capsule; Refill: 0    2. Need for Tdap vaccination  -     Tdap Vaccine Greater Than or Equal To 8yo  IM      “Discussed risks/benefits to vaccination, reviewed components of the vaccine, discussed VIS, discussed informed consent, informed consent obtained. Patient/Parent was allowed to accept or refuse vaccine. Questions answered to satisfactory state of patient/Parent. We reviewed typical age appropriate and seasonally appropriate vaccinations. Reviewed immunization history and updated state vaccination form as needed. Patient was counseled on Tdap      Follow Up   No follow-ups on file.  Patient was given instructions and counseling regarding her condition or for health maintenance advice. Please see specific information pulled into the AVS if appropriate.     RTC/call  If symptoms worsen  Meds MOA and SE's reviewed and pt v/u

## 2022-02-09 LAB
BACTERIA SPEC AEROBE CULT: NO GROWTH
BACTERIA UR QL AUTO: ABNORMAL /HPF
HYALINE CASTS UR QL AUTO: ABNORMAL /LPF
RBC # UR STRIP: ABNORMAL /HPF
REF LAB TEST METHOD: ABNORMAL
SQUAMOUS #/AREA URNS HPF: ABNORMAL /HPF
WBC # UR STRIP: ABNORMAL /HPF

## 2022-02-11 NOTE — PATIENT INSTRUCTIONS
Low back pain   Low Back Sprain or Strain Rehab  Ask your health care provider which exercises are safe for you. Do exercises exactly as told by your health care provider and adjust them as directed. It is normal to feel mild stretching, pulling, tightness, or discomfort as you do these exercises. Stop right away if you feel sudden pain or your pain gets worse. Do not begin these exercises until told by your health care provider.  Stretching and range-of-motion exercises  These exercises warm up your muscles and joints and improve the movement and flexibility of your back. These exercises also help to relieve pain, numbness, and tingling.  Lumbar rotation    1. Lie on your back on a firm surface and bend your knees.  2. Straighten your arms out to your sides so each arm forms a 90-degree angle (right angle) with a side of your body.  3. Slowly move (rotate) both of your knees to one side of your body until you feel a stretch in your lower back (lumbar). Try not to let your shoulders lift off the floor.  4. Hold this position for __________ seconds.  5. Tense your abdominal muscles and slowly move your knees back to the starting position.  6. Repeat this exercise on the other side of your body.  Repeat __________ times. Complete this exercise __________ times a day.  Single knee to chest    1. Lie on your back on a firm surface with both legs straight.  2. Bend one of your knees. Use your hands to move your knee up toward your chest until you feel a gentle stretch in your lower back and buttock.  ? Hold your leg in this position by holding on to the front of your knee.  ? Keep your other leg as straight as possible.  3. Hold this position for __________ seconds.  4. Slowly return to the starting position.  5. Repeat with your other leg.  Repeat __________ times. Complete this exercise __________ times a day.  Prone extension on elbows    1. Lie on your abdomen on a firm surface (prone position).  2. Prop yourself up  on your elbows.  3. Use your arms to help lift your chest up until you feel a gentle stretch in your abdomen and your lower back.  ? This will place some of your body weight on your elbows. If this is uncomfortable, try stacking pillows under your chest.  ? Your hips should stay down, against the surface that you are lying on. Keep your hip and back muscles relaxed.  4. Hold this position for __________ seconds.  5. Slowly relax your upper body and return to the starting position.  Repeat __________ times. Complete this exercise __________ times a day.  Strengthening exercises  These exercises build strength and endurance in your back. Endurance is the ability to use your muscles for a long time, even after they get tired.  Pelvic tilt  This exercise strengthens the muscles that lie deep in the abdomen.  1. Lie on your back on a firm surface. Bend your knees and keep your feet flat on the floor.  2. Tense your abdominal muscles. Tip your pelvis up toward the ceiling and flatten your lower back into the floor.  ? To help with this exercise, you may place a small towel under your lower back and try to push your back into the towel.  3. Hold this position for __________ seconds.  4. Let your muscles relax completely before you repeat this exercise.  Repeat __________ times. Complete this exercise __________ times a day.  Alternating arm and leg raises    1. Get on your hands and knees on a firm surface. If you are on a hard floor, you may want to use padding, such as an exercise mat, to cushion your knees.  2. Line up your arms and legs. Your hands should be directly below your shoulders, and your knees should be directly below your hips.  3. Lift your left leg behind you. At the same time, raise your right arm and straighten it in front of you.  ? Do not lift your leg higher than your hip.  ? Do not lift your arm higher than your shoulder.  ? Keep your abdominal and back muscles tight.  ? Keep your hips facing the  ground.  ? Do not arch your back.  ? Keep your balance carefully, and do not hold your breath.  4. Hold this position for __________ seconds.  5. Slowly return to the starting position.  6. Repeat with your right leg and your left arm.  Repeat __________ times. Complete this exercise __________ times a day.  Abdominal set with straight leg raise    1. Lie on your back on a firm surface.  2. Bend one of your knees and keep your other leg straight.  3. Tense your abdominal muscles and lift your straight leg up, 4-6 inches (10-15 cm) off the ground.  4. Keep your abdominal muscles tight and hold this position for __________ seconds.  ? Do not hold your breath.  ? Do not arch your back. Keep it flat against the ground.  5. Keep your abdominal muscles tense as you slowly lower your leg back to the starting position.  6. Repeat with your other leg.  Repeat __________ times. Complete this exercise __________ times a day.  Single leg lower with bent knees  1. Lie on your back on a firm surface.  2. Tense your abdominal muscles and lift your feet off the floor, one foot at a time, so your knees and hips are bent in 90-degree angles (right angles).  ? Your knees should be over your hips and your lower legs should be parallel to the floor.  3. Keeping your abdominal muscles tense and your knee bent, slowly lower one of your legs so your toe touches the ground.  4. Lift your leg back up to return to the starting position.  ? Do not hold your breath.  ? Do not let your back arch. Keep your back flat against the ground.  5. Repeat with your other leg.  Repeat __________ times. Complete this exercise __________ times a day.  Posture and body mechanics  Good posture and healthy body mechanics can help to relieve stress in your body's tissues and joints. Body mechanics refers to the movements and positions of your body while you do your daily activities. Posture is part of body mechanics. Good posture means:  · Your spine is in its  natural S-curve position (neutral).  · Your shoulders are pulled back slightly.  · Your head is not tipped forward.  Follow these guidelines to improve your posture and body mechanics in your everyday activities.  Standing    · When standing, keep your spine neutral and your feet about hip width apart. Keep a slight bend in your knees. Your ears, shoulders, and hips should line up.  · When you do a task in which you  one place for a long time, place one foot up on a stable object that is 2-4 inches (5-10 cm) high, such as a footstool. This helps keep your spine neutral.    Sitting    · When sitting, keep your spine neutral and keep your feet flat on the floor. Use a footrest, if necessary, and keep your thighs parallel to the floor. Avoid rounding your shoulders, and avoid tilting your head forward.  · When working at a desk or a computer, keep your desk at a height where your hands are slightly lower than your elbows. Slide your chair under your desk so you are close enough to maintain good posture.  · When working at a computer, place your monitor at a height where you are looking straight ahead and you do not have to tilt your head forward or downward to look at the screen.    Resting  · When lying down and resting, avoid positions that are most painful for you.  · If you have pain with activities such as sitting, bending, stooping, or squatting, lie in a position in which your body does not bend very much. For example, avoid curling up on your side with your arms and knees near your chest (fetal position).  · If you have pain with activities such as standing for a long time or reaching with your arms, lie with your spine in a neutral position and bend your knees slightly. Try the following positions:  ? Lying on your side with a pillow between your knees.  ? Lying on your back with a pillow under your knees.  Lifting    · When lifting objects, keep your feet at least shoulder width apart and tighten your  abdominal muscles.  · Bend your knees and hips and keep your spine neutral. It is important to lift using the strength of your legs, not your back. Do not lock your knees straight out.  · Always ask for help to lift heavy or awkward objects.    This information is not intended to replace advice given to you by your health care provider. Make sure you discuss any questions you have with your health care provider.  Document Revised: 04/10/2020 Document Reviewed: 01/09/2020  Elsevier Patient Education © 2021 [employer]  ACP information pamphlet given to the patient.  ACP information provided on the AVS.

## 2022-02-21 ENCOUNTER — TELEPHONE (OUTPATIENT)
Dept: INTERNAL MEDICINE | Facility: CLINIC | Age: 67
End: 2022-02-21

## 2022-02-21 NOTE — TELEPHONE ENCOUNTER
Caller: Jasvir Hamilton    Relationship to patient: Self    Best call back number: [phone]     What is the call regarding:  PATIENT STATES THAT SHE CONSTANTLY BLOWING HER NOSE, HEADACHE, AND SHE IS FATIGUED.  SHE TOOK A COVID TEST AND IT WAS NEGATIVE.  WHAT WOULD TAYO SUGGEST AT TO MEDICINE OR WHAT SHE SHOULD DO.  HER PHARMACY IS [facility]

## 2022-02-21 NOTE — TELEPHONE ENCOUNTER
Would advise she be seen in office before further medication prescribed. Please schedule accordingly.

## 2022-02-23 NOTE — TELEPHONE ENCOUNTER
Patient states her call was from 2 days ago and she's improving. She states she just has a stuffy head. She states she doesn't know what could be done for her at this point. She states her car is in the shop, I offered a video visit. She states she will call back if she needs to be seen.

## 2022-02-23 NOTE — TELEPHONE ENCOUNTER
LVM requesting pt call back.     HUB/PAR please advise:  Would advise she be seen in office before further medication prescribed. Please schedule accordingly. Same Day appointment.

## 2022-03-03 DIAGNOSIS — E78.2 MIXED HYPERLIPIDEMIA: ICD-10-CM

## 2022-03-03 DIAGNOSIS — I25.118 CORONARY ARTERY DISEASE OF NATIVE ARTERY OF NATIVE HEART WITH STABLE ANGINA PECTORIS: ICD-10-CM

## 2022-03-03 DIAGNOSIS — Z86.73 HISTORY OF ARTERIAL ISCHEMIC STROKE: ICD-10-CM

## 2022-03-03 RX ORDER — ISOSORBIDE MONONITRATE 30 MG/1
30 TABLET, EXTENDED RELEASE ORAL DAILY
Qty: 90 TABLET | Refills: 1 | Status: CANCELLED | OUTPATIENT
Start: 2022-03-03

## 2022-03-03 NOTE — TELEPHONE ENCOUNTER
Caller: Jasvir Hamilton    Relationship: Self    Best call back number: [phone]    Requested Prescriptions:   Requested Prescriptions     Pending Prescriptions Disp Refills   • isosorbide mononitrate (IMDUR) 30 MG 24 hr tablet 90 tablet 1     Sig: Take 1 tablet by mouth Daily.   • rosuvastatin (CRESTOR) 40 MG tablet 90 tablet 3     Sig: Take 1 tablet by mouth Daily.        Pharmacy where request should be sent: [facility] - [address] - [phone]  - [phone] FX     Additional details provided by patient:     Does the patient have less than a 3 day supply:  [] Yes  [x] No    Brinda Alcantara   03/03/22 15:06 EST

## 2022-03-04 DIAGNOSIS — Z86.73 HISTORY OF ARTERIAL ISCHEMIC STROKE: ICD-10-CM

## 2022-03-04 DIAGNOSIS — I25.118 CORONARY ARTERY DISEASE OF NATIVE ARTERY OF NATIVE HEART WITH STABLE ANGINA PECTORIS: ICD-10-CM

## 2022-03-04 DIAGNOSIS — E78.2 MIXED HYPERLIPIDEMIA: ICD-10-CM

## 2022-03-04 RX ORDER — ISOSORBIDE MONONITRATE 30 MG/1
30 TABLET, EXTENDED RELEASE ORAL DAILY
Qty: 90 TABLET | Refills: 1 | Status: SHIPPED | OUTPATIENT
Start: 2022-03-04 | End: 2022-03-09 | Stop reason: SDUPTHER

## 2022-03-04 RX ORDER — ROSUVASTATIN CALCIUM 40 MG/1
40 TABLET, COATED ORAL DAILY
Qty: 90 TABLET | Refills: 1 | Status: SHIPPED | OUTPATIENT
Start: 2022-03-04 | End: 2022-03-09 | Stop reason: SDUPTHER

## 2022-03-08 ENCOUNTER — HOSPITAL ENCOUNTER (OUTPATIENT)
Dept: ULTRASOUND IMAGING | Facility: HOSPITAL | Age: 67
Discharge: HOME | End: 2022-03-08
Admitting: PHYSICIAN ASSISTANT

## 2022-03-08 PROCEDURE — 76536 US EXAM OF HEAD AND NECK: CPT

## 2022-03-08 RX ORDER — ROSUVASTATIN CALCIUM 40 MG/1
40 TABLET, COATED ORAL DAILY
Qty: 90 TABLET | Refills: 3 | Status: SHIPPED | OUTPATIENT
Start: 2022-03-08 | End: 2022-03-09 | Stop reason: SDUPTHER

## 2022-03-08 NOTE — TELEPHONE ENCOUNTER
Patient is calling back and to see what the status of the prescription is; can someone please call back to advise; this is for the     • isosorbide mononitrate (IMDUR) 30 MG 24 hr tablet 90 tablet 1       Sig: Take 1 tablet by mouth Daily.   • rosuvastatin (CRESTOR) 40 MG tablet 90 tablet 3       Sig: Take 1 tablet by mouth Daily.

## 2022-03-08 NOTE — TELEPHONE ENCOUNTER
Patient called and was checking on her refill for isosorbide mononitrate. Patient talked to [facility] pharmacy and they stated they did not have this refill. Patient only has 4 pills left and has been trying to save them by only taking it every other day. Patient switched to [facility] 3/1. Patient is frustrated that this has been such an ordeal. Patient stated that she is concerned at this point to do mail in for this medication due to only having a few left. She asked that we send it into [facility] in [place] at [phone].    Please advise?

## 2022-03-09 ENCOUNTER — TELEPHONE (OUTPATIENT)
Dept: INTERNAL MEDICINE | Facility: CLINIC | Age: 67
End: 2022-03-09

## 2022-03-09 DIAGNOSIS — I25.118 CORONARY ARTERY DISEASE OF NATIVE ARTERY OF NATIVE HEART WITH STABLE ANGINA PECTORIS: ICD-10-CM

## 2022-03-09 DIAGNOSIS — E78.2 MIXED HYPERLIPIDEMIA: ICD-10-CM

## 2022-03-09 DIAGNOSIS — Z86.73 HISTORY OF ARTERIAL ISCHEMIC STROKE: ICD-10-CM

## 2022-03-09 RX ORDER — ROSUVASTATIN CALCIUM 40 MG/1
40 TABLET, COATED ORAL DAILY
Qty: 90 TABLET | Refills: 1 | Status: SHIPPED | OUTPATIENT
Start: 2022-03-09 | End: 2022-03-30

## 2022-03-09 RX ORDER — ISOSORBIDE MONONITRATE 30 MG/1
30 TABLET, EXTENDED RELEASE ORAL DAILY
Qty: 90 TABLET | Refills: 1 | Status: SHIPPED | OUTPATIENT
Start: 2022-03-09 | End: 2022-03-30 | Stop reason: SDUPTHER

## 2022-03-09 RX ORDER — ISOSORBIDE MONONITRATE 30 MG/1
30 TABLET, EXTENDED RELEASE ORAL DAILY
Qty: 30 TABLET | Refills: 0 | Status: SHIPPED | OUTPATIENT
Start: 2022-03-09 | End: 2022-03-09 | Stop reason: SDUPTHER

## 2022-03-09 RX ORDER — ROSUVASTATIN CALCIUM 40 MG/1
40 TABLET, COATED ORAL DAILY
Qty: 30 TABLET | Refills: 0 | Status: SHIPPED | OUTPATIENT
Start: 2022-03-09 | End: 2022-03-30 | Stop reason: SDUPTHER

## 2022-03-09 NOTE — TELEPHONE ENCOUNTER
Please let patient know her thyroid ultrasound is stable as compared to previous. No change to current plan.

## 2022-03-14 DIAGNOSIS — E78.2 MIXED HYPERLIPIDEMIA: ICD-10-CM

## 2022-03-14 DIAGNOSIS — I10 ESSENTIAL HYPERTENSION: ICD-10-CM

## 2022-03-14 DIAGNOSIS — Z86.73 HISTORY OF ARTERIAL ISCHEMIC STROKE: ICD-10-CM

## 2022-03-14 DIAGNOSIS — I25.118 CORONARY ARTERY DISEASE OF NATIVE ARTERY OF NATIVE HEART WITH STABLE ANGINA PECTORIS: ICD-10-CM

## 2022-03-14 NOTE — TELEPHONE ENCOUNTER
Caller: [facility]IL DELIV[facility] - [place], [place] - [address] - [phone] [facility] [phone] FX    Relationship: Pharmacy    Best call back number: [phone]    Requested Prescriptions:   Requested Prescriptions     Pending Prescriptions Disp Refills   • metoprolol tartrate (LOPRESSOR) 50 MG tablet 180 tablet 1     Sig: Take 1 tablet by mouth 2 (Two) Times a Day.   • losartan (Cozaar) 100 MG tablet 90 tablet 1     Sig: Take 1 tablet by mouth Daily.        Pharmacy where request should be sent: [facility] - [place], [place] - [address] - [phone] [facility] [phone] FX  [address] [phone] [facility] [phone] FX     Additional details provided by patient:   Does the patient have less than a 3 day supply:  [x] Yes  [] No    Ly Collado Rep   03/14/22 16:32 EDT

## 2022-03-15 RX ORDER — METOPROLOL TARTRATE 50 MG/1
50 TABLET, FILM COATED ORAL 2 TIMES DAILY
Qty: 180 TABLET | Refills: 1 | Status: SHIPPED | OUTPATIENT
Start: 2022-03-15 | End: 2022-05-31

## 2022-03-15 RX ORDER — LOSARTAN POTASSIUM 100 MG/1
100 TABLET ORAL DAILY
Qty: 90 TABLET | Refills: 1 | Status: SHIPPED | OUTPATIENT
Start: 2022-03-15 | End: 2022-05-05 | Stop reason: SDUPTHER

## 2022-03-16 DIAGNOSIS — K21.9 GASTROESOPHAGEAL REFLUX DISEASE WITHOUT ESOPHAGITIS: ICD-10-CM

## 2022-03-16 RX ORDER — PANTOPRAZOLE SODIUM 40 MG/1
40 TABLET, DELAYED RELEASE ORAL 2 TIMES DAILY
Qty: 180 TABLET | Refills: 1 | Status: SHIPPED | OUTPATIENT
Start: 2022-03-16 | End: 2022-04-13

## 2022-03-30 ENCOUNTER — OFFICE VISIT (OUTPATIENT)
Dept: INTERNAL MEDICINE | Facility: CLINIC | Age: 67
End: 2022-03-30

## 2022-03-30 VITALS
TEMPERATURE: 97.8 F | WEIGHT: 155.38 LBS | DIASTOLIC BLOOD PRESSURE: 80 MMHG | SYSTOLIC BLOOD PRESSURE: 130 MMHG | HEIGHT: 60 IN | HEART RATE: 76 BPM | RESPIRATION RATE: 20 BRPM | BODY MASS INDEX: 30.51 KG/M2

## 2022-03-30 DIAGNOSIS — I25.118 CORONARY ARTERY DISEASE OF NATIVE ARTERY OF NATIVE HEART WITH STABLE ANGINA PECTORIS: ICD-10-CM

## 2022-03-30 DIAGNOSIS — Z72.0 TOBACCO USE: ICD-10-CM

## 2022-03-30 DIAGNOSIS — Z86.73 HISTORY OF ARTERIAL ISCHEMIC STROKE: ICD-10-CM

## 2022-03-30 DIAGNOSIS — E78.2 MIXED HYPERLIPIDEMIA: ICD-10-CM

## 2022-03-30 DIAGNOSIS — Z12.4 PAPANICOLAOU SMEAR FOR CERVICAL CANCER SCREENING: ICD-10-CM

## 2022-03-30 DIAGNOSIS — E55.9 VITAMIN D DEFICIENCY: ICD-10-CM

## 2022-03-30 DIAGNOSIS — I10 ESSENTIAL HYPERTENSION: ICD-10-CM

## 2022-03-30 DIAGNOSIS — E04.2 MULTINODULAR GOITER: ICD-10-CM

## 2022-03-30 DIAGNOSIS — Z00.00 ENCOUNTER FOR HEALTH MAINTENANCE EXAMINATION: Primary | ICD-10-CM

## 2022-03-30 DIAGNOSIS — Z87.891 PERSONAL HISTORY OF TOBACCO USE, PRESENTING HAZARDS TO HEALTH: ICD-10-CM

## 2022-03-30 PROCEDURE — 99396 PREV VISIT EST AGE 40-64: CPT | Performed by: PHYSICIAN ASSISTANT

## 2022-03-30 RX ORDER — ROSUVASTATIN CALCIUM 40 MG/1
40 TABLET, COATED ORAL DAILY
Qty: 90 TABLET | Refills: 1 | Status: SHIPPED | OUTPATIENT
Start: 2022-03-30 | End: 2022-07-27 | Stop reason: SDUPTHER

## 2022-03-30 RX ORDER — CLOPIDOGREL BISULFATE 75 MG/1
75 TABLET ORAL DAILY
Qty: 90 TABLET | Refills: 1 | Status: SHIPPED | OUTPATIENT
Start: 2022-03-30 | End: 2022-05-23

## 2022-03-30 RX ORDER — ISOSORBIDE MONONITRATE 30 MG/1
30 TABLET, EXTENDED RELEASE ORAL DAILY
Qty: 90 TABLET | Refills: 1 | Status: SHIPPED | OUTPATIENT
Start: 2022-03-30 | End: 2022-07-27 | Stop reason: SDUPTHER

## 2022-04-05 ENCOUNTER — LAB (OUTPATIENT)
Dept: LAB | Facility: HOSPITAL | Age: 67
End: 2022-04-05

## 2022-04-05 DIAGNOSIS — E04.2 MULTINODULAR GOITER: ICD-10-CM

## 2022-04-05 DIAGNOSIS — Z00.00 ENCOUNTER FOR HEALTH MAINTENANCE EXAMINATION: ICD-10-CM

## 2022-04-05 DIAGNOSIS — E78.2 MIXED HYPERLIPIDEMIA: ICD-10-CM

## 2022-04-05 DIAGNOSIS — E55.9 VITAMIN D DEFICIENCY: ICD-10-CM

## 2022-04-05 LAB
25(OH)D3 SERPL-MCNC: 72.8 NG/ML (ref 30–100)
ALBUMIN SERPL-MCNC: 4.5 G/DL (ref 3.5–5.2)
ALBUMIN/GLOB SERPL: 2.8 G/DL
ALP SERPL-CCNC: 77 U/L (ref 39–117)
ALT SERPL W P-5'-P-CCNC: 11 U/L (ref 1–33)
ANION GAP SERPL CALCULATED.3IONS-SCNC: 9.9 MMOL/L (ref 5–15)
AST SERPL-CCNC: 13 U/L (ref 1–32)
BASOPHILS # BLD AUTO: 0.04 10*3/MM3 (ref 0–0.2)
BASOPHILS NFR BLD AUTO: 0.8 % (ref 0–1.5)
BILIRUB SERPL-MCNC: 0.8 MG/DL (ref 0–1.2)
BUN SERPL-MCNC: 14 MG/DL (ref 8–23)
BUN/CREAT SERPL: 16.5 (ref 7–25)
CALCIUM SPEC-SCNC: 9.7 MG/DL (ref 8.6–10.5)
CHLORIDE SERPL-SCNC: 103 MMOL/L (ref 98–107)
CHOLEST SERPL-MCNC: 135 MG/DL (ref 0–200)
CO2 SERPL-SCNC: 27.1 MMOL/L (ref 22–29)
CREAT SERPL-MCNC: 0.85 MG/DL (ref 0.57–1)
DEPRECATED RDW RBC AUTO: 44.8 FL (ref 37–54)
EGFRCR SERPLBLD CKD-EPI 2021: 76.1 ML/MIN/1.73
EOSINOPHIL # BLD AUTO: 0.12 10*3/MM3 (ref 0–0.4)
EOSINOPHIL NFR BLD AUTO: 2.5 % (ref 0.3–6.2)
ERYTHROCYTE [DISTWIDTH] IN BLOOD BY AUTOMATED COUNT: 14.1 % (ref 12.3–15.4)
GLOBULIN UR ELPH-MCNC: 1.6 GM/DL
GLUCOSE SERPL-MCNC: 91 MG/DL (ref 65–99)
HBA1C MFR BLD: 5.8 % (ref 4.8–5.6)
HCT VFR BLD AUTO: 42.3 % (ref 34–46.6)
HDLC SERPL-MCNC: 44 MG/DL (ref 40–60)
HGB BLD-MCNC: 14.3 G/DL (ref 12–15.9)
IMM GRANULOCYTES # BLD AUTO: 0.02 10*3/MM3 (ref 0–0.05)
IMM GRANULOCYTES NFR BLD AUTO: 0.4 % (ref 0–0.5)
LDLC SERPL CALC-MCNC: 67 MG/DL (ref 0–100)
LDLC/HDLC SERPL: 1.45 {RATIO}
LYMPHOCYTES # BLD AUTO: 0.9 10*3/MM3 (ref 0.7–3.1)
LYMPHOCYTES NFR BLD AUTO: 19 % (ref 19.6–45.3)
MCH RBC QN AUTO: 29.5 PG (ref 26.6–33)
MCHC RBC AUTO-ENTMCNC: 33.8 G/DL (ref 31.5–35.7)
MCV RBC AUTO: 87.2 FL (ref 79–97)
MONOCYTES # BLD AUTO: 0.33 10*3/MM3 (ref 0.1–0.9)
MONOCYTES NFR BLD AUTO: 7 % (ref 5–12)
NEUTROPHILS NFR BLD AUTO: 3.32 10*3/MM3 (ref 1.7–7)
NEUTROPHILS NFR BLD AUTO: 70.3 % (ref 42.7–76)
NRBC BLD AUTO-RTO: 0 /100 WBC (ref 0–0.2)
PLATELET # BLD AUTO: 196 10*3/MM3 (ref 140–450)
PMV BLD AUTO: 10.7 FL (ref 6–12)
POTASSIUM SERPL-SCNC: 4.5 MMOL/L (ref 3.5–5.2)
PROT SERPL-MCNC: 6.1 G/DL (ref 6–8.5)
RBC # BLD AUTO: 4.85 10*6/MM3 (ref 3.77–5.28)
SODIUM SERPL-SCNC: 140 MMOL/L (ref 136–145)
TRIGL SERPL-MCNC: 137 MG/DL (ref 0–150)
TSH SERPL DL<=0.05 MIU/L-ACNC: 1.27 UIU/ML (ref 0.27–4.2)
VLDLC SERPL-MCNC: 24 MG/DL (ref 5–40)
WBC NRBC COR # BLD: 4.73 10*3/MM3 (ref 3.4–10.8)

## 2022-04-05 PROCEDURE — 83036 HEMOGLOBIN GLYCOSYLATED A1C: CPT

## 2022-04-05 PROCEDURE — 80061 LIPID PANEL: CPT

## 2022-04-05 PROCEDURE — 85025 COMPLETE CBC W/AUTO DIFF WBC: CPT

## 2022-04-05 PROCEDURE — 80053 COMPREHEN METABOLIC PANEL: CPT

## 2022-04-05 PROCEDURE — 82306 VITAMIN D 25 HYDROXY: CPT

## 2022-04-05 PROCEDURE — 84443 ASSAY THYROID STIM HORMONE: CPT

## 2022-04-13 ENCOUNTER — OFFICE VISIT (OUTPATIENT)
Dept: INTERNAL MEDICINE | Facility: CLINIC | Age: 67
End: 2022-04-13

## 2022-04-13 ENCOUNTER — TELEPHONE (OUTPATIENT)
Dept: INTERNAL MEDICINE | Facility: CLINIC | Age: 67
End: 2022-04-13

## 2022-04-13 VITALS
SYSTOLIC BLOOD PRESSURE: 130 MMHG | TEMPERATURE: 97.1 F | HEART RATE: 64 BPM | WEIGHT: 159 LBS | DIASTOLIC BLOOD PRESSURE: 74 MMHG | BODY MASS INDEX: 31.22 KG/M2 | HEIGHT: 60 IN | OXYGEN SATURATION: 95 % | RESPIRATION RATE: 20 BRPM

## 2022-04-13 DIAGNOSIS — J98.8 RESPIRATORY INFECTION: ICD-10-CM

## 2022-04-13 DIAGNOSIS — J98.8 RESPIRATORY INFECTION: Primary | ICD-10-CM

## 2022-04-13 DIAGNOSIS — R05.9 COUGH: ICD-10-CM

## 2022-04-13 PROCEDURE — 99213 OFFICE O/P EST LOW 20 MIN: CPT | Performed by: NURSE PRACTITIONER

## 2022-04-13 RX ORDER — AZITHROMYCIN 250 MG/1
TABLET, FILM COATED ORAL
Qty: 6 TABLET | Refills: 0 | Status: SHIPPED | OUTPATIENT
Start: 2022-04-13 | End: 2022-04-13 | Stop reason: SDUPTHER

## 2022-04-13 RX ORDER — DEXTROMETHORPHAN HYDROBROMIDE AND PROMETHAZINE HYDROCHLORIDE 15; 6.25 MG/5ML; MG/5ML
5 SYRUP ORAL 4 TIMES DAILY PRN
Qty: 60 ML | Refills: 0 | Status: SHIPPED | OUTPATIENT
Start: 2022-04-13 | End: 2022-06-22

## 2022-04-13 RX ORDER — AZITHROMYCIN 250 MG/1
TABLET, FILM COATED ORAL
Qty: 6 TABLET | Refills: 0 | Status: SHIPPED | OUTPATIENT
Start: 2022-04-13 | End: 2022-06-22

## 2022-04-13 NOTE — TELEPHONE ENCOUNTER
Caller: Jasvir Hamilton    Relationship to patient: Self    Best call back number: [phone]    Patient is needing: PATIENT STATED THAT MEDICATION WOULD NEED TO BE SENT TO [facility]'S #[id number]

## 2022-04-13 NOTE — PROGRESS NOTES
Patient Name: Jasvir Hamilton  : 1955   MRN: [id number]     Chief Complaint:    Chief Complaint   Patient presents with   • Cough     Since yesterday   • Nasal Congestion   • Sore Throat     Covid test negative today       History of Present Illness: Jasvir Hamilton is a 67 y.o. female presents to clinic for cough, nasal congestion, and sore throat. She has some intermittent wheezing and slight shortness of breath. Denies fever but having chills.     Subjective     Review of System: Review of Systems   Constitutional: Positive for chills. Negative for fever.   HENT: Positive for postnasal drip, rhinorrhea and sore throat.    Respiratory: Positive for cough, shortness of breath and wheezing.    Cardiovascular: Negative for chest pain.   Musculoskeletal: Positive for myalgias.   Skin: Negative for rash.   Neurological: Positive for headaches.        Medications:     Current Outpatient Medications:   •  aspirin 81 MG chewable tablet, CHEW 1 TABLET DAILY, Disp: 90 tablet, Rfl: 1  •  cholecalciferol (VITAMIN D3) 25 MCG (1000 UT) tablet, Take 1 tablet by mouth Daily., Disp: 90 tablet, Rfl: 1  •  clopidogrel (PLAVIX) 75 MG tablet, Take 1 tablet by mouth Daily., Disp: 90 tablet, Rfl: 1  •  cyclobenzaprine (FLEXERIL) 10 MG tablet, Take 1 tablet by mouth At Night As Needed for Muscle Spasms., Disp: 30 tablet, Rfl: 0  •  ibuprofen (ADVIL,MOTRIN) 600 MG tablet, TAKE 1 TABLET EVERY 8 HOURSAS NEEDED FOR MILD PAIN, Disp: 90 tablet, Rfl: 0  •  isosorbide mononitrate (IMDUR) 30 MG 24 hr tablet, Take 1 tablet by mouth Daily., Disp: 90 tablet, Rfl: 1  •  losartan (Cozaar) 100 MG tablet, Take 1 tablet by mouth Daily., Disp: 90 tablet, Rfl: 1  •  metoprolol tartrate (LOPRESSOR) 50 MG tablet, Take 1 tablet by mouth 2 (Two) Times a Day., Disp: 180 tablet, Rfl: 1  •  nitroglycerin (NITROSTAT) 0.4 MG SL tablet, Place 1 tablet under the tongue Every 5 (Five) Minutes As Needed for Chest Pain., Disp: 25 tablet,  "Rfl: 1  •  rosuvastatin (CRESTOR) 40 MG tablet, Take 1 tablet by mouth Daily., Disp: 90 tablet, Rfl: 1  •  sucralfate (CARAFATE) 1 g tablet, Take 1 tablet by mouth 4 (Four) Times a Day. Thoroughly crush pill and mix in small amount of water prior to swallowing., Disp: 60 tablet, Rfl: 0  •  azithromycin (Zithromax Z-Erik) 250 MG tablet, Take 2 tablets by mouth on day 1, then 1 tablet daily on days 2-5, Disp: 6 tablet, Rfl: 0  •  famotidine (PEPCID) 20 MG tablet, Take 1 tablet by mouth 2 (Two) Times a Day., Disp: 30 tablet, Rfl: 0  •  promethazine-dextromethorphan (PROMETHAZINE-DM) 6.25-15 MG/5ML syrup, Take 5 mL by mouth 4 (Four) Times a Day As Needed for Cough., Disp: 60 mL, Rfl: 0    Allergies:   Allergies   Allergen Reactions   • Pravastatin Myalgia   • Influenza Vac Split Quad Hives       Objective     Physical Exam:   Vital Signs:   Vitals:    04/13/22 1017   BP: 130/74   BP Location: Right arm   Patient Position: Sitting   Cuff Size: Adult   Pulse: 64   Resp: 20   Temp: 97.1 °F (36.2 °C)   TempSrc: Infrared   SpO2: 95%   Weight: 72.1 kg (159 lb)   Height: 152.4 cm (60\")   PainSc:   8           Physical Exam  Constitutional:       Appearance: She is ill-appearing.   HENT:      Right Ear: Tympanic membrane normal.      Left Ear: Tympanic membrane normal.      Nose: Congestion and rhinorrhea present.      Mouth/Throat:      Pharynx: Posterior oropharyngeal erythema present.   Cardiovascular:      Rate and Rhythm: Normal rate and regular rhythm.      Heart sounds: Normal heart sounds. No murmur heard.  Pulmonary:      Effort: No respiratory distress.      Breath sounds: No stridor. Wheezing (intermittent wheeze in right lower lobe) present. No rhonchi or rales.   Lymphadenopathy:      Cervical: No cervical adenopathy.         Assessment / Plan      Assessment/Plan:   Diagnoses and all orders for this visit:    1. Respiratory infection (Primary)  -     azithromycin (Zithromax Z-Erik) 250 MG tablet; Take 2 tablets by " mouth on day 1, then 1 tablet daily on days 2-5  Dispense: 6 tablet; Refill: 0    2. Cough  -     promethazine-dextromethorphan (PROMETHAZINE-DM) 6.25-15 MG/5ML syrup; Take 5 mL by mouth 4 (Four) Times a Day As Needed for Cough.  Dispense: 60 mL; Refill: 0     Encouraged to quit smoking.           Follow Up:   Return if symptoms worsen or fail to improve.    LORI Gunn  [facility]

## 2022-04-14 ENCOUNTER — APPOINTMENT (OUTPATIENT)
Dept: CT IMAGING | Facility: HOSPITAL | Age: 67
End: 2022-04-14

## 2022-04-15 ENCOUNTER — TELEPHONE (OUTPATIENT)
Dept: INTERNAL MEDICINE | Facility: CLINIC | Age: 67
End: 2022-04-15

## 2022-04-15 DIAGNOSIS — J98.8 RESPIRATORY INFECTION: Primary | ICD-10-CM

## 2022-04-15 RX ORDER — METHYLPREDNISOLONE 4 MG/1
TABLET ORAL
Qty: 21 TABLET | Refills: 0 | Status: SHIPPED | OUTPATIENT
Start: 2022-04-15 | End: 2022-06-22

## 2022-04-15 NOTE — TELEPHONE ENCOUNTER
Caller: Jasvir Hamilton    Relationship: Self    Best call back number: [phone]    What is the best time to reach you: ANYTIME    Who are you requesting to speak with (clinical staff, provider,  specific staff member): TU ANGEL OR NURSE        What was the call regarding: THE PATIENT WAS SEEN MY TU ON 04/13/2022 SHE STATES THAT THE DOCTOR TOLD HER THAT SHE WOULD CALL IN PREDNISONE FOR HER IF SHE DID NOT GET BETTER THE PATIENT STATES THAT SHE FEELS WORSE THAN SHE DID ON 04/13/2022 SHE STATES THAT SHE IS WHEEZING AND SHE CAN NOT GET THE CHEST CONGESTION TO LOSSEN UP. THE PATIENT WOULD LIKE THE MEDICATION [facility]ED [facility] ON [email or web address] PHONE NUMBER [phone]  PLEASE CALL PATIENT TO LET HER KNOW IF THAT CAN BE DONE   Do you require a callback: YES

## 2022-04-19 ENCOUNTER — TELEPHONE (OUTPATIENT)
Dept: INTERNAL MEDICINE | Facility: CLINIC | Age: 67
End: 2022-04-19

## 2022-04-19 PROBLEM — R73.03 PREDIABETES: Status: ACTIVE | Noted: 2022-04-19

## 2022-04-19 NOTE — TELEPHONE ENCOUNTER
Please let patient know her labs look good except for A1c, which is her diabetes screening. This has risen just a little since last check and she is in very early stage of prediabetes. I want her to really work on diet particularly cutting out carbs and sweets, as well as exercise so we can improve her glucose numbers.   Remainder of labs stable, cholesterol is excellent so continue on all current meds.

## 2022-04-21 NOTE — TELEPHONE ENCOUNTER
Spoke with patient and let her know her labs look good except for A1c, which is her diabetes screening. This has risen just a little since last check and she is in very early stage of prediabetes. I want her to really work on diet particularly cutting out carbs and sweets, as well as exercise so we can improve her glucose numbers.   Remainder of labs stable, cholesterol is excellent so continue on all current meds.       Patient verbalized understanding and has no additional questions at this time.

## 2022-05-05 ENCOUNTER — HOSPITAL ENCOUNTER (OUTPATIENT)
Dept: CT IMAGING | Facility: HOSPITAL | Age: 67
Discharge: HOME | End: 2022-05-05
Admitting: PHYSICIAN ASSISTANT

## 2022-05-05 DIAGNOSIS — I10 ESSENTIAL HYPERTENSION: ICD-10-CM

## 2022-05-05 DIAGNOSIS — Z72.0 TOBACCO USE: ICD-10-CM

## 2022-05-05 DIAGNOSIS — I25.118 CORONARY ARTERY DISEASE OF NATIVE ARTERY OF NATIVE HEART WITH STABLE ANGINA PECTORIS: ICD-10-CM

## 2022-05-05 DIAGNOSIS — Z87.891 PERSONAL HISTORY OF TOBACCO USE, PRESENTING HAZARDS TO HEALTH: ICD-10-CM

## 2022-05-05 PROCEDURE — 71271 CT THORAX LUNG CANCER SCR C-: CPT

## 2022-05-05 RX ORDER — LOSARTAN POTASSIUM 100 MG/1
100 TABLET ORAL DAILY
Qty: 90 TABLET | Refills: 1 | OUTPATIENT
Start: 2022-05-05

## 2022-05-05 RX ORDER — LOSARTAN POTASSIUM 100 MG/1
100 TABLET ORAL DAILY
Qty: 90 TABLET | Refills: 1 | Status: SHIPPED | OUTPATIENT
Start: 2022-05-05 | End: 2022-10-26 | Stop reason: SDUPTHER

## 2022-05-06 ENCOUNTER — TELEPHONE (OUTPATIENT)
Dept: INTERNAL MEDICINE | Facility: CLINIC | Age: 67
End: 2022-05-06

## 2022-05-06 NOTE — TELEPHONE ENCOUNTER
Caller: Jasvir Hamilton    Relationship: Self    Best call back number: [phone]    What medications are you currently taking:   Current Outpatient Medications on File Prior to Visit   Medication Sig Dispense Refill   • aspirin 81 MG chewable tablet CHEW 1 TABLET DAILY 90 tablet 1   • azithromycin (Zithromax Z-Erik) 250 MG tablet Take 2 tablets by mouth on day 1, then 1 tablet daily on days 2-5 6 tablet 0   • cholecalciferol (VITAMIN D3) 25 MCG (1000 UT) tablet Take 1 tablet by mouth Daily. 90 tablet 1   • clopidogrel (PLAVIX) 75 MG tablet Take 1 tablet by mouth Daily. 90 tablet 1   • cyclobenzaprine (FLEXERIL) 10 MG tablet Take 1 tablet by mouth At Night As Needed for Muscle Spasms. 30 tablet 0   • famotidine (PEPCID) 20 MG tablet Take 1 tablet by mouth 2 (Two) Times a Day. 30 tablet 0   • ibuprofen (ADVIL,MOTRIN) 600 MG tablet TAKE 1 TABLET EVERY 8 HOURSAS NEEDED FOR MILD PAIN 90 tablet 0   • isosorbide mononitrate (IMDUR) 30 MG 24 hr tablet Take 1 tablet by mouth Daily. 90 tablet 1   • losartan (Cozaar) 100 MG tablet Take 1 tablet by mouth Daily. 90 tablet 1   • methylPREDNISolone (MEDROL) 4 MG dose pack Take as directed on package instructions. 21 tablet 0   • metoprolol tartrate (LOPRESSOR) 50 MG tablet Take 1 tablet by mouth 2 (Two) Times a Day. 180 tablet 1   • nitroglycerin (NITROSTAT) 0.4 MG SL tablet Place 1 tablet under the tongue Every 5 (Five) Minutes As Needed for Chest Pain. 25 tablet 1   • promethazine-dextromethorphan (PROMETHAZINE-DM) 6.25-15 MG/5ML syrup Take 5 mL by mouth 4 (Four) Times a Day As Needed for Cough. 60 mL 0   • rosuvastatin (CRESTOR) 40 MG tablet Take 1 tablet by mouth Daily. 90 tablet 1   • sucralfate (CARAFATE) 1 g tablet Take 1 tablet by mouth 4 (Four) Times a Day. Thoroughly crush pill and mix in small amount of water prior to swallowing. 60 tablet 0     No current facility-administered medications on file prior to visit.              Which medication are you  concerned about: SHIRLEY    Who prescribed you this medication:TAYO BROWN    What are your concerns: PATIENT NOT SURE WHY [facility]USH SENT IN A REFILL REQUEST FOR MEDICATION SINCE SHE DOESN'T NEED A REFILL AT THIS TIME. IT WAS LAST FILLED ON 03/15/2022

## 2022-05-06 NOTE — TELEPHONE ENCOUNTER
Spoke with pt, she stated that she was not needing a refill at this time and is unsure why Pavel requested one. She stated that she will call the Mail delivery pharmacy and see why they did that.

## 2022-05-28 DIAGNOSIS — I10 ESSENTIAL HYPERTENSION: ICD-10-CM

## 2022-05-31 RX ORDER — METOPROLOL TARTRATE 50 MG/1
TABLET, FILM COATED ORAL
Qty: 180 TABLET | Refills: 1 | Status: SHIPPED | OUTPATIENT
Start: 2022-05-31 | End: 2022-11-14 | Stop reason: SDUPTHER

## 2022-06-01 ENCOUNTER — TELEPHONE (OUTPATIENT)
Dept: INTERNAL MEDICINE | Facility: CLINIC | Age: 67
End: 2022-06-01

## 2022-06-01 ENCOUNTER — PATIENT MESSAGE (OUTPATIENT)
Dept: INTERNAL MEDICINE | Facility: CLINIC | Age: 67
End: 2022-06-01

## 2022-06-01 DIAGNOSIS — Z00.00 HEALTHCARE MAINTENANCE: ICD-10-CM

## 2022-06-01 DIAGNOSIS — Z78.0 POST-MENOPAUSAL: Primary | ICD-10-CM

## 2022-06-01 RX ORDER — IBUPROFEN 600 MG/1
600 TABLET ORAL EVERY 6 HOURS PRN
Qty: 90 TABLET | Refills: 1 | Status: SHIPPED | OUTPATIENT
Start: 2022-06-01 | End: 2022-07-08

## 2022-06-01 NOTE — TELEPHONE ENCOUNTER
I haven't received any message about this, but I am happy to place order for DEXA scan. Please let her know this has been ordered and we will be in touch for scheduling.

## 2022-06-01 NOTE — TELEPHONE ENCOUNTER
Pt notified of clinical message. Pt verbalized good understanding.    Pt is asking if PCP received message about bone density scan and if possible for order to be placed. Scan offered free of charge by insurance.

## 2022-06-01 NOTE — TELEPHONE ENCOUNTER
Please let her know her CT chest scan was stable with no concerning findings. Continue with annual screenings.

## 2022-06-22 ENCOUNTER — HOSPITAL ENCOUNTER (OUTPATIENT)
Dept: BONE DENSITY | Facility: HOSPITAL | Age: 67
Discharge: HOME | End: 2022-06-22
Admitting: PHYSICIAN ASSISTANT

## 2022-06-22 ENCOUNTER — TELEPHONE (OUTPATIENT)
Dept: INTERNAL MEDICINE | Facility: CLINIC | Age: 67
End: 2022-06-22

## 2022-06-22 DIAGNOSIS — Z78.0 POST-MENOPAUSAL: ICD-10-CM

## 2022-06-22 PROCEDURE — 77080 DXA BONE DENSITY AXIAL: CPT

## 2022-06-22 RX ORDER — KETOCONAZOLE 20 MG/ML
SHAMPOO TOPICAL 2 TIMES WEEKLY
Qty: 360 ML | Refills: 3 | Status: SHIPPED | OUTPATIENT
Start: 2022-06-23

## 2022-06-22 NOTE — TELEPHONE ENCOUNTER
Caller: Jasvir Hamilton    Relationship: Self    Best call back number: [phone]    Requested Prescriptions: ketoconazole (NIZORAL) 2 % shampoo    Pharmacy where request should be sent: [facility] (NOW [facility]) - [place] [id number] - [phone]  - [phone] FX     Additional details provided by patient: SHE USES IT FOR HER DRY ITCHY SCALP. 90 DAY REFILL IS REQUESTED    Does the patient have less than a 3 day supply:  [x] Yes  [] No    Rosalino Gerard, PCT   06/22/22 16:11 EDT

## 2022-06-23 ENCOUNTER — TELEPHONE (OUTPATIENT)
Dept: CARDIOLOGY | Facility: CLINIC | Age: 67
End: 2022-06-23

## 2022-07-08 DIAGNOSIS — Z00.00 HEALTHCARE MAINTENANCE: ICD-10-CM

## 2022-07-08 RX ORDER — IBUPROFEN 600 MG/1
TABLET ORAL
Qty: 90 TABLET | Refills: 1 | Status: SHIPPED | OUTPATIENT
Start: 2022-07-08 | End: 2023-03-13 | Stop reason: SDUPTHER

## 2022-07-27 ENCOUNTER — OFFICE VISIT (OUTPATIENT)
Dept: INTERNAL MEDICINE | Facility: CLINIC | Age: 67
End: 2022-07-27

## 2022-07-27 VITALS
TEMPERATURE: 96.9 F | HEIGHT: 60 IN | BODY MASS INDEX: 31.61 KG/M2 | RESPIRATION RATE: 18 BRPM | OXYGEN SATURATION: 99 % | SYSTOLIC BLOOD PRESSURE: 128 MMHG | HEART RATE: 62 BPM | DIASTOLIC BLOOD PRESSURE: 62 MMHG | WEIGHT: 161 LBS

## 2022-07-27 DIAGNOSIS — E78.2 MIXED HYPERLIPIDEMIA: ICD-10-CM

## 2022-07-27 DIAGNOSIS — I25.118 CORONARY ARTERY DISEASE OF NATIVE ARTERY OF NATIVE HEART WITH STABLE ANGINA PECTORIS: ICD-10-CM

## 2022-07-27 DIAGNOSIS — M79.641 HAND PAIN, RIGHT: Primary | ICD-10-CM

## 2022-07-27 DIAGNOSIS — K21.9 GASTROESOPHAGEAL REFLUX DISEASE, UNSPECIFIED WHETHER ESOPHAGITIS PRESENT: ICD-10-CM

## 2022-07-27 DIAGNOSIS — I10 ESSENTIAL HYPERTENSION: ICD-10-CM

## 2022-07-27 DIAGNOSIS — F17.200 CURRENT EVERY DAY SMOKER: ICD-10-CM

## 2022-07-27 DIAGNOSIS — Z86.73 HISTORY OF ARTERIAL ISCHEMIC STROKE: ICD-10-CM

## 2022-07-27 PROCEDURE — 99214 OFFICE O/P EST MOD 30 MIN: CPT | Performed by: NURSE PRACTITIONER

## 2022-07-27 RX ORDER — PANTOPRAZOLE SODIUM 40 MG/1
40 TABLET, DELAYED RELEASE ORAL DAILY
COMMUNITY
Start: 2022-05-23 | End: 2022-11-29 | Stop reason: SDUPTHER

## 2022-07-27 RX ORDER — ISOSORBIDE MONONITRATE 30 MG/1
30 TABLET, EXTENDED RELEASE ORAL DAILY
Qty: 90 TABLET | Refills: 1 | Status: SHIPPED | OUTPATIENT
Start: 2022-07-27 | End: 2022-11-14

## 2022-07-27 RX ORDER — ROSUVASTATIN CALCIUM 40 MG/1
40 TABLET, COATED ORAL DAILY
Qty: 90 TABLET | Refills: 1 | Status: SHIPPED | OUTPATIENT
Start: 2022-07-27 | End: 2022-11-14 | Stop reason: SDUPTHER

## 2022-07-27 NOTE — PROGRESS NOTES
"Patient Name: Jasvir Hamilton  : 1955   MRN: [id number]     Chief Complaint:    Chief Complaint   Patient presents with   • Hand Pain       History of Present Illness: Jasvir Hamilton is a 67 y.o. female presents to clinic for hand pain. She would like to establish with me.       Right hand and wrist pain \"pulling\" and difficulty picking things up 1-2 weeks ago. She has seen a hand doctor for tendon issues in in the past.  She has had multiple injections in both hands and a tendon release in her left hand.  She started hand exercises that she had done in the past.  It improved her symptoms.  She denies any numbness or tingling.     She smokes. She has tried gum and cold turkey quitting.  She has been unable to quit.    Hyperlipidemia  She is following a low fat diet.  She takes rosuvastatin 40 mg daily. LDL goal <70.    Ischemic Stroke (2019)  Patient had difficulty speaking and found suspected old white matter infarct in the left corona radiata.   IMPRESSION:  Heavily motion degraded exam, but with no evidence to  suggest acute infarction or other acute intracranial disease. Suspected  small old white matter infarct in the left corona radiata.    Bilateral Carotid Duplex (10/01/2019 09:42)    Interpretation Summary    · No evidence of hemodynamically significant stenosis of bilateral internal carotid arteries.  · Bilateral mild to moderate irregular plaque is noted.  · Right external carotid artery velocity is significantly elevated consistent with moderate to severe stenosis.    Intracranial carotid stenosis  CTA head imaging showed focal calcification of the intracavernous left ICA and proximal supraclinoid ICA, difficult to assess clearly for stenosis, thought to represent 50% or less stenosis.     Thyroid nodule  US Thyroid (2022 10:18)    IMPRESSION:  1. Overall stable appearance of multinodular goiter with largest solid  nodule on the right measuring 1.6 cm and on the left " measuring 1.5 cm.   2.  No new or enlarging nodule.    CAD  Cardiac stents in 2015 with Absorb III Randomized controlled trial MID and LAD.  She was seeing Dr. Ashish Ashford in [place]. She is taking Plavix 75 mg. Denies chest pain, exertional chest pain, or dyspnea. She is on BB and isosorbide.     Adult Transthoracic Echo Complete W/ Cont if Necessary Per Protocol (With Agitated Saline) (10/01/2019 09:19)  Interpretation Summary    · Mild mitral valve regurgitation is present.  · Mild tricuspid valve regurgitation is present.  · Calculated right ventricular systolic pressure from tricuspid regurgitation is 24 mmHg.  · Estimated EF = 68%.  · Left ventricular systolic function is normal.  · Left ventricular wall thickness is consistent with mild concentric hypertrophy.  · Normal right ventricular cavity size, wall thickness, systolic function and septal motion noted.  · Left ventricular diastolic dysfunction is abnormal consistent with: age.  · Saline test results are negative.  · The aortic valve exhibits mild sclerosis.  · No evidence of pulmonary hypertension is present.  · There is no evidence of pericardial effusion.     GERD  Sx controlled on sucralfate.        Past Medical History:   Diagnosis Date   • Allergic 80s    Seasonal   • Chicken pox    • Coronary artery disease 2015   • Diverticulitis    • Diverticulosis 2019   • Easy bruising    • Gall stones    • GERD (gastroesophageal reflux disease)    • Glaucoma    • Hyperlipemia    • Hypertension    • Ischemic stroke (HCC) 10/24/2019   • Low back pain 2016   • Measles    • Menopause    • Mumps    • Positive PPD    • Scarlet fever    • Tuberculosis 1986    Exposure           Subjective     Review of System: Review of Systems   Constitutional: Negative for fatigue and fever.   HENT: Negative for congestion and rhinorrhea.    Respiratory: Negative for cough, shortness of breath and wheezing.    Cardiovascular: Negative for chest pain and palpitations.    Gastrointestinal: Negative for abdominal pain, diarrhea, nausea and vomiting.   Genitourinary: Negative for dysuria.   Musculoskeletal: Negative for myalgias.   Skin: Negative for rash.   Neurological: Negative for headaches.   Hematological: Negative for adenopathy.   Psychiatric/Behavioral: Negative for dysphoric mood.        Medications:     Current Outpatient Medications:   •  aspirin 81 MG chewable tablet, CHEW 1 TABLET DAILY, Disp: 90 tablet, Rfl: 1  •  cholecalciferol (VITAMIN D3) 25 MCG (1000 UT) tablet, Take 1 tablet by mouth Daily., Disp: 90 tablet, Rfl: 1  •  clopidogrel (PLAVIX) 75 MG tablet, TAKE 1 TABLET EVERY DAY, Disp: 90 tablet, Rfl: 1  •  ketoconazole (NIZORAL) 2 % shampoo, Apply  topically to the appropriate area as directed 2 (Two) Times a Week., Disp: 360 mL, Rfl: 3  •  losartan (Cozaar) 100 MG tablet, Take 1 tablet by mouth Daily., Disp: 90 tablet, Rfl: 1  •  metoprolol tartrate (LOPRESSOR) 50 MG tablet, TAKE 1 TABLET TWICE DAILY, Disp: 180 tablet, Rfl: 1  •  nitroglycerin (NITROSTAT) 0.4 MG SL tablet, Place 1 tablet under the tongue Every 5 (Five) Minutes As Needed for Chest Pain., Disp: 25 tablet, Rfl: 1  •  sucralfate (CARAFATE) 1 g tablet, Take 1 tablet by mouth 4 (Four) Times a Day. Thoroughly crush pill and mix in small amount of water prior to swallowing., Disp: 60 tablet, Rfl: 0  •  cyclobenzaprine (FLEXERIL) 10 MG tablet, Take 1 tablet by mouth At Night As Needed for Muscle Spasms., Disp: 30 tablet, Rfl: 0  •   MG tablet, TAKE 1 TABLET EVERY 6 HOURS FOR MILD PAIN AS NEEDED, Disp: 90 tablet, Rfl: 1  •  isosorbide mononitrate (IMDUR) 30 MG 24 hr tablet, Take 1 tablet by mouth Daily., Disp: 90 tablet, Rfl: 1  •  pantoprazole (PROTONIX) 40 MG EC tablet, , Disp: , Rfl:   •  rosuvastatin (CRESTOR) 40 MG tablet, Take 1 tablet by mouth Daily., Disp: 90 tablet, Rfl: 1    Allergies:   Allergies   Allergen Reactions   • Pravastatin Myalgia   • Influenza Vac Split Quad Hives  "      Objective     Physical Exam:   Vital Signs:   Vitals:    07/27/22 0905 07/27/22 0943   BP: 138/92 128/62   BP Location: Right arm    Patient Position: Sitting    Cuff Size: Adult    Pulse: 62    Resp: 18    Temp: 96.9 °F (36.1 °C)    TempSrc: Infrared    SpO2: 99%    Weight: 73 kg (161 lb)    Height: 152.4 cm (60\")    PainSc: 0-No pain      Body mass index is 31.44 kg/m². BMI is >= 30 and <35. (Class 1 Obesity). The following options were offered after discussion;: weight loss educational material (shared in after visit summary)      Physical Exam  Constitutional:       General: She is not in acute distress.     Appearance: She is not ill-appearing.   HENT:      Head: Normocephalic.   Neck:      Vascular: Carotid bruit present.   Cardiovascular:      Rate and Rhythm: Normal rate and regular rhythm.      Heart sounds: Normal heart sounds. No murmur heard.  Pulmonary:      Breath sounds: Normal breath sounds. No wheezing, rhonchi or rales.   Abdominal:      Tenderness: There is no abdominal tenderness.   Musculoskeletal:      Right lower leg: No edema.      Left lower leg: No edema.   Lymphadenopathy:      Cervical: No cervical adenopathy.   Neurological:      General: No focal deficit present.      Mental Status: She is oriented to person, place, and time.   Psychiatric:         Mood and Affect: Mood normal.         Assessment / Plan      Assessment/Plan:   Diagnoses and all orders for this visit:    1. Hand pain, right (Primary)  Continue exercises.    2. Coronary artery disease of native artery of native heart with stable angina pectoris (HCC)  Continue current medications.    3. Mixed hyperlipidemia  Continue current medications.    4. Essential hypertension  Continue current  Medications.    5. Current every day smoker  Patient declined an RT or bupropion.  Encouraged to quit smoking.    6. Gastroesophageal reflux disease, unspecified whether esophagitis present  Continue current medications.    Explained " and discussed patient's condition and plan of care.  Discussed when to follow-up.  Discussed possible red flags and how to follow-up with those.  Viewed patient's medications and discussed common side effects. Patient to continue current medications as advised.  Be compliant with medications. Patient to let me know if they have any worsening, no improvement, does not tolerate medication, or any future concerns about treatment. ER for emergencies.  Patient verbalized an understanding and agreement with plan of care.        Follow Up:   Return in about 3 months (around 10/27/2022) for Recheck.    LORI Gunn  [facility]

## 2022-08-16 DIAGNOSIS — M54.41 RIGHT-SIDED LOW BACK PAIN WITH RIGHT-SIDED SCIATICA, UNSPECIFIED CHRONICITY: ICD-10-CM

## 2022-08-17 RX ORDER — CYCLOBENZAPRINE HCL 10 MG
10 TABLET ORAL NIGHTLY PRN
Qty: 30 TABLET | Refills: 0 | Status: SHIPPED | OUTPATIENT
Start: 2022-08-17

## 2022-10-06 ENCOUNTER — TRANSCRIBE ORDERS (OUTPATIENT)
Dept: INTERNAL MEDICINE | Facility: CLINIC | Age: 67
End: 2022-10-06

## 2022-10-06 DIAGNOSIS — Z12.31 ENCOUNTER FOR SCREENING MAMMOGRAM FOR BREAST CANCER: Primary | ICD-10-CM

## 2022-10-26 ENCOUNTER — OFFICE VISIT (OUTPATIENT)
Dept: INTERNAL MEDICINE | Facility: CLINIC | Age: 67
End: 2022-10-26

## 2022-10-26 VITALS
WEIGHT: 162.8 LBS | RESPIRATION RATE: 16 BRPM | OXYGEN SATURATION: 98 % | HEART RATE: 63 BPM | BODY MASS INDEX: 31.96 KG/M2 | SYSTOLIC BLOOD PRESSURE: 130 MMHG | TEMPERATURE: 96.8 F | HEIGHT: 60 IN | DIASTOLIC BLOOD PRESSURE: 72 MMHG

## 2022-10-26 DIAGNOSIS — E78.2 MIXED HYPERLIPIDEMIA: ICD-10-CM

## 2022-10-26 DIAGNOSIS — I10 ESSENTIAL HYPERTENSION: ICD-10-CM

## 2022-10-26 DIAGNOSIS — I25.118 CORONARY ARTERY DISEASE OF NATIVE ARTERY OF NATIVE HEART WITH STABLE ANGINA PECTORIS: ICD-10-CM

## 2022-10-26 DIAGNOSIS — K21.9 GASTROESOPHAGEAL REFLUX DISEASE, UNSPECIFIED WHETHER ESOPHAGITIS PRESENT: Primary | ICD-10-CM

## 2022-10-26 PROCEDURE — 99214 OFFICE O/P EST MOD 30 MIN: CPT | Performed by: NURSE PRACTITIONER

## 2022-10-26 RX ORDER — SUCRALFATE 1 G/1
1 TABLET ORAL 4 TIMES DAILY
Qty: 60 TABLET | Refills: 0 | Status: SHIPPED | OUTPATIENT
Start: 2022-10-26

## 2022-10-26 RX ORDER — NITROGLYCERIN 0.4 MG/1
0.4 TABLET SUBLINGUAL
Qty: 25 TABLET | Refills: 1 | Status: SHIPPED | OUTPATIENT
Start: 2022-10-26 | End: 2022-11-14 | Stop reason: SDUPTHER

## 2022-10-26 RX ORDER — LOSARTAN POTASSIUM 100 MG/1
100 TABLET ORAL DAILY
Qty: 90 TABLET | Refills: 1 | Status: SHIPPED | OUTPATIENT
Start: 2022-10-26 | End: 2022-11-14 | Stop reason: SDUPTHER

## 2022-10-26 NOTE — PROGRESS NOTES
Patient Name: Jasvir Hamilton  : 1955   MRN: [id number]     Chief Complaint:    Chief Complaint   Patient presents with   • Hypertension     Follow up       History of Present Illness: Jasvir Hamilton is a 67 y.o. female presents to clinic for follow-up:    She continues to smoke. She has tried gum and cold turkey quitting.  She is not interested in quitting at this time.     Ischemic Stroke (2019)  Patient had difficulty speaking and found suspected old white matter infarct in the left corona radiata.   IMPRESSION:  Heavily motion degraded exam, but with no evidence to  suggest acute infarction or other acute intracranial disease. Suspected  small old white matter infarct in the left corona radiata.    Bilateral Carotid Duplex (10/01/2019 09:42)    Interpretation Summary    · No evidence of hemodynamically significant stenosis of bilateral internal carotid arteries.  · Bilateral mild to moderate irregular plaque is noted.  · Right external carotid artery velocity is significantly elevated consistent with moderate to severe stenosis.    Intracranial carotid stenosis  CTA head imaging showed focal calcification of the intracavernous left ICA and proximal supraclinoid ICA, difficult to assess clearly for stenosis, thought to represent 50% or less stenosis.    Thyroid nodule  US Thyroid (2022 10:18)    IMPRESSION:  1. Overall stable appearance of multinodular goiter with largest solid  nodule on the right measuring 1.6 cm and on the left measuring 1.5 cm.   2.  No new or enlarging nodule.      CAD  Cardiac stents in  with Absorb III Randomized controlled trial MID and LAD.  She recently found out she did receive the absorb BVS stent. She was seeing Dr. Ashish Ashford in [place]. She is taking Plavix 75 mg. She is on metoprolol and isosorbide. She has not seen cardiology since 2016. She is going to start Silver Sneakers. She wants to lose weight. She has not needed nitroglycerin.        Adult Transthoracic Echo Complete W/ Cont if Necessary Per Protocol (With Agitated Saline) (10/01/2019 09:19)  Interpretation Summary    · Mild mitral valve regurgitation is present.  · Mild tricuspid valve regurgitation is present.  · Calculated right ventricular systolic pressure from tricuspid regurgitation is 24 mmHg.  · Estimated EF = 68%.  · Left ventricular systolic function is normal.  · Left ventricular wall thickness is consistent with mild concentric hypertrophy.  · Normal right ventricular cavity size, wall thickness, systolic function and septal motion noted.  · Left ventricular diastolic dysfunction is abnormal consistent with: age.  · Saline test results are negative.  · The aortic valve exhibits mild sclerosis.  · No evidence of pulmonary hypertension is present.  · There is no evidence of pericardial effusion.     GERD  Sx controlled on pantoprazole 40 mg daily. She has sucralfate for breakthrough symptoms. Denies any rectal bleeding or dysphagia.     Hypertension   She is not exercising and is not adherent to low salt diet.  Blood pressure is not checked at home.   Cardiac symptoms are chest pain. It is associated mainly with eating. Patient denies exertional chest pressure/discomfort.  Cardiovascular risk factors: advanced age (older than 55 for men, 65 for women), dyslipidemia, hypertension, obesity (BMI >= 30 kg/m2), sedentary lifestyle and smoking/ tobacco exposure. She is taking losartan  100 mg.     HLD  She is taking crestor 40 mg. LDL goal < 70.   Lab Results   Component Value Date    CHOL 135 04/05/2022    CHOL 127 10/01/2019     Lab Results   Component Value Date    TRIG 137 04/05/2022    TRIG 111 10/01/2019     Lab Results   Component Value Date    HDL 44 04/05/2022    HDL 40 10/01/2019     Lab Results   Component Value Date    LDL 67 04/05/2022    LDL 65 10/01/2019     Lab Results   Component Value Date    VLDL 24 04/05/2022    VLDL 22.2 10/01/2019     Lab Results   Component  Value Date    [facility]45 04/05/2022    [facility]       Subjective     Review of System: Review of Systems   Constitutional: Positive for fatigue. Negative for fever.   HENT: Negative for congestion and rhinorrhea.    Respiratory: Negative for cough, shortness of breath and wheezing.    Cardiovascular: Positive for chest pain (rare). Negative for palpitations.   Gastrointestinal: Negative for abdominal pain, diarrhea, nausea and vomiting.        Indigestion    Genitourinary: Negative for dysuria.   Musculoskeletal: Negative for myalgias.   Skin: Negative for rash.   Neurological: Negative for headaches.   Hematological: Negative for adenopathy.   Psychiatric/Behavioral: Negative for dysphoric mood.        Medications:     Current Outpatient Medications:   •  aspirin 81 MG chewable tablet, CHEW 1 TABLET DAILY, Disp: 90 tablet, Rfl: 1  •  cholecalciferol (VITAMIN D3) 25 MCG (1000 UT) tablet, Take 1 tablet by mouth Daily., Disp: 90 tablet, Rfl: 1  •  clopidogrel (PLAVIX) 75 MG tablet, TAKE 1 TABLET EVERY DAY, Disp: 90 tablet, Rfl: 1  •  cyclobenzaprine (FLEXERIL) 10 MG tablet, Take 1 tablet by mouth At Night As Needed for Muscle Spasms., Disp: 30 tablet, Rfl: 0  •   MG tablet, TAKE 1 TABLET EVERY 6 HOURS FOR MILD PAIN AS NEEDED, Disp: 90 tablet, Rfl: 1  •  isosorbide mononitrate (IMDUR) 30 MG 24 hr tablet, Take 1 tablet by mouth Daily., Disp: 90 tablet, Rfl: 1  •  ketoconazole (NIZORAL) 2 % shampoo, Apply  topically to the appropriate area as directed 2 (Two) Times a Week., Disp: 360 mL, Rfl: 3  •  losartan (Cozaar) 100 MG tablet, Take 1 tablet by mouth Daily., Disp: 90 tablet, Rfl: 1  •  metoprolol tartrate (LOPRESSOR) 50 MG tablet, TAKE 1 TABLET TWICE DAILY, Disp: 180 tablet, Rfl: 1  •  nitroglycerin (NITROSTAT) 0.4 MG SL tablet, Place 1 tablet under the tongue Every 5 (Five) Minutes As Needed for Chest Pain., Disp: 25 tablet, Rfl: 1  •  pantoprazole (PROTONIX) 40 MG EC tablet, , Disp: , Rfl:   •   "rosuvastatin (CRESTOR) 40 MG tablet, Take 1 tablet by mouth Daily., Disp: 90 tablet, Rfl: 1  •  sucralfate (CARAFATE) 1 g tablet, Take 1 tablet by mouth 4 (Four) Times a Day. Thoroughly crush pill and mix in small amount of water prior to swallowing., Disp: 60 tablet, Rfl: 0    Allergies:   Allergies   Allergen Reactions   • Pravastatin Myalgia   • Influenza Vac Split Quad Hives       Objective     Physical Exam:   Vital Signs:   Vitals:    10/26/22 0930 10/26/22 1000   BP: 158/88 130/72   BP Location: Right arm    Patient Position: Sitting    Cuff Size: Adult    Pulse: 63    Resp: 16    Temp: 96.8 °F (36 °C)    TempSrc: Infrared    SpO2: 98%    Weight: 73.8 kg (162 lb 12.8 oz)    Height: 152.4 cm (60\")    PainSc: 0-No pain      Body mass index is 31.79 kg/m². BMI is >= 30 and <35. (Class 1 Obesity). The following options were offered after discussion;: weight loss educational material (shared in after visit summary)      Physical Exam  Constitutional:       General: She is not in acute distress.     Appearance: She is not ill-appearing.   HENT:      Head: Normocephalic.   Cardiovascular:      Rate and Rhythm: Normal rate and regular rhythm.      Heart sounds: Normal heart sounds. No murmur heard.  Pulmonary:      Breath sounds: Normal breath sounds.   Abdominal:      General: Bowel sounds are normal.      Tenderness: There is no abdominal tenderness.   Neurological:      General: No focal deficit present.      Mental Status: She is oriented to person, place, and time.   Psychiatric:         Mood and Affect: Mood normal.         Assessment / Plan      Assessment/Plan:   Diagnoses and all orders for this visit:    1. Gastroesophageal reflux disease, unspecified whether esophagitis present (Primary)  -     sucralfate (CARAFATE) 1 g tablet; Take 1 tablet by mouth 4 (Four) Times a Day. Thoroughly crush pill and mix in small amount of water prior to swallowing.  Dispense: 60 tablet; Refill: 0    2. Coronary artery " disease of native artery of native heart with stable angina pectoris (HCC)  -     Ambulatory Referral to Cardiology  -     losartan (Cozaar) 100 MG tablet; Take 1 tablet by mouth Daily.  Dispense: 90 tablet; Refill: 1  -     nitroglycerin (NITROSTAT) 0.4 MG SL tablet; Place 1 tablet under the tongue Every 5 (Five) Minutes As Needed for Chest Pain.  Dispense: 25 tablet; Refill: 1    3. Mixed hyperlipidemia  -     Lipid Panel; Future    4. Essential hypertension  -     Comprehensive Metabolic Panel; Future  -     TSH; Future  -     CBC & Differential; Future  -     losartan (Cozaar) 100 MG tablet; Take 1 tablet by mouth Daily.  Dispense: 90 tablet; Refill: 1       Explained and discussed patient's condition and plan of care.  Discussed when to follow-up.  Discussed possible red flags and how to follow-up with those.  Viewed patient's medications and discussed common side effects. Patient to continue current medications as advised.  Be compliant with medications. Patient to let me know if they have any worsening, no improvement, does not tolerate medication, or any future concerns about treatment. ER for emergencies.  Patient verbalized an understanding and agreement with plan of care.          Follow Up:   Return in about 3 months (around 1/26/2023).    LORI Gunn  [facility]

## 2022-11-14 ENCOUNTER — OFFICE VISIT (OUTPATIENT)
Dept: CARDIOLOGY | Facility: CLINIC | Age: 67
End: 2022-11-14

## 2022-11-14 ENCOUNTER — PATIENT ROUNDING (BHMG ONLY) (OUTPATIENT)
Dept: CARDIOLOGY | Facility: CLINIC | Age: 67
End: 2022-11-14

## 2022-11-14 VITALS
BODY MASS INDEX: 30.62 KG/M2 | OXYGEN SATURATION: 95 % | DIASTOLIC BLOOD PRESSURE: 72 MMHG | SYSTOLIC BLOOD PRESSURE: 126 MMHG | WEIGHT: 162.2 LBS | HEIGHT: 61 IN | HEART RATE: 60 BPM

## 2022-11-14 DIAGNOSIS — E78.5 HYPERLIPIDEMIA LDL GOAL <70: ICD-10-CM

## 2022-11-14 DIAGNOSIS — R09.89 BILATERAL CAROTID BRUITS: ICD-10-CM

## 2022-11-14 DIAGNOSIS — I63.9 ISCHEMIC STROKE: ICD-10-CM

## 2022-11-14 DIAGNOSIS — F17.200 CURRENT EVERY DAY SMOKER: ICD-10-CM

## 2022-11-14 DIAGNOSIS — I25.119 CORONARY ARTERY DISEASE INVOLVING NATIVE CORONARY ARTERY OF NATIVE HEART WITH ANGINA PECTORIS: Primary | ICD-10-CM

## 2022-11-14 DIAGNOSIS — I10 ESSENTIAL HYPERTENSION: ICD-10-CM

## 2022-11-14 PROBLEM — Z86.73 HISTORY OF ARTERIAL ISCHEMIC STROKE: Status: RESOLVED | Noted: 2019-11-11 | Resolved: 2022-11-14

## 2022-11-14 PROCEDURE — 99204 OFFICE O/P NEW MOD 45 MIN: CPT | Performed by: INTERNAL MEDICINE

## 2022-11-14 PROCEDURE — 93000 ELECTROCARDIOGRAM COMPLETE: CPT | Performed by: INTERNAL MEDICINE

## 2022-11-14 RX ORDER — LOSARTAN POTASSIUM 100 MG/1
100 TABLET ORAL DAILY
Qty: 90 TABLET | Refills: 1 | Status: SHIPPED | OUTPATIENT
Start: 2022-11-14 | End: 2023-03-16

## 2022-11-14 RX ORDER — METOPROLOL TARTRATE 50 MG/1
50 TABLET, FILM COATED ORAL 2 TIMES DAILY
Qty: 180 TABLET | Refills: 3 | Status: SHIPPED | OUTPATIENT
Start: 2022-11-14 | End: 2023-03-08 | Stop reason: SDUPTHER

## 2022-11-14 RX ORDER — CLOPIDOGREL BISULFATE 75 MG/1
75 TABLET ORAL DAILY
Qty: 90 TABLET | Refills: 3 | Status: SHIPPED | OUTPATIENT
Start: 2022-11-14

## 2022-11-14 RX ORDER — NITROGLYCERIN 0.4 MG/1
0.4 TABLET SUBLINGUAL
Qty: 25 TABLET | Refills: 1 | Status: SHIPPED | OUTPATIENT
Start: 2022-11-14

## 2022-11-14 RX ORDER — ROSUVASTATIN CALCIUM 40 MG/1
40 TABLET, COATED ORAL DAILY
Qty: 90 TABLET | Refills: 3 | Status: SHIPPED | OUTPATIENT
Start: 2022-11-14 | End: 2023-01-11 | Stop reason: SDUPTHER

## 2022-11-14 RX ORDER — ASPIRIN 81 MG/1
81 TABLET, CHEWABLE ORAL DAILY
Qty: 90 TABLET | Refills: 3 | Status: CANCELLED | OUTPATIENT
Start: 2022-11-14

## 2022-11-14 RX ORDER — ISOSORBIDE MONONITRATE 30 MG/1
30 TABLET, EXTENDED RELEASE ORAL DAILY
Qty: 90 TABLET | Refills: 1 | Status: CANCELLED | OUTPATIENT
Start: 2022-11-14

## 2022-11-14 RX ORDER — MULTIVIT WITH MINERALS/LUTEIN
1000 TABLET ORAL DAILY
COMMUNITY

## 2022-11-14 NOTE — ASSESSMENT & PLAN NOTE
· No angina  · She is now >5 years out from bio absorbable scaffold placement in the LAD.  Antiplatelet monotherapy using clopidogrel.  She may stop aspirin.    · Continue beta-blocker and statin  · Okay to discontinue isosorbide.  If anginal symptoms recur, she should resume isosorbide and contact office and noninvasive ischemic evaluation will be recommended

## 2022-11-14 NOTE — PROGRESS NOTES
[facility]  Cardiology Consultation Note     Jasvir Hamilton  1955  Requesting Provider: LORI Gunn  PCP: Nichole Cardenas APRN    ID:  Jasvir Hamilton is a 67 y.o. female who resides in [place], [place].    REASON FOR CONSULTATION:    • Coronary artery disease         Dear Nichole Cardenas:    Thank you for referring [place] to my office today to establish care for coronary disease.  She is a 67-year-old female who was previously living in [place].  In 2015, the patient was experiencing anginal symptoms and was treated at [facility].  She underwent PCI of the mid LAD and enrolled in the ABSORB trial.  Later she has found out that she was randomized to the bioabsorbable scaffold.    The patient has been doing well and denies any anginal symptoms.  She does not exercise routinely but is active around the house and with daily activities and denies chest pain.  She would like to come off of clopidogrel if possible due to bruising.  She would also like to come off of isosorbide if possible.    The patient has history of carotid bruits which has been assessed with carotid duplex and CT angiogram.  The patient has a right external carotid stenosis but no significant disease of the left or right internal carotids.    Patient has a history of ischemic stroke in 2019.  She has had no recurrence of TIA or stroke symptoms.  She has been maintained on clopidogrel and aspirin since this time.  Notably, MR imaging of the brain was suboptimal due to motion artifact.      Past Medical History, Past Surgical History, Family history, Social History, and Medications were all reviewed with the patient today and updated as necessary.       Current Outpatient Medications:   •  ascorbic acid (VITAMIN C) 1000 MG tablet, Take 1 tablet by mouth Daily., Disp: , Rfl:   •  BIOTIN PO, Take 1 tablet by mouth Daily., Disp: , Rfl:   •  cholecalciferol (VITAMIN  D3) 25 MCG (1000 UT) tablet, Take 1 tablet by mouth Daily., Disp: 90 tablet, Rfl: 1  •  clopidogrel (PLAVIX) 75 MG tablet, Take 1 tablet by mouth Daily., Disp: 90 tablet, Rfl: 3  •  cyclobenzaprine (FLEXERIL) 10 MG tablet, Take 1 tablet by mouth At Night As Needed for Muscle Spasms., Disp: 30 tablet, Rfl: 0  •   MG tablet, TAKE 1 TABLET EVERY 6 HOURS FOR MILD PAIN AS NEEDED, Disp: 90 tablet, Rfl: 1  •  ketoconazole (NIZORAL) 2 % shampoo, Apply  topically to the appropriate area as directed 2 (Two) Times a Week., Disp: 360 mL, Rfl: 3  •  losartan (Cozaar) 100 MG tablet, Take 1 tablet by mouth Daily., Disp: 90 tablet, Rfl: 1  •  metoprolol tartrate (LOPRESSOR) 50 MG tablet, Take 1 tablet by mouth 2 (Two) Times a Day., Disp: 180 tablet, Rfl: 3  •  nitroglycerin (NITROSTAT) 0.4 MG SL tablet, Place 1 tablet under the tongue Every 5 (Five) Minutes As Needed for Chest Pain., Disp: 25 tablet, Rfl: 1  •  pantoprazole (PROTONIX) 40 MG EC tablet, Take 1 tablet by mouth Daily., Disp: , Rfl:   •  rosuvastatin (CRESTOR) 40 MG tablet, Take 1 tablet by mouth Daily., Disp: 90 tablet, Rfl: 3  •  sucralfate (CARAFATE) 1 g tablet, Take 1 tablet by mouth 4 (Four) Times a Day. Thoroughly crush pill and mix in small amount of water prior to swallowing., Disp: 60 tablet, Rfl: 0    Allergies   Allergen Reactions   • Pravastatin Myalgia   • Influenza Vac Split Quad Hives         Past Medical History:   Diagnosis Date   • Allergic 80s    Seasonal   • Chicken pox    • Coronary artery disease 2015   • Diverticulitis    • Diverticulosis 2019   • Easy bruising    • Gall stones    • GERD (gastroesophageal reflux disease)    • Glaucoma    • Hyperlipemia    • Hypertension    • Ischemic stroke (HCC) 10/24/2019   • Low back pain 2016   • Measles    • Menopause    • Mumps    • Positive PPD    • Scarlet fever    • Tuberculosis 1986    Exposure       Past Surgical History:   Procedure Laterality Date   • CARDIAC CATHETERIZATION     • CARPAL TUNNEL  "RELEASE      both hands   • CHOLECYSTECTOMY     • COLONOSCOPY     • CORONARY STENT PLACEMENT  2015   • ECTOPIC PREGNANCY     • ENDOMETRIAL ABLATION     • HAND SURGERY     • OOPHORECTOMY      unilateral   • OTHER SURGICAL HISTORY      lap and leep   • SUBTOTAL HYSTERECTOMY  2001    Ablation   • TUBAL ABDOMINAL LIGATION      with L salpingo-oophorectomy       Family History   Problem Relation Age of Onset   • Arthritis Mother    • Hypertension Mother    • Hyperlipidemia Mother    • Heart attack Father    • Hypertension Father    • Obesity Father    • Stroke Father         massive   • Heart disease Father          massive stroke age 69   • Hyperlipidemia Father    • Arthritis Sister    • Hypertension Sister    • No Known Problems Sister    • Breast cancer Neg Hx    • Ovarian cancer Neg Hx        Social History     Tobacco Use   • Smoking status: Every Day     Packs/day: 0.50     Years: 40.00     Pack years: 20.00     Types: Cigarettes     Last attempt to quit: 2019     Years since quitting: 3.1   • Smokeless tobacco: Never   Substance Use Topics   • Alcohol use: Yes     Comment: rarely       Review of Systems   Constitutional: Negative for malaise/fatigue.   Eyes: Negative for vision loss in left eye and vision loss in right eye.   Cardiovascular: Negative for chest pain, dyspnea on exertion, near-syncope, orthopnea, palpitations, paroxysmal nocturnal dyspnea and syncope.   Respiratory: Positive for shortness of breath.    Musculoskeletal: Negative for myalgias.   Neurological: Negative for brief paralysis, excessive daytime sleepiness, focal weakness, numbness, paresthesias and weakness.   All other systems reviewed and are negative.              /72 (BP Location: Right arm, Patient Position: Sitting)   Pulse 60   Ht 154.9 cm (61\")   Wt 73.6 kg (162 lb 3.2 oz)   SpO2 95%   BMI 30.65 kg/m²        Constitutional:       Appearance: Healthy appearance. Well-developed.   Eyes:      General: " Lids are normal. No scleral icterus.     Conjunctiva/sclera: Conjunctivae normal.   HENT:      Head: Normocephalic and atraumatic.   Neck:      Thyroid: No thyromegaly.      Vascular: Carotid bruit present. No JVD.      Comments: Bilateral carotid bruit  Pulmonary:      Effort: Pulmonary effort is normal.      Breath sounds: Normal breath sounds. No wheezing. No rhonchi. No rales.   Cardiovascular:      Normal rate. Regular rhythm.      Murmurs: There is no murmur.      No gallop. No rub.   Pulses:     Intact distal pulses.   Abdominal:      General: There is no distension.      Palpations: Abdomen is soft. There is no abdominal mass.   Musculoskeletal:      Cervical back: Normal range of motion. Skin:     General: Skin is warm and dry.      Findings: No rash.   Neurological:      General: No focal deficit present.      Mental Status: Alert and oriented to person, place, and time.      Gait: Gait is intact.   Psychiatric:         Attention and Perception: Attention normal.         Mood and Affect: Mood normal.         Behavior: Behavior normal.             ECG 12 Lead    Date/Time: 11/14/2022 1:56 PM  Performed by: Telly Anguiano IV, MD  Authorized by: Telly Anguiano IV, MD   Comparison: compared with previous ECG from 10/14/2021  Rhythm: sinus bradycardia  Rate: normal    Clinical impression: normal ECG            Lab Results   Component Value Date    CHOL 135 04/05/2022    HDL 44 04/05/2022    LDL 67 04/05/2022    VLDL 24 04/05/2022     Lab Results   Component Value Date    GLUCOSE 91 04/05/2022    BUN 14 04/05/2022    CREATININE 0.85 04/05/2022    EGFRIFNONA 72 10/13/2021    BCR 16.5 04/05/2022    K 4.5 04/05/2022    CO2 27.1 04/05/2022    CALCIUM 9.7 04/05/2022    ALBUMIN 4.50 04/05/2022    AST 13 04/05/2022    ALT 11 04/05/2022     Lab Results   Component Value Date    WBC 4.73 04/05/2022    HGB 14.3 04/05/2022    HCT 42.3 04/05/2022    MCV 87.2 04/05/2022     04/05/2022             Problem List Items Addressed This Visit        Cardiology Problems    Bilateral carotid bruits    Overview     · Carotid duplex (10/1/2019): High-grade stenosis of right EXTERNAL carotid artery.  No significant ICA stenoses           Current Assessment & Plan     · Bilateral bruits heard on exam today  · Reassess left carotid with carotid duplex         Relevant Orders    Duplex Carotid Ultrasound CAR    Coronary artery disease involving native coronary artery of native heart with angina pectoris (HCC) - Primary    Overview     · Cardiac catherization at [facility] (3/16/2012): 1-vessel CAD with 60-70% eccentric stenosis of the mid LAD. LVEF 65-70%  · Cardiac catherization at [facility] (1/26/2015): Coronary angioplasty with stenting to the mid LAD. 2.5 x 15 mm bioabsorbable scaffold implanted   · Echo (10/1/2019): EF 68%.          Current Assessment & Plan     · No angina  · She is now >5 years out from bio absorbable scaffold placement in the LAD.  Antiplatelet monotherapy using clopidogrel.  She may stop aspirin.    · Continue beta-blocker and statin  · Okay to discontinue isosorbide.  If anginal symptoms recur, she should resume isosorbide and contact office and noninvasive ischemic evaluation will be recommended         Relevant Medications    clopidogrel (PLAVIX) 75 MG tablet    metoprolol tartrate (LOPRESSOR) 50 MG tablet    nitroglycerin (NITROSTAT) 0.4 MG SL tablet    Essential hypertension    Overview     • Target blood pressure <130/80 mmHg         Current Assessment & Plan     · Controlled  · Continue present medical therapy         Relevant Medications    metoprolol tartrate (LOPRESSOR) 50 MG tablet    losartan (Cozaar) 100 MG tablet    Hyperlipidemia LDL goal <70    Overview     • High intensity statin therapy indicated given the presence of CAD         Current Assessment & Plan     · Controlled  · Continue rosuvastatin         Relevant Medications    rosuvastatin (CRESTOR) 40 MG tablet        Other    Current every day smoker    Current Assessment & Plan     · Smoking cessation strongly recommended        Other Visit Diagnoses     Ischemic stroke (HCC)        Relevant Medications    clopidogrel (PLAVIX) 75 MG tablet                   · Discontinue aspirin and continue clopidogrel monotherapy  · Smoking cessation strongly recommended  · Carotid duplex to assess left carotid bruit  Return in about 1 year (around 11/14/2023).          DIOGENES Anguiano MD, [facility], [facility]  Interventional Cardiology  11/14/22  13:13 EST

## 2022-11-14 NOTE — PROGRESS NOTES
"November 14, 2022    Hello, may I speak with Jasvir Hamilton?  Yes.    My name is Emily ROMEO      I am  with [facility]  [address]  [place] [address] [phone].    Before we get started may I verify your date of birth? 1955, Yes, correct.    I am calling to officially welcome you to our practice and ask about your recent visit. Is this a good time to talk? Yes.    Tell me about your visit with us. What things went well?  \"Liked the doctor; very good-on point.  Doctor answered all my questions in depth and very understanding.  Doctor is very personable.\"       We're always looking for ways to make our patients' experiences even better. Do you have recommendations on ways we may improve?  No.  \"Very comfortable with doctor.\"    Overall were you satisfied with your first visit to our practice? Yes.  \"Nurse was very pleasant and polite.\"       I appreciate you taking the time to speak with me today. Is there anything else I can do for you? No.      Thank you, and have a great day.      "

## 2022-11-29 ENCOUNTER — APPOINTMENT (OUTPATIENT)
Dept: CT IMAGING | Facility: HOSPITAL | Age: 67
End: 2022-11-29

## 2022-11-29 ENCOUNTER — HOSPITAL ENCOUNTER (EMERGENCY)
Facility: HOSPITAL | Age: 67
Discharge: HOME | End: 2022-11-30
Attending: STUDENT IN AN ORGANIZED HEALTH CARE EDUCATION/TRAINING PROGRAM | Admitting: STUDENT IN AN ORGANIZED HEALTH CARE EDUCATION/TRAINING PROGRAM

## 2022-11-29 DIAGNOSIS — Z86.73 HISTORY OF CVA (CEREBROVASCULAR ACCIDENT): ICD-10-CM

## 2022-11-29 DIAGNOSIS — I10 HYPERTENSION, UNSPECIFIED TYPE: ICD-10-CM

## 2022-11-29 DIAGNOSIS — K21.9 GASTROESOPHAGEAL REFLUX DISEASE, UNSPECIFIED WHETHER ESOPHAGITIS PRESENT: Primary | ICD-10-CM

## 2022-11-29 DIAGNOSIS — R51.9 ACUTE NONINTRACTABLE HEADACHE, UNSPECIFIED HEADACHE TYPE: Primary | ICD-10-CM

## 2022-11-29 DIAGNOSIS — G25.81 RESTLESS LEG SYNDROME: ICD-10-CM

## 2022-11-29 LAB
ALBUMIN SERPL-MCNC: 3.9 G/DL (ref 3.5–5.2)
ALBUMIN/GLOB SERPL: 1.7 G/DL
ALP SERPL-CCNC: 70 U/L (ref 39–117)
ALT SERPL W P-5'-P-CCNC: 12 U/L (ref 1–33)
ANION GAP SERPL CALCULATED.3IONS-SCNC: 10 MMOL/L (ref 5–15)
AST SERPL-CCNC: 18 U/L (ref 1–32)
BASOPHILS # BLD AUTO: 0.04 10*3/MM3 (ref 0–0.2)
BASOPHILS NFR BLD AUTO: 0.7 % (ref 0–1.5)
BILIRUB SERPL-MCNC: 0.4 MG/DL (ref 0–1.2)
BILIRUB UR QL STRIP: NEGATIVE
BUN SERPL-MCNC: 17 MG/DL (ref 8–23)
BUN/CREAT SERPL: 23 (ref 7–25)
CALCIUM SPEC-SCNC: 9.4 MG/DL (ref 8.6–10.5)
CHLORIDE SERPL-SCNC: 105 MMOL/L (ref 98–107)
CLARITY UR: CLEAR
CO2 SERPL-SCNC: 26 MMOL/L (ref 22–29)
COLOR UR: YELLOW
CREAT SERPL-MCNC: 0.74 MG/DL (ref 0.57–1)
DEPRECATED RDW RBC AUTO: 45 FL (ref 37–54)
EGFRCR SERPLBLD CKD-EPI 2021: 89.9 ML/MIN/1.73
EOSINOPHIL # BLD AUTO: 0.16 10*3/MM3 (ref 0–0.4)
EOSINOPHIL NFR BLD AUTO: 3 % (ref 0.3–6.2)
ERYTHROCYTE [DISTWIDTH] IN BLOOD BY AUTOMATED COUNT: 13.9 % (ref 12.3–15.4)
FLUAV RNA RESP QL NAA+PROBE: NOT DETECTED
FLUBV RNA RESP QL NAA+PROBE: NOT DETECTED
GLOBULIN UR ELPH-MCNC: 2.3 GM/DL
GLUCOSE SERPL-MCNC: 109 MG/DL (ref 65–99)
GLUCOSE UR STRIP-MCNC: NEGATIVE MG/DL
HCT VFR BLD AUTO: 38.1 % (ref 34–46.6)
HGB BLD-MCNC: 12.8 G/DL (ref 12–15.9)
HGB UR QL STRIP.AUTO: NEGATIVE
HOLD SPECIMEN: NORMAL
HOLD SPECIMEN: NORMAL
IMM GRANULOCYTES # BLD AUTO: 0.02 10*3/MM3 (ref 0–0.05)
IMM GRANULOCYTES NFR BLD AUTO: 0.4 % (ref 0–0.5)
KETONES UR QL STRIP: NEGATIVE
LEUKOCYTE ESTERASE UR QL STRIP.AUTO: NEGATIVE
LYMPHOCYTES # BLD AUTO: 1.13 10*3/MM3 (ref 0.7–3.1)
LYMPHOCYTES NFR BLD AUTO: 21.1 % (ref 19.6–45.3)
MAGNESIUM SERPL-MCNC: 2 MG/DL (ref 1.6–2.4)
MCH RBC QN AUTO: 29.6 PG (ref 26.6–33)
MCHC RBC AUTO-ENTMCNC: 33.6 G/DL (ref 31.5–35.7)
MCV RBC AUTO: 88 FL (ref 79–97)
MONOCYTES # BLD AUTO: 0.41 10*3/MM3 (ref 0.1–0.9)
MONOCYTES NFR BLD AUTO: 7.6 % (ref 5–12)
NEUTROPHILS NFR BLD AUTO: 3.6 10*3/MM3 (ref 1.7–7)
NEUTROPHILS NFR BLD AUTO: 67.2 % (ref 42.7–76)
NITRITE UR QL STRIP: NEGATIVE
NRBC BLD AUTO-RTO: 0 /100 WBC (ref 0–0.2)
PH UR STRIP.AUTO: 6 [PH] (ref 5–8)
PHOSPHATE SERPL-MCNC: 3.8 MG/DL (ref 2.5–4.5)
PLATELET # BLD AUTO: 178 10*3/MM3 (ref 140–450)
PMV BLD AUTO: 10.2 FL (ref 6–12)
POTASSIUM SERPL-SCNC: 4 MMOL/L (ref 3.5–5.2)
PROT SERPL-MCNC: 6.2 G/DL (ref 6–8.5)
PROT UR QL STRIP: NEGATIVE
RBC # BLD AUTO: 4.33 10*6/MM3 (ref 3.77–5.28)
RSV RNA NPH QL NAA+NON-PROBE: NOT DETECTED
SARS-COV-2 RNA RESP QL NAA+PROBE: NOT DETECTED
SODIUM SERPL-SCNC: 141 MMOL/L (ref 136–145)
SP GR UR STRIP: 1.01 (ref 1–1.03)
T4 FREE SERPL-MCNC: 1.19 NG/DL (ref 0.93–1.7)
TROPONIN T SERPL-MCNC: <0.01 NG/ML (ref 0–0.03)
TSH SERPL DL<=0.05 MIU/L-ACNC: 1.61 UIU/ML (ref 0.27–4.2)
UROBILINOGEN UR QL STRIP: NORMAL
WBC NRBC COR # BLD: 5.36 10*3/MM3 (ref 3.4–10.8)
WHOLE BLOOD HOLD COAG: NORMAL
WHOLE BLOOD HOLD SPECIMEN: NORMAL

## 2022-11-29 PROCEDURE — 84439 ASSAY OF FREE THYROXINE: CPT | Performed by: STUDENT IN AN ORGANIZED HEALTH CARE EDUCATION/TRAINING PROGRAM

## 2022-11-29 PROCEDURE — 70496 CT ANGIOGRAPHY HEAD: CPT

## 2022-11-29 PROCEDURE — 99284 EMERGENCY DEPT VISIT MOD MDM: CPT

## 2022-11-29 PROCEDURE — 25010000002 PROCHLORPERAZINE 10 MG/2ML SOLUTION: Performed by: STUDENT IN AN ORGANIZED HEALTH CARE EDUCATION/TRAINING PROGRAM

## 2022-11-29 PROCEDURE — 25010000002 DIPHENHYDRAMINE PER 50 MG: Performed by: STUDENT IN AN ORGANIZED HEALTH CARE EDUCATION/TRAINING PROGRAM

## 2022-11-29 PROCEDURE — 0 IOPAMIDOL PER 1 ML: Performed by: STUDENT IN AN ORGANIZED HEALTH CARE EDUCATION/TRAINING PROGRAM

## 2022-11-29 PROCEDURE — 81003 URINALYSIS AUTO W/O SCOPE: CPT | Performed by: STUDENT IN AN ORGANIZED HEALTH CARE EDUCATION/TRAINING PROGRAM

## 2022-11-29 PROCEDURE — 83735 ASSAY OF MAGNESIUM: CPT | Performed by: STUDENT IN AN ORGANIZED HEALTH CARE EDUCATION/TRAINING PROGRAM

## 2022-11-29 PROCEDURE — 96365 THER/PROPH/DIAG IV INF INIT: CPT

## 2022-11-29 PROCEDURE — 36415 COLL VENOUS BLD VENIPUNCTURE: CPT

## 2022-11-29 PROCEDURE — 25010000002 MAGNESIUM SULFATE IN D5W 1G/100ML (PREMIX) 1-5 GM/100ML-% SOLUTION: Performed by: STUDENT IN AN ORGANIZED HEALTH CARE EDUCATION/TRAINING PROGRAM

## 2022-11-29 PROCEDURE — 85025 COMPLETE CBC W/AUTO DIFF WBC: CPT

## 2022-11-29 PROCEDURE — 87637 SARSCOV2&INF A&B&RSV AMP PRB: CPT | Performed by: STUDENT IN AN ORGANIZED HEALTH CARE EDUCATION/TRAINING PROGRAM

## 2022-11-29 PROCEDURE — 96375 TX/PRO/DX INJ NEW DRUG ADDON: CPT

## 2022-11-29 PROCEDURE — 84443 ASSAY THYROID STIM HORMONE: CPT | Performed by: STUDENT IN AN ORGANIZED HEALTH CARE EDUCATION/TRAINING PROGRAM

## 2022-11-29 PROCEDURE — 84484 ASSAY OF TROPONIN QUANT: CPT | Performed by: STUDENT IN AN ORGANIZED HEALTH CARE EDUCATION/TRAINING PROGRAM

## 2022-11-29 PROCEDURE — 70450 CT HEAD/BRAIN W/O DYE: CPT

## 2022-11-29 PROCEDURE — 70498 CT ANGIOGRAPHY NECK: CPT

## 2022-11-29 PROCEDURE — 84100 ASSAY OF PHOSPHORUS: CPT | Performed by: STUDENT IN AN ORGANIZED HEALTH CARE EDUCATION/TRAINING PROGRAM

## 2022-11-29 PROCEDURE — 80053 COMPREHEN METABOLIC PANEL: CPT | Performed by: STUDENT IN AN ORGANIZED HEALTH CARE EDUCATION/TRAINING PROGRAM

## 2022-11-29 PROCEDURE — 25010000002 KETOROLAC TROMETHAMINE PER 15 MG: Performed by: STUDENT IN AN ORGANIZED HEALTH CARE EDUCATION/TRAINING PROGRAM

## 2022-11-29 RX ORDER — ACETAMINOPHEN 500 MG
1000 TABLET ORAL ONCE
Status: COMPLETED | OUTPATIENT
Start: 2022-11-29 | End: 2022-11-29

## 2022-11-29 RX ORDER — PROCHLORPERAZINE EDISYLATE 5 MG/ML
5 INJECTION INTRAMUSCULAR; INTRAVENOUS ONCE
Status: COMPLETED | OUTPATIENT
Start: 2022-11-29 | End: 2022-11-29

## 2022-11-29 RX ORDER — KETOROLAC TROMETHAMINE 15 MG/ML
15 INJECTION, SOLUTION INTRAMUSCULAR; INTRAVENOUS ONCE
Status: COMPLETED | OUTPATIENT
Start: 2022-11-29 | End: 2022-11-29

## 2022-11-29 RX ORDER — MAGNESIUM SULFATE 1 G/100ML
1 INJECTION INTRAVENOUS ONCE
Status: COMPLETED | OUTPATIENT
Start: 2022-11-29 | End: 2022-11-30

## 2022-11-29 RX ORDER — DIPHENHYDRAMINE HYDROCHLORIDE 50 MG/ML
25 INJECTION INTRAMUSCULAR; INTRAVENOUS ONCE
Status: COMPLETED | OUTPATIENT
Start: 2022-11-29 | End: 2022-11-29

## 2022-11-29 RX ORDER — LABETALOL HYDROCHLORIDE 5 MG/ML
10 INJECTION, SOLUTION INTRAVENOUS ONCE
Status: COMPLETED | OUTPATIENT
Start: 2022-11-29 | End: 2022-11-29

## 2022-11-29 RX ORDER — PANTOPRAZOLE SODIUM 40 MG/1
40 TABLET, DELAYED RELEASE ORAL DAILY
Qty: 90 TABLET | Refills: 1 | Status: SHIPPED | OUTPATIENT
Start: 2022-11-29

## 2022-11-29 RX ORDER — SODIUM CHLORIDE 0.9 % (FLUSH) 0.9 %
10 SYRINGE (ML) INJECTION AS NEEDED
Status: DISCONTINUED | OUTPATIENT
Start: 2022-11-29 | End: 2022-11-30 | Stop reason: HOSPADM

## 2022-11-29 RX ADMIN — ACETAMINOPHEN 1000 MG: 500 TABLET ORAL at 22:26

## 2022-11-29 RX ADMIN — DIPHENHYDRAMINE HYDROCHLORIDE 25 MG: 50 INJECTION INTRAMUSCULAR; INTRAVENOUS at 22:27

## 2022-11-29 RX ADMIN — LABETALOL HYDROCHLORIDE 10 MG: 5 INJECTION, SOLUTION INTRAVENOUS at 22:29

## 2022-11-29 RX ADMIN — MAGNESIUM SULFATE HEPTAHYDRATE 1 G: 1 INJECTION, SOLUTION INTRAVENOUS at 22:39

## 2022-11-29 RX ADMIN — KETOROLAC TROMETHAMINE 15 MG: 15 INJECTION, SOLUTION INTRAMUSCULAR; INTRAVENOUS at 22:31

## 2022-11-29 RX ADMIN — PROCHLORPERAZINE EDISYLATE 5 MG: 5 INJECTION INTRAMUSCULAR; INTRAVENOUS at 22:32

## 2022-11-29 RX ADMIN — SODIUM CHLORIDE, POTASSIUM CHLORIDE, SODIUM LACTATE AND CALCIUM CHLORIDE 1000 ML: 600; 310; 30; 20 INJECTION, SOLUTION INTRAVENOUS at 22:26

## 2022-11-29 RX ADMIN — IOPAMIDOL 80 ML: 755 INJECTION, SOLUTION INTRAVENOUS at 22:23

## 2022-11-30 VITALS
SYSTOLIC BLOOD PRESSURE: 154 MMHG | HEIGHT: 61 IN | TEMPERATURE: 98.1 F | DIASTOLIC BLOOD PRESSURE: 76 MMHG | HEART RATE: 69 BPM | OXYGEN SATURATION: 94 % | WEIGHT: 160 LBS | RESPIRATION RATE: 18 BRPM | BODY MASS INDEX: 30.21 KG/M2

## 2022-11-30 LAB — HOLD SPECIMEN: NORMAL

## 2022-11-30 RX ORDER — ROPINIROLE 0.5 MG/1
0.25 TABLET, FILM COATED ORAL ONCE
Status: COMPLETED | OUTPATIENT
Start: 2022-11-30 | End: 2022-11-30

## 2022-11-30 RX ORDER — ROPINIROLE 0.25 MG/1
0.25 TABLET, FILM COATED ORAL NIGHTLY
Qty: 14 TABLET | Refills: 0 | Status: SHIPPED | OUTPATIENT
Start: 2022-11-30 | End: 2023-01-17

## 2022-11-30 RX ADMIN — ROPINIROLE HYDROCHLORIDE 0.25 MG: 0.5 TABLET, FILM COATED ORAL at 01:17

## 2022-12-02 ENCOUNTER — OFFICE VISIT (OUTPATIENT)
Dept: INTERNAL MEDICINE | Facility: CLINIC | Age: 67
End: 2022-12-02

## 2022-12-02 DIAGNOSIS — J01.10 ACUTE FRONTAL SINUSITIS, RECURRENCE NOT SPECIFIED: ICD-10-CM

## 2022-12-02 DIAGNOSIS — I10 ESSENTIAL HYPERTENSION: ICD-10-CM

## 2022-12-02 DIAGNOSIS — G44.209 TENSION-TYPE HEADACHE, NOT INTRACTABLE, UNSPECIFIED CHRONICITY PATTERN: Primary | ICD-10-CM

## 2022-12-02 PROCEDURE — 99214 OFFICE O/P EST MOD 30 MIN: CPT | Performed by: NURSE PRACTITIONER

## 2022-12-02 RX ORDER — ISOSORBIDE MONONITRATE 30 MG/1
30 TABLET, EXTENDED RELEASE ORAL DAILY
Qty: 30 TABLET | Refills: 0 | Status: SHIPPED | OUTPATIENT
Start: 2022-12-02 | End: 2022-12-05

## 2022-12-02 RX ORDER — RIMEGEPANT SULFATE 75 MG/75MG
75 TABLET, ORALLY DISINTEGRATING ORAL DAILY
Qty: 17 TABLET | Refills: 0 | COMMUNITY
Start: 2022-12-02 | End: 2023-01-17

## 2022-12-02 RX ORDER — AMOXICILLIN 875 MG/1
875 TABLET, COATED ORAL 2 TIMES DAILY
Qty: 20 TABLET | Refills: 0 | Status: SHIPPED | OUTPATIENT
Start: 2022-12-02 | End: 2023-01-17

## 2022-12-02 NOTE — PROGRESS NOTES
Patient Name: Jasvir Hamilton  : 1955   MRN: [id number]     Chief Complaint:    Chief Complaint   Patient presents with   • Headache     ER follow up 22 [facility]ex   • Hypertension       History of Present Illness: Jasvir Hamilton is a 67 y.o. female presents to clinic with headache.    Headache  The patient complains of having a severe headache which led her to the emergency room. While at the emergency room she had a computed tomography scan performed with and without contrast with normal results. She states she was told she could be having a stroke. She did not have symptoms with a previous stroke episode, and believes this could be due to discontinuing the isosorbide mononitrate abruptly. The patient reports since discontinuing the isosorbide mononitrate on last Tuesday, 2022, 2022 she began experiencing sever headaches with a pounding sensation. She hears a swishing sound and her heartbeat in her ears. She has tried Tylenol and Ibuprofen, but did not want to try Excedrin due to the amount of caffeine in it. She denies experiencing weakness, dizziness, or lightheadedness. She reports after taking the Captopril 25 MG in clinic, her swishing sound seems to be resolving.      Hypertension  She does have her blood pressure medication with her today just in case there needs to be modifications. She has been keeping a record of her blood pressure at home which have been around 130 or 120 for systolic readings until this week.     Frontal sinusitis  The patient reports having some sinus pressure under her eyes.     She admits to having smokers' cough, but nothing associated with her current symptoms.       Subjective     Review of System: Review of Systems   Constitutional: Negative for fatigue and fever.   HENT: Positive for sinus pressure and sinus pain.    Eyes: Negative for visual disturbance.   Respiratory: Positive for cough (smoker's chronic cough). Negative  for shortness of breath.    Cardiovascular: Negative for chest pain and palpitations.   Neurological: Positive for headaches. Negative for dizziness, syncope, facial asymmetry, light-headedness and numbness.        Medications:     Current Outpatient Medications:   •  ascorbic acid (VITAMIN C) 1000 MG tablet, Take 1 tablet by mouth Daily., Disp: , Rfl:   •  BIOTIN PO, Take 1 tablet by mouth Daily., Disp: , Rfl:   •  cholecalciferol (VITAMIN D3) 25 MCG (1000 UT) tablet, Take 1 tablet by mouth Daily., Disp: 90 tablet, Rfl: 1  •  clopidogrel (PLAVIX) 75 MG tablet, Take 1 tablet by mouth Daily., Disp: 90 tablet, Rfl: 3  •  cyclobenzaprine (FLEXERIL) 10 MG tablet, Take 1 tablet by mouth At Night As Needed for Muscle Spasms., Disp: 30 tablet, Rfl: 0  •   MG tablet, TAKE 1 TABLET EVERY 6 HOURS FOR MILD PAIN AS NEEDED, Disp: 90 tablet, Rfl: 1  •  ketoconazole (NIZORAL) 2 % shampoo, Apply  topically to the appropriate area as directed 2 (Two) Times a Week., Disp: 360 mL, Rfl: 3  •  losartan (Cozaar) 100 MG tablet, Take 1 tablet by mouth Daily., Disp: 90 tablet, Rfl: 1  •  metoprolol tartrate (LOPRESSOR) 50 MG tablet, Take 1 tablet by mouth 2 (Two) Times a Day., Disp: 180 tablet, Rfl: 3  •  nitroglycerin (NITROSTAT) 0.4 MG SL tablet, Place 1 tablet under the tongue Every 5 (Five) Minutes As Needed for Chest Pain., Disp: 25 tablet, Rfl: 1  •  pantoprazole (PROTONIX) 40 MG EC tablet, Take 1 tablet by mouth Daily., Disp: 90 tablet, Rfl: 1  •  rOPINIRole (Requip) 0.25 MG tablet, Take 1 tablet by mouth Every Night. Take 1 hour before bedtime., Disp: 14 tablet, Rfl: 0  •  rosuvastatin (CRESTOR) 40 MG tablet, Take 1 tablet by mouth Daily., Disp: 90 tablet, Rfl: 3  •  sucralfate (CARAFATE) 1 g tablet, Take 1 tablet by mouth 4 (Four) Times a Day. Thoroughly crush pill and mix in small amount of water prior to swallowing., Disp: 60 tablet, Rfl: 0  •  amoxicillin (AMOXIL) 875 MG tablet, Take 1 tablet by mouth 2 (Two) Times a  "Day., Disp: 20 tablet, Rfl: 0  •  isosorbide mononitrate (IMDUR) 30 MG 24 hr tablet, Take 1 tablet by mouth Daily., Disp: 30 tablet, Rfl: 0  •  Rimegepant Sulfate (Nurtec) 75 MG tablet dispersible tablet, Take 1 tablet by mouth Daily., Disp: 17 tablet, Rfl: 0    Allergies:   Allergies   Allergen Reactions   • Pravastatin Myalgia   • Influenza Vac Split Quad Hives       Objective     Physical Exam:   Vital Signs:   Vitals:    12/02/22 1521   BP: (!) 172/102   BP Location: Right arm   Patient Position: Sitting   Cuff Size: Adult   Pulse: 81   Resp: 22   Temp: 96.6 °F (35.9 °C)   TempSrc: Infrared   SpO2: 92%   Weight: 73.3 kg (161 lb 9.6 oz)   Height: 154.9 cm (61\")   PainSc:   8     Physical Exam  Constitutional:       General: She is not in acute distress.     Appearance: She is not ill-appearing.   HENT:      Head: Normocephalic.   Cardiovascular:      Rate and Rhythm: Normal rate and regular rhythm.      Heart sounds: Murmur heard.   Pulmonary:      Breath sounds: Normal breath sounds.   Abdominal:      General: Bowel sounds are normal.      Tenderness: There is no abdominal tenderness.   Neurological:      General: No focal deficit present.      Mental Status: She is oriented to person, place, and time.   Psychiatric:         Mood and Affect: Mood normal.         Assessment / Plan      Assessment/Plan:   Diagnoses and all orders for this visit:    1. Tension-type headache, not intractable, unspecified chronicity pattern (Primary)  -     Rimegepant Sulfate (Nurtec) 75 MG tablet dispersible tablet; Take 1 tablet by mouth Daily.  Dispense: 17 tablet; Refill: 0    2. Acute frontal sinusitis, recurrence not specified  -     amoxicillin (AMOXIL) 875 MG tablet; Take 1 tablet by mouth 2 (Two) Times a Day.  Dispense: 20 tablet; Refill: 0    3. Essential hypertension  -     isosorbide mononitrate (IMDUR) 30 MG 24 hr tablet; Take 1 tablet by mouth Daily.  Dispense: 30 tablet; Refill: 0     1. Tension Headache  - Nurtec 75 " MG has been prescribed daily or when she feels the onset of a headache.     2.  Essential hypertension  - The patient was given Captopril 25MG in clinic today with a blood pressure recheck, her systolic was still elevated and was instructed to rest before a recheck.   - She rates her headache pain level at a 4 or 5 decreased from 8-9.Before leaving, her blood pressure recheck reading was at 160/86.  - Restart back on the isosorbide mononitrate 30 MG.    3.  Frontal sinusitis  - Amoxicillin was prescribed to prevent infection.     [facility] sample lot [id number] exp 2/25    Follow Up:   Return in about 1 week (around 12/9/2022) for Recheck.    LORI Gunn  [facility] [place] [facility]    Transcribed from ambient dictation for LORI Gunn by Cathy  12/02/22   22:14 EST    Patient or patient representative verbalized consent to the visit recording.  I have personally performed the services described in this document as transcribed by the above individual, and it is both accurate and complete.

## 2022-12-03 VITALS
RESPIRATION RATE: 22 BRPM | OXYGEN SATURATION: 92 % | TEMPERATURE: 96.6 F | HEART RATE: 81 BPM | HEIGHT: 61 IN | SYSTOLIC BLOOD PRESSURE: 160 MMHG | DIASTOLIC BLOOD PRESSURE: 86 MMHG | WEIGHT: 161.6 LBS | BODY MASS INDEX: 30.51 KG/M2

## 2022-12-04 DIAGNOSIS — I10 ESSENTIAL HYPERTENSION: ICD-10-CM

## 2022-12-05 RX ORDER — ISOSORBIDE MONONITRATE 30 MG/1
30 TABLET, EXTENDED RELEASE ORAL DAILY
Qty: 90 TABLET | Refills: 0 | Status: SHIPPED | OUTPATIENT
Start: 2022-12-05 | End: 2023-01-13 | Stop reason: SDUPTHER

## 2022-12-05 NOTE — TELEPHONE ENCOUNTER
Rx Refill Note  Requested Prescriptions     Pending Prescriptions Disp Refills   • isosorbide mononitrate (IMDUR) 30 MG 24 hr tablet [Pharmacy Med Name: ISOSORBIDE MONONITRATE 30MG ER TABS] 90 tablet      Sig: TAKE 1 TABLET BY MOUTH DAILY      Last office visit with prescribing clinician: 12/2/2022   Last telemedicine visit with prescribing clinician: 12/13/2022   Next office visit with prescribing clinician: 12/13/2022                         Would you like a call back once the refill request has been completed: [] Yes [] No    If the office needs to give you a call back, can they leave a voicemail: [] Yes [] No    Augustine Fonseca MA  12/05/22, 08:40 EST

## 2022-12-12 ENCOUNTER — HOSPITAL ENCOUNTER (OUTPATIENT)
Dept: CARDIOLOGY | Facility: HOSPITAL | Age: 67
Discharge: HOME | End: 2022-12-12
Admitting: INTERNAL MEDICINE

## 2022-12-12 VITALS — WEIGHT: 161 LBS | HEIGHT: 61 IN | BODY MASS INDEX: 30.4 KG/M2

## 2022-12-12 DIAGNOSIS — R09.89 BILATERAL CAROTID BRUITS: ICD-10-CM

## 2022-12-12 LAB
BH CV XLRA MEAS LEFT DIST CCA EDV: -22 CM/SEC
BH CV XLRA MEAS LEFT DIST CCA PSV: 99 CM/SEC
BH CV XLRA MEAS LEFT DIST ICA EDV: 26.4 CM/SEC
BH CV XLRA MEAS LEFT DIST ICA PSV: 86.8 CM/SEC
BH CV XLRA MEAS LEFT ICA/CCA RATIO: 1.11
BH CV XLRA MEAS LEFT MID CCA EDV: 22 CM/SEC
BH CV XLRA MEAS LEFT MID CCA PSV: 102 CM/SEC
BH CV XLRA MEAS LEFT MID ICA EDV: 34.6 CM/SEC
BH CV XLRA MEAS LEFT MID ICA PSV: 97.4 CM/SEC
BH CV XLRA MEAS LEFT PROX CCA EDV: 18.9 CM/SEC
BH CV XLRA MEAS LEFT PROX CCA PSV: 108 CM/SEC
BH CV XLRA MEAS LEFT PROX ECA EDV: 13.4 CM/SEC
BH CV XLRA MEAS LEFT PROX ECA PSV: 119 CM/SEC
BH CV XLRA MEAS LEFT PROX ICA EDV: 36.9 CM/SEC
BH CV XLRA MEAS LEFT PROX ICA PSV: 113 CM/SEC
BH CV XLRA MEAS LEFT PROX SCLA EDV: 10.8 CM/SEC
BH CV XLRA MEAS LEFT PROX SCLA PSV: 168 CM/SEC
BH CV XLRA MEAS LEFT VERTEBRAL A EDV: 26.4 CM/SEC
BH CV XLRA MEAS LEFT VERTEBRAL A PSV: 76.8 CM/SEC
BH CV XLRA MEAS RIGHT DIST CCA EDV: 23.2 CM/SEC
BH CV XLRA MEAS RIGHT DIST CCA PSV: 66.8 CM/SEC
BH CV XLRA MEAS RIGHT DIST ICA EDV: 26.4 CM/SEC
BH CV XLRA MEAS RIGHT DIST ICA PSV: 85.6 CM/SEC
BH CV XLRA MEAS RIGHT ICA/CCA RATIO: 1.83
BH CV XLRA MEAS RIGHT MID CCA EDV: 17.3 CM/SEC
BH CV XLRA MEAS RIGHT MID CCA PSV: 63.3 CM/SEC
BH CV XLRA MEAS RIGHT MID ICA EDV: 28.3 CM/SEC
BH CV XLRA MEAS RIGHT MID ICA PSV: 96.6 CM/SEC
BH CV XLRA MEAS RIGHT PROX CCA EDV: 27.5 CM/SEC
BH CV XLRA MEAS RIGHT PROX CCA PSV: 104 CM/SEC
BH CV XLRA MEAS RIGHT PROX ECA EDV: 85.3 CM/SEC
BH CV XLRA MEAS RIGHT PROX ECA PSV: 447 CM/SEC
BH CV XLRA MEAS RIGHT PROX ICA EDV: 36.3 CM/SEC
BH CV XLRA MEAS RIGHT PROX ICA PSV: 116 CM/SEC
BH CV XLRA MEAS RIGHT PROX SCLA EDV: 0 CM/SEC
BH CV XLRA MEAS RIGHT PROX SCLA PSV: 231 CM/SEC
BH CV XLRA MEAS RIGHT VERTEBRAL A EDV: 10.7 CM/SEC
BH CV XLRA MEAS RIGHT VERTEBRAL A PSV: 26.4 CM/SEC
LEFT ARM BP: NORMAL MMHG
MAXIMAL PREDICTED HEART RATE: 155 BPM
RIGHT ARM BP: NORMAL MMHG
STRESS TARGET HR: 132 BPM

## 2022-12-12 PROCEDURE — 93880 EXTRACRANIAL BILAT STUDY: CPT

## 2022-12-12 PROCEDURE — 93880 EXTRACRANIAL BILAT STUDY: CPT | Performed by: INTERNAL MEDICINE

## 2022-12-13 ENCOUNTER — HOSPITAL ENCOUNTER (OUTPATIENT)
Dept: MAMMOGRAPHY | Facility: HOSPITAL | Age: 67
Discharge: HOME | End: 2022-12-13
Admitting: NURSE PRACTITIONER

## 2022-12-13 ENCOUNTER — OFFICE VISIT (OUTPATIENT)
Dept: INTERNAL MEDICINE | Facility: CLINIC | Age: 67
End: 2022-12-13

## 2022-12-13 VITALS
BODY MASS INDEX: 30.57 KG/M2 | WEIGHT: 161.8 LBS | OXYGEN SATURATION: 96 % | SYSTOLIC BLOOD PRESSURE: 128 MMHG | TEMPERATURE: 97.5 F | RESPIRATION RATE: 16 BRPM | HEART RATE: 76 BPM | DIASTOLIC BLOOD PRESSURE: 76 MMHG

## 2022-12-13 DIAGNOSIS — I10 ESSENTIAL HYPERTENSION: Primary | ICD-10-CM

## 2022-12-13 DIAGNOSIS — G44.209 TENSION-TYPE HEADACHE, NOT INTRACTABLE, UNSPECIFIED CHRONICITY PATTERN: ICD-10-CM

## 2022-12-13 DIAGNOSIS — Z86.73 HISTORY OF STROKE: ICD-10-CM

## 2022-12-13 DIAGNOSIS — Z12.31 ENCOUNTER FOR SCREENING MAMMOGRAM FOR BREAST CANCER: ICD-10-CM

## 2022-12-13 PROCEDURE — 77067 SCR MAMMO BI INCL CAD: CPT

## 2022-12-13 PROCEDURE — 77063 BREAST TOMOSYNTHESIS BI: CPT

## 2022-12-13 PROCEDURE — 99214 OFFICE O/P EST MOD 30 MIN: CPT | Performed by: NURSE PRACTITIONER

## 2022-12-13 PROCEDURE — 77063 BREAST TOMOSYNTHESIS BI: CPT | Performed by: RADIOLOGY

## 2022-12-13 PROCEDURE — 77067 SCR MAMMO BI INCL CAD: CPT | Performed by: RADIOLOGY

## 2022-12-14 NOTE — PROGRESS NOTES
Patient Name: Georgia Romero  : 1955   MRN: [id number]     Chief Complaint:    Chief Complaint   Patient presents with   • Hypertension     1 week f/u       History of Present Illness: Georgia Romero is a 67 y.o. female presents to clinic  today for a follow-up. She consents to [facility] recording.     Headaches  The patient began taking isosorbide and states that the second day of taking the medication she felt like herself again. Her headaches have resolved. She still has Nurtec but has not needed to take any with the exception of 1 time.     Hypertension  Her blood pressure readings have been within normal limits. The blood pressure readings range at 120/80 mmHg or 120/70 mmHg.     Health maintenance   She recently had labs completed at an ER on 2022.     Ischemic Stroke ()  Patient had difficulty speaking and found suspected old white matter infarct in the left corona radiata.   IMPRESSION:  Heavily motion degraded exam, but with no evidence to  suggest acute infarction or other acute intracranial disease. Suspected  small old white matter infarct in the left corona radiata.    Bilateral Carotid Duplex (10/01/2019 09:42)    Interpretation Summary    · No evidence of hemodynamically significant stenosis of bilateral internal carotid arteries.  · Bilateral mild to moderate irregular plaque is noted.  · Right external carotid artery velocity is significantly elevated consistent with moderate to severe stenosis.    Intracranial carotid stenosis  CTA head imaging showed focal calcification of the intracavernous left ICA and proximal supraclinoid ICA, difficult to assess clearly for stenosis, thought to represent 50% or less stenosis.          Subjective     Review of System: Review of Systems   Constitutional: Negative for fatigue and fever.   HENT: Negative for congestion and rhinorrhea.    Respiratory: Negative for cough, shortness of breath and wheezing.    Cardiovascular: Negative for  chest pain and palpitations.   Gastrointestinal: Negative for abdominal pain, diarrhea, nausea and vomiting.   Musculoskeletal: Negative for myalgias.   Skin: Negative for rash.   Neurological: Negative for headaches.   Hematological: Negative for adenopathy.   Psychiatric/Behavioral: Negative for dysphoric mood.        Medications:     Current Outpatient Medications:   •  amoxicillin (AMOXIL) 875 MG tablet, Take 1 tablet by mouth 2 (Two) Times a Day., Disp: 20 tablet, Rfl: 0  •  ascorbic acid (VITAMIN C) 1000 MG tablet, Take 1 tablet by mouth Daily., Disp: , Rfl:   •  BIOTIN PO, Take 1 tablet by mouth Daily., Disp: , Rfl:   •  cholecalciferol (VITAMIN D3) 25 MCG (1000 UT) tablet, Take 1 tablet by mouth Daily., Disp: 90 tablet, Rfl: 1  •  clopidogrel (PLAVIX) 75 MG tablet, Take 1 tablet by mouth Daily., Disp: 90 tablet, Rfl: 3  •  cyclobenzaprine (FLEXERIL) 10 MG tablet, Take 1 tablet by mouth At Night As Needed for Muscle Spasms., Disp: 30 tablet, Rfl: 0  •   MG tablet, TAKE 1 TABLET EVERY 6 HOURS FOR MILD PAIN AS NEEDED, Disp: 90 tablet, Rfl: 1  •  isosorbide mononitrate (IMDUR) 30 MG 24 hr tablet, TAKE 1 TABLET BY MOUTH DAILY, Disp: 90 tablet, Rfl: 0  •  ketoconazole (NIZORAL) 2 % shampoo, Apply  topically to the appropriate area as directed 2 (Two) Times a Week., Disp: 360 mL, Rfl: 3  •  losartan (Cozaar) 100 MG tablet, Take 1 tablet by mouth Daily., Disp: 90 tablet, Rfl: 1  •  metoprolol tartrate (LOPRESSOR) 50 MG tablet, Take 1 tablet by mouth 2 (Two) Times a Day., Disp: 180 tablet, Rfl: 3  •  nitroglycerin (NITROSTAT) 0.4 MG SL tablet, Place 1 tablet under the tongue Every 5 (Five) Minutes As Needed for Chest Pain., Disp: 25 tablet, Rfl: 1  •  pantoprazole (PROTONIX) 40 MG EC tablet, Take 1 tablet by mouth Daily., Disp: 90 tablet, Rfl: 1  •  Rimegepant Sulfate (Nurtec) 75 MG tablet dispersible tablet, Take 1 tablet by mouth Daily., Disp: 17 tablet, Rfl: 0  •  rosuvastatin (CRESTOR) 40 MG tablet, Take 1  tablet by mouth Daily., Disp: 90 tablet, Rfl: 3  •  sucralfate (CARAFATE) 1 g tablet, Take 1 tablet by mouth 4 (Four) Times a Day. Thoroughly crush pill and mix in small amount of water prior to swallowing., Disp: 60 tablet, Rfl: 0  •  rOPINIRole (Requip) 0.25 MG tablet, Take 1 tablet by mouth Every Night. Take 1 hour before bedtime., Disp: 14 tablet, Rfl: 0    Allergies:   Allergies   Allergen Reactions   • Pravastatin Myalgia   • Influenza Vac Split Quad Hives       Objective     Physical Exam:   Vital Signs:   Vitals:    12/13/22 1257   BP: 128/76   BP Location: Right arm   Patient Position: Sitting   Cuff Size: Adult   Pulse: 76   Resp: 16   Temp: 97.5 °F (36.4 °C)   TempSrc: Infrared   SpO2: 96%   Weight: 73.4 kg (161 lb 12.8 oz)   PainSc: 0-No pain         Physical Exam  Constitutional:       General: She is not in acute distress.     Appearance: She is not ill-appearing.   HENT:      Head: Normocephalic.   Neck:      Vascular: Carotid bruit present.   Cardiovascular:      Rate and Rhythm: Normal rate and regular rhythm.      Heart sounds: Murmur heard.   Pulmonary:      Breath sounds: Normal breath sounds.   Abdominal:      General: Bowel sounds are normal.      Tenderness: There is no abdominal tenderness.   Neurological:      General: No focal deficit present.      Mental Status: She is oriented to person, place, and time.   Psychiatric:         Mood and Affect: Mood normal.         Assessment / Plan      Assessment/Plan:   Diagnoses and all orders for this visit:    1. Essential hypertension (Primary)  Continue current medications. Continue to monitor at home.    2. History of stroke  Continue plavix and rosuvastatin    3. Tension-type headache, not intractable, unspecified chronicity pattern  Nurtec as needed    Follow Up:   No follow-ups on file.    LORI Gunn  [facility] [place] [facility]    Transcribed from ambient dictation for LORI Gunn by Leonora Gan  / Pasted by: Wandy Figueredo SWAT12/13/22   19:26 EST    Patient or patient representative verbalized consent to the visit recording.  I have personally performed the services described in this document as transcribed by the above individual, and it is both accurate and complete.

## 2022-12-19 ENCOUNTER — TELEPHONE (OUTPATIENT)
Dept: INTERNAL MEDICINE | Facility: CLINIC | Age: 67
End: 2022-12-19

## 2022-12-19 NOTE — TELEPHONE ENCOUNTER
----- Message from LORI Gunn sent at 12/18/2022  8:23 PM EST -----  I am pleased to report your mammogram did not show any suspicious findings for breast cancer.  I recommend annual mammograms and follow-up if any new concerns or changes.

## 2022-12-19 NOTE — TELEPHONE ENCOUNTER
I left a message on the patients voicemail to call our office back, office number provided.     HUB read message:  I am pleased to report your mammogram did not show any suspicious findings for breast cancer.  I recommend annual mammograms and follow-up if any new concerns or changes.

## 2022-12-19 NOTE — TELEPHONE ENCOUNTER
It is a stable finding that appears to be fibrous tissue when compared from last mammograms. If anything changes let me know.

## 2022-12-19 NOTE — TELEPHONE ENCOUNTER
"Patient was calling in for the results of her mammogram.  The hub read the message about no masses being present but the report she got in [facility] stated \"There is a stable oval isodense  benign-appearing mass measuring 1.2 cm in size in the left mid lower  inner quadrant.\" and she was wanting to know what that meant because it was worrying her.  She is asking for a call back, if she cant answer she asks that we leave a detailed message.    "

## 2022-12-19 NOTE — TELEPHONE ENCOUNTER
Patient called and said she received a message in regards to test results so she was returning the phone call.

## 2022-12-20 ENCOUNTER — TELEPHONE (OUTPATIENT)
Dept: CARDIOLOGY | Facility: CLINIC | Age: 67
End: 2022-12-20

## 2022-12-20 NOTE — TELEPHONE ENCOUNTER
Spoke with the patient. Reports BP has been controlled at home. Last check 120/70. She would prefer for her PCP to handler her HTN. Reviewed carotid duplex with her. No changes in meds. Verbalized understanding.

## 2022-12-20 NOTE — TELEPHONE ENCOUNTER
Start hydrochlorothiazide 25 mg daily.  She does not need to be concerned about her carotid results.  I sent her a message on my chart regarding this.

## 2022-12-20 NOTE — TELEPHONE ENCOUNTER
"Left message stating \"  Its a stable finding that appears to be fibrous tissue when compared from last mammograms and to call us if anything changes  "

## 2022-12-20 NOTE — TELEPHONE ENCOUNTER
Patient called to let you know that since stopping her isosorbide that she developed a severe headache and went to the ER. She reports her BP was 240/115. She followed up with PCP and she was put back on her isosorbide and her BP checks have been fine since.     She is also calling because she reviewed her carotid results and was concerned.    JERARDO and LICA 0-49%. Right external carotid artery disease.  She is on Plavix, Crestor and no ASA.    For BP she is on:  Metoprolol 50 mg BID  Losartan 100 mg daily    She was started on Nurtec for migraines.     Any changes?

## 2023-01-11 DIAGNOSIS — E78.5 HYPERLIPIDEMIA LDL GOAL <70: ICD-10-CM

## 2023-01-11 RX ORDER — ROSUVASTATIN CALCIUM 40 MG/1
40 TABLET, COATED ORAL DAILY
Qty: 90 TABLET | Refills: 1 | Status: SHIPPED | OUTPATIENT
Start: 2023-01-11

## 2023-01-13 ENCOUNTER — TELEPHONE (OUTPATIENT)
Dept: INTERNAL MEDICINE | Facility: CLINIC | Age: 68
End: 2023-01-13
Payer: MEDICARE

## 2023-01-13 DIAGNOSIS — I10 ESSENTIAL HYPERTENSION: ICD-10-CM

## 2023-01-13 RX ORDER — ISOSORBIDE MONONITRATE 30 MG/1
30 TABLET, EXTENDED RELEASE ORAL DAILY
Qty: 90 TABLET | Refills: 1 | Status: SHIPPED | OUTPATIENT
Start: 2023-01-13

## 2023-01-17 ENCOUNTER — OFFICE VISIT (OUTPATIENT)
Dept: INTERNAL MEDICINE | Facility: CLINIC | Age: 68
End: 2023-01-17
Payer: MEDICARE

## 2023-01-17 VITALS
WEIGHT: 160.4 LBS | BODY MASS INDEX: 30.29 KG/M2 | DIASTOLIC BLOOD PRESSURE: 64 MMHG | HEIGHT: 61 IN | TEMPERATURE: 98.2 F | RESPIRATION RATE: 20 BRPM | SYSTOLIC BLOOD PRESSURE: 110 MMHG | OXYGEN SATURATION: 95 % | HEART RATE: 67 BPM

## 2023-01-17 DIAGNOSIS — Z23 ENCOUNTER FOR IMMUNIZATION: ICD-10-CM

## 2023-01-17 DIAGNOSIS — Z12.11 ENCOUNTER FOR COLORECTAL CANCER SCREENING: ICD-10-CM

## 2023-01-17 DIAGNOSIS — E78.2 MIXED HYPERLIPIDEMIA: ICD-10-CM

## 2023-01-17 DIAGNOSIS — I25.119 CORONARY ARTERY DISEASE INVOLVING NATIVE CORONARY ARTERY OF NATIVE HEART WITH ANGINA PECTORIS: ICD-10-CM

## 2023-01-17 DIAGNOSIS — I10 ESSENTIAL HYPERTENSION: ICD-10-CM

## 2023-01-17 DIAGNOSIS — Z12.12 ENCOUNTER FOR COLORECTAL CANCER SCREENING: ICD-10-CM

## 2023-01-17 DIAGNOSIS — Z00.00 MEDICARE WELCOME VISIT: Primary | ICD-10-CM

## 2023-01-17 DIAGNOSIS — E04.2 MULTINODULAR GOITER: ICD-10-CM

## 2023-01-17 DIAGNOSIS — E55.9 VITAMIN D DEFICIENCY: ICD-10-CM

## 2023-01-17 DIAGNOSIS — R45.89 DEPRESSED MOOD: ICD-10-CM

## 2023-01-17 LAB
ALBUMIN SERPL-MCNC: 4 G/DL (ref 3.5–5.2)
ALBUMIN/GLOB SERPL: 1.9 G/DL
ALP SERPL-CCNC: 72 U/L (ref 39–117)
ALT SERPL-CCNC: 9 U/L (ref 1–33)
AST SERPL-CCNC: 12 U/L (ref 1–32)
BASOPHILS # BLD AUTO: 0.03 10*3/MM3 (ref 0–0.2)
BASOPHILS NFR BLD AUTO: 0.6 % (ref 0–1.5)
BILIRUB SERPL-MCNC: 0.7 MG/DL (ref 0–1.2)
BUN SERPL-MCNC: 18 MG/DL (ref 8–23)
BUN/CREAT SERPL: 22.2 (ref 7–25)
CALCIUM SERPL-MCNC: 9.5 MG/DL (ref 8.6–10.5)
CHLORIDE SERPL-SCNC: 105 MMOL/L (ref 98–107)
CHOLEST SERPL-MCNC: 135 MG/DL (ref 0–200)
CO2 SERPL-SCNC: 27.2 MMOL/L (ref 22–29)
CREAT SERPL-MCNC: 0.81 MG/DL (ref 0.57–1)
EGFRCR SERPLBLD CKD-EPI 2021: 80.7 ML/MIN/1.73
EOSINOPHIL # BLD AUTO: 0.1 10*3/MM3 (ref 0–0.4)
EOSINOPHIL NFR BLD AUTO: 2.1 % (ref 0.3–6.2)
ERYTHROCYTE [DISTWIDTH] IN BLOOD BY AUTOMATED COUNT: 13.4 % (ref 12.3–15.4)
GLOBULIN SER CALC-MCNC: 2.1 GM/DL
GLUCOSE SERPL-MCNC: 100 MG/DL (ref 65–99)
HCT VFR BLD AUTO: 39.5 % (ref 34–46.6)
HDLC SERPL-MCNC: 41 MG/DL (ref 40–60)
HGB BLD-MCNC: 13.6 G/DL (ref 12–15.9)
IMM GRANULOCYTES # BLD AUTO: 0.01 10*3/MM3 (ref 0–0.05)
IMM GRANULOCYTES NFR BLD AUTO: 0.2 % (ref 0–0.5)
LDLC SERPL CALC-MCNC: 67 MG/DL (ref 0–100)
LYMPHOCYTES # BLD AUTO: 0.69 10*3/MM3 (ref 0.7–3.1)
LYMPHOCYTES NFR BLD AUTO: 14.3 % (ref 19.6–45.3)
MCH RBC QN AUTO: 29.4 PG (ref 26.6–33)
MCHC RBC AUTO-ENTMCNC: 34.4 G/DL (ref 31.5–35.7)
MCV RBC AUTO: 85.3 FL (ref 79–97)
MONOCYTES # BLD AUTO: 0.35 10*3/MM3 (ref 0.1–0.9)
MONOCYTES NFR BLD AUTO: 7.3 % (ref 5–12)
NEUTROPHILS # BLD AUTO: 3.63 10*3/MM3 (ref 1.7–7)
NEUTROPHILS NFR BLD AUTO: 75.5 % (ref 42.7–76)
NRBC BLD AUTO-RTO: 0 /100 WBC (ref 0–0.2)
PLATELET # BLD AUTO: 199 10*3/MM3 (ref 140–450)
POTASSIUM SERPL-SCNC: 4.2 MMOL/L (ref 3.5–5.2)
PROT SERPL-MCNC: 6.1 G/DL (ref 6–8.5)
RBC # BLD AUTO: 4.63 10*6/MM3 (ref 3.77–5.28)
SODIUM SERPL-SCNC: 141 MMOL/L (ref 136–145)
TRIGL SERPL-MCNC: 154 MG/DL (ref 0–150)
TSH SERPL DL<=0.005 MIU/L-ACNC: 0.6 UIU/ML (ref 0.27–4.2)
VLDLC SERPL CALC-MCNC: 27 MG/DL (ref 5–40)
WBC # BLD AUTO: 4.81 10*3/MM3 (ref 3.4–10.8)

## 2023-01-17 PROCEDURE — G0009 ADMIN PNEUMOCOCCAL VACCINE: HCPCS | Performed by: NURSE PRACTITIONER

## 2023-01-17 PROCEDURE — 99214 OFFICE O/P EST MOD 30 MIN: CPT | Performed by: NURSE PRACTITIONER

## 2023-01-17 PROCEDURE — 1160F RVW MEDS BY RX/DR IN RCRD: CPT | Performed by: NURSE PRACTITIONER

## 2023-01-17 PROCEDURE — 1126F AMNT PAIN NOTED NONE PRSNT: CPT | Performed by: NURSE PRACTITIONER

## 2023-01-17 PROCEDURE — 1170F FXNL STATUS ASSESSED: CPT | Performed by: NURSE PRACTITIONER

## 2023-01-17 PROCEDURE — G0402 INITIAL PREVENTIVE EXAM: HCPCS | Performed by: NURSE PRACTITIONER

## 2023-01-17 PROCEDURE — 96160 PT-FOCUSED HLTH RISK ASSMT: CPT | Performed by: NURSE PRACTITIONER

## 2023-01-17 PROCEDURE — 90677 PCV20 VACCINE IM: CPT | Performed by: NURSE PRACTITIONER

## 2023-01-17 NOTE — PROGRESS NOTES
The ABCs of the Annual Wellness Visit  [place] to Medicare Visit    Subjective     Georgia Romero is a 67 y.o. female who presents for a  Welcome to Medicare Visit.    The following portions of the patient's history were reviewed and   updated as appropriate: allergies, current medications, past medical history, past social history, past surgical history and problem list.     Compared to one year ago, the patient feels her physical   health is the same.    Compared to one year ago, the patient feels her mental   health is the same.    Recent Hospitalizations:  She was not admitted to the hospital during the last year.       Current Medical Providers:  Patient Care Team:  Nichole Cardenas APRN as PCP - General (Nurse Practitioner)  Telly Anguiano IV, MD as Consulting Physician (Interventional Cardiology)  Emily Ba APRN as Nurse Practitioner (Interventional Cardiology)    Outpatient Medications Prior to Visit   Medication Sig Dispense Refill   • ascorbic acid (VITAMIN C) 1000 MG tablet Take 1 tablet by mouth Daily.     • BIOTIN PO Take 1 tablet by mouth Daily.     • cholecalciferol (VITAMIN D3) 25 MCG (1000 UT) tablet Take 1 tablet by mouth Daily. 90 tablet 1   • clopidogrel (PLAVIX) 75 MG tablet Take 1 tablet by mouth Daily. 90 tablet 3   • cyclobenzaprine (FLEXERIL) 10 MG tablet Take 1 tablet by mouth At Night As Needed for Muscle Spasms. 30 tablet 0   •  MG tablet TAKE 1 TABLET EVERY 6 HOURS FOR MILD PAIN AS NEEDED 90 tablet 1   • isosorbide mononitrate (IMDUR) 30 MG 24 hr tablet Take 1 tablet by mouth Daily. 90 tablet 1   • ketoconazole (NIZORAL) 2 % shampoo Apply  topically to the appropriate area as directed 2 (Two) Times a Week. 360 mL 3   • losartan (Cozaar) 100 MG tablet Take 1 tablet by mouth Daily. 90 tablet 1   • metoprolol tartrate (LOPRESSOR) 50 MG tablet Take 1 tablet by mouth 2 (Two) Times a Day. 180 tablet 3   • nitroglycerin (NITROSTAT) 0.4 MG SL tablet Place 1  tablet under the tongue Every 5 (Five) Minutes As Needed for Chest Pain. 25 tablet 1   • pantoprazole (PROTONIX) 40 MG EC tablet Take 1 tablet by mouth Daily. 90 tablet 1   • rosuvastatin (CRESTOR) 40 MG tablet Take 1 tablet by mouth Daily. 90 tablet 1   • sucralfate (CARAFATE) 1 g tablet Take 1 tablet by mouth 4 (Four) Times a Day. Thoroughly crush pill and mix in small amount of water prior to swallowing. 60 tablet 0   • amoxicillin (AMOXIL) 875 MG tablet Take 1 tablet by mouth 2 (Two) Times a Day. 20 tablet 0   • Rimegepant Sulfate (Nurtec) 75 MG tablet dispersible tablet Take 1 tablet by mouth Daily. 17 tablet 0   • rOPINIRole (Requip) 0.25 MG tablet Take 1 tablet by mouth Every Night. Take 1 hour before bedtime. 14 tablet 0     No facility-administered medications prior to visit.       No opioid medication identified on active medication list. I have reviewed chart for other potential  high risk medication/s and harmful drug interactions in the elderly.          Aspirin is not on active medication list.  Aspirin use is not indicated based on review of current medical condition/s. Risk of harm outweighs potential benefits.  .    Patient Active Problem List   Diagnosis   • Essential hypertension   • GERD (gastroesophageal reflux disease)   • Hyperlipidemia LDL goal <70   • Cobalamin deficiency   • Vitamin D deficiency   • Coronary artery disease involving native coronary artery of native heart with angina pectoris (HCC)   • Current every day smoker   • Obesity (BMI 30.0-34.9)   • Bilateral carotid bruits   • Multinodular goiter   • Prediabetes     Advance Care Planning  Advance Directive is not on file.  ACP discussion was held with the patient during this visit. Patient does not have an advance directive, information provided.       Objective   Vitals:    01/17/23 0918   BP: 110/64   BP Location: Right arm   Patient Position: Sitting   Cuff Size: Adult   Pulse: 67   Resp: 20   Temp: 98.2 °F (36.8 °C)   TempSrc:  "Infrared   SpO2: 95%   Weight: 72.8 kg (160 lb 6.4 oz)   Height: 154.9 cm (61\")   PainSc: 0-No pain     Estimated body mass index is 30.31 kg/m² as calculated from the following:    Height as of this encounter: 154.9 cm (61\").    Weight as of this encounter: 72.8 kg (160 lb 6.4 oz).    BMI is >= 30 and <35. (Class 1 Obesity). The following options were offered after discussion;: weight loss educational material (shared in after visit summary)      Does the patient have evidence of cognitive impairment?   No    Lab Results   Component Value Date    CHLPL 135 01/17/2023    TRIG 154 (H) 01/17/2023    HDL 41 01/17/2023    LDL 67 01/17/2023    VLDL 27 01/17/2023         ECG 12 Lead    Date/Time: 1/18/2023 8:55 PM  Performed by: Nichole Cardenas APRN  Authorized by: Nichole Cardenas APRN   Comparison: compared with previous ECG from 11/14/2022  Similar to previous ECG  Comparison to previous ECG: SR replaces sinus gary  Rhythm: sinus rhythm  Rate: normal  Conduction: conduction normal  QRS axis: normal    Clinical impression: normal ECG               HEALTH RISK ASSESSMENT    Smoking Status:  Social History     Tobacco Use   Smoking Status Every Day   • Packs/day: 0.50   • Years: 40.00   • Pack years: 20.00   • Types: Cigarettes   • Last attempt to quit: 9/20/2019   • Years since quitting: 3.3   Smokeless Tobacco Never     Alcohol Consumption:  Social History     Substance and Sexual Activity   Alcohol Use Yes    Comment: rarely       Fall Risk Screen:    FAUZIA Fall Risk Assessment was completed, and patient is at LOW risk for falls.Assessment completed on:1/17/2023    Depression Screen:   PHQ-2/PHQ-9 Depression Screening 1/17/2023   Little Interest or Pleasure in Doing Things 0-->not at all   Feeling Down, Depressed or Hopeless 0-->not at all   PHQ-9: Brief Depression Severity Measure Score 0       Health Habits and Functional and Cognitive Screening:  Functional & Cognitive Status 1/17/2023   Do you have " difficulty preparing food and eating? No   Do you have difficulty bathing yourself, getting dressed or grooming yourself? No   Do you have difficulty using the toilet? No   Do you have difficulty moving around from place to place? No   Do you have trouble with steps or getting out of a bed or a chair? No   Current Diet Well Balanced Diet   Dental Exam Up to date   Eye Exam Up to date   Exercise (times per week) 0 times per week   Current Exercises Include No Regular Exercise   Do you need help using the phone?  No   Are you deaf or do you have serious difficulty hearing?  No   Do you need help with transportation? No   Do you need help shopping? No   Do you need help preparing meals?  No   Do you need help with housework?  No   Do you need help with laundry? No   Do you need help taking your medications? No   Do you need help managing money? No   Do you ever drive or ride in a car without wearing a seat belt? No   Have you felt unusual stress, anger or loneliness in the last month? No   Who do you live with? Spouse   If you need help, do you have trouble finding someone available to you? No   Have you been bothered in the last four weeks by sexual problems? No   Do you have difficulty concentrating, remembering or making decisions? Yes       Visual Acuity:    Vision Screening    Right eye Left eye Both eyes   Without correction      With correction 20/20 20/40 20/20   Comments: Has bifocal contacts       Age-appropriate Screening Schedule:  Refer to the list below for future screening recommendations based on patient's age, sex and/or medical conditions. Orders for these recommended tests are listed in the plan section. The patient has been provided with a written plan.    Health Maintenance   Topic Date Due   • ZOSTER VACCINE (2 of 3) 09/20/2017   • TDAP/TD VACCINES (2 - Td or Tdap) 10/07/2021   • MAMMOGRAM  12/13/2023   • LIPID PANEL  01/17/2024   • DXA SCAN  06/22/2024   • PAP SMEAR  03/30/2025        CMS  Preventative Services Quick Reference  Risk Factors Identified During Encounter    Depression/Dysphoria: discussed anti-depressants.   Fall Risk-High or Moderate: Discussed Fall Prevention in the home  Glaucoma or Family History of Glaucoma:  follows with ophthalmology  Immunizations Discussed/Encouraged: Tdap and Prevnar 20 (Pneumococcal 20-valent conjugate)  Inadequate Social Support, Isolation, Loneliness, Lack of Transportation, Financial Difficulties, or Caregiver Stress: discussed interventions  Inactivity/Sedentary:  Patient was advised to do at least 150 minutes a week of moderate intensity activity such as brisk walking and do at least 2 days a week of activities that strengthen muscles such as resistance training and do activities to improve balance such as standing on one foot about 3 days a week.  Tobacco use: encouraged to quit smoking.   The above risks/problems have been discussed with the patient.  Pertinent information has been shared with the patient in the After Visit Summary.    Follow Up:   Initial Medicare Visit in one year    An After Visit Summary and PPPS were made available to the patient.      Additional E&M Note during same encounter follows:  Patient has multiple medical problems which are significant and separately identifiable that require additional work above and beyond the Medicare Wellness Visit.      Chief Complaint  Welcome To Medicare    Subjective        HPI  Georgia MCGINNIS PhillipFlores is also being seen today for chronic conditions.       CAD/Hypertension  Cardiac stents in 2015 with Absorb III Randomized controlled trial MID and LAD.  She recently found out she did receive the absorb BVS stent. She follows with Dr. Anguiano. She is taking Plavix 75 mg. She is on metoprolol and isosorbide.She feels better on isosorbide and bp is controlled.   She has not needed nitroglycerin. The patient states that she has not been getting much exercise other than cleaning her house. She needs to  rejoin a gym. She states she is going to try to stop smoking. She has tried nicotine patches but it increased her blood pressure.       Adult Transthoracic Echo Complete W/ Cont if Necessary Per Protocol (With Agitated Saline) (10/01/2019 09:19)  Interpretation Summary    · Mild mitral valve regurgitation is present.  · Mild tricuspid valve regurgitation is present.  · Calculated right ventricular systolic pressure from tricuspid regurgitation is 24 mmHg.  · Estimated EF = 68%.  · Left ventricular systolic function is normal.  · Left ventricular wall thickness is consistent with mild concentric hypertrophy.  · Normal right ventricular cavity size, wall thickness, systolic function and septal motion noted.  · Left ventricular diastolic dysfunction is abnormal consistent with: age.  · Saline test results are negative.  · The aortic valve exhibits mild sclerosis.  · No evidence of pulmonary hypertension is present.  · There is no evidence of pericardial effusion.       HLD  She is taking crestor 40 mg. LDL goal < 70.  No myalgias.     She states she had a benign mass that was discovered on her last mammogram and that has making her feel worried.   Mammo Screening Digital Tomosynthesis Bilateral With CAD (12/13/2022 10:40)  FINDINGS: There are scattered areas of fibroglandular density. The  fibroglandular pattern is stable. There is a stable oval isodense  benign-appearing mass measuring 1.2 cm in size in the left mid lower  inner quadrant. There is no mass, worrisome microcalcifications, or  architectural distortion to suggest development of malignancy.     IMPRESSION:  Benign screening mammogram.  No findings suspicious for  malignancy.        ACR BI-RADS CATEGORY:  2, BENIGN     RECOMMENDATION: Yearly mammogram, yearly clinical breast exam, and  encourage self breast awareness.       Also, she states she is due for her thyroid ultrasound to monitor thyroid nodules.     The patient states she would like to do  "Cologuard instead of a colonoscopy.     She states that her mood is not the best. She states she hates it [place] and she misses her family. She is not interested in medications at this time. 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     Review of Systems   Psychiatric/Behavioral: Positive for dysphoric mood.       Objective   Vital Signs:  /64 (BP Location: Right arm, Patient Position: Sitting, Cuff Size: Adult)   Pulse 67   Temp 98.2 °F (36.8 °C) (Infrared)   Resp 20   Ht 154.9 cm (61\")   Wt 72.8 kg (160 lb 6.4 oz)   SpO2 95%   BMI 30.31 kg/m²     Physical Exam  Constitutional:       General: She is not in acute distress.     Appearance: She is not ill-appearing.   HENT:      Head: Normocephalic.   Neck:      Vascular: Carotid bruit present.   Cardiovascular:      Rate and Rhythm: Normal rate and regular rhythm.      Heart sounds: Murmur heard.   Pulmonary:      Breath sounds: Normal breath sounds.   Abdominal:      General: Bowel sounds are normal.      Tenderness: There is no abdominal tenderness.   Musculoskeletal:      Right lower leg: No edema.      Left lower leg: No edema.   Lymphadenopathy:      Cervical: No cervical adenopathy.   Skin:     General: Skin is warm and dry.   Neurological:      General: No focal deficit present.      Mental Status: She is oriented to person, place, and time.   Psychiatric:         Mood and Affect: Mood normal.        Finger Rub Hearing{Test (right ear):passed  Finger Rub Hearing{Test (left ear):passed      The following data was reviewed by: LORI Gunn on 01/17/2023:  CMP    CMP 4/5/22 11/29/22 1/17/23   Glucose 91 109 (A) 100 (A)   BUN 14 17 18   Creatinine 0.85 0.74 0.81   eGFR 76.1 89.9    Sodium 140 141 141   Potassium 4.5 4.0 4.2   Chloride 103 105 105   Calcium 9.7 9.4 9.5   Total Protein   6.1   Total Protein 6.1 6.2    Albumin 4.50 3.90 4.0   Globulin   2.1   Globulin 1.6 2.3  "   Total Bilirubin 0.8 0.4 0.7   Alkaline Phosphatase 77 70 72   AST (SGOT) 13 18 12   ALT (SGPT) 11 12 9   Albumin/Globulin Ratio 2.8 1.7    BUN/Creatinine Ratio 16.5 23.0 22.2   Anion Gap 9.9 10.0    (A) Abnormal value       Comments are available for some flowsheets but are not being displayed.           CBC w/diff    CBC w/Diff 4/5/22 11/29/22 1/17/23   WBC 4.73 5.36 4.81   RBC 4.85 4.33 4.63   Hemoglobin 14.3 12.8 13.6   Hematocrit 42.3 38.1 39.5   MCV 87.2 88.0 85.3   MCH 29.5 29.6 29.4   MCHC 33.8 33.6 34.4   RDW 14.1 13.9 13.4   Platelets 196 178 199   Neutrophil Rel % 70.3 67.2 75.5   Immature Granulocyte Rel % 0.4 0.4    Lymphocyte Rel % 19.0 (A) 21.1 14.3 (A)   Monocyte Rel % 7.0 7.6 7.3   Eosinophil Rel % 2.5 3.0 2.1   Basophil Rel % 0.8 0.7 0.6   (A) Abnormal value            Lipid Panel    Lipid Panel 4/5/22 1/17/23   Total Cholesterol 135    Total Cholesterol  135   Triglycerides 137 154 (A)   HDL Cholesterol 44 41   VLDL Cholesterol 24 27   LDL Cholesterol  67 67   LDL/HDL Ratio 1.45    (A) Abnormal value       Comments are available for some flowsheets but are not being displayed.           TSH    TSH 4/5/22 11/29/22 1/17/23   TSH 1.270 1.610 0.600                    Assessment and Plan   Diagnoses and all orders for this visit:    1. Medicare welcome visit (Primary)  -     ECG 12 Lead  -     Hemoglobin A1c; Future    2. Essential hypertension  -     CBC & Differential  -     TSH  -     Comprehensive Metabolic Panel    3. Mixed hyperlipidemia  -     Lipid Panel    4. Coronary artery disease involving native coronary artery of native heart with angina pectoris (HCC)    5. Multinodular goiter  -     US Thyroid    6. Vitamin D deficiency  -     Vitamin D 25 hydroxy; Future    7. Encounter for immunization  -     Pneumococcal Conjugate Vaccine 20-Valent (PCV20)    8. Depressed mood    9. Encounter for colorectal cancer screening  -     Cologuard - Stool, Per Rectum; Future      1. Welcome to Medicare  -  Full panel of labs will be obtained today.   - Cologuard was ordered.   - Advised the patient to return to clinic in 2 months to discuss depression symptoms further. She declined medications at this visit.        Follow Up   Return in about 3 months (around 4/17/2023).  Patient was given instructions and counseling regarding her condition or for health maintenance advice. Please see specific information pulled into the AVS if appropriate.       Transcribed from ambient dictation for LORI Gunn by Shasta Quintan[employer] .  01/17/23   13:00 EST    Patient or patient representative verbalized consent to the visit recording.  I have personally performed the services described in this document as transcribed by the above individual, and it is both accurate and complete.

## 2023-01-17 NOTE — LETTER
"VACCINE CONSENT FORM      Patient Name:  Georgia Romero    Patient :  1955      I/We have read, or have been explained, the information about the diseases and the vaccines listed below.  There was an opportunity to ask questions and any questions were answered satisfactorily.  I/We believe that I/we understand the benefits and risks of the vaccines(s) cited, and ask the vaccine(s) listed below be given to me/us or the person named above (for which i have authorized to make the request).      Vaccine(s) give:    Orders Placed This Encounter   Procedures   • Pneumococcal Conjugate Vaccine 20-Valent (PCV20)         Medicare patients:    The only vaccine covered under your medical benefit is flu/pneumonia and hepatitis B.  All other may be covered under your \"Part D\" prescription plan and requires you to go to a pharmacy with a Physician orders for administration.  If you still prefer to have it administered at our office, you will receive a bill for the vaccine and administration cost.               Patient Initials                     Patient or Parent/Guardian Signature                    Date        A copy of the appropriate Centers for Disease Control and Prevention Vaccine Information Statements has been provided.   "

## 2023-01-18 PROCEDURE — 93000 ELECTROCARDIOGRAM COMPLETE: CPT | Performed by: NURSE PRACTITIONER

## 2023-02-28 ENCOUNTER — LAB (OUTPATIENT)
Dept: INTERNAL MEDICINE | Facility: CLINIC | Age: 68
End: 2023-02-28
Payer: MEDICARE

## 2023-02-28 ENCOUNTER — HOSPITAL ENCOUNTER (OUTPATIENT)
Dept: ULTRASOUND IMAGING | Facility: HOSPITAL | Age: 68
Discharge: HOME | End: 2023-02-28
Admitting: NURSE PRACTITIONER
Payer: MEDICARE

## 2023-02-28 DIAGNOSIS — E55.9 VITAMIN D DEFICIENCY: Primary | ICD-10-CM

## 2023-02-28 DIAGNOSIS — R73.9 HYPERGLYCEMIA: ICD-10-CM

## 2023-02-28 PROCEDURE — 76536 US EXAM OF HEAD AND NECK: CPT

## 2023-03-08 ENCOUNTER — TELEPHONE (OUTPATIENT)
Dept: INTERNAL MEDICINE | Facility: CLINIC | Age: 68
End: 2023-03-08
Payer: MEDICARE

## 2023-03-08 DIAGNOSIS — I25.119 CORONARY ARTERY DISEASE INVOLVING NATIVE CORONARY ARTERY OF NATIVE HEART WITH ANGINA PECTORIS: ICD-10-CM

## 2023-03-08 RX ORDER — METOPROLOL TARTRATE 50 MG/1
50 TABLET, FILM COATED ORAL 2 TIMES DAILY
Qty: 180 TABLET | Refills: 3 | Status: SHIPPED | OUTPATIENT
Start: 2023-03-08

## 2023-03-13 DIAGNOSIS — Z00.00 HEALTHCARE MAINTENANCE: ICD-10-CM

## 2023-03-13 RX ORDER — IBUPROFEN 600 MG/1
600 TABLET ORAL EVERY 8 HOURS PRN
Qty: 90 TABLET | Refills: 1 | Status: SHIPPED | OUTPATIENT
Start: 2023-03-13

## 2023-03-16 DIAGNOSIS — I10 ESSENTIAL HYPERTENSION: ICD-10-CM

## 2023-03-16 RX ORDER — LOSARTAN POTASSIUM 100 MG/1
TABLET ORAL
Qty: 90 TABLET | Refills: 1 | Status: SHIPPED | OUTPATIENT
Start: 2023-03-16

## 2023-04-17 ENCOUNTER — OFFICE VISIT (OUTPATIENT)
Dept: INTERNAL MEDICINE | Facility: CLINIC | Age: 68
End: 2023-04-17
Payer: MEDICARE

## 2023-04-17 VITALS
SYSTOLIC BLOOD PRESSURE: 142 MMHG | DIASTOLIC BLOOD PRESSURE: 76 MMHG | HEIGHT: 61 IN | TEMPERATURE: 98.2 F | WEIGHT: 161 LBS | RESPIRATION RATE: 20 BRPM | BODY MASS INDEX: 30.4 KG/M2 | OXYGEN SATURATION: 98 % | HEART RATE: 68 BPM

## 2023-04-17 DIAGNOSIS — E55.9 VITAMIN D DEFICIENCY: ICD-10-CM

## 2023-04-17 DIAGNOSIS — R73.9 HYPERGLYCEMIA: Primary | ICD-10-CM

## 2023-04-17 DIAGNOSIS — E04.2 MULTINODULAR GOITER: ICD-10-CM

## 2023-04-17 DIAGNOSIS — I10 ESSENTIAL HYPERTENSION: ICD-10-CM

## 2023-04-17 LAB
25(OH)D3+25(OH)D2 SERPL-MCNC: 52.1 NG/ML (ref 30–100)
ALBUMIN SERPL-MCNC: 4.4 G/DL (ref 3.5–5.2)
ALBUMIN/GLOB SERPL: 2.4 G/DL
ALP SERPL-CCNC: 73 U/L (ref 39–117)
ALT SERPL-CCNC: 11 U/L (ref 1–33)
AST SERPL-CCNC: 11 U/L (ref 1–32)
BASOPHILS # BLD AUTO: 0.04 10*3/MM3 (ref 0–0.2)
BASOPHILS NFR BLD AUTO: 0.7 % (ref 0–1.5)
BILIRUB SERPL-MCNC: 0.8 MG/DL (ref 0–1.2)
BUN SERPL-MCNC: 17 MG/DL (ref 8–23)
BUN/CREAT SERPL: 21.3 (ref 7–25)
CALCIUM SERPL-MCNC: 10.2 MG/DL (ref 8.6–10.5)
CHLORIDE SERPL-SCNC: 105 MMOL/L (ref 98–107)
CO2 SERPL-SCNC: 27.4 MMOL/L (ref 22–29)
CREAT SERPL-MCNC: 0.8 MG/DL (ref 0.57–1)
EGFRCR SERPLBLD CKD-EPI 2021: 81.4 ML/MIN/1.73
EOSINOPHIL # BLD AUTO: 0.13 10*3/MM3 (ref 0–0.4)
EOSINOPHIL NFR BLD AUTO: 2.2 % (ref 0.3–6.2)
ERYTHROCYTE [DISTWIDTH] IN BLOOD BY AUTOMATED COUNT: 13.8 % (ref 12.3–15.4)
EXPIRATION DATE: NORMAL
GLOBULIN SER CALC-MCNC: 1.8 GM/DL
GLUCOSE SERPL-MCNC: 104 MG/DL (ref 65–99)
HBA1C MFR BLD: 5.8 %
HCT VFR BLD AUTO: 41.4 % (ref 34–46.6)
HGB BLD-MCNC: 14.1 G/DL (ref 12–15.9)
IMM GRANULOCYTES # BLD AUTO: 0.02 10*3/MM3 (ref 0–0.05)
IMM GRANULOCYTES NFR BLD AUTO: 0.3 % (ref 0–0.5)
LYMPHOCYTES # BLD AUTO: 1.07 10*3/MM3 (ref 0.7–3.1)
LYMPHOCYTES NFR BLD AUTO: 18.2 % (ref 19.6–45.3)
Lab: NORMAL
MCH RBC QN AUTO: 29.2 PG (ref 26.6–33)
MCHC RBC AUTO-ENTMCNC: 34.1 G/DL (ref 31.5–35.7)
MCV RBC AUTO: 85.7 FL (ref 79–97)
MONOCYTES # BLD AUTO: 0.4 10*3/MM3 (ref 0.1–0.9)
MONOCYTES NFR BLD AUTO: 6.8 % (ref 5–12)
NEUTROPHILS # BLD AUTO: 4.23 10*3/MM3 (ref 1.7–7)
NEUTROPHILS NFR BLD AUTO: 71.8 % (ref 42.7–76)
NRBC BLD AUTO-RTO: 0 /100 WBC (ref 0–0.2)
PLATELET # BLD AUTO: 215 10*3/MM3 (ref 140–450)
POTASSIUM SERPL-SCNC: 4.6 MMOL/L (ref 3.5–5.2)
PROT SERPL-MCNC: 6.2 G/DL (ref 6–8.5)
RBC # BLD AUTO: 4.83 10*6/MM3 (ref 3.77–5.28)
SODIUM SERPL-SCNC: 142 MMOL/L (ref 136–145)
TSH SERPL DL<=0.005 MIU/L-ACNC: 0.6 UIU/ML (ref 0.27–4.2)
WBC # BLD AUTO: 5.89 10*3/MM3 (ref 3.4–10.8)

## 2023-04-17 RX ORDER — TOBRAMYCIN AND DEXAMETHASONE 3; 1 MG/ML; MG/ML
SUSPENSION/ DROPS OPHTHALMIC
COMMUNITY
Start: 2023-04-10

## 2023-04-17 NOTE — PROGRESS NOTES
Patient Name: Jasvir Hamilton  : 1955   MRN: [id number]     Chief Complaint:    Chief Complaint   Patient presents with   • Hypertension     Follow up       History of Present Illness:    Jasvir Hamilton is a 68-year-old female who presents today for follow up.     The patient states her mood has improved. She found out her  must have a valve replacement and states she is handling this okay.     She denies angina or dyspnea. She has been taking isosorbide and feels better since she started taking it again. She states her blood pressure has been well controlled recently staying under 140 mmHg systolic. She denies headaches. She is tolerating Crestor. She has not had to use nitroglycerin in approximately 1 year.     She recently had an ultrasound on her thyroid that revealed normal findings. She is not currently taking any thyroid medication.   Comparison: Thyroid ultrasound 3/8/2022     Technique: Grayscale and color and Doppler ultrasound imaging of the thyroid performed according to routine thyroid ultrasound protocol, real time with image documentation.      Findings:  Mild prominence of the thyroid gland with the right lobe measures 4.3 x 2.0 x 2.1 cm, left lobe measuring 4.8 x 2.4 x 2.0 cm, and the isthmus measuring 0.6 cm in thickness. There is mild heterogeneity of the thyroid parenchyma without evidence of thyroid   parenchymal hyperemia on color Doppler assessment. There are multiple thyroid nodules as indexed below:     R1: 1.0 x 0.9 x 1.0 cm nodule in the inferior right thyroid gland appearing solid, hypoechoic, wider than tall, smoothly marginated, without echogenic foci; TR-4. This is grossly unchanged from prior exam.     R2: 1.8 x 0.8 x 1.0 cm nodule in the superior right thyroid gland appearing solid, isoechoic, wider than tall, ill-defined margins, without echogenic foci; TR-3. This is grossly unchanged from prior exam.     L1: 1.7 x 1.0 x 1.5 cm nodule in the superior  left thyroid gland appearing mixed cystic and solid with minimally hypoechoic solid components, appearing wider than tall, smoothly marginated, without echogenic foci; TR-3. This nodule is stable in size   compared to prior exam, with increased conspicuity of central cystic component on today's exam.     L2: 1.1 x 0.6 x 0.7 cm nodule mid left thyroid gland appearing solid, hypoechoic, wider than tall, smoothly marginated, with a few echogenic foci with comet tail artifact compatible with colloid; TR-4. This is likely stable compared to prior exam.     IMPRESSION:  Impression:  Grossly stable appearance of multiple bilateral TI-RADS 3 and TI-RADS 4 nodules. No new or enlarging nodules. These are below size threshold for biopsy at this time per TI-RADS criteria.        TI-RADS: TR4 - Size between 1 cm and 1.5 cm. Recommend follow up thyroid ultrasound at years 1, 2, 3 and 5 of surveillance.       Size between 1 cm and 1.5 cm. Recommend follow up thyroid ultrasound at years 1, 2, 3 and 5 of surveillance.       Her stomach issues have been well controlled with pantoprazole. She had diverticulitis in her stomach from eating corn in the past. She denies difficulty swallowing or bleeding with her bowel movements.     She has a lesion on her breast and was told it was benign.     Subjective     Review of System: Review of Systems   A review of systems was performed, and the pertinent positives are noted in the HPI.      Medications:     Current Outpatient Medications:   •  ascorbic acid (VITAMIN C) 1000 MG tablet, Take 1 tablet by mouth Daily., Disp: , Rfl:   •  BIOTIN PO, Take 1 tablet by mouth Daily., Disp: , Rfl:   •  cholecalciferol (VITAMIN D3) 25 MCG (1000 UT) tablet, Take 1 tablet by mouth Daily., Disp: 90 tablet, Rfl: 1  •  clopidogrel (PLAVIX) 75 MG tablet, Take 1 tablet by mouth Daily., Disp: 90 tablet, Rfl: 3  •  cyclobenzaprine (FLEXERIL) 10 MG tablet, Take 1 tablet by mouth At Night As Needed for Muscle  Spasms., Disp: 30 tablet, Rfl: 0  •  ibuprofen (IBU) 600 MG tablet, Take 1 tablet by mouth Every 8 (Eight) Hours As Needed for Mild Pain., Disp: 90 tablet, Rfl: 1  •  isosorbide mononitrate (IMDUR) 30 MG 24 hr tablet, Take 1 tablet by mouth Daily., Disp: 90 tablet, Rfl: 1  •  ketoconazole (NIZORAL) 2 % shampoo, Apply  topically to the appropriate area as directed 2 (Two) Times a Week., Disp: 360 mL, Rfl: 3  •  losartan (COZAAR) 100 MG tablet, TAKE 1 TABLET EVERY DAY, Disp: 90 tablet, Rfl: 1  •  metoprolol tartrate (LOPRESSOR) 50 MG tablet, Take 1 tablet by mouth 2 (Two) Times a Day., Disp: 180 tablet, Rfl: 3  •  nitroglycerin (NITROSTAT) 0.4 MG SL tablet, Place 1 tablet under the tongue Every 5 (Five) Minutes As Needed for Chest Pain., Disp: 25 tablet, Rfl: 1  •  pantoprazole (PROTONIX) 40 MG EC tablet, Take 1 tablet by mouth Daily., Disp: 90 tablet, Rfl: 1  •  rosuvastatin (CRESTOR) 40 MG tablet, Take 1 tablet by mouth Daily., Disp: 90 tablet, Rfl: 1  •  sucralfate (CARAFATE) 1 g tablet, Take 1 tablet by mouth 4 (Four) Times a Day. Thoroughly crush pill and mix in small amount of water prior to swallowing., Disp: 60 tablet, Rfl: 0  •  tobramycin-dexamethasone (TOBRADEX) 0.3-0.1 % ophthalmic suspension, SHAKE LIQUID AND INSTILL 1 DROP IN LEFT EYE FOUR TIMES DAILY, Disp: , Rfl:     Allergies:   Allergies   Allergen Reactions   • Pravastatin Myalgia   • Influenza Vac Split Quad Hives     Past Medical History:   Diagnosis Date   • Allergic 80s    Seasonal   • Chicken pox    • Clotting disorder 1998   • Coronary artery disease 2015   • Diverticulitis    • Diverticulosis 2019   • Easy bruising    • Gall stones    • GERD (gastroesophageal reflux disease)    • Glaucoma    • Hyperlipemia    • Hypertension    • Ischemic stroke 10/24/2019   • Low back pain 2016   • Measles    • Menopause    • Mumps    • Positive PPD    • Scarlet fever    • Tuberculosis 1986    Exposure         Objective     Physical Exam:   Vital Signs:  "  Vitals:    04/17/23 0907   BP: 142/76   BP Location: Right arm   Patient Position: Sitting   Cuff Size: Adult   Pulse: 68   Resp: 20   Temp: 98.2 °F (36.8 °C)   TempSrc: Infrared   SpO2: 98%   Weight: 73 kg (161 lb)   Height: 154.9 cm (61\")   PainSc: 0-No pain       Physical Exam  Constitutional:       General: She is not in acute distress.     Appearance: She is not ill-appearing.   HENT:      Head: Normocephalic.   Cardiovascular:      Rate and Rhythm: Normal rate and regular rhythm.      Heart sounds: Normal heart sounds. No murmur heard.  Pulmonary:      Breath sounds: Normal breath sounds.   Abdominal:      General: Bowel sounds are normal.      Tenderness: There is no abdominal tenderness.   Musculoskeletal:      Right lower leg: No edema.      Left lower leg: No edema.   Neurological:      General: No focal deficit present.      Mental Status: She is oriented to person, place, and time.   Psychiatric:         Mood and Affect: Mood normal.         Assessment / Plan      Assessment/Plan:   Diagnoses and all orders for this visit:    1. Hyperglycemia (Primary)  -     POC Glycosylated Hemoglobin (Hb A1C)    2. Essential hypertension  -     Comprehensive Metabolic Panel; Future  -     CBC & Differential; Future  -     Comprehensive Metabolic Panel  -     CBC & Differential    3. Vitamin D deficiency  -     Vitamin D,25-Hydroxy; Future  -     Vitamin D 25 hydroxy  -     Vitamin D,25-Hydroxy    4. Multinodular goiter  -     TSH; Future  -     US Thyroid; Future  -     TSH  -     US Thyroid       1. Hypertension   - Well controlled on her current medication regimen. Advised the patient to monitor her blood pressure and set goal below 140/90 mmHg.     2. Thyroid  - Recent ultrasound revealed normal results. Labs will be obtained.     3. Breast lesion   - Repeat imaging will be obtained this year.       Follow Up:   No follow-ups on file.    LORI Gunn  [facility] " [facility]      Transcribed from ambient dictation for LORI Gunn by Shasta Quintanilla, Quality .  04/17/23   12:43 EDT    Patient or patient representative verbalized consent to the visit recording.  I have personally performed the services described in this document as transcribed by the above individual, and it is both accurate and complete.

## 2023-04-26 DIAGNOSIS — K21.9 GASTROESOPHAGEAL REFLUX DISEASE, UNSPECIFIED WHETHER ESOPHAGITIS PRESENT: ICD-10-CM

## 2023-04-26 RX ORDER — PANTOPRAZOLE SODIUM 40 MG/1
TABLET, DELAYED RELEASE ORAL
Qty: 90 TABLET | Refills: 1 | Status: SHIPPED | OUTPATIENT
Start: 2023-04-26

## 2023-05-22 DIAGNOSIS — I25.119 CORONARY ARTERY DISEASE INVOLVING NATIVE CORONARY ARTERY OF NATIVE HEART WITH ANGINA PECTORIS: ICD-10-CM

## 2023-05-22 DIAGNOSIS — E78.5 HYPERLIPIDEMIA LDL GOAL <70: ICD-10-CM

## 2023-05-22 DIAGNOSIS — I10 ESSENTIAL HYPERTENSION: ICD-10-CM

## 2023-05-22 DIAGNOSIS — I63.9 ISCHEMIC STROKE: ICD-10-CM

## 2023-05-22 RX ORDER — LOSARTAN POTASSIUM 100 MG/1
100 TABLET ORAL DAILY
Qty: 90 TABLET | Refills: 1 | Status: SHIPPED | OUTPATIENT
Start: 2023-05-22

## 2023-05-22 RX ORDER — ISOSORBIDE MONONITRATE 30 MG/1
30 TABLET, EXTENDED RELEASE ORAL DAILY
Qty: 90 TABLET | Refills: 1 | Status: SHIPPED | OUTPATIENT
Start: 2023-05-22

## 2023-05-22 RX ORDER — ROSUVASTATIN CALCIUM 40 MG/1
40 TABLET, COATED ORAL DAILY
Qty: 90 TABLET | Refills: 1 | Status: SHIPPED | OUTPATIENT
Start: 2023-05-22

## 2023-05-23 RX ORDER — CLOPIDOGREL BISULFATE 75 MG/1
75 TABLET ORAL DAILY
Qty: 90 TABLET | Refills: 3 | Status: SHIPPED | OUTPATIENT
Start: 2023-05-23

## 2023-05-23 RX ORDER — CLOPIDOGREL BISULFATE 75 MG/1
75 TABLET ORAL DAILY
Qty: 90 TABLET | Refills: 3 | OUTPATIENT
Start: 2023-05-23

## 2023-06-05 RX ORDER — KETOCONAZOLE 20 MG/ML
SHAMPOO TOPICAL 2 TIMES WEEKLY
Qty: 360 ML | Refills: 3 | Status: SHIPPED | OUTPATIENT
Start: 2023-06-05

## 2023-08-06 DIAGNOSIS — Z12.2 ENCOUNTER FOR SCREENING FOR LUNG CANCER: Primary | ICD-10-CM

## 2023-08-06 DIAGNOSIS — Z87.891 PERSONAL HISTORY OF NICOTINE DEPENDENCE: ICD-10-CM

## 2023-08-08 ENCOUNTER — APPOINTMENT (OUTPATIENT)
Dept: GENERAL RADIOLOGY | Facility: HOSPITAL | Age: 68
End: 2023-08-08
Payer: MEDICARE

## 2023-08-08 LAB
ALBUMIN SERPL-MCNC: 4.1 G/DL (ref 3.5–5.2)
ALBUMIN/GLOB SERPL: 1.5 G/DL
ALP SERPL-CCNC: 70 U/L (ref 39–117)
ALT SERPL W P-5'-P-CCNC: 13 U/L (ref 1–33)
ANION GAP SERPL CALCULATED.3IONS-SCNC: 11 MMOL/L (ref 5–15)
AST SERPL-CCNC: 16 U/L (ref 1–32)
BASOPHILS # BLD AUTO: 0.03 10*3/MM3 (ref 0–0.2)
BASOPHILS NFR BLD AUTO: 0.4 % (ref 0–1.5)
BILIRUB SERPL-MCNC: 0.6 MG/DL (ref 0–1.2)
BUN SERPL-MCNC: 19 MG/DL (ref 8–23)
BUN/CREAT SERPL: 23.8 (ref 7–25)
CALCIUM SPEC-SCNC: 9.5 MG/DL (ref 8.6–10.5)
CHLORIDE SERPL-SCNC: 105 MMOL/L (ref 98–107)
CO2 SERPL-SCNC: 24 MMOL/L (ref 22–29)
CREAT SERPL-MCNC: 0.8 MG/DL (ref 0.57–1)
DEPRECATED RDW RBC AUTO: 47.3 FL (ref 37–54)
EGFRCR SERPLBLD CKD-EPI 2021: 81.4 ML/MIN/1.73
EOSINOPHIL # BLD AUTO: 0.16 10*3/MM3 (ref 0–0.4)
EOSINOPHIL NFR BLD AUTO: 2.3 % (ref 0.3–6.2)
ERYTHROCYTE [DISTWIDTH] IN BLOOD BY AUTOMATED COUNT: 14.1 % (ref 12.3–15.4)
GLOBULIN UR ELPH-MCNC: 2.8 GM/DL
GLUCOSE SERPL-MCNC: 92 MG/DL (ref 65–99)
HCT VFR BLD AUTO: 43.1 % (ref 34–46.6)
HGB BLD-MCNC: 14 G/DL (ref 12–15.9)
IMM GRANULOCYTES # BLD AUTO: 0.02 10*3/MM3 (ref 0–0.05)
IMM GRANULOCYTES NFR BLD AUTO: 0.3 % (ref 0–0.5)
LIPASE SERPL-CCNC: 49 U/L (ref 13–60)
LYMPHOCYTES # BLD AUTO: 1.52 10*3/MM3 (ref 0.7–3.1)
LYMPHOCYTES NFR BLD AUTO: 22 % (ref 19.6–45.3)
MCH RBC QN AUTO: 29.9 PG (ref 26.6–33)
MCHC RBC AUTO-ENTMCNC: 32.5 G/DL (ref 31.5–35.7)
MCV RBC AUTO: 91.9 FL (ref 79–97)
MONOCYTES # BLD AUTO: 0.52 10*3/MM3 (ref 0.1–0.9)
MONOCYTES NFR BLD AUTO: 7.5 % (ref 5–12)
NEUTROPHILS NFR BLD AUTO: 4.65 10*3/MM3 (ref 1.7–7)
NEUTROPHILS NFR BLD AUTO: 67.5 % (ref 42.7–76)
NRBC BLD AUTO-RTO: 0 /100 WBC (ref 0–0.2)
NT-PROBNP SERPL-MCNC: <36 PG/ML (ref 0–900)
PLATELET # BLD AUTO: 191 10*3/MM3 (ref 140–450)
PMV BLD AUTO: 9.9 FL (ref 6–12)
POTASSIUM SERPL-SCNC: 4.3 MMOL/L (ref 3.5–5.2)
PROT SERPL-MCNC: 6.9 G/DL (ref 6–8.5)
RBC # BLD AUTO: 4.69 10*6/MM3 (ref 3.77–5.28)
SODIUM SERPL-SCNC: 140 MMOL/L (ref 136–145)
TROPONIN T SERPL HS-MCNC: <6 NG/L
WBC NRBC COR # BLD: 6.9 10*3/MM3 (ref 3.4–10.8)

## 2023-08-08 PROCEDURE — 71045 X-RAY EXAM CHEST 1 VIEW: CPT

## 2023-08-08 PROCEDURE — 85025 COMPLETE CBC W/AUTO DIFF WBC: CPT

## 2023-08-08 PROCEDURE — 80053 COMPREHEN METABOLIC PANEL: CPT

## 2023-08-08 PROCEDURE — 84484 ASSAY OF TROPONIN QUANT: CPT

## 2023-08-08 PROCEDURE — 93005 ELECTROCARDIOGRAM TRACING: CPT | Performed by: STUDENT IN AN ORGANIZED HEALTH CARE EDUCATION/TRAINING PROGRAM

## 2023-08-08 PROCEDURE — 83690 ASSAY OF LIPASE: CPT

## 2023-08-08 PROCEDURE — 83880 ASSAY OF NATRIURETIC PEPTIDE: CPT

## 2023-08-08 PROCEDURE — 99284 EMERGENCY DEPT VISIT MOD MDM: CPT

## 2023-08-08 PROCEDURE — 93005 ELECTROCARDIOGRAM TRACING: CPT

## 2023-08-08 RX ORDER — SODIUM CHLORIDE 0.9 % (FLUSH) 0.9 %
10 SYRINGE (ML) INJECTION AS NEEDED
Status: DISCONTINUED | OUTPATIENT
Start: 2023-08-08 | End: 2023-08-09 | Stop reason: HOSPADM

## 2023-08-08 RX ORDER — ASPIRIN 81 MG/1
324 TABLET, CHEWABLE ORAL ONCE
Status: COMPLETED | OUTPATIENT
Start: 2023-08-08 | End: 2023-08-08

## 2023-08-08 RX ADMIN — ASPIRIN 324 MG: 81 TABLET, CHEWABLE ORAL at 23:03

## 2023-08-09 ENCOUNTER — HOSPITAL ENCOUNTER (EMERGENCY)
Facility: HOSPITAL | Age: 68
Discharge: HOME | End: 2023-08-09
Attending: STUDENT IN AN ORGANIZED HEALTH CARE EDUCATION/TRAINING PROGRAM
Payer: MEDICARE

## 2023-08-09 VITALS
TEMPERATURE: 97.9 F | HEIGHT: 60 IN | HEART RATE: 70 BPM | OXYGEN SATURATION: 98 % | RESPIRATION RATE: 16 BRPM | BODY MASS INDEX: 31.41 KG/M2 | SYSTOLIC BLOOD PRESSURE: 140 MMHG | DIASTOLIC BLOOD PRESSURE: 69 MMHG | WEIGHT: 160 LBS

## 2023-08-09 DIAGNOSIS — Z87.19 HISTORY OF GASTROESOPHAGEAL REFLUX (GERD): ICD-10-CM

## 2023-08-09 DIAGNOSIS — R07.89 ATYPICAL CHEST PAIN: Primary | ICD-10-CM

## 2023-08-09 DIAGNOSIS — Z86.79 HISTORY OF CORONARY ARTERY DISEASE: ICD-10-CM

## 2023-08-09 DIAGNOSIS — Z86.39 HISTORY OF HYPERLIPIDEMIA: ICD-10-CM

## 2023-08-09 DIAGNOSIS — I10 ELEVATED BLOOD PRESSURE READING WITH DIAGNOSIS OF HYPERTENSION: ICD-10-CM

## 2023-08-09 DIAGNOSIS — Z86.79 HISTORY OF HYPERTENSION: ICD-10-CM

## 2023-08-09 LAB
GEN 5 2HR TROPONIN T REFLEX: 8 NG/L
HOLD SPECIMEN: NORMAL
HOLD SPECIMEN: NORMAL
TROPONIN T DELTA: NORMAL
WHOLE BLOOD HOLD COAG: NORMAL
WHOLE BLOOD HOLD SPECIMEN: NORMAL

## 2023-08-09 PROCEDURE — 84484 ASSAY OF TROPONIN QUANT: CPT | Performed by: STUDENT IN AN ORGANIZED HEALTH CARE EDUCATION/TRAINING PROGRAM

## 2023-08-09 PROCEDURE — 25010000002 KETOROLAC TROMETHAMINE PER 15 MG: Performed by: PHYSICIAN ASSISTANT

## 2023-08-09 PROCEDURE — 96374 THER/PROPH/DIAG INJ IV PUSH: CPT

## 2023-08-09 PROCEDURE — 36415 COLL VENOUS BLD VENIPUNCTURE: CPT

## 2023-08-09 PROCEDURE — 93005 ELECTROCARDIOGRAM TRACING: CPT | Performed by: STUDENT IN AN ORGANIZED HEALTH CARE EDUCATION/TRAINING PROGRAM

## 2023-08-09 RX ORDER — KETOROLAC TROMETHAMINE 15 MG/ML
15 INJECTION, SOLUTION INTRAMUSCULAR; INTRAVENOUS ONCE
Status: COMPLETED | OUTPATIENT
Start: 2023-08-09 | End: 2023-08-09

## 2023-08-09 RX ADMIN — KETOROLAC TROMETHAMINE 15 MG: 15 INJECTION, SOLUTION INTRAMUSCULAR; INTRAVENOUS at 01:57

## 2023-08-09 NOTE — ED PROVIDER NOTES
" EMERGENCY DEPARTMENT ENCOUNTER    Pt Name: Jasvir Romero  MRN: [id number]  Pt :   1955  Room Number:    Date of encounter:  2023  PCP: Nichole Cardenas APRN  ED Provider: KEYANNA Sullivan    Historian: Patient    HPI:  Chief Complaint: Chest Pain    Context: Jasvir Romero is a 68 y.o. female who presents to the ED c/o chest pain for approximately the last 2 weeks.  Patient complains of pain in her chest that she also has felt into her neck and shoulders.  She has a follow-up with her primary care physician tomorrow with shoulder pain.  She states that she has been having \"heart attack symptoms\".   who is present at bedside reports that patient has been under a significant amount of stress lately as he just had open heart surgery and she has been his caregiver.  She reports that several weeks ago she was shredding zucchini and she woke up and had pain in her left arm.  She reports that since this time she has continued to experience intermittent pain in her left arm.  She reports taking ibuprofen for the pain which has helped.  She does have significant cardiac history with stent in 2015 as well as carotid stenosis.  Patient shares that she took 3 nitroglycerin today because of her pain in her chest.  She reports some nauseousness but denies any additional associated symptoms on exam.  HPI     REVIEW OF SYSTEMS  A chief complaint appropriate review of systems was completed and is negative except as noted in the HPI.     PAST MEDICAL HISTORY  Past Medical History:   Diagnosis Date    Allergic 80s    Seasonal    Chicken pox     Clotting disorder     Coronary artery disease 2015    Diverticulitis     Diverticulosis     Easy bruising     Gall stones     GERD (gastroesophageal reflux disease)     Glaucoma     Hyperlipemia     Hypertension     Ischemic stroke 10/24/2019    Low back pain 2016    Measles     Menopause     Mumps     Positive PPD     Scarlet fever  "    Tuberculosis 1986    Exposure       PAST SURGICAL HISTORY  Past Surgical History:   Procedure Laterality Date    CARDIAC CATHETERIZATION      CARPAL TUNNEL RELEASE      both hands    CHOLECYSTECTOMY      COLONOSCOPY  2009    CORONARY STENT PLACEMENT  2015    ECTOPIC PREGNANCY      ENDOMETRIAL ABLATION      HAND SURGERY      HYSTERECTOMY      OOPHORECTOMY      unilateral    OTHER SURGICAL HISTORY      lap and leep    SUBTOTAL HYSTERECTOMY  2001    Ablation    TUBAL ABDOMINAL LIGATION  1993    with L salpingo-oophorectomy       FAMILY HISTORY  Family History   Problem Relation Age of Onset    Arthritis Mother     Hypertension Mother     Hyperlipidemia Mother     Heart attack Father     Hypertension Father     Obesity Father     Stroke Father         massive    Heart disease Father          massive stroke age 69    Hyperlipidemia Father     Arthritis Sister     Hypertension Sister     No Known Problems Sister     Breast cancer Neg Hx     Ovarian cancer Neg Hx        SOCIAL HISTORY  Social History     Socioeconomic History    Marital status:    Tobacco Use    Smoking status: Every Day     Packs/day: 1.00     Years: 40.00     Pack years: 40.00     Types: Cigarettes     Start date: 1980     Last attempt to quit: 2019     Years since quitting: 3.9    Smokeless tobacco: Never   Vaping Use    Vaping Use: Never used   Substance and Sexual Activity    Alcohol use: Not Currently     Comment: rarely    Drug use: No    Sexual activity: Not Currently     Partners: Male     Birth control/protection: Post-menopausal       ALLERGIES  Pravastatin and Influenza vac split quad    PHYSICAL EXAM  Physical Exam  GENERAL:   Appears in no acute distress.   HENT: Nares patent.  EYES: No scleral icterus.  CV: Regular rhythm, regular rate.  RESPIRATORY: Normal effort.  No audible wheezes, rales or rhonchi.  ABDOMEN: Soft, nontender  MUSCULOSKELETAL: No deformities.   NEURO: Alert, moves all extremities, follows  commands.  SKIN: Warm, dry, no rash visualized.  PSYCH: Anxious  I have reviewed the triage vital signs and nursing notes.     LAB RESULTS  Results for orders placed or performed during the hospital encounter of 08/09/23   High Sensitivity Troponin T    Specimen: Blood   Result Value Ref Range    HS Troponin T <6 <10 ng/L   Comprehensive Metabolic Panel    Specimen: Blood   Result Value Ref Range    Glucose 92 65 - 99 mg/dL    BUN 19 8 - 23 mg/dL    Creatinine 0.80 0.57 - 1.00 mg/dL    Sodium 140 136 - 145 mmol/L    Potassium 4.3 3.5 - 5.2 mmol/L    Chloride 105 98 - 107 mmol/L    CO2 24.0 22.0 - 29.0 mmol/L    Calcium 9.5 8.6 - 10.5 mg/dL    Total Protein 6.9 6.0 - 8.5 g/dL    Albumin 4.1 3.5 - 5.2 g/dL    ALT (SGPT) 13 1 - 33 U/L    AST (SGOT) 16 1 - 32 U/L    Alkaline Phosphatase 70 39 - 117 U/L    Total Bilirubin 0.6 0.0 - 1.2 mg/dL    Globulin 2.8 gm/dL    A/G Ratio 1.5 g/dL    BUN/Creatinine Ratio 23.8 7.0 - 25.0    Anion Gap 11.0 5.0 - 15.0 mmol/L    eGFR 81.4 >60.0 mL/min/1.73   Lipase    Specimen: Blood   Result Value Ref Range    Lipase 49 13 - 60 U/L   BNP    Specimen: Blood   Result Value Ref Range    proBNP <36.0 0.0 - 900.0 pg/mL   CBC Auto Differential    Specimen: Blood   Result Value Ref Range    WBC 6.90 3.40 - 10.80 10*3/mm3    RBC 4.69 3.77 - 5.28 10*6/mm3    Hemoglobin 14.0 12.0 - 15.9 g/dL    Hematocrit 43.1 34.0 - 46.6 %    MCV 91.9 79.0 - 97.0 fL    MCH 29.9 26.6 - 33.0 pg    MCHC 32.5 31.5 - 35.7 g/dL    RDW 14.1 12.3 - 15.4 %    RDW-SD 47.3 37.0 - 54.0 fl    MPV 9.9 6.0 - 12.0 fL    Platelets 191 140 - 450 10*3/mm3    Neutrophil % 67.5 42.7 - 76.0 %    Lymphocyte % 22.0 19.6 - 45.3 %    Monocyte % 7.5 5.0 - 12.0 %    Eosinophil % 2.3 0.3 - 6.2 %    Basophil % 0.4 0.0 - 1.5 %    Immature Grans % 0.3 0.0 - 0.5 %    Neutrophils, Absolute 4.65 1.70 - 7.00 10*3/mm3    Lymphocytes, Absolute 1.52 0.70 - 3.10 10*3/mm3    Monocytes, Absolute 0.52 0.10 - 0.90 10*3/mm3    Eosinophils, Absolute 0.16  0.00 - 0.40 10*3/mm3    Basophils, Absolute 0.03 0.00 - 0.20 10*3/mm3    Immature Grans, Absolute 0.02 0.00 - 0.05 10*3/mm3    nRBC 0.0 0.0 - 0.2 /100 WBC   High Sensitivity Troponin T 2Hr    Specimen: Blood   Result Value Ref Range    HS Troponin T 8 <10 ng/L    Troponin T Delta     ECG 12 Lead ED Triage Standing Order; Chest Pain   Result Value Ref Range    QT Interval 382 ms    QTC Interval 415 ms   ECG 12 Lead ED Triage Standing Order; Chest Pain   Result Value Ref Range    QT Interval 400 ms    QTC Interval 425 ms   Green Top (Gel)   Result Value Ref Range    Extra Tube Hold for add-ons.    Lavender Top   Result Value Ref Range    Extra Tube hold for add-on    Gray Top   Result Value Ref Range    Extra Tube Hold for add-ons.    Light Blue Top   Result Value Ref Range    Extra Tube Hold for add-ons.        If labs were ordered, I independently reviewed the results and considered them in treating the patient.    RADIOLOGY  XR Chest 1 View   Final Result   Impression:   No acute cardiopulmonary abnormality.         Electronically Signed: Blair Rubalcava     8/8/2023 11:28 PM EDT     Workstation ID: [id number]        [x] Radiologist's Report Reviewed:  I ordered and independently reviewed the above noted radiographic studies.  See radiologist's dictation for official interpretation.      PROCEDURES    Procedures    ECG 12 Lead ED Triage Standing Order; Chest Pain   Final Result   Test Reason : ED Triage Standing Order~   Blood Pressure :   */*   mmHG   Vent. Rate :  68 BPM     Atrial Rate :  68 BPM      P-R Int : 136 ms          QRS Dur :  82 ms       QT Int : 400 ms       P-R-T Axes :  49  37  32 degrees      QTc Int : 425 ms      Normal sinus rhythm   Normal ECG   When compared with ECG of 08-AUG-2023 22:52, (Unconfirmed)   No significant change was found   Confirmed by SMITHA NGUYEN ([id number]) on 8/10/2023 2:23:51 AM      Referred By:            Confirmed By: SMITHA NGUYEN      ECG 12 Lead ED Triage Standing  Order; Chest Pain   Final Result   Test Reason : ED Triage Standing Order~   Blood Pressure :   */*   mmHG   Vent. Rate :  71 BPM     Atrial Rate :  71 BPM      P-R Int : 134 ms          QRS Dur :  84 ms       QT Int : 382 ms       P-R-T Axes :  40  34  31 degrees      QTc Int : 415 ms      Normal sinus rhythm   Normal ECG   When compared with ECG of 14-OCT-2021 00:04,   No significant change was found   Confirmed by SMITHA NGUYEN ([id number]) on 8/10/2023 2:15:09 AM      Referred By: PATRICK           Confirmed By: SMITHA NGUYEN          MEDICATIONS GIVEN IN ER    Medications   aspirin chewable tablet 324 mg (324 mg Oral Given 8/8/23 3693)   ketorolac (TORADOL) injection 15 mg (15 mg Intravenous Given 8/9/23 0157)       MEDICAL DECISION MAKING, PROGRESS, and CONSULTS   Medical Decision Making  Problems Addressed:  Atypical chest pain: complicated acute illness or injury  Elevated blood pressure reading with diagnosis of hypertension: acute illness or injury  History of coronary artery disease: chronic illness or injury  History of gastroesophageal reflux (GERD): chronic illness or injury  History of hyperlipidemia: chronic illness or injury  History of hypertension: chronic illness or injury    Amount and/or Complexity of Data Reviewed  Labs: ordered.  Radiology: ordered.  ECG/medicine tests: ordered.    Risk  OTC drugs.  Prescription drug management.        All labs have been independently reviewed by me.  All radiology studies have been reviewed by me and the radiologist dictating the report.  All EKG's have been independently viewed by me.    [] Discussed with radiology regarding test interpretation:    Discussion below represents my analysis of pertinent findings related to patient's condition, differential diagnosis, treatment plan and final disposition.    Differential diagnosis:  The differential diagnosis associated with the patient's presentation includes: Congestive heart failure (volume overload),  acute coronary syndrome (STEMI/NSTEMI), pulmonary embolism, pneumothorax, thoracic aortic dissection, pericarditis, mediastinitis, rib contusions and fractures, intercostal muscle strains, costochondritis, tamponade, pneumonia, URI, myocarditis and GERD.     Additional sources  Discussed/ obtained information from independent historians:   [] Spouse  [] Parent  [] Family member  [] Friend  [] EMS   [] Other:  External (non-ED) record review:   [] Inpatient record:   [] Office record:   [] Outpatient record:   [] Prior Outpatient labs:   [] Prior Outpatient radiology:   [x] Primary Care record: Personally reviewed primary care visit from January 2023 demonstrating patient's medical history significant for hypertension, hyperlipidemia and coronary artery disease   [] Outside ED record:   [] Other:   Patient's care impacted by:   [] Diabetes  [x] Hypertension  [x] Hyperlipidemia  [] Hypothyroidism   [x] Coronary Artery Disease   [] COPD   [] Cancer   [] Obesity  [] GERD   [] Tobacco Abuse   [] Substance Abuse    [] Anxiety   [x] Depression   [] Other:   Care significantly affected by Social Determinants of Health (housing and economic circumstances, unemployment)    [] Yes     [x] No   If yes, Patient's care significantly limited by  Social Determinants of Health including:   [] Inadequate housing   [] Low income   [] Alcoholism and drug addiction in family   [] Problems related to primary support group   [] Unemployment   [] Problems related to employment   [] Other Social Determinants of Health:     Shared decision making:  I had a discussion with the patient/family regarding diagnosis, diagnostic results, treatment plan, and medications.  The patient/family indicated understanding of these instructions.  I spent adequate time at the bedside preceding discharge necessary to personally discuss the aftercare instructions, giving patient education, providing explanations of the results of our evaluations/findings, and  my decision making to assure that the patient/family understand the plan of care.  Time was allotted to answer questions at that time and throughout the ED course.  Emphasis was placed on timely follow-up after discharge.  I also discussed the potential for the development of an acute emergent condition requiring further evaluation, admission, or even surgical intervention. I discussed that we found nothing during the visit today indicating the need for further workup, admission, or the presence of an unstable medical condition.  I encouraged the patient to return to the emergency department immediately for ANY concerns, worsening, new complaints, or if symptoms persist and unable to seek follow-up in a timely fashion.  The patient/family expressed understanding and agreement with this plan.  The patient will follow-up with primary care and cardiology for reevaluation.      Orders placed during this visit:  Orders Placed This Encounter   Procedures    XR Chest 1 View    High Sensitivity Troponin T    Comprehensive Metabolic Panel    Lipase    BNP    CBC Auto Differential    High Sensitivity Troponin T 2Hr    Continuous Pulse Oximetry    ECG 12 Lead ED Triage Standing Order; Chest Pain    CBC & Differential    Green Top (Gel)    Lavender Top    Gray Top    Light Blue Top       ED Course:    ED Course as of 08/16/23 1425   Wed Aug 09, 2023   0259 Reviewed work-up with patient and family member was present at bedside.  I offered admission to the hospital to be observed and seen by cardiology or the option to follow-up outpatient based on negative work-up in the ED.  Patient would like to be discharged home.  She states that she will follow-up outpatient with her primary care as scheduled tomorrow as well as her cardiologist in the future as needed. [JG]   0305 This patient presents with chest pain, with symptoms suggestive of noncardiac chest pain. Exam without evidence of volume overload, BNP normal. EKG without signs  of active ischemia. Initial and two hour HS troponin negative. Chest imaging demonstrates no acte acute cardiopulmonary process. HEART score: 5. Workup reviewed with patient and family at bedside and based on history patient provided option for admission for additional cardiac workup or discharge home to follow up with cardiology outpatient. Patient requesting discharge home and will follow up outpatient. At time of discharge disposition patient is afebrile, nontoxic appearing, vital signs stable and able to maintain O2 sats of 98% on room air.   [JG]      ED Course User Index  [JG] Rafael Ramirez PA           HEART Score (for prediction of 6-week risk of major adverse cardiac event) reviewed and/or performed as part of the patient evaluation and treatment planning process.  The result associated with this review/performance is: 5    DIAGNOSIS  Final diagnoses:   Atypical chest pain   History of hypertension   History of hyperlipidemia   History of coronary artery disease   History of gastroesophageal reflux (GERD)   Elevated blood pressure reading with diagnosis of hypertension       DISPOSITION    ED Disposition       ED Disposition   Discharge    Condition   Stable    Comment   --               Please note that portions of this document were completed with voice recognition software.        Rafael Ramirez PA  08/16/23 4955

## 2023-08-10 LAB
QT INTERVAL: 382 MS
QT INTERVAL: 400 MS
QTC INTERVAL: 415 MS
QTC INTERVAL: 425 MS

## 2023-08-14 ENCOUNTER — OFFICE VISIT (OUTPATIENT)
Dept: INTERNAL MEDICINE | Facility: CLINIC | Age: 68
End: 2023-08-14
Payer: MEDICARE

## 2023-08-14 VITALS
BODY MASS INDEX: 31.44 KG/M2 | TEMPERATURE: 98.3 F | DIASTOLIC BLOOD PRESSURE: 76 MMHG | RESPIRATION RATE: 16 BRPM | WEIGHT: 161 LBS | SYSTOLIC BLOOD PRESSURE: 142 MMHG | HEART RATE: 72 BPM

## 2023-08-14 DIAGNOSIS — Z72.0 TOBACCO USE: ICD-10-CM

## 2023-08-14 DIAGNOSIS — I10 ESSENTIAL HYPERTENSION: Primary | ICD-10-CM

## 2023-08-14 DIAGNOSIS — K21.9 GASTROESOPHAGEAL REFLUX DISEASE, UNSPECIFIED WHETHER ESOPHAGITIS PRESENT: ICD-10-CM

## 2023-08-14 DIAGNOSIS — R10.9 STOMACH CRAMPS: ICD-10-CM

## 2023-08-14 RX ORDER — SUCRALFATE 1 G/1
1 TABLET ORAL 4 TIMES DAILY
Qty: 90 TABLET | Refills: 0 | Status: SHIPPED | OUTPATIENT
Start: 2023-08-14

## 2023-08-14 RX ORDER — DICYCLOMINE HYDROCHLORIDE 10 MG/1
10 CAPSULE ORAL
Qty: 90 CAPSULE | Refills: 0 | Status: SHIPPED | OUTPATIENT
Start: 2023-08-14

## 2023-08-14 NOTE — PROGRESS NOTES
Patient Name: Jasvir Romero  : 1955   MRN: [id number]     Chief Complaint:    Chief Complaint   Patient presents with    Abdominal Pain     2023 [facility] ED for chest pain, pain has moved from chest/ shoulder into stomach       History of Present Illness: Jasvir Romero is a 68 y.o. female presents to clinic for a ED follow up appointment.    The patient reports that she thought she was having a heart attack. She states she was having all of the symptoms and was under a lot of stress. She reports that her  had to have open heart surgery and had a seizure after his surgery. She notes that he was in a coma for 3 days, but he is doing better now. She states that he was in the ICU for 10 days at .    She states that she went to the hospital on Tuesday, 2023. She reports that they did not find anything and advised her that everything was normal. She states the EKG, troponin, cardiac enzymes, and chest x-ray were all normal. She reports her  asked if she had eaten any corn and she advised him that she had tasted it. She notes she tasted some about 1 year ago and had a diverticulitis infection. She states that she has had chest tightness for the past couple of days. She reports that she drank 1 cup of coffee and one Pepsi and that has helped a lot. She localizes the pain to be on her left side underneath her breast that radiated down her arm. She states that her shoulder blades were tingling, and her neck was hurting. She notes by Thursday, 08/10/2023, she felt a lot better. She reports over the weekend, it started going down into her stomach that caused her to have bad cramps. She states that she has diverticulosis. She reports that she does feel like she is improving. She denies any tenderness in her abdomen. She states that she did have to take nitroglycerin three times on 2023 but it did not really help.    She states that her blood pressure has been  doing well. She reports that she had one high reading but thinks it was because she smoked a cigarette before checking it.    She states that she does not take anything for anxiety. She reports that she has been feeling less anxious now.    She notes that she is having bowel movements. She states that she does have diarrhea if she drinks a lot coffee. She reports that she did a Cologuard test.    She reports she has never had heartburn. She states that she does have pain and burning in her shoulder blades. She reports that she normally takes sucralfate as needed in the evenings. She states that it does get worse when she lies down. She reports she feels better now than she did last Tuesday. She states that it felt like she was having a gallbladder attack. She reports that she does not have her gallbladder anymore.    She does still currently smoke cigarettes. She notes that she uses Nicorette gum to help her quit smoking. She states that it does help take the edge off. She reports that she tried the patches, but her blood pressure would always increase. She states she has also tried Wellbutrin, but it made her feel like a zombie.     Current medications include losartan 100 MG, isosorbide 30 MG and nitroglycerin 0.4 MG.    Subjective     Review of System: Review of Systems   Constitutional:  Negative for fatigue and fever.   HENT:  Negative for congestion and rhinorrhea.    Respiratory:  Positive for chest tightness. Negative for shortness of breath and wheezing.    Cardiovascular:  Negative for chest pain and palpitations.   Gastrointestinal:  Positive for abdominal pain. Negative for diarrhea, nausea and vomiting.   Genitourinary:  Negative for dysuria.   Musculoskeletal:  Negative for myalgias.   Skin:  Negative for rash.   Neurological:  Negative for headaches.   Hematological:  Negative for adenopathy.   Psychiatric/Behavioral:  Negative for dysphoric mood.     A review of systems was performed, and the  pertinent positives are noted in the HPI.    Medications:     Current Outpatient Medications:     ascorbic acid (VITAMIN C) 1000 MG tablet, Take 1 tablet by mouth Daily., Disp: , Rfl:     BIOTIN PO, Take 1 tablet by mouth Daily., Disp: , Rfl:     cholecalciferol (VITAMIN D3) 25 MCG (1000 UT) tablet, Take 1 tablet by mouth Daily., Disp: 90 tablet, Rfl: 1    clopidogrel (PLAVIX) 75 MG tablet, Take 1 tablet by mouth Daily., Disp: 90 tablet, Rfl: 3    cyclobenzaprine (FLEXERIL) 10 MG tablet, Take 1 tablet by mouth At Night As Needed for Muscle Spasms., Disp: 30 tablet, Rfl: 0    ibuprofen (IBU) 600 MG tablet, Take 1 tablet by mouth Every 8 (Eight) Hours As Needed for Mild Pain., Disp: 90 tablet, Rfl: 1    isosorbide mononitrate (IMDUR) 30 MG 24 hr tablet, Take 1 tablet by mouth Daily., Disp: 90 tablet, Rfl: 1    ketoconazole (NIZORAL) 2 % shampoo, Apply  topically to the appropriate area as directed 2 (Two) Times a Week., Disp: 360 mL, Rfl: 3    losartan (COZAAR) 100 MG tablet, Take 1 tablet by mouth Daily., Disp: 90 tablet, Rfl: 1    metoprolol tartrate (LOPRESSOR) 50 MG tablet, Take 1 tablet by mouth 2 (Two) Times a Day., Disp: 180 tablet, Rfl: 3    nitroglycerin (NITROSTAT) 0.4 MG SL tablet, Place 1 tablet under the tongue Every 5 (Five) Minutes As Needed for Chest Pain., Disp: 25 tablet, Rfl: 1    pantoprazole (PROTONIX) 40 MG EC tablet, TAKE 1 TABLET EVERY DAY, Disp: 90 tablet, Rfl: 1    rosuvastatin (CRESTOR) 40 MG tablet, Take 1 tablet by mouth Daily., Disp: 90 tablet, Rfl: 1    sucralfate (CARAFATE) 1 g tablet, Take 1 tablet by mouth 4 (Four) Times a Day. Thoroughly crush pill and mix in small amount of water prior to swallowing., Disp: 90 tablet, Rfl: 0    dicyclomine (BENTYL) 10 MG capsule, Take 1 capsule by mouth 4 (Four) Times a Day Before Meals & at Bedtime., Disp: 90 capsule, Rfl: 0    tobramycin-dexamethasone (TOBRADEX) 0.3-0.1 % ophthalmic suspension, SHAKE LIQUID AND INSTILL 1 DROP IN  LEFT EYE FOUR TIMES DAILY, Disp: , Rfl:     Allergies:   Allergies   Allergen Reactions    Pravastatin Myalgia    Influenza Vac Split Quad Hives       Objective     Physical Exam:   Vital Signs:   Vitals:    08/14/23 1307   BP: 142/76   BP Location: Right arm   Patient Position: Sitting   Cuff Size: Adult   Pulse: 72   Resp: 16   Temp: 98.3 øF (36.8 øC)   TempSrc: Infrared   Weight: 73 kg (161 lb)           Physical Exam  Constitutional:       General: She is not in acute distress.     Appearance: She is not ill-appearing.   HENT:      Head: Normocephalic.   Cardiovascular:      Rate and Rhythm: Normal rate and regular rhythm.      Heart sounds: Normal heart sounds. No murmur heard.  Pulmonary:      Breath sounds: Normal breath sounds.   Abdominal:      General: Bowel sounds are normal.      Tenderness: There is no abdominal tenderness.   Neurological:      General: No focal deficit present.      Mental Status: She is oriented to person, place, and time.   Psychiatric:         Mood and Affect: Mood normal.       Assessment / Plan      Assessment/Plan:   Diagnoses and all orders for this visit:    1. Essential hypertension (Primary)    2. Tobacco use    3. Stomach cramps  -     dicyclomine (BENTYL) 10 MG capsule; Take 1 capsule by mouth 4 (Four) Times a Day Before Meals & at Bedtime.  Dispense: 90 capsule; Refill: 0    4. Gastroesophageal reflux disease, unspecified whether esophagitis present  -     sucralfate (CARAFATE) 1 g tablet; Take 1 tablet by mouth 4 (Four) Times a Day. Thoroughly crush pill and mix in small amount of water prior to swallowing.  Dispense: 90 tablet; Refill: 0      Assessment and Plan  1. Abdominal cramps  - The patient complains of cramps. She states this has been improving but she still has them.  - A prescription for Bentyl 10 MG has been sent to the patient's pharmacy.    2. Acid reflux  - The patient does have acid reflux.  - A refill for sucralfate 1 G has been sent to the patient's  pharmacy.  -If no improvement in one month, endoscopy will be ordered.     3. Tobacco  -She will try nicorette gum; if unable to quit she will let me know and chantix will be prescribed.     Jasvir Romero  reports that she has been smoking cigarettes. She started smoking about 43 years ago. She has a 40.00 pack-year smoking history. She has never used smokeless tobacco.. I have educated her on the risk of diseases from using tobacco products such as cancer, COPD, and heart disease.     I advised her to quit and she is willing to quit. We have discussed the following method/s for tobacco cessation:  OTC Cessation Products.  Together we have set a quit date for 1 week from today.  She will follow up with me in 3 months or sooner to check on her progress.    I spent 3  minutes counseling the patient.          Follow Up:   Return for Next scheduled follow up.    LORI Gunn  [facility]    Transcribed from ambient dictation for LORI Gunn by Valeria Garcia.  08/14/23   14:30 EDT    Patient or patient representative verbalized consent to the visit recording.  I have personally performed the services described in this document as transcribed by the above individual, and it is both accurate and complete.    Transcribed from ambient dictation for LORI Gunn by Valeria Garcia.  08/14/23   14:41 EDT    Patient or patient representative verbalized consent to the visit recording.  I have personally performed the services described in this document as transcribed by the above individual, and it is both accurate and complete.

## 2023-08-25 ENCOUNTER — HOSPITAL ENCOUNTER (OUTPATIENT)
Dept: CT IMAGING | Facility: HOSPITAL | Age: 68
Discharge: HOME | End: 2023-08-25
Admitting: NURSE PRACTITIONER
Payer: MEDICARE

## 2023-08-25 DIAGNOSIS — Z12.2 ENCOUNTER FOR SCREENING FOR LUNG CANCER: ICD-10-CM

## 2023-08-25 DIAGNOSIS — Z87.891 PERSONAL HISTORY OF NICOTINE DEPENDENCE: ICD-10-CM

## 2023-08-25 PROCEDURE — 71271 CT THORAX LUNG CANCER SCR C-: CPT

## 2023-08-28 ENCOUNTER — TELEPHONE (OUTPATIENT)
Dept: INTERNAL MEDICINE | Facility: CLINIC | Age: 68
End: 2023-08-28
Payer: MEDICARE

## 2023-08-28 NOTE — TELEPHONE ENCOUNTER
I have called and scheduled patient for an appointment. She states that it has been several months since she has mentioned this to her provider. She demonstrates appreciation and understanding.

## 2023-08-28 NOTE — TELEPHONE ENCOUNTER
Caller: Jasvir Romero     Relationship: SELF    Best call back number: [phone]     What is your medical concern? PATIENT SPOKE TO PCP MONTHS AGO ABOUT SOME OFF AND ON NUMBNESS IN HER RIGHT THIGH. THE PAST COUPLE OF DAYS THE PATIENT HAS BEEN EXPERIENCING A SORE SPOT AND  PAIN IN HER LEG. THERE HAS BEEN NO INJURIES TO THE LEG AND NO BRUISES.    How long has this issue been going on?  -NUMBNESS IN THIGH ON AND OFF FOR A YEAR  -PAIN AND SORENESS IN LEG FOR THE LAST COUPLE FOR    Is your provider already aware of this issue?   -YES TO NUMBNESS  -NO TO PAIN IN LEG    Have you been treated for this issue? NO    PLEASE CONTACT PATIENT AS SOON AS POSSIBLE FOR FURTHER ADVISEMENT  PATIENT WANTED TO KNOW IF SHE NEEDS TO MAKE AN APPOINTMENT TO BE SEEN OR IF SHE NEEDS TO BE SENT FOR FURTHER TESTING

## 2023-08-30 ENCOUNTER — OFFICE VISIT (OUTPATIENT)
Dept: INTERNAL MEDICINE | Facility: CLINIC | Age: 68
End: 2023-08-30
Payer: MEDICARE

## 2023-08-30 ENCOUNTER — HOSPITAL ENCOUNTER (OUTPATIENT)
Dept: GENERAL RADIOLOGY | Facility: HOSPITAL | Age: 68
Discharge: HOME | End: 2023-08-30
Admitting: NURSE PRACTITIONER
Payer: MEDICARE

## 2023-08-30 VITALS
SYSTOLIC BLOOD PRESSURE: 128 MMHG | HEART RATE: 64 BPM | TEMPERATURE: 97.3 F | DIASTOLIC BLOOD PRESSURE: 82 MMHG | WEIGHT: 160.8 LBS | RESPIRATION RATE: 18 BRPM | BODY MASS INDEX: 31.57 KG/M2 | HEIGHT: 60 IN

## 2023-08-30 DIAGNOSIS — G57.91 NEUROPATHY OF RIGHT LOWER EXTREMITY: Primary | ICD-10-CM

## 2023-08-30 DIAGNOSIS — G57.91 NEUROPATHY OF RIGHT LOWER EXTREMITY: ICD-10-CM

## 2023-08-30 DIAGNOSIS — R73.9 BLOOD GLUCOSE ELEVATED: ICD-10-CM

## 2023-08-30 LAB
EXPIRATION DATE: NORMAL
HBA1C MFR BLD: 5.5 %
Lab: NORMAL

## 2023-08-30 PROCEDURE — 72100 X-RAY EXAM L-S SPINE 2/3 VWS: CPT

## 2023-08-30 RX ORDER — METHYLPREDNISOLONE 4 MG/1
TABLET ORAL
Qty: 21 TABLET | Refills: 0 | Status: SHIPPED | OUTPATIENT
Start: 2023-08-30

## 2023-08-30 NOTE — PROGRESS NOTES
Patient Name: Jasvir Romero  : 1955   MRN: [id number]     Chief Complaint:    Chief Complaint   Patient presents with    leg numbness     Right leg       History of Present Illness: Jasvir Romero is a 68 y.o. female presents to clinic for numbness in her right leg.    Right leg numbness.  The patient reports her right leg is getting numb more frequently. She began experiencing symptoms in 2019 after having a stroke but has noticed them more beginning 2023. She adds she used to have numbness in her right leg once every few months, followed by episodes once per month. She notes it has been occurring daily for the last 3 to 4 days. Episodes only occur in the morning and dissipate in 5 minutes or less, after walking. The numbness spans from her thigh to her knee. She is experiencing a tingling sensation on her leg when lightly touched. She states she can not feel anything when firm pressure is applied. She reports symptoms have been waking her up at night. She is unsure if she injured her back. She denies increased activity. She is not having issues walking.     Right-sided back pain.  She has had right sided back pain every morning for a while that dissipates when she gets moving. She adds that episodes occur when she has been on her feet too long. She denies leg pain, or pain when walking. She has not fallen recently. She denies any hip tenderness or soreness. She is not having any joint pain. She has not had sciatic pain recently. She has not had any lumbar imaging studies.    She reports her stomach is feeling better. She states she felt horrible and is unsure why she experiences chest pain.     The patient denies changes with her bowel or bladder habits and notes she has a bowel movement daily.    She reports that her arteries are clogged.    She reports she discontinued taking B12 supplements due to increased levels.    She denies tick bites.    She reports she did not try  Dicyclomine after reading side effects may include hallucinations. She reports Percocet caused her frequent dreams and nightmares.        .    Review of System: Review of Systems   A review of systems was performed, and the pertinent positives are noted in the HPI.    Medications:     Current Outpatient Medications:     ascorbic acid (VITAMIN C) 1000 MG tablet, Take 1 tablet by mouth Daily., Disp: , Rfl:     BIOTIN PO, Take 1 tablet by mouth Daily., Disp: , Rfl:     cholecalciferol (VITAMIN D3) 25 MCG (1000 UT) tablet, Take 1 tablet by mouth Daily., Disp: 90 tablet, Rfl: 1    clopidogrel (PLAVIX) 75 MG tablet, Take 1 tablet by mouth Daily., Disp: 90 tablet, Rfl: 3    cyclobenzaprine (FLEXERIL) 10 MG tablet, Take 1 tablet by mouth At Night As Needed for Muscle Spasms., Disp: 30 tablet, Rfl: 0    dicyclomine (BENTYL) 10 MG capsule, Take 1 capsule by mouth 4 (Four) Times a Day Before Meals & at Bedtime., Disp: 90 capsule, Rfl: 0    ibuprofen (IBU) 600 MG tablet, Take 1 tablet by mouth Every 8 (Eight) Hours As Needed for Mild Pain., Disp: 90 tablet, Rfl: 1    isosorbide mononitrate (IMDUR) 30 MG 24 hr tablet, Take 1 tablet by mouth Daily., Disp: 90 tablet, Rfl: 1    ketoconazole (NIZORAL) 2 % shampoo, Apply  topically to the appropriate area as directed 2 (Two) Times a Week., Disp: 360 mL, Rfl: 3    losartan (COZAAR) 100 MG tablet, Take 1 tablet by mouth Daily., Disp: 90 tablet, Rfl: 1    metoprolol tartrate (LOPRESSOR) 50 MG tablet, Take 1 tablet by mouth 2 (Two) Times a Day., Disp: 180 tablet, Rfl: 3    nitroglycerin (NITROSTAT) 0.4 MG SL tablet, Place 1 tablet under the tongue Every 5 (Five) Minutes As Needed for Chest Pain., Disp: 25 tablet, Rfl: 1    pantoprazole (PROTONIX) 40 MG EC tablet, TAKE 1 TABLET EVERY DAY, Disp: 90 tablet, Rfl: 1    rosuvastatin (CRESTOR) 40 MG tablet, Take 1 tablet by mouth Daily., Disp: 90 tablet, Rfl: 1    sucralfate (CARAFATE) 1 g tablet, Take 1 tablet by mouth 4 (Four)  "Times a Day. Thoroughly crush pill and mix in small amount of water prior to swallowing., Disp: 90 tablet, Rfl: 0    methylPREDNISolone (MEDROL) 4 MG dose pack, Take as directed on package instructions., Disp: 21 tablet, Rfl: 0    Allergies:   Allergies   Allergen Reactions    Pravastatin Myalgia    Codeine Other (See Comments)    Influenza Vac Split Quad Hives       Objective     Physical Exam:   Vital Signs:   Vitals:    08/30/23 0947   BP: 128/82   BP Location: Right arm   Patient Position: Sitting   Cuff Size: Adult   Pulse: 64   Resp: 18   Temp: 97.3 øF (36.3 øC)   TempSrc: Infrared   Weight: 72.9 kg (160 lb 12.8 oz)   Height: 152.4 cm (60\")   PainSc: 0-No pain           Physical Exam  Constitutional:       General: She is not in acute distress.     Appearance: She is not ill-appearing.   HENT:      Head: Normocephalic.   Cardiovascular:      Rate and Rhythm: Normal rate and regular rhythm.      Heart sounds: Normal heart sounds. No murmur heard.  Pulmonary:      Breath sounds: Normal breath sounds.   Abdominal:      General: Bowel sounds are normal.      Tenderness: There is no abdominal tenderness.   Musculoskeletal:      Right lower leg: No edema.      Left lower leg: No edema.      Comments: The patient has a normal spinal curvature with no evidence of scoliosis.Palpation reveals tenderness and tight paraspinal muscles in right lumbar spine. Patient has full ROM of spine with pain.  Negative leg raise bilaterally.    Neurological:      General: No focal deficit present.      Mental Status: She is oriented to person, place, and time.   Psychiatric:         Mood and Affect: Mood normal.       Assessment / Plan      Assessment/Plan:   Diagnoses and all orders for this visit:    1. Neuropathy of right lower extremity (Primary)  -     Vitamin B12; Future  -     Folate; Future  -     methylPREDNISolone (MEDROL) 4 MG dose pack; Take as directed on package instructions.  Dispense: 21 tablet; Refill: 0  -     XR " Spine Lumbar 2 or 3 View; Future  -     SUELLEN Comprehensive Plus Profile; Future  -     Rheumatoid Factor; Future  -     Vitamin B12  -     Folate  -     SUELLEN Comprehensive Plus Profile  -     Rheumatoid Factor    2. Blood glucose elevated  -     POC Glycosylated Hemoglobin (Hb A1C)       The patient was prescribed a Medrol dose pack to be taken as written. She will take it with food. She will undergo a lumbar x-ray. She has been advised to contact me if symptoms worsen. We will consider physical therapy and nerve testing if needed.    We will check her B12 and folate today. She will undergo an autoimmune panel.    She will follow-up in 10/2023      Follow Up:   Return for Next scheduled follow up.    LORI Gunn  [facility]  Transcribed from ambient dictation for LORI Gunn by Lelia Aden.  08/30/23   13:23 EDT    Patient or patient representative verbalized consent to the visit recording.  I have personally performed the services described in this document as transcribed by the above individual, and it is both accurate and complete.

## 2023-08-31 LAB
CENTROMERE B AB SER-ACNC: <0.2 AI (ref 0–0.9)
CHROMATIN AB SERPL-ACNC: <0.2 AI (ref 0–0.9)
DSDNA AB SER-ACNC: <1 IU/ML (ref 0–9)
ENA JO1 AB SER-ACNC: <0.2 AI (ref 0–0.9)
ENA RNP AB SER-ACNC: <0.2 AI (ref 0–0.9)
ENA SCL70 AB SER-ACNC: <0.2 AI (ref 0–0.9)
ENA SM AB SER-ACNC: <0.2 AI (ref 0–0.9)
ENA SM+RNP AB SER-ACNC: <0.2 AI (ref 0–0.9)
ENA SS-A AB SER-ACNC: <0.2 AI (ref 0–0.9)
ENA SS-B AB SER-ACNC: <0.2 AI (ref 0–0.9)
FOLATE SERPL-MCNC: 7.97 NG/ML (ref 4.78–24.2)
Lab: NORMAL
RHEUMATOID FACT SERPL-ACNC: <10 IU/ML
RIBOSOMAL P AB SER-ACNC: <0.2 AI (ref 0–0.9)
VIT B12 SERPL-MCNC: 392 PG/ML (ref 211–946)

## 2023-09-05 ENCOUNTER — TELEPHONE (OUTPATIENT)
Dept: INTERNAL MEDICINE | Facility: CLINIC | Age: 68
End: 2023-09-05
Payer: MEDICARE

## 2023-09-05 NOTE — TELEPHONE ENCOUNTER
PER HUB TO READ ADVISED PATIENT. SHE UNDERSTOOD . SHE IS GOING TO  WAIT AND SEE WHAT HAPPENS WITH THE INSURANCE FOR NOW. SHE SAID THE PREDNISONE DID HELP A LITTLE

## 2023-09-05 NOTE — TELEPHONE ENCOUNTER
I left a message on the patients voicemail to call our office back, office number provided.      HUB read:  Lumbar x-ray shows degenerative changes.  Some of your vertebrae in the lumbar spine slip are out of alignment. This could be causing pain.  If no improvement it symptoms I would recommend physical therapy.

## 2023-09-05 NOTE — TELEPHONE ENCOUNTER
----- Message from LORI Gunn sent at 9/4/2023  4:47 PM EDT -----  Lumbar x-ray shows degenerative changes.  Some of your vertebrae in the lumbar spine slip are out of alignment. This could be causing pain.  If no improvement it symptoms I would recommend physical therapy.

## 2023-09-12 DIAGNOSIS — I10 ESSENTIAL HYPERTENSION: ICD-10-CM

## 2023-09-12 DIAGNOSIS — E78.5 HYPERLIPIDEMIA LDL GOAL <70: ICD-10-CM

## 2023-09-12 DIAGNOSIS — I25.119 CORONARY ARTERY DISEASE INVOLVING NATIVE CORONARY ARTERY OF NATIVE HEART WITH ANGINA PECTORIS: ICD-10-CM

## 2023-09-12 RX ORDER — ISOSORBIDE MONONITRATE 30 MG/1
30 TABLET, EXTENDED RELEASE ORAL DAILY
Qty: 90 TABLET | Refills: 1 | Status: SHIPPED | OUTPATIENT
Start: 2023-09-12

## 2023-09-12 RX ORDER — METOPROLOL TARTRATE 50 MG/1
50 TABLET, FILM COATED ORAL 2 TIMES DAILY
Qty: 180 TABLET | Refills: 3 | Status: SHIPPED | OUTPATIENT
Start: 2023-09-12

## 2023-09-12 RX ORDER — LOSARTAN POTASSIUM 100 MG/1
100 TABLET ORAL DAILY
Qty: 90 TABLET | Refills: 1 | Status: SHIPPED | OUTPATIENT
Start: 2023-09-12

## 2023-09-12 RX ORDER — ROSUVASTATIN CALCIUM 40 MG/1
40 TABLET, COATED ORAL DAILY
Qty: 90 TABLET | Refills: 1 | Status: SHIPPED | OUTPATIENT
Start: 2023-09-12

## 2023-10-05 ENCOUNTER — TELEPHONE (OUTPATIENT)
Dept: INTERNAL MEDICINE | Facility: CLINIC | Age: 68
End: 2023-10-05
Payer: MEDICARE

## 2023-10-05 NOTE — TELEPHONE ENCOUNTER
Caller: Jasvir Romero    Relationship: Self    Best call back number: [phone]     What form or medical record are you requesting: COPY OF MEDICATIONS, ALL LAB WORK, X-RAYS, LAST MAMMOGRAM, AND LATEST THYROID ULTRASOUND    Who is requesting this form or medical record from you: SELF    How would you like to receive the form or medical records (pick-up, mail, fax):     Timeframe paperwork needed: SHE IS HOPING FOR IT TO BE READY BY MONDAY OR TUESDAY. HER APPOINTMENT IS ON WEDNESDAY.     Additional notes: THE PATIENT IS SWITCHING INSURANCES WHEN SHE IS ABLE TO SOON. IN THE MEANTIME SHE HAS TO GO TO [facility] SO SHE HAS MEDICATIONS. [facility] WILL NOT PAY FOR MEDICATIONS WRITTEN BY [employer]. SHE HAD CALLED AND REQUESTED HER MEDICAL RECORDS SINCE SHE HAS TO BE SEEN SOMEWHERE ELSE FOR ABOUT A MONTH. THE PATIENT GOT THESE IN [place] [place] AND WHEN SHE WENT TO PRINT THEM THEY ARE GONE. SHE SAID SHE DOES NOT NEED THE FULL RECORDS SINCE SHE IS HOPING TO BE BACK WITH [employer] SOON. PLEASE LET HER KNOW IF THERE IS ANY ISSUE WITH ANY OF THESE BEING A COPY. PLEASE CALL WHEN THESE ARE READY FOR .

## 2023-10-20 DIAGNOSIS — E04.2 MULTINODULAR GOITER: Primary | ICD-10-CM

## 2023-12-14 NOTE — TELEPHONE ENCOUNTER
From: Jasvir Hamilton  To: Valeria Gentile PA-C  Sent: 6/1/2022 6:54 AM EDT  Subject: Medication refill     Ibuprofen Call in to [facility]   
suspected abruption

## 2024-01-03 ENCOUNTER — OFFICE VISIT (OUTPATIENT)
Dept: INTERNAL MEDICINE | Facility: CLINIC | Age: 69
End: 2024-01-03
Payer: MEDICARE

## 2024-01-03 VITALS
TEMPERATURE: 97.8 F | BODY MASS INDEX: 32.22 KG/M2 | SYSTOLIC BLOOD PRESSURE: 108 MMHG | HEART RATE: 72 BPM | RESPIRATION RATE: 16 BRPM | WEIGHT: 165 LBS | DIASTOLIC BLOOD PRESSURE: 60 MMHG

## 2024-01-03 DIAGNOSIS — Z12.31 ENCOUNTER FOR SCREENING MAMMOGRAM FOR BREAST CANCER: ICD-10-CM

## 2024-01-03 DIAGNOSIS — K21.9 GASTROESOPHAGEAL REFLUX DISEASE, UNSPECIFIED WHETHER ESOPHAGITIS PRESENT: ICD-10-CM

## 2024-01-03 DIAGNOSIS — I25.119 CORONARY ARTERY DISEASE INVOLVING NATIVE CORONARY ARTERY OF NATIVE HEART WITH ANGINA PECTORIS: ICD-10-CM

## 2024-01-03 DIAGNOSIS — Z12.11 ENCOUNTER FOR COLORECTAL CANCER SCREENING: ICD-10-CM

## 2024-01-03 DIAGNOSIS — K58.2 IRRITABLE BOWEL SYNDROME WITH BOTH CONSTIPATION AND DIARRHEA: Primary | ICD-10-CM

## 2024-01-03 DIAGNOSIS — L30.9 ECZEMA, UNSPECIFIED TYPE: ICD-10-CM

## 2024-01-03 DIAGNOSIS — I63.9 ISCHEMIC STROKE: ICD-10-CM

## 2024-01-03 DIAGNOSIS — E78.5 HYPERLIPIDEMIA LDL GOAL <70: ICD-10-CM

## 2024-01-03 DIAGNOSIS — M54.41 RIGHT-SIDED LOW BACK PAIN WITH RIGHT-SIDED SCIATICA, UNSPECIFIED CHRONICITY: ICD-10-CM

## 2024-01-03 DIAGNOSIS — Z12.12 ENCOUNTER FOR COLORECTAL CANCER SCREENING: ICD-10-CM

## 2024-01-03 DIAGNOSIS — I10 ESSENTIAL HYPERTENSION: ICD-10-CM

## 2024-01-03 RX ORDER — CYCLOBENZAPRINE HCL 10 MG
10 TABLET ORAL NIGHTLY PRN
Qty: 30 TABLET | Refills: 0 | Status: SHIPPED | OUTPATIENT
Start: 2024-01-03

## 2024-01-03 RX ORDER — CLOPIDOGREL BISULFATE 75 MG/1
75 TABLET ORAL DAILY
Qty: 90 TABLET | Refills: 1 | Status: SHIPPED | OUTPATIENT
Start: 2024-01-03

## 2024-01-03 RX ORDER — PANTOPRAZOLE SODIUM 40 MG/1
40 TABLET, DELAYED RELEASE ORAL DAILY
Qty: 90 TABLET | Refills: 1 | Status: SHIPPED | OUTPATIENT
Start: 2024-01-03

## 2024-01-03 RX ORDER — ROSUVASTATIN CALCIUM 40 MG/1
40 TABLET, COATED ORAL DAILY
Qty: 90 TABLET | Refills: 1 | Status: SHIPPED | OUTPATIENT
Start: 2024-01-03

## 2024-01-03 RX ORDER — ISOSORBIDE MONONITRATE 30 MG/1
30 TABLET, EXTENDED RELEASE ORAL DAILY
Qty: 90 TABLET | Refills: 1 | Status: SHIPPED | OUTPATIENT
Start: 2024-01-03

## 2024-01-03 RX ORDER — LOSARTAN POTASSIUM 100 MG/1
100 TABLET ORAL DAILY
Qty: 90 TABLET | Refills: 1 | Status: SHIPPED | OUTPATIENT
Start: 2024-01-03

## 2024-01-03 RX ORDER — LANOLIN ALCOHOL/MO/W.PET/CERES
1000 CREAM (GRAM) TOPICAL DAILY
COMMUNITY

## 2024-01-03 NOTE — PROGRESS NOTES
Patient Name: Jasvir Hamilton  : 1955   MRN: [id number]     Chief Complaint:    Chief Complaint   Patient presents with    Follow-up     Follow up GI issues        History of Present Illness: Jasvir Hamilton is a 68-year-old female who has had constipation and cramping for several months.    Constipation  She was seen by [facility] and was recommended to try a low FODMAP diet as well as MiraLAX. She had a trial of MiraLAX for 3 to 4 days, which did not help her constipation. She has found that peanut butter and toast will help it. She continues to have a bowel movement every 3 to 4 days, which is hard, and she also has intermittent diarrhea. She denies any rectal bleeding. Her last colonoscopy was in . They also did a CT scan, which is not available; however, the patient reports it was normal.    Hypertension  Her blood pressure has been at its goal. She is utilizing losartan 100 mg as well as metoprolol 50 mg twice a day. She also takes isosorbide 30 mg daily. She had a trial without the isosorbide, however, her chest pain reoccurred, and it was restarted.    Right side back pain   She has right-sided back pain that occasionally flares up, and she will take Flexeril as needed.    Eczema  She has had an itchy patch on her upper left arm for several months, hydrocortisone helped. She has seen a dermatologist in the past; however, she currently declines a referral.    Coronary artery disease  She continues to utilize Plavix daily, isosorbide, and metoprolol for her coronary artery disease.    Hyperlipidemia   She is utilizing rosuvastatin 40 mg. Her LDL goal is less than 70 mg/dL. Her last LDL on 2023 was 67 mg/dL.     Gastroesophageal reflux disease  The patient's Gastroesophageal reflux disease is controlled by Protonix 40 mg daily prn. She also utilizes Carafate 1 g four times a day as needed.     Lab Results   Component Value Date    CHOL 135 2022    CHLPL 135 2023     TRIG 154 (H) 01/17/2023    HDL 41 01/17/2023    LDL 67 01/17/2023           Subjective     Review of System: Review of Systems   A review of systems was performed, and the pertinent positives are noted in the HPI.    Medications:     Current Outpatient Medications:     ascorbic acid (VITAMIN C) 1000 MG tablet, Take 1 tablet by mouth Daily., Disp: , Rfl:     BIOTIN PO, Take 1 tablet by mouth Daily., Disp: , Rfl:     cholecalciferol (VITAMIN D3) 25 MCG (1000 UT) tablet, Take 1 tablet by mouth Daily., Disp: 90 tablet, Rfl: 1    clopidogrel (PLAVIX) 75 MG tablet, Take 1 tablet by mouth Daily., Disp: 90 tablet, Rfl: 1    cyclobenzaprine (FLEXERIL) 10 MG tablet, Take 1 tablet by mouth At Night As Needed for Muscle Spasms., Disp: 30 tablet, Rfl: 0    ibuprofen (IBU) 600 MG tablet, Take 1 tablet by mouth Every 8 (Eight) Hours As Needed for Mild Pain., Disp: 90 tablet, Rfl: 1    isosorbide mononitrate (IMDUR) 30 MG 24 hr tablet, Take 1 tablet by mouth Daily., Disp: 90 tablet, Rfl: 1    ketoconazole (NIZORAL) 2 % shampoo, Apply  topically to the appropriate area as directed 2 (Two) Times a Week., Disp: 360 mL, Rfl: 3    losartan (COZAAR) 100 MG tablet, Take 1 tablet by mouth Daily., Disp: 90 tablet, Rfl: 1    metoprolol tartrate (LOPRESSOR) 50 MG tablet, Take 1 tablet by mouth 2 (Two) Times a Day., Disp: 180 tablet, Rfl: 3    pantoprazole (PROTONIX) 40 MG EC tablet, Take 1 tablet by mouth Daily., Disp: 90 tablet, Rfl: 1    rosuvastatin (CRESTOR) 40 MG tablet, Take 1 tablet by mouth Daily., Disp: 90 tablet, Rfl: 1    sucralfate (CARAFATE) 1 g tablet, Take 1 tablet by mouth 4 (Four) Times a Day. Thoroughly crush pill and mix in small amount of water prior to swallowing., Disp: 90 tablet, Rfl: 0    vitamin B-12 (CYANOCOBALAMIN) 1000 MCG tablet, Take 1 tablet by mouth Daily., Disp: , Rfl:     linaclotide (Linzess) 72 MCG capsule capsule, Take 1 capsule by mouth Every Morning Before Breakfast., Disp: 30 capsule, Rfl: 1     nitroglycerin (NITROSTAT) 0.4 MG SL tablet, Place 1 tablet under the tongue Every 5 (Five) Minutes As Needed for Chest Pain. (Patient not taking: Reported on 1/3/2024), Disp: 25 tablet, Rfl: 1    Allergies:   Allergies   Allergen Reactions    Pravastatin Myalgia    Codeine Other (See Comments)    Influenza Vac Split Quad Hives       Objective     Physical Exam:   Vital Signs:   Vitals:    01/03/24 1127   BP: 108/60   BP Location: Right arm   Patient Position: Sitting   Cuff Size: Adult   Pulse: 72   Resp: 16   Temp: 97.8 °F (36.6 °C)   TempSrc: Infrared   Weight: 74.8 kg (165 lb)           Physical Exam  Constitutional:       General: She is not in acute distress.     Appearance: She is not ill-appearing.   HENT:      Head: Normocephalic.   Neck:      Vascular: Carotid bruit (known) present.   Cardiovascular:      Rate and Rhythm: Normal rate and regular rhythm.      Heart sounds: Normal heart sounds. No murmur heard.  Pulmonary:      Breath sounds: Normal breath sounds.   Abdominal:      General: Bowel sounds are normal.      Tenderness: There is no abdominal tenderness.   Musculoskeletal:      Right lower leg: No edema.      Left lower leg: No edema.   Neurological:      General: No focal deficit present.      Mental Status: She is oriented to person, place, and time.   Psychiatric:         Mood and Affect: Mood normal.         Assessment / Plan      Assessment/Plan:   Diagnoses and all orders for this visit:    1. Irritable bowel syndrome with both constipation and diarrhea (Primary)  -     linaclotide (Linzess) 72 MCG capsule capsule; Take 1 capsule by mouth Every Morning Before Breakfast.  Dispense: 30 capsule; Refill: 1    2. Coronary artery disease involving native coronary artery of native heart with angina pectoris  -     clopidogrel (PLAVIX) 75 MG tablet; Take 1 tablet by mouth Daily.  Dispense: 90 tablet; Refill: 1    3. Ischemic stroke  -     clopidogrel (PLAVIX) 75 MG tablet; Take 1 tablet by mouth  Daily.  Dispense: 90 tablet; Refill: 1    4. Right-sided low back pain with right-sided sciatica, unspecified chronicity  -     cyclobenzaprine (FLEXERIL) 10 MG tablet; Take 1 tablet by mouth At Night As Needed for Muscle Spasms.  Dispense: 30 tablet; Refill: 0    5. Essential hypertension  -     isosorbide mononitrate (IMDUR) 30 MG 24 hr tablet; Take 1 tablet by mouth Daily.  Dispense: 90 tablet; Refill: 1  -     losartan (COZAAR) 100 MG tablet; Take 1 tablet by mouth Daily.  Dispense: 90 tablet; Refill: 1  -     Comprehensive Metabolic Panel; Future  -     TSH; Future  -     CBC & Differential; Future    6. Gastroesophageal reflux disease, unspecified whether esophagitis present  -     pantoprazole (PROTONIX) 40 MG EC tablet; Take 1 tablet by mouth Daily.  Dispense: 90 tablet; Refill: 1    7. Hyperlipidemia LDL goal <70  -     rosuvastatin (CRESTOR) 40 MG tablet; Take 1 tablet by mouth Daily.  Dispense: 90 tablet; Refill: 1  -     Lipid Panel; Future    8. Encounter for colorectal cancer screening  -     Ambulatory Referral For Screening Colonoscopy    9. Encounter for screening mammogram for breast cancer  -     Mammo Screening Digital Tomosynthesis Bilateral With CAD; Future    10. Eczema, unspecified type         1. Constipation   We will do a trial of Linzess and referred her for colorectal screening.      2.Hyperlipidemia    We will continue her rosuvastatin 40 mg.     3. Gastroesophageal reflux disease   She will continue the pantoprazole 40 mg daily to control her symptoms.     4.Coronary artery disease   She will follow up with Cardiology in 02/2024.      5.Health Maintenance    The patient is overdue for her mammogram; order has been placed.      Follow Up:   Return for Next scheduled follow up.    The patient will follow up next week for her Medicare wellness and fasting lab work.     LORI Gunn  [facility] [place] [facility]      Transcribed from ambient dictation for  LORI Gunn by Shelton Messina.  01/03/24   14:14 EST    Patient or patient representative verbalized consent to the visit recording.  I have personally performed the services described in this document as transcribed by the above individual, and it is both accurate and complete.

## 2024-01-10 ENCOUNTER — TELEPHONE (OUTPATIENT)
Dept: INTERNAL MEDICINE | Facility: CLINIC | Age: 69
End: 2024-01-10
Payer: MEDICARE

## 2024-01-10 NOTE — TELEPHONE ENCOUNTER
It looks like they may have covered a temporary supply.  Please ask patient if she would want something else sent in.

## 2024-01-10 NOTE — TELEPHONE ENCOUNTER
Pharmacy sent over a request to either change medication or patient can pay out of pocket. The cyclobenzaprine is not on the drug formulary.    Please see denial in media tab

## 2024-01-10 NOTE — TELEPHONE ENCOUNTER
Spoke to patient she stated that she is ok for now and does not need any of the medication. Patient will call back if anything changes.

## 2024-02-01 RX ORDER — SODIUM, POTASSIUM,MAG SULFATES 17.5-3.13G
2 SOLUTION, RECONSTITUTED, ORAL ORAL TAKE AS DIRECTED
Qty: 354 ML | Refills: 0 | Status: SHIPPED | OUTPATIENT
Start: 2024-02-01

## 2024-02-05 ENCOUNTER — TELEPHONE (OUTPATIENT)
Dept: GASTROENTEROLOGY | Facility: CLINIC | Age: 69
End: 2024-02-05
Payer: MEDICARE

## 2024-02-05 NOTE — TELEPHONE ENCOUNTER
Caller: Jasvir Romero    Relationship to patient: Self    Best call back number: [phone]    Patient is needing: PATIENT CALLED IS THERE ANYTHING OVER THE COUNTER THAT SHE CAN DO IN PLACE OF A PREP. THE CHEAPEST ONE FOR HER IS 50 DOLLARS AND IT IS STILL TOO HIGH. PATIENT STATES LAST TIME SHE DRANK A BOTTLE OF CITRUS MAGNESIUM WITH WATER AND THAT DID THE TRICK. CAN SHE DO THAT INSTEAD BECAUSE NONE OF THE PREPS ARE ON THE FORMULARY FOR HER [facility] INSURANCE PLEASE CALL PATIENT BACK ASAP

## 2024-02-06 ENCOUNTER — HOSPITAL ENCOUNTER (OUTPATIENT)
Dept: ULTRASOUND IMAGING | Facility: HOSPITAL | Age: 69
Discharge: HOME | End: 2024-02-06
Admitting: NURSE PRACTITIONER
Payer: MEDICARE

## 2024-02-06 DIAGNOSIS — E04.2 MULTINODULAR GOITER: ICD-10-CM

## 2024-02-06 PROCEDURE — 76536 US EXAM OF HEAD AND NECK: CPT

## 2024-02-07 ENCOUNTER — TELEPHONE (OUTPATIENT)
Dept: INTERNAL MEDICINE | Facility: CLINIC | Age: 69
End: 2024-02-07
Payer: MEDICARE

## 2024-02-07 NOTE — TELEPHONE ENCOUNTER
I left a message on the patients voicemail to call our office back, office number provided.     RELAY:    Enlarged nodules are seen. Recommend evaluation for biopsy; recommend referral to ENT for evaluation.   I will place the order if agreeable.

## 2024-02-07 NOTE — TELEPHONE ENCOUNTER
----- Message from LORI Gunn sent at 2/7/2024  9:28 AM EST -----  Enlarged nodules are seen. Recommend evaluation for biopsy; recommend referral to ENT for evaluation.   I will place the order if agreeable.

## 2024-02-08 DIAGNOSIS — E04.2 MULTINODULAR GOITER: Primary | ICD-10-CM

## 2024-02-08 NOTE — TELEPHONE ENCOUNTER
Name: NikkoJasvir cheng Sasha      Relationship: Self      Best Callback Number: [phone]       HUB PROVIDED THE RELAY MESSAGE FROM THE OFFICE      PATIENT: VOICED UNDERSTANDING AND HAS NO FURTHER QUESTIONS AT THIS TIME    ADDITIONAL INFORMATION: PATIENT IS AGREEABLE AND WOULD LIKE THE ORDERS PLACED.

## 2024-02-09 ENCOUNTER — LAB (OUTPATIENT)
Dept: INTERNAL MEDICINE | Facility: CLINIC | Age: 69
End: 2024-02-09
Payer: MEDICARE

## 2024-02-09 DIAGNOSIS — E78.5 HYPERLIPIDEMIA LDL GOAL <70: ICD-10-CM

## 2024-02-09 DIAGNOSIS — I10 ESSENTIAL HYPERTENSION: ICD-10-CM

## 2024-02-09 LAB
ALBUMIN SERPL-MCNC: 4.6 G/DL (ref 3.5–5.2)
ALBUMIN/GLOB SERPL: 2.4 G/DL
ALP SERPL-CCNC: 67 U/L (ref 39–117)
ALT SERPL W P-5'-P-CCNC: 10 U/L (ref 1–33)
ANION GAP SERPL CALCULATED.3IONS-SCNC: 11 MMOL/L (ref 5–15)
AST SERPL-CCNC: 11 U/L (ref 1–32)
BASOPHILS # BLD AUTO: 0.02 10*3/MM3 (ref 0–0.2)
BASOPHILS NFR BLD AUTO: 0.4 % (ref 0–1.5)
BILIRUB SERPL-MCNC: 0.6 MG/DL (ref 0–1.2)
BUN SERPL-MCNC: 17 MG/DL (ref 8–23)
BUN/CREAT SERPL: 19.3 (ref 7–25)
CALCIUM SPEC-SCNC: 10 MG/DL (ref 8.6–10.5)
CHLORIDE SERPL-SCNC: 104 MMOL/L (ref 98–107)
CHOLEST SERPL-MCNC: 150 MG/DL (ref 0–200)
CO2 SERPL-SCNC: 26 MMOL/L (ref 22–29)
CREAT SERPL-MCNC: 0.88 MG/DL (ref 0.57–1)
DEPRECATED RDW RBC AUTO: 40.7 FL (ref 37–54)
EGFRCR SERPLBLD CKD-EPI 2021: 72.6 ML/MIN/1.73
EOSINOPHIL # BLD AUTO: 0.13 10*3/MM3 (ref 0–0.4)
EOSINOPHIL NFR BLD AUTO: 2.3 % (ref 0.3–6.2)
ERYTHROCYTE [DISTWIDTH] IN BLOOD BY AUTOMATED COUNT: 13.1 % (ref 12.3–15.4)
GLOBULIN UR ELPH-MCNC: 1.9 GM/DL
GLUCOSE SERPL-MCNC: 105 MG/DL (ref 65–99)
HCT VFR BLD AUTO: 41.7 % (ref 34–46.6)
HDLC SERPL-MCNC: 40 MG/DL (ref 40–60)
HGB BLD-MCNC: 14 G/DL (ref 12–15.9)
IMM GRANULOCYTES # BLD AUTO: 0.01 10*3/MM3 (ref 0–0.05)
IMM GRANULOCYTES NFR BLD AUTO: 0.2 % (ref 0–0.5)
LDLC SERPL CALC-MCNC: 79 MG/DL (ref 0–100)
LDLC/HDLC SERPL: 1.83 {RATIO}
LYMPHOCYTES # BLD AUTO: 0.92 10*3/MM3 (ref 0.7–3.1)
LYMPHOCYTES NFR BLD AUTO: 16.1 % (ref 19.6–45.3)
MCH RBC QN AUTO: 28.8 PG (ref 26.6–33)
MCHC RBC AUTO-ENTMCNC: 33.6 G/DL (ref 31.5–35.7)
MCV RBC AUTO: 85.8 FL (ref 79–97)
MONOCYTES # BLD AUTO: 0.39 10*3/MM3 (ref 0.1–0.9)
MONOCYTES NFR BLD AUTO: 6.8 % (ref 5–12)
NEUTROPHILS NFR BLD AUTO: 4.23 10*3/MM3 (ref 1.7–7)
NEUTROPHILS NFR BLD AUTO: 74.2 % (ref 42.7–76)
NRBC BLD AUTO-RTO: 0.2 /100 WBC (ref 0–0.2)
PLATELET # BLD AUTO: 222 10*3/MM3 (ref 140–450)
PMV BLD AUTO: 10.5 FL (ref 6–12)
POTASSIUM SERPL-SCNC: 4.4 MMOL/L (ref 3.5–5.2)
PROT SERPL-MCNC: 6.5 G/DL (ref 6–8.5)
RBC # BLD AUTO: 4.86 10*6/MM3 (ref 3.77–5.28)
SODIUM SERPL-SCNC: 141 MMOL/L (ref 136–145)
TRIGL SERPL-MCNC: 185 MG/DL (ref 0–150)
TSH SERPL DL<=0.05 MIU/L-ACNC: 0.79 UIU/ML (ref 0.27–4.2)
VLDLC SERPL-MCNC: 31 MG/DL (ref 5–40)
WBC NRBC COR # BLD AUTO: 5.7 10*3/MM3 (ref 3.4–10.8)

## 2024-02-09 PROCEDURE — 80053 COMPREHEN METABOLIC PANEL: CPT | Performed by: NURSE PRACTITIONER

## 2024-02-09 PROCEDURE — 85025 COMPLETE CBC W/AUTO DIFF WBC: CPT | Performed by: NURSE PRACTITIONER

## 2024-02-09 PROCEDURE — 80061 LIPID PANEL: CPT | Performed by: NURSE PRACTITIONER

## 2024-02-09 PROCEDURE — 84443 ASSAY THYROID STIM HORMONE: CPT | Performed by: NURSE PRACTITIONER

## 2024-02-09 PROCEDURE — 36415 COLL VENOUS BLD VENIPUNCTURE: CPT | Performed by: NURSE PRACTITIONER

## 2024-02-13 ENCOUNTER — OUTSIDE FACILITY SERVICE (OUTPATIENT)
Dept: GASTROENTEROLOGY | Facility: CLINIC | Age: 69
End: 2024-02-13
Payer: MEDICARE

## 2024-02-13 PROCEDURE — 88305 TISSUE EXAM BY PATHOLOGIST: CPT | Performed by: INTERNAL MEDICINE

## 2024-02-14 ENCOUNTER — LAB REQUISITION (OUTPATIENT)
Dept: LAB | Facility: HOSPITAL | Age: 69
End: 2024-02-14
Payer: MEDICARE

## 2024-02-14 DIAGNOSIS — D12.2 BENIGN NEOPLASM OF ASCENDING COLON: ICD-10-CM

## 2024-02-14 DIAGNOSIS — K57.30 DIVERTICULOSIS OF LARGE INTESTINE WITHOUT PERFORATION OR ABSCESS WITHOUT BLEEDING: ICD-10-CM

## 2024-02-14 DIAGNOSIS — D12.3 BENIGN NEOPLASM OF TRANSVERSE COLON: ICD-10-CM

## 2024-02-14 DIAGNOSIS — Z12.11 ENCOUNTER FOR SCREENING FOR MALIGNANT NEOPLASM OF COLON: ICD-10-CM

## 2024-02-14 DIAGNOSIS — Z86.010 PERSONAL HISTORY OF COLONIC POLYPS: ICD-10-CM

## 2024-02-15 LAB — REF LAB TEST METHOD: NORMAL

## 2024-02-16 ENCOUNTER — HOSPITAL ENCOUNTER (OUTPATIENT)
Dept: MAMMOGRAPHY | Facility: HOSPITAL | Age: 69
Discharge: HOME | End: 2024-02-16
Admitting: NURSE PRACTITIONER
Payer: MEDICARE

## 2024-02-16 DIAGNOSIS — Z12.31 ENCOUNTER FOR SCREENING MAMMOGRAM FOR BREAST CANCER: ICD-10-CM

## 2024-02-16 PROCEDURE — 77067 SCR MAMMO BI INCL CAD: CPT

## 2024-02-16 PROCEDURE — 77063 BREAST TOMOSYNTHESIS BI: CPT

## 2024-03-05 ENCOUNTER — OFFICE VISIT (OUTPATIENT)
Dept: INTERNAL MEDICINE | Facility: CLINIC | Age: 69
End: 2024-03-05
Payer: MEDICARE

## 2024-03-05 VITALS
RESPIRATION RATE: 18 BRPM | TEMPERATURE: 97.5 F | DIASTOLIC BLOOD PRESSURE: 92 MMHG | SYSTOLIC BLOOD PRESSURE: 166 MMHG | HEART RATE: 60 BPM | WEIGHT: 160.8 LBS | BODY MASS INDEX: 30.36 KG/M2 | HEIGHT: 61 IN

## 2024-03-05 DIAGNOSIS — Z23 ENCOUNTER FOR IMMUNIZATION: ICD-10-CM

## 2024-03-05 DIAGNOSIS — Z00.00 INITIAL MEDICARE ANNUAL WELLNESS VISIT: Primary | ICD-10-CM

## 2024-03-05 DIAGNOSIS — L98.9 SKIN LESION: ICD-10-CM

## 2024-03-05 DIAGNOSIS — E78.5 HYPERLIPIDEMIA LDL GOAL <70: ICD-10-CM

## 2024-03-05 DIAGNOSIS — I25.119 CORONARY ARTERY DISEASE INVOLVING NATIVE CORONARY ARTERY OF NATIVE HEART WITH ANGINA PECTORIS: ICD-10-CM

## 2024-03-05 NOTE — PATIENT INSTRUCTIONS
MyPlate from USDA  MyPlate is an outline of a general healthy diet based on the Dietary Guidelines for Americans, 4165-9222, from the U.S. Department of Agriculture (USDA). It sets guidelines for how much food you should eat from each food group based on your age, sex, and level of physical activity.  What are tips for following MyPlate?  To follow MyPlate recommendations:  Eat a wide variety of fruits and vegetables, grains, and protein foods.  Serve smaller portions and eat less food throughout the day.  Limit portion sizes to avoid overeating.  Enjoy your food.  Get at least 150 minutes of exercise every week. This is about 30 minutes each day, 5 or more days per week.  It can be difficult to have every meal look like MyPlate. Think about MyPlate as eating guidelines for an entire day, rather than each individual meal.  Fruits and vegetables  Make one half of your plate fruits and vegetables.  Eat many different colors of fruits and vegetables each day.  For a 2,000-calorie daily food plan, eat:  2½ cups of vegetables every day.  2 cups of fruit every day.  1 cup is equal to:  1 cup raw or cooked vegetables.  1 cup raw fruit.  1 medium-sized orange, apple, or banana.  1 cup 100% fruit or vegetable juice.  2 cups raw leafy greens, such as lettuce, spinach, or kale.  ½ cup dried fruit.  Grains  One fourth of your plate should be grains.  Make at least half of the grains you eat each day whole grains.  For a 2,000-calorie daily food plan, eat 6 oz of grains every day.  1 oz is equal to:  1 slice bread.  1 cup cereal.  ½ cup cooked rice, cereal, or pasta.  Protein  One fourth of your plate should be protein.  Eat a wide variety of protein foods, including meat, poultry, fish, eggs, beans, nuts, and tofu.  For a 2,000-calorie daily food plan, eat 5½ oz of protein every day.  1 oz is equal to:  1 oz meat, poultry, or fish.  ¼ cup cooked beans.  1 egg.  ½ oz nuts or seeds.  1 Tbsp peanut butter.  Dairy  Drink fat-free  or low-fat (1%) milk.  Eat or drink dairy as a side to meals.  For a 2,000-calorie daily food plan, eat or drink 3 cups of dairy every day.  1 cup is equal to:  1 cup milk, yogurt, cottage cheese, or soy milk (soy beverage).  2 oz processed cheese.  1½ oz natural cheese.  Fats, oils, salt, and sugars  Only small amounts of oils are recommended.  Avoid foods that are high in calories and low in nutritional value (empty calories), like foods high in fat or added sugars.  Choose foods that are low in salt (sodium). Choose foods that have less than 140 milligrams (mg) of sodium per serving.  Drink water instead of sugary drinks. Drink enough fluid to keep your urine pale yellow.  Where to find support  Work with your health care provider or a dietitian to develop a customized eating plan that is right for you.  Download an ruslan (mobile application) to help you track your daily food intake.  Where to find more information  USDA: ChooseMyPlate.gov  Summary  MyPlate is a general guideline for healthy eating from the USDA. It is based on the Dietary Guidelines for Americans, 2840-2421.  In general, fruits and vegetables should take up one half of your plate, grains should take up one fourth of your plate, and protein should take up one fourth of your plate.  This information is not intended to replace advice given to you by your health care provider. Make sure you discuss any questions you have with your health care provider.  Document Revised: 11/08/2021 Document Reviewed: 11/08/2021  [employer] Patient Education © 2022 [employer]

## 2024-03-05 NOTE — PROGRESS NOTES
The ABCs of the Annual Wellness Visit  Subsequent Medicare Wellness Visit    Subjective    Jasvir Romero is a 68 y.o. female who presents for a Subsequent Medicare Wellness Visit.    The following portions of the patient's history were reviewed and   updated as appropriate: allergies, current medications, past family history, past medical history, past social history, past surgical history, and problem list.    Compared to one year ago, the patient feels her physical   health is better.    Compared to one year ago, the patient feels her mental   health is better.    Recent Hospitalizations:  She was not admitted to the hospital during the last year.       Current Medical Providers:  Patient Care Team:  Nichole Cardenas APRN as PCP - General (Nurse Practitioner)  Telly Anguiano IV, MD as Consulting Physician (Interventional Cardiology)  Emily Ba APRN as Nurse Practitioner (Interventional Cardiology)    Outpatient Medications Prior to Visit   Medication Sig Dispense Refill   • ascorbic acid (VITAMIN C) 1000 MG tablet Take 1 tablet by mouth Daily.     • BIOTIN PO Take 1 tablet by mouth Daily.     • cholecalciferol (VITAMIN D3) 25 MCG (1000 UT) tablet Take 1 tablet by mouth Daily. 90 tablet 1   • clopidogrel (PLAVIX) 75 MG tablet Take 1 tablet by mouth Daily. 90 tablet 1   • ibuprofen (IBU) 600 MG tablet Take 1 tablet by mouth Every 8 (Eight) Hours As Needed for Mild Pain. 90 tablet 1   • isosorbide mononitrate (IMDUR) 30 MG 24 hr tablet Take 1 tablet by mouth Daily. 90 tablet 1   • ketoconazole (NIZORAL) 2 % shampoo Apply  topically to the appropriate area as directed 2 (Two) Times a Week. 360 mL 3   • losartan (COZAAR) 100 MG tablet Take 1 tablet by mouth Daily. 90 tablet 1   • metoprolol tartrate (LOPRESSOR) 50 MG tablet Take 1 tablet by mouth 2 (Two) Times a Day. 180 tablet 3   • nitroglycerin (NITROSTAT) 0.4 MG SL tablet Place 1 tablet under the tongue Every 5 (Five) Minutes As  Needed for Chest Pain. 25 tablet 1   • pantoprazole (PROTONIX) 40 MG EC tablet Take 1 tablet by mouth Daily. 90 tablet 1   • rosuvastatin (CRESTOR) 40 MG tablet Take 1 tablet by mouth Daily. 90 tablet 1   • sucralfate (CARAFATE) 1 g tablet Take 1 tablet by mouth 4 (Four) Times a Day. Thoroughly crush pill and mix in small amount of water prior to swallowing. 90 tablet 0   • vitamin B-12 (CYANOCOBALAMIN) 1000 MCG tablet Take 1 tablet by mouth Daily.     • cyclobenzaprine (FLEXERIL) 10 MG tablet Take 1 tablet by mouth At Night As Needed for Muscle Spasms. 30 tablet 0   • linaclotide (Linzess) 72 MCG capsule capsule Take 1 capsule by mouth Every Morning Before Breakfast. 30 capsule 1   • sodium-potassium-magnesium sulfates (Suprep Bowel Prep Kit) 17.5-3.13-1.6 GM/177ML solution oral solution Take 2 bottles by mouth Take As Directed. Do not eat the day before your procedure. If you didn't receive instructions call [phone]. 354 mL 0     No facility-administered medications prior to visit.       No opioid medication identified on active medication list. I have reviewed chart for other potential  high risk medication/s and harmful drug interactions in the elderly.        Aspirin is not on active medication list.   Cardiology recommended discontinue .    Patient Active Problem List   Diagnosis   • Essential hypertension   • GERD (gastroesophageal reflux disease)   • Hyperlipidemia LDL goal <70   • Cobalamin deficiency   • Vitamin D deficiency   • Coronary artery disease involving native coronary artery of native heart with angina pectoris   • Current every day smoker   • Obesity (BMI 30.0-34.9)   • Bilateral carotid bruits   • Multinodular goiter   • Prediabetes     Advance Care Planning   Advance Care Planning     Advance Directive is not on file.  ACP discussion was held with the patient during this visit. Patient does not have an advance directive, declines further assistance.     Objective    Vitals:    03/05/24  "917   BP: 166/92   BP Location: Right arm   Patient Position: Sitting   Cuff Size: Adult   Pulse: 60   Resp: 18   Temp: 97.5 °F (36.4 °C)   TempSrc: Infrared   Weight: 72.9 kg (160 lb 12.8 oz)   Height: 154.3 cm (60.75\")   PainSc: 0-No pain     Estimated body mass index is 30.63 kg/m² as calculated from the following:    Height as of this encounter: 154.3 cm (60.75\").    Weight as of this encounter: 72.9 kg (160 lb 12.8 oz).    BMI is >= 30 and <35. (Class 1 Obesity). The following options were offered after discussion;: weight loss educational material (shared in after visit summary)      Does the patient have evidence of cognitive impairment? No    Lab Results   Component Value Date    TRIG 185 (H) 2024    HDL 40 2024    LDL 79 2024    VLDL 31 2024        HEALTH RISK ASSESSMENT    Smoking Status:  Social History     Tobacco Use   Smoking Status Every Day   • Current packs/day: 0.00   • Average packs/day: 1 pack/day for 40.0 years (40.0 ttl pk-yrs)   • Types: Cigarettes   • Start date: 1980   • Last attempt to quit: 2019   • Years since quittin.4   • Passive exposure: Current   Smokeless Tobacco Never     Alcohol Consumption:  Social History     Substance and Sexual Activity   Alcohol Use Not Currently    Comment: rarely     Fall Risk Screen:    FAUZIA Fall Risk Assessment was completed, and patient is at LOW risk for falls.Assessment completed on:3/5/2024    Depression Screening:      3/5/2024     9:23 AM   PHQ-2/PHQ-9 Depression Screening   Little Interest or Pleasure in Doing Things 0-->not at all   Feeling Down, Depressed or Hopeless 0-->not at all   PHQ-9: Brief Depression Severity Measure Score 0       Health Habits and Functional and Cognitive Screenin/27/2024     7:37 AM   Functional & Cognitive Status   Do you have difficulty preparing food and eating? No   Do you have difficulty bathing yourself, getting dressed or grooming yourself? No   Do you have " difficulty using the toilet? No   Do you have difficulty moving around from place to place? No   Do you have trouble with steps or getting out of a bed or a chair? No   Current Diet Other   Dental Exam Up to date   Eye Exam Up to date   Exercise (times per week) 0 times per week   Do you need help using the phone?  No   Are you deaf or do you have serious difficulty hearing?  No   Do you need help to go to places out of walking distance? No   Do you need help shopping? No   Do you need help preparing meals?  No   Do you need help with housework?  No   Do you need help with laundry? No   Do you need help taking your medications? No   Do you need help managing money? No   Do you ever drive or ride in a car without wearing a seat belt? No   Have you felt unusual stress, anger or loneliness in the last month? No   Who do you live with? Spouse   If you need help, do you have trouble finding someone available to you? No   Have you been bothered in the last four weeks by sexual problems? No   Do you have difficulty concentrating, remembering or making decisions? No       Age-appropriate Screening Schedule:  Refer to the list below for future screening recommendations based on patient's age, sex and/or medical conditions. Orders for these recommended tests are listed in the plan section. The patient has been provided with a written plan.    Health Maintenance   Topic Date Due   • RSV Vaccine - Adults (1 - 1-dose 60+ series) Never done   • ZOSTER VACCINE (2 of 3) 09/20/2017   • TDAP/TD VACCINES (2 - Td or Tdap) 10/07/2021   • COVID-19 Vaccine (5 - 2023-24 season) 09/01/2023   • ANNUAL WELLNESS VISIT  01/17/2024   • DXA SCAN  06/22/2024   • LUNG CANCER SCREENING  08/25/2024   • LIPID PANEL  02/09/2025   • COLORECTAL CANCER SCREENING  02/13/2025   • MAMMOGRAM  02/16/2025   • BMI FOLLOWUP  03/05/2025   • PAP SMEAR  03/30/2025   • HEPATITIS C SCREENING  Completed   • Pneumococcal Vaccine 65+  Completed                  CMS  Preventative Services Quick Reference  Risk Factors Identified During Encounter  Fall Risk-High or Moderate: Discussed Fall Prevention in the home and Information on Fall Prevention Shared in After Visit Summary  Immunizations Discussed/Encouraged: Tdap, Shingrix, COVID19, and RSV (Respiratory Syncytial Virus)  Dental Screening Recommended  Vision Screening Recommended  The above risks/problems have been discussed with the patient.  Pertinent information has been shared with the patient in the After Visit Summary.  An After Visit Summary and PPPS were made available to the patient.    Follow Up:   Next Medicare Wellness visit to be scheduled in 1 year.       Additional E&M Note during same encounter follows:  Patient has multiple medical problems which are significant and separately identifiable that require additional work above and beyond the Medicare Wellness Visit.      Chief Complaint  Chronic illness    Subjective        HPI  Jasvir Romero is also being seen today for chronic conditions.     Hypertension  The patient denies headaches, chest pain, dyspnea, edema, syncope, blurred vision or palpitations. They state that they are taking their medication as prescribed. They are not having medication side effects. It has been in the 120-130s/70-80s at home.  She is utilizing losartan 100 mg as well as metoprolol 50 mg twice a day. She also takes isosorbide 30 mg daily. She had a trial without the isosorbide, however, her chest pain reoccurred, and it was restarted. No chest pain since restarting it.     Skin lesion  She continues to have an itchy patch on her upper left arm for several months, hydrocortisone helped. She has seen a dermatologist in the past. It has not improved since last visit.     Coronary artery disease  She continues to utilize Plavix daily, isosorbide, and metoprolol for her coronary artery disease. Ok to dc aspirin per cardiology. No exertional chest pain.     Hyperlipidemia   She  "is utilizing rosuvastatin 40 mg. Her LDL is 79 mg/dL.                 Review of Systems   Gastrointestinal:         IBS       Objective   Vital Signs:  /92 (BP Location: Right arm, Patient Position: Sitting, Cuff Size: Adult)   Pulse 60   Temp 97.5 °F (36.4 °C) (Infrared)   Resp 18   Ht 154.3 cm (60.75\")   Wt 72.9 kg (160 lb 12.8 oz)   BMI 30.63 kg/m²     Physical Exam  Constitutional:       General: She is not in acute distress.     Appearance: She is not ill-appearing.   HENT:      Head: Normocephalic.   Neck:      Vascular: Carotid bruit present.   Cardiovascular:      Rate and Rhythm: Normal rate and regular rhythm.      Heart sounds: Normal heart sounds. No murmur heard.  Pulmonary:      Breath sounds: Normal breath sounds.   Abdominal:      General: Bowel sounds are normal.      Tenderness: There is no abdominal tenderness.   Lymphadenopathy:      Cervical: No cervical adenopathy.   Skin:     Comments: Left upper arm irregular macule with scaling that is pruritic   Neurological:      General: No focal deficit present.      Mental Status: She is oriented to person, place, and time.   Psychiatric:         Mood and Affect: Mood normal.        Finger Rub Hearing{Test (right ear):passed  Finger Rub Hearing{Test (left ear):passed    The following data was reviewed by: LORI Gunn on 03/05/2024:  CMP          4/17/2023    09:45 8/8/2023    23:04 2/9/2024    08:18   CMP   Glucose 104  92  105    BUN 17  19  17    Creatinine 0.80  0.80  0.88    EGFR  81.4  72.6    Sodium 142  140  141    Potassium 4.6  4.3  4.4    Chloride 105  105  104    Calcium 10.2  9.5  10.0    Total Protein 6.2      Total Protein  6.9  6.5    Albumin 4.4  4.1  4.6    Globulin 1.8      Globulin  2.8  1.9    Total Bilirubin 0.8  0.6  0.6    Alkaline Phosphatase 73  70  67    AST (SGOT) 11  16  11    ALT (SGPT) 11  13  10    Albumin/Globulin Ratio  1.5  2.4    BUN/Creatinine Ratio 21.3  23.8  19.3    Anion Gap  11.0  11.0  "     CBC w/diff          4/17/2023    09:45 8/8/2023    23:04 2/9/2024    08:18   CBC w/Diff   WBC 5.89  6.90  5.70    RBC 4.83  4.69  4.86    Hemoglobin 14.1  14.0  14.0    Hematocrit 41.4  43.1  41.7    MCV 85.7  91.9  85.8    MCH 29.2  29.9  28.8    MCHC 34.1  32.5  33.6    RDW 13.8  14.1  13.1    Platelets 215  191  222    Neutrophil Rel % 71.8  67.5  74.2    Immature Granulocyte Rel %  0.3  0.2    Lymphocyte Rel % 18.2  22.0  16.1    Monocyte Rel % 6.8  7.5  6.8    Eosinophil Rel % 2.2  2.3  2.3    Basophil Rel % 0.7  0.4  0.4      Lipid Panel          2/9/2024    08:18   Lipid Panel   Total Cholesterol 150    Triglycerides 185    HDL Cholesterol 40    VLDL Cholesterol 31    LDL Cholesterol  79    LDL/HDL Ratio 1.83      TSH          4/17/2023    09:45 2/9/2024    08:18   TSH   TSH 0.603  0.793      Data reviewed : GI studies colonoscopy           Assessment and Plan   Diagnoses and all orders for this visit:    1. Initial Medicare annual wellness visit (Primary)    2. Skin lesion  -     Ambulatory Referral to Dermatology    3. Encounter for immunization  -     Varicella-Zoster Vaccine Subcutaneous; Future  -     COVID-19 F23 (Pfizer) 12yrs+ (COMIRNATY); Future  -     RSVPreF3 Vac Recomb Adjuvanted (AREXVY) 120 MCG/0.5ML reconstituted suspension injection; Inject 0.5 mL into the appropriate muscle as directed by prescriber 1 (One) Time for 1 dose.  Dispense: 0.5 mL; Refill: 0    4. Hyperlipidemia LDL goal <70    5. Coronary artery disease involving native coronary artery of native heart with angina pectoris      Jasvir Baez Nick  reports that she has been smoking cigarettes. She started smoking about 44 years ago. She has a 40 pack-year smoking history. She has been exposed to tobacco smoke. She has never used smokeless tobacco.. I have educated her on the risk of diseases from using tobacco products such as cancer, COPD, and heart disease.     I advised her to quit and she is willing to quit. We have  discussed the following method/s for tobacco cessation:  OTC Cessation Products.  Together we have set a quit date for  today .  She will follow up with me in 6 months or sooner to check on her progress.    I spent 3  minutes counseling the patient.         Health Maintenance for Postmenopausal Women  Menopause is a normal process in which your reproductive ability comes to an end. This process happens gradually over a span of months to years, usually between the ages of 48 and 55. Menopause is complete when you have missed 12 consecutive menstrual periods.  It is important to talk with your health care provider about some of the most common conditions that affect postmenopausal women, such as heart disease, cancer, and bone loss (osteoporosis). Adopting a healthy lifestyle and getting preventive care can help to promote your health and wellness. Those actions can also lower your chances of developing some of these common conditions.  What should I know about menopause?  During menopause, you may experience a number of symptoms, such as:  Moderate-to-severe hot flashes.  Night sweats.  Decrease in sex drive.  Mood swings.  Headaches.  Tiredness.  Irritability.  Memory problems.  Insomnia.     Choosing to treat or not to treat menopausal changes is an individual decision that you make with your health care provider.  What should I know about hormone replacement therapy and supplements?  Hormone therapy products are effective for treating symptoms that are associated with menopause, such as hot flashes and night sweats. Hormone replacement carries certain risks, especially as you become older. If you are thinking about using estrogen or estrogen with progestin treatments, discuss the benefits and risks with your health care provider.  What should I know about heart disease and stroke?  Heart disease, heart attack, and stroke become more likely as you age. This may be due, in part, to the hormonal changes that your  body experiences during menopause. These can affect how your body processes dietary fats, triglycerides, and cholesterol. Heart attack and stroke are both medical emergencies.    There are many things that you can do to help prevent heart disease and stroke:  Have your blood pressure checked at least every 1-2 years. High blood pressure causes heart disease and increases the risk of stroke.  If you are 55-79 years old, ask your health care provider if you should take aspirin to prevent a heart attack or a stroke.  Do not use any tobacco products, including cigarettes, chewing tobacco, or electronic cigarettes. If you need help quitting, ask your health care provider.  It is important to eat a healthy diet and maintain a healthy weight.  Be sure to include plenty of vegetables, fruits, low-fat dairy products, and lean protein.  Avoid eating foods that are high in solid fats, added sugars, or salt (sodium).  Get regular exercise. This is one of the most important things that you can do for your health.  Try to exercise for at least 150 minutes each week. The type of exercise that you do should increase your heart rate and make you sweat. This is known as moderate-intensity exercise.  Try to do strengthening exercises at least twice each week. Do these in addition to the moderate-intensity exercise.  Know your numbers. Ask your health care provider to check your cholesterol and your blood glucose. Continue to have your blood tested as directed by your health care provider.     What should I know about cancer screening?  There are several types of cancer. Take the following steps to reduce your risk and to catch any cancer development as early as possible.  Breast Cancer  Practice breast self-awareness.  This means understanding how your breasts normally appear and feel.  It also means doing regular breast self-exams. Let your health care provider know about any changes, no matter how small.  If you are 40 or older,  have a clinician do a breast exam (clinical breast exam or CBE) every year. Depending on your age, family history, and medical history, it may be recommended that you also have a yearly breast X-ray (mammogram).  If you have a family history of breast cancer, talk with your health care provider about genetic screening.  If you are at high risk for breast cancer, talk with your health care provider about having an MRI and a mammogram every year.  Breast cancer (BRCA) gene test is recommended for women who have family members with BRCA-related cancers. Results of the assessment will determine the need for genetic counseling and BRCA1 and for BRCA2 testing. BRCA-related cancers include these types:  Breast. This occurs in males or females.  Ovarian.  Tubal. This may also be called fallopian tube cancer.  Cancer of the abdominal or pelvic lining (peritoneal cancer).  Prostate.  Pancreatic.     Cervical, Uterine, and Ovarian Cancer  Your health care provider may recommend that you be screened regularly for cancer of the pelvic organs. These include your ovaries, uterus, and vagina. This screening involves a pelvic exam, which includes checking for microscopic changes to the surface of your cervix (Pap test).  For women ages 21-65, health care providers may recommend a pelvic exam and a Pap test every three years. For women ages 30-65, they may recommend the Pap test and pelvic exam, combined with testing for human papilloma virus (HPV), every five years. Some types of HPV increase your risk of cervical cancer. Testing for HPV may also be done on women of any age who have unclear Pap test results.  Other health care providers may not recommend any screening for nonpregnant women who are considered low risk for pelvic cancer and have no symptoms. Ask your health care provider if a screening pelvic exam is right for you.  If you have had past treatment for cervical cancer or a condition that could lead to cancer, you need  Pap tests and screening for cancer for at least 20 years after your treatment. If Pap tests have been discontinued for you, your risk factors (such as having a new sexual partner) need to be reassessed to determine if you should start having screenings again. Some women have medical problems that increase the chance of getting cervical cancer. In these cases, your health care provider may recommend that you have screening and Pap tests more often.  If you have a family history of uterine cancer or ovarian cancer, talk with your health care provider about genetic screening.  If you have vaginal bleeding after reaching menopause, tell your health care provider.  There are currently no reliable tests available to screen for ovarian cancer.     Lung Cancer  Lung cancer screening is recommended for adults 55-80 years old who are at high risk for lung cancer because of a history of smoking. A yearly low-dose CT scan of the lungs is recommended if you:  Currently smoke.  Have a history of at least 30 pack-years of smoking and you currently smoke or have quit within the past 15 years. A pack-year is smoking an average of one pack of cigarettes per day for one year.     Yearly screening should:  Continue until it has been 15 years since you quit.  Stop if you develop a health problem that would prevent you from having lung cancer treatment.     Colorectal Cancer  This type of cancer can be detected and can often be prevented.  Routine colorectal cancer screening usually begins at age 50 and continues through age 75.  If you have risk factors for colon cancer, your health care provider may recommend that you be screened at an earlier age.  If you have a family history of colorectal cancer, talk with your health care provider about genetic screening.  Your health care provider may also recommend using home test kits to check for hidden blood in your stool.  A small camera at the end of a tube can be used to examine your colon  directly (sigmoidoscopy or colonoscopy). This is done to check for the earliest forms of colorectal cancer.  Direct examination of the colon should be repeated every 5-10 years until age 75. However, if early forms of precancerous polyps or small growths are found or if you have a family history or genetic risk for colorectal cancer, you may need to be screened more often.     Skin Cancer  Check your skin from head to toe regularly.  Monitor any moles. Be sure to tell your health care provider:  About any new moles or changes in moles, especially if there is a change in a mole's shape or color.  If you have a mole that is larger than the size of a pencil eraser.  If any of your family members has a history of skin cancer, especially at a young age, talk with your health care provider about genetic screening.  Always use sunscreen. Apply sunscreen liberally and repeatedly throughout the day.  Whenever you are outside, protect yourself by wearing long sleeves, pants, a wide-brimmed hat, and sunglasses.     What should I know about osteoporosis?  Osteoporosis is a condition in which bone destruction happens more quickly than new bone creation. After menopause, you may be at an increased risk for osteoporosis. To help prevent osteoporosis or the bone fractures that can happen because of osteoporosis, the following is recommended:  If you are 19-50 years old, get at least 1,000 mg of calcium and at least 600 mg of vitamin D per day.  If you are older than age 50 but younger than age 70, get at least 1,200 mg of calcium and at least 600 mg of vitamin D per day.  If you are older than age 70, get at least 1,200 mg of calcium and at least 800 mg of vitamin D per day.     Smoking and excessive alcohol intake increase the risk of osteoporosis. Eat foods that are rich in calcium and vitamin D, and do weight-bearing exercises several times each week as directed by your health care provider.  What should I know about how  menopause affects my mental health?  Depression may occur at any age, but it is more common as you become older. Common symptoms of depression include:  Low or sad mood.  Changes in sleep patterns.  Changes in appetite or eating patterns.  Feeling an overall lack of motivation or enjoyment of activities that you previously enjoyed.  Frequent crying spells.     Talk with your health care provider if you think that you are experiencing depression.  What should I know about immunizations?  It is important that you get and maintain your immunizations. These include:  Tetanus, diphtheria, and pertussis (Tdap) booster vaccine.  Influenza every year before the flu season begins.  Pneumonia vaccine.  Shingles vaccine.     Your health care provider may also recommend other immunizations.  This information is not intended to replace advice given to you by your health care provider. Make sure you discuss any questions you have with your health care provider.  Document Released: 02/09/2007 Document Revised: 07/07/2017 Document Reviewed: 09/20/2016  [employer] Patient Education © 2018 [employer]        Return in about 6 months (around 9/5/2024) for Recheck.  Patient was given instructions and counseling regarding her condition or for health maintenance advice. Please see specific information pulled into the AVS if appropriate.

## 2024-03-09 DIAGNOSIS — I10 ESSENTIAL HYPERTENSION: ICD-10-CM

## 2024-03-11 RX ORDER — ISOSORBIDE MONONITRATE 30 MG/1
30 TABLET, EXTENDED RELEASE ORAL DAILY
Qty: 90 TABLET | Refills: 1 | Status: SHIPPED | OUTPATIENT
Start: 2024-03-11

## 2024-03-14 ENCOUNTER — OFFICE VISIT (OUTPATIENT)
Dept: INTERNAL MEDICINE | Facility: CLINIC | Age: 69
End: 2024-03-14
Payer: MEDICARE

## 2024-03-14 VITALS
DIASTOLIC BLOOD PRESSURE: 76 MMHG | HEART RATE: 64 BPM | SYSTOLIC BLOOD PRESSURE: 114 MMHG | RESPIRATION RATE: 20 BRPM | WEIGHT: 162.2 LBS | TEMPERATURE: 97.3 F | BODY MASS INDEX: 30.62 KG/M2 | HEIGHT: 61 IN

## 2024-03-14 DIAGNOSIS — J01.90 ACUTE SINUSITIS, RECURRENCE NOT SPECIFIED, UNSPECIFIED LOCATION: ICD-10-CM

## 2024-03-14 DIAGNOSIS — J02.9 SORE THROAT: ICD-10-CM

## 2024-03-14 DIAGNOSIS — J02.9 PHARYNGITIS, UNSPECIFIED ETIOLOGY: Primary | ICD-10-CM

## 2024-03-14 LAB
EXPIRATION DATE: NORMAL
EXPIRATION DATE: NORMAL
FLUAV AG UPPER RESP QL IA.RAPID: NOT DETECTED
FLUBV AG UPPER RESP QL IA.RAPID: NOT DETECTED
INTERNAL CONTROL: NORMAL
INTERNAL CONTROL: NORMAL
Lab: NORMAL
Lab: NORMAL
S PYO AG THROAT QL: NEGATIVE
SARS-COV-2 AG UPPER RESP QL IA.RAPID: NOT DETECTED

## 2024-03-14 PROCEDURE — 87428 SARSCOV & INF VIR A&B AG IA: CPT | Performed by: NURSE PRACTITIONER

## 2024-03-14 PROCEDURE — 3078F DIAST BP <80 MM HG: CPT | Performed by: NURSE PRACTITIONER

## 2024-03-14 PROCEDURE — 99213 OFFICE O/P EST LOW 20 MIN: CPT | Performed by: NURSE PRACTITIONER

## 2024-03-14 PROCEDURE — 1159F MED LIST DOCD IN RCRD: CPT | Performed by: NURSE PRACTITIONER

## 2024-03-14 PROCEDURE — 1160F RVW MEDS BY RX/DR IN RCRD: CPT | Performed by: NURSE PRACTITIONER

## 2024-03-14 PROCEDURE — 87880 STREP A ASSAY W/OPTIC: CPT | Performed by: NURSE PRACTITIONER

## 2024-03-14 PROCEDURE — 3074F SYST BP LT 130 MM HG: CPT | Performed by: NURSE PRACTITIONER

## 2024-03-14 RX ORDER — AMOXICILLIN 875 MG/1
875 TABLET, COATED ORAL 2 TIMES DAILY
Qty: 20 TABLET | Refills: 0 | Status: SHIPPED | OUTPATIENT
Start: 2024-03-14

## 2024-03-14 NOTE — PROGRESS NOTES
Patient Name: Jasvir Romero  : 1955   MRN: [id number]     Chief Complaint:    Chief Complaint   Patient presents with    Sore Throat    Nasal Congestion    Cough       History of Present Illness: Jasvir Romero is a 68 y.o. female presents to clinic  for a sore throat, congestion, and cough.      The patient states she has had a 2-day history of sore throat, congestion, and cough. She does have some mild sinus pressure and pain in her face. No fevers, shortness of breath, wheezing, nausea, vomiting, or diarrhea.     Subjective     Review of System: Review of Systems   Constitutional:  Negative for fever.   HENT:  Positive for congestion, sinus pressure and sore throat.    Respiratory:  Positive for cough. Negative for shortness of breath and wheezing.    Gastrointestinal:  Negative for diarrhea, nausea and vomiting.        Medications:     Current Outpatient Medications:     ascorbic acid (VITAMIN C) 1000 MG tablet, Take 1 tablet by mouth Daily., Disp: , Rfl:     BIOTIN PO, Take 1 tablet by mouth Daily., Disp: , Rfl:     cholecalciferol (VITAMIN D3) 25 MCG (1000 UT) tablet, Take 1 tablet by mouth Daily., Disp: 90 tablet, Rfl: 1    clopidogrel (PLAVIX) 75 MG tablet, Take 1 tablet by mouth Daily., Disp: 90 tablet, Rfl: 1    ibuprofen (IBU) 600 MG tablet, Take 1 tablet by mouth Every 8 (Eight) Hours As Needed for Mild Pain., Disp: 90 tablet, Rfl: 1    isosorbide mononitrate (IMDUR) 30 MG 24 hr tablet, Take 1 tablet by mouth Daily., Disp: 90 tablet, Rfl: 1    ketoconazole (NIZORAL) 2 % shampoo, Apply  topically to the appropriate area as directed 2 (Two) Times a Week., Disp: 360 mL, Rfl: 3    losartan (COZAAR) 100 MG tablet, Take 1 tablet by mouth Daily., Disp: 90 tablet, Rfl: 1    metoprolol tartrate (LOPRESSOR) 50 MG tablet, Take 1 tablet by mouth 2 (Two) Times a Day., Disp: 180 tablet, Rfl: 3    nitroglycerin (NITROSTAT) 0.4 MG SL tablet, Place 1 tablet under the tongue Every 5 (Five)  "Minutes As Needed for Chest Pain., Disp: 25 tablet, Rfl: 1    pantoprazole (PROTONIX) 40 MG EC tablet, Take 1 tablet by mouth Daily., Disp: 90 tablet, Rfl: 1    rosuvastatin (CRESTOR) 40 MG tablet, Take 1 tablet by mouth Daily., Disp: 90 tablet, Rfl: 1    sucralfate (CARAFATE) 1 g tablet, Take 1 tablet by mouth 4 (Four) Times a Day. Thoroughly crush pill and mix in small amount of water prior to swallowing., Disp: 90 tablet, Rfl: 0    vitamin B-12 (CYANOCOBALAMIN) 1000 MCG tablet, Take 1 tablet by mouth Daily., Disp: , Rfl:     amoxicillin (AMOXIL) 875 MG tablet, Take 1 tablet by mouth 2 (Two) Times a Day., Disp: 20 tablet, Rfl: 0    Allergies:   Allergies   Allergen Reactions    Pravastatin Myalgia    Codeine Other (See Comments)    Influenza Vac Split Quad Hives       Objective     Physical Exam:   Vital Signs:   Vitals:    03/14/24 1031   BP: 114/76   BP Location: Right arm   Patient Position: Sitting   Cuff Size: Adult   Pulse: 64   Resp: 20   Temp: 97.3 °F (36.3 °C)   TempSrc: Infrared   Weight: 73.6 kg (162 lb 3.2 oz)   Height: 154.3 cm (60.75\")         Physical Exam  Constitutional:       Appearance: She is not ill-appearing.   HENT:      Right Ear: A middle ear effusion is present.      Left Ear: There is impacted cerumen.      Nose: Congestion and rhinorrhea present.      Mouth/Throat:      Pharynx: Posterior oropharyngeal erythema present.   Eyes:      General:         Right eye: No discharge.         Left eye: No discharge.   Cardiovascular:      Rate and Rhythm: Normal rate and regular rhythm.      Heart sounds: Normal heart sounds. No murmur heard.  Pulmonary:      Effort: No respiratory distress.      Breath sounds: Normal breath sounds. No stridor. No wheezing, rhonchi or rales.   Abdominal:      General: Bowel sounds are normal.      Tenderness: There is no abdominal tenderness.   Lymphadenopathy:      Cervical: No cervical adenopathy.   Skin:     General: Skin is warm and dry.       Assessment / " Plan      Assessment/Plan:   Diagnoses and all orders for this visit:    1. Pharyngitis, unspecified etiology (Primary)  -     amoxicillin (AMOXIL) 875 MG tablet; Take 1 tablet by mouth 2 (Two) Times a Day.  Dispense: 20 tablet; Refill: 0    2. Sore throat  -     POC Rapid Strep A  -     POCT SARS-CoV-2 + Flu Antigen RAZA    3. Acute sinusitis, recurrence not specified, unspecified location       COVID-19, influenza, and strep were negative. She will continue supportive care. She can take Coricidin HBP. I will give her an antibiotic to hold if symptoms worsen or do not improve.        Follow Up:   LORI Law  [facility]        Transcribed from ambient dictation for LORI Gunn by Shelton Messina.  03/14/24   11:51 EDT    Patient or patient representative verbalized consent to the visit recording.  I have personally performed the services described in this document as transcribed by the above individual, and it is both accurate and complete.

## 2024-03-18 ENCOUNTER — TELEPHONE (OUTPATIENT)
Dept: INTERNAL MEDICINE | Facility: CLINIC | Age: 69
End: 2024-03-18
Payer: MEDICARE

## 2024-03-18 NOTE — TELEPHONE ENCOUNTER
Caller: Jasvir Romero     Relationship: [unfilled]     Best call back number: [phone]     What is your medical concern? THE PATIENT WAS SEEN LAST WEEK ON THURSDAY.  HER SYMPTOMS STARTED AS A SORE THROAT.  SHE STATES THE CONGESTION IS ALL IN HER CHEST NOW AND SHE HAS A COUGH.     How long has this issue been going on? 3 DAYS     Is your provider already aware of this issue? PROVIDER NOT AWARE OF NEW SYMPTOMS     Have you been treated for this issue? NO     JUST THE SORE THROAT.  THE PATIENT WOULD LIKE TO KNOW IF SOMETHING ELSE CAN BE CALLED IN  TO   [facility]   [phone]      PATIENT DID A COVID HOME TEST AND IT WAS NEGATIVE.   NO FEVER

## 2024-03-19 ENCOUNTER — OFFICE VISIT (OUTPATIENT)
Dept: INTERNAL MEDICINE | Facility: CLINIC | Age: 69
End: 2024-03-19
Payer: MEDICARE

## 2024-03-19 VITALS
TEMPERATURE: 96.9 F | SYSTOLIC BLOOD PRESSURE: 112 MMHG | RESPIRATION RATE: 20 BRPM | HEART RATE: 60 BPM | HEIGHT: 61 IN | DIASTOLIC BLOOD PRESSURE: 74 MMHG | WEIGHT: 162 LBS | BODY MASS INDEX: 30.58 KG/M2

## 2024-03-19 DIAGNOSIS — J98.8 RESPIRATORY INFECTION: Primary | ICD-10-CM

## 2024-03-19 PROCEDURE — 3078F DIAST BP <80 MM HG: CPT | Performed by: NURSE PRACTITIONER

## 2024-03-19 PROCEDURE — 99213 OFFICE O/P EST LOW 20 MIN: CPT | Performed by: NURSE PRACTITIONER

## 2024-03-19 PROCEDURE — 3074F SYST BP LT 130 MM HG: CPT | Performed by: NURSE PRACTITIONER

## 2024-03-19 RX ORDER — AZITHROMYCIN 250 MG/1
TABLET, FILM COATED ORAL
Qty: 6 TABLET | Refills: 0 | Status: SHIPPED | OUTPATIENT
Start: 2024-03-19 | End: 2024-03-25

## 2024-03-19 RX ORDER — METHYLPREDNISOLONE 4 MG/1
TABLET ORAL
Qty: 21 TABLET | Refills: 0 | Status: SHIPPED | OUTPATIENT
Start: 2024-03-19 | End: 2024-03-25

## 2024-03-19 NOTE — PROGRESS NOTES
Patient Name: Jasvir Romero  : 1955   MRN: [id number]     Chief Complaint:    Chief Complaint   Patient presents with    Cough     No fever    Nasal Congestion    Fatigue       History of Present Illness: Jasvir Romero is a 68 y.o. female presents to clinic today for evaluation of sore throat, congestion, and cough.    She was seen last week for sore throat, congestion and cough and started antibiotics on Friday 03/15/2024. She had a sore throat on 2024. She reports that she gets a cough every year. She denies any fevers and adds that she sneezes once in a while. She is coughing up phlegm and denies any shortness of breath. She states that she has no energy. She denies any vomiting. She confirms that she feels it in her ears early in the morning when she gets up. Her left ear is clogged up.    Subjective     Review of System: Review of Systems     Medications:     Current Outpatient Medications:     ascorbic acid (VITAMIN C) 1000 MG tablet, Take 1 tablet by mouth Daily., Disp: , Rfl:     BIOTIN PO, Take 1 tablet by mouth Daily., Disp: , Rfl:     cholecalciferol (VITAMIN D3) 25 MCG (1000 UT) tablet, Take 1 tablet by mouth Daily., Disp: 90 tablet, Rfl: 1    clopidogrel (PLAVIX) 75 MG tablet, Take 1 tablet by mouth Daily., Disp: 90 tablet, Rfl: 1    ibuprofen (IBU) 600 MG tablet, Take 1 tablet by mouth Every 8 (Eight) Hours As Needed for Mild Pain., Disp: 90 tablet, Rfl: 1    isosorbide mononitrate (IMDUR) 30 MG 24 hr tablet, Take 1 tablet by mouth Daily., Disp: 90 tablet, Rfl: 1    ketoconazole (NIZORAL) 2 % shampoo, Apply  topically to the appropriate area as directed 2 (Two) Times a Week., Disp: 360 mL, Rfl: 3    losartan (COZAAR) 100 MG tablet, Take 1 tablet by mouth Daily., Disp: 90 tablet, Rfl: 1    metoprolol tartrate (LOPRESSOR) 50 MG tablet, Take 1 tablet by mouth 2 (Two) Times a Day., Disp: 180 tablet, Rfl: 3    nitroglycerin (NITROSTAT) 0.4 MG SL tablet, Place  "1 tablet under the tongue Every 5 (Five) Minutes As Needed for Chest Pain., Disp: 25 tablet, Rfl: 1    pantoprazole (PROTONIX) 40 MG EC tablet, Take 1 tablet by mouth Daily., Disp: 90 tablet, Rfl: 1    rosuvastatin (CRESTOR) 40 MG tablet, Take 1 tablet by mouth Daily., Disp: 90 tablet, Rfl: 1    sucralfate (CARAFATE) 1 g tablet, Take 1 tablet by mouth 4 (Four) Times a Day. Thoroughly crush pill and mix in small amount of water prior to swallowing., Disp: 90 tablet, Rfl: 0    vitamin B-12 (CYANOCOBALAMIN) 1000 MCG tablet, Take 1 tablet by mouth Daily., Disp: , Rfl:     azithromycin (Zithromax Z-Erik) 250 MG tablet, Take 2 tablets by mouth on day 1, then 1 tablet daily on days 2-5, Disp: 6 tablet, Rfl: 0    methylPREDNISolone (MEDROL) 4 MG dose pack, Take as directed on package instructions., Disp: 21 tablet, Rfl: 0    Allergies:   Allergies   Allergen Reactions    Pravastatin Myalgia    Codeine Other (See Comments)    Influenza Vac Split Quad Hives       Objective     Physical Exam:   Vital Signs:   Vitals:    03/19/24 1010   BP: 112/74   BP Location: Right arm   Patient Position: Sitting   Cuff Size: Adult   Pulse: 60   Resp: 20   Temp: 96.9 °F (36.1 °C)   TempSrc: Infrared   Weight: 73.5 kg (162 lb)   Height: 154.3 cm (60.75\")   PainSc: 0-No pain           Physical Exam  Constitutional:       Appearance: She is ill-appearing.   HENT:      Right Ear: Tympanic membrane normal.      Left Ear: There is impacted cerumen.      Nose: Congestion and rhinorrhea present.      Mouth/Throat:      Pharynx: Posterior oropharyngeal erythema present.   Cardiovascular:      Rate and Rhythm: Normal rate and regular rhythm.      Heart sounds: Normal heart sounds. No murmur heard.  Pulmonary:      Effort: No respiratory distress.      Breath sounds: Normal breath sounds. No stridor. No wheezing, rhonchi or rales.   Lymphadenopathy:      Cervical: No cervical adenopathy.   Skin:     General: Skin is warm and dry.         Assessment / " Plan      Assessment/Plan:   Diagnoses and all orders for this visit:    1. Respiratory infection (Primary)  -     methylPREDNISolone (MEDROL) 4 MG dose pack; Take as directed on package instructions.  Dispense: 21 tablet; Refill: 0  -     azithromycin (Zithromax Z-Erik) 250 MG tablet; Take 2 tablets by mouth on day 1, then 1 tablet daily on days 2-5  Dispense: 6 tablet; Refill: 0       General health maintenance  - The patient presents today for an acute visit for upper respiratory infection symptoms.    1. Respiratory infection   Amoxicillin is causing diarrhea. She will stop the amoxicillin and take a probiotic with the azithromycin.    Follow-up  The patient will follow up if no improvement or worsening.      LORI Gunn  [facility]    Transcribed from ambient dictation for LORI Gunn by Naren Mendosa.  03/19/24   10:48 EDT    Patient or patient representative verbalized consent to the visit recording.  I have personally performed the services described in this document as transcribed by the above individual, and it is both accurate and complete.

## 2024-03-25 ENCOUNTER — TELEMEDICINE (OUTPATIENT)
Dept: INTERNAL MEDICINE | Facility: CLINIC | Age: 69
End: 2024-03-25
Payer: MEDICARE

## 2024-03-25 VITALS — BODY MASS INDEX: 30.86 KG/M2 | WEIGHT: 162 LBS

## 2024-03-25 DIAGNOSIS — J20.9 ACUTE BRONCHITIS, UNSPECIFIED ORGANISM: Primary | ICD-10-CM

## 2024-03-25 PROCEDURE — 99213 OFFICE O/P EST LOW 20 MIN: CPT | Performed by: INTERNAL MEDICINE

## 2024-03-25 PROCEDURE — 1160F RVW MEDS BY RX/DR IN RCRD: CPT | Performed by: INTERNAL MEDICINE

## 2024-03-25 PROCEDURE — 1159F MED LIST DOCD IN RCRD: CPT | Performed by: INTERNAL MEDICINE

## 2024-03-25 RX ORDER — DOXYCYCLINE 100 MG/1
100 CAPSULE ORAL 2 TIMES DAILY
Qty: 20 CAPSULE | Refills: 0 | Status: SHIPPED | OUTPATIENT
Start: 2024-03-25 | End: 2024-04-04

## 2024-03-25 RX ORDER — GUAIFENESIN 600 MG/1
1200 TABLET, EXTENDED RELEASE ORAL 2 TIMES DAILY
COMMUNITY

## 2024-03-25 RX ORDER — PREDNISONE 20 MG/1
TABLET ORAL
Qty: 11 TABLET | Refills: 0 | Status: SHIPPED | OUTPATIENT
Start: 2024-03-25

## 2024-03-25 NOTE — PROGRESS NOTES
Subjective       Jasvir Romero is a 68 y.o. female.     Chief Complaint   Patient presents with    URI     03/14/24 Symptoms onset     Fatigue    Cough       History obtained from the patient.    The patient has chosen to receive care through a Telehealth visit.  The patient consented to use a video/audio connection for her medical care today.    The patient presents during the COVID-19 pandemic/federally declared [facility] public health emergency.  This service was conducted via Telehealth audio/visual by I-phone.  Connected to the patient using [employer].  This visit occurred with the Provider located at [facility] (@ [place], [place]) and the patient located at home in the [facility].  Other participants in this Telehealth visit included: None      History of Present Illness     The patient has been sick since 3/14/2024.  On that day, she started with a sore throat, and was she was seen by Nichole Cardenas.  Influenza, COVID-19, and Strep swabs were negative.  She was diagnosed with Sinusitis and Pharyngitis and put on Amoxicillin.  She was seen again by Nichole Cardenas on 3/19/2024.  Amoxicillin was changed to Z-Erik due to diarrhea, although the patient states that is more of a chronic condition.  She completed that antibiotic and does not feel any better.  She actually feels worse today.  She states she has no energy.  She reports head and chest congestion, as well as a cough productive of clear sputum.  There is no fever, but she has occasional chills.  She is reporting chest pain/chest tightness, and shortness of breath with exertion, but not at rest.  She has a headache, but there is no sore throat or earache.  She is currently taking Mucinex and DayQuil/NyQuil, which helps a little.  She denies any recent exposures to illness.        Current Outpatient Medications on File Prior to Visit   Medication Sig Dispense Refill    ascorbic  acid (VITAMIN C) 1000 MG tablet Take 1 tablet by mouth Daily.      BIOTIN PO Take 1 tablet by mouth Daily.      cholecalciferol (VITAMIN D3) 25 MCG (1000 UT) tablet Take 1 tablet by mouth Daily. 90 tablet 1    clopidogrel (PLAVIX) 75 MG tablet Take 1 tablet by mouth Daily. 90 tablet 1    guaiFENesin (MUCINEX) 600 MG 12 hr tablet Take 2 tablets by mouth 2 (Two) Times a Day.      ibuprofen (IBU) 600 MG tablet Take 1 tablet by mouth Every 8 (Eight) Hours As Needed for Mild Pain. 90 tablet 1    isosorbide mononitrate (IMDUR) 30 MG 24 hr tablet Take 1 tablet by mouth Daily. 90 tablet 1    ketoconazole (NIZORAL) 2 % shampoo Apply  topically to the appropriate area as directed 2 (Two) Times a Week. 360 mL 3    losartan (COZAAR) 100 MG tablet Take 1 tablet by mouth Daily. 90 tablet 1    metoprolol tartrate (LOPRESSOR) 50 MG tablet Take 1 tablet by mouth 2 (Two) Times a Day. 180 tablet 3    nitroglycerin (NITROSTAT) 0.4 MG SL tablet Place 1 tablet under the tongue Every 5 (Five) Minutes As Needed for Chest Pain. 25 tablet 1    pantoprazole (PROTONIX) 40 MG EC tablet Take 1 tablet by mouth Daily. 90 tablet 1    rosuvastatin (CRESTOR) 40 MG tablet Take 1 tablet by mouth Daily. 90 tablet 1    sucralfate (CARAFATE) 1 g tablet Take 1 tablet by mouth 4 (Four) Times a Day. Thoroughly crush pill and mix in small amount of water prior to swallowing. 90 tablet 0    vitamin B-12 (CYANOCOBALAMIN) 1000 MCG tablet Take 1 tablet by mouth Daily.      [DISCONTINUED] azithromycin (Zithromax Z-Erik) 250 MG tablet Take 2 tablets by mouth on day 1, then 1 tablet daily on days 2-5 6 tablet 0    [DISCONTINUED] methylPREDNISolone (MEDROL) 4 MG dose pack Take as directed on package instructions. 21 tablet 0     No current facility-administered medications on file prior to visit.       Current outpatient and discharge medications have been reconciled for the patient.  Reviewed by: Ijeoma Quiles MD        The following portions of the patient's  history were reviewed and updated as appropriate: allergies, current medications, past family history, past medical history, past social history, past surgical history, and problem list.    Review of Systems   Constitutional:  Positive for chills and fatigue. Negative for fever.   HENT:  Positive for congestion and sore throat. Negative for ear pain.    Respiratory:  Positive for cough, chest tightness and wheezing. Negative for shortness of breath.    Cardiovascular:  Positive for chest pain.   Gastrointestinal:  Positive for diarrhea. Negative for abdominal pain, nausea and vomiting.   Neurological:  Positive for headaches.         Objective       Weight 73.5 kg (162 lb), not currently breastfeeding.  Body mass index is 30.86 kg/m².      Physical Exam  Vitals reviewed.   Constitutional:       Comments: BMI greater than 30   Pulmonary:      Effort: Pulmonary effort is normal. No respiratory distress.   Neurological:      Mental Status: She is alert.   Psychiatric:         Mood and Affect: Mood normal.         Assessment / Plan:  Diagnoses and all orders for this visit:    1. Acute bronchitis, unspecified organism (Primary)  -     doxycycline (MONODOX) 100 MG capsule; Take 1 capsule by mouth 2 (Two) Times a Day for 10 days.  Dispense: 20 capsule; Refill: 0  -     predniSONE (DELTASONE) 20 MG tablet; 2 pills daily x 3 days, then 1 pill daily x 3 days, then 1/2 pill daily x 4 days  Dispense: 11 tablet; Refill: 0    She was instructed to continue current over the counter medication, and plenty of fluids.  May need chest x-ray if no improvement with above medication.            Return if symptoms worsen or fail to improve.

## 2024-03-26 NOTE — CONSULTS
Neurology    Referring provider:   No referring provider defined for this encounter.    Reason for Consultation: Stroke except speech difficulty    Chief complaint: Speech difficulty with right leg weakness    History of present illness: 65-year-old woman seen for Dr. Poole for evaluation of stroke.    She was at work taking a break when she had a funny feeling and developed a visual scotoma that was silvery and circular.  These have occurred in the past have expanded to gone away in about 20 minutes.  She had her ophthalmologist saw her and told her that this was an ophthalmic migraine.  She gets occasional sinus headaches but is unaware of any major headache pain.  She had no headache with this episode.    She was said to have facial droop on the left but she is not aware of that.    She is had no trouble with receptive speech.  Visual symptoms have resolved.  She denies focal numbness or weakness.  She is had no upper extremity symptoms.    She does smoke three quarters of a pack of cigarettes daily.    She had coronary stenting in 2014 for single artery.    She is on Crestor 40 and takes multiple medications for her blood pressure.  She tells me that her blood pressure runs in the 130s.    She denies palpitations.        Review of Systems: All systems are reviewed and are negative other than the [facility]    Home meds:   Medications Prior to Admission   Medication Sig Dispense Refill Last Dose   • cholecalciferol (VITAMIN D3) 1000 units tablet Take 1,000 Units by mouth Daily.   9/30/2019 at Unknown time   • clopidogrel (PLAVIX) 75 MG tablet Take 1 tablet by mouth Daily. 90 tablet 3 9/30/2019 at Unknown time   • ibuprofen (ADVIL,MOTRIN) 600 MG tablet Take 1 tablet by mouth Every 8 (Eight) Hours As Needed for Mild Pain  or Moderate Pain . 90 tablet 0 9/29/2019 at Unknown time   • isosorbide mononitrate (IMDUR) 30 MG 24 hr tablet TAKE 1 TABLET DAILY 90 tablet 1 9/30/2019 at Unknown time   • losartan (COZAAR) 50 MG  tablet Take 1 tablet by mouth Daily. 90 tablet 1 9/29/2019 at Unknown time   • metoprolol tartrate (LOPRESSOR) 50 MG tablet TAKE 1 TABLET TWICE A  tablet 3 9/30/2019 at Unknown time   • pantoprazole (PROTONIX) 40 MG EC tablet TAKE 1 TABLET TWICE A DAY (Patient taking differently: take 1 tablet twice a day) 180 tablet 1 9/30/2019 at Unknown time   • rosuvastatin (CRESTOR) 40 MG tablet Take 1 tablet by mouth Daily. 90 tablet 3 9/29/2019 at Unknown time   • sucralfate (CARAFATE) 1 g tablet Take 1 tablet by mouth Every Night. 90 tablet 1 9/30/2019 at Unknown time   • nitroglycerin (NITROSTAT) 0.4 MG SL tablet Place 0.4 mg under the tongue Every 5 (Five) Minutes As Needed.   More than a month at Unknown time   • ondansetron ODT (ZOFRAN ODT) 8 MG disintegrating tablet Take 1 tablet by mouth Every 8 (Eight) Hours As Needed for Nausea or Vomiting. 20 tablet 0 Unknown at Unknown time       History  Past Medical History:   Diagnosis Date   • Chicken pox    • Diverticulitis    • Easy bruising    • Gall stones    • GERD (gastroesophageal reflux disease)    • Glaucoma    • Hyperlipemia    • Hypertension    • Measles    • Menopause    • Mumps    • Positive PPD    • Scarlet fever    ,   Past Surgical History:   Procedure Laterality Date   • CARDIAC CATHETERIZATION     • CARPAL TUNNEL RELEASE      both hands   • CHOLECYSTECTOMY     • COLONOSCOPY  2009   • ECTOPIC PREGNANCY     • ENDOMETRIAL ABLATION     • HAND SURGERY     • OTHER SURGICAL HISTORY      lap and leep   • TUBAL ABDOMINAL LIGATION  1993    with L salpingo-oophorectomy   ,   Family History   Problem Relation Age of Onset   • Arthritis Mother    • Hypertension Mother    • Heart attack Father    • Hypertension Father    • Obesity Father    • Stroke Father         massive   • Arthritis Sister    • Hypertension Sister    • No Known Problems Sister    • Breast cancer Neg Hx    ,   Social History     Tobacco Use   • Smoking status: Current Every Day Smoker      "Packs/day: 0.75   • Smokeless tobacco: Never Used   Substance Use Topics   • Alcohol use: Yes     Comment: occassionally   • Drug use: No    and Allergies:  Patient has no known allergies.,    Vital Signs   Blood pressure 169/81, pulse 71, temperature 97.7 °F (36.5 °C), temperature source Oral, resp. rate 18, height 154.9 cm (60.98\"), weight 74.3 kg (163 lb 12.8 oz), SpO2 95 %.  Body mass index is 30.97 kg/m².    Physical Exam:   General: Pleasant white female in no distress.  Blood pressure at onset was 228/107              Head: No signs of trauma              Neck: No bruit              Resp: Normal breath sounds              Cor: Regular rhythm              Extremities: No edema              Skin: Warm and dry              Neuro: Mentally alert and oriented memory attention and concentration.    Speech appears normal to me but the  says that she has difficulty finishing her sentences even this morning.  I did not note that.    Coordination is normal finger-nose and fine finger movements.    Cranial nerves show benign fundi full visual fields normal eye movements and equal pupils.    Facial movement and sensation are normal.    Palate elevates normally tongue protrudes normally.    Reflexes are 3+ and equal bilaterally.    Motor testing shows symmetrical strength and tone in all muscle groups with no pronator drift.    Sensory testing is intact bilaterally including double simultaneous stimulation.        Results Review: CT of the head shows no changes.    Transthoracic echocardiogram shows no evidence of pulmonary hypertension.  Saline test is negative.  Left atrium is normal size.    There is mild left ventricular hypertrophy.        Labs:  Lab Results (last 72 hours)     Procedure Component Value Units Date/Time    POC Glucose Once [[id number]]  (Normal) Collected:  10/01/19 1116    Specimen:  Blood Updated:  10/01/19 1117     Glucose 117 mg/dL     TSH [[id number]]  (Normal) Collected:  10/01/19 0445    " Specimen:  Blood Updated:  10/01/19 0607     TSH 1.410 uIU/mL     POC Glucose Once [[id number]]  (Normal) Collected:  10/01/19 0546    Specimen:  Blood Updated:  10/01/19 0553     Glucose 98 mg/dL     Lipid Panel [[id number]] Collected:  10/01/19 0445    Specimen:  Blood Updated:  10/01/19 0553     Total Cholesterol 127 mg/dL      Triglycerides 111 mg/dL      HDL Cholesterol 40 mg/dL      LDL Cholesterol  65 mg/dL      VLDL Cholesterol 22.2 mg/dL      LDL/HDL Ratio 1.62    Narrative:       Cholesterol Reference Ranges  (U.S. Department of Health and Human Services ATP III Classifications)    Desirable          <200 mg/dL  Borderline High    200-239 mg/dL  High Risk          >240 mg/dL      Triglyceride Reference Ranges  (U.S. Department of Health and Human Services ATP III Classifications)    Normal           <150 mg/dL  Borderline High  150-199 mg/dL  High             200-499 mg/dL  Very High        >500 mg/dL    HDL Reference Ranges  (U.S. Department of Health and Human Services ATP III Classifcations)    Low     <40 mg/dl (major risk factor for CHD)  High    >60 mg/dl ('negative' risk factor for CHD)        LDL Reference Ranges  (U.S. Department of Health and Human Services ATP III Classifcations)    Optimal          <100 mg/dL  Near Optimal     100-129 mg/dL  Borderline High  130-159 mg/dL  High             160-189 mg/dL  Very High        >189 mg/dL    Comprehensive Metabolic Panel [[id number]]  (Abnormal) Collected:  10/01/19 0445    Specimen:  Blood Updated:  10/01/19 0553     Glucose 98 mg/dL      BUN 15 mg/dL      Creatinine 0.71 mg/dL      Sodium 145 mmol/L      Potassium 4.3 mmol/L      Chloride 105 mmol/L      CO2 30.0 mmol/L      Calcium 9.3 mg/dL      Total Protein 6.1 g/dL      Albumin 4.10 g/dL      ALT (SGPT) 12 U/L      AST (SGOT) 13 U/L      Alkaline Phosphatase 59 U/L      Total Bilirubin 0.5 mg/dL      eGFR Non African Amer 83 mL/min/1.73      Globulin 2.0 gm/dL      A/G Ratio 2.1 g/dL       BUN/Creatinine Ratio 21.1     Anion Gap 10.0 mmol/L     Narrative:       GFR Normal >60  Chronic Kidney Disease <60  Kidney Failure <15    Hemoglobin A1c [[id number]]  (Normal) Collected:  10/01/19 0445    Specimen:  Blood Updated:  10/01/19 0547     Hemoglobin A1C 5.60 %     Narrative:       Hemoglobin A1C Ranges:    Increased Risk for Diabetes  5.7% to 6.4%  Diabetes                     >= 6.5%  Diabetic Goal                < 7.0%    CBC (No Diff) [[id number]]  (Normal) Collected:  10/01/19 0445    Specimen:  Blood Updated:  10/01/19 0531     WBC 4.80 10*3/mm3      RBC 4.43 10*6/mm3      Hemoglobin 12.6 g/dL      Hematocrit 39.0 %      MCV 88.0 fL      MCH 28.4 pg      MCHC 32.3 g/dL      RDW 14.4 %      RDW-SD 45.6 fl      MPV 10.2 fL      Platelets 164 10*3/mm3     POC Glucose Once [[id number]]  (Abnormal) Collected:  09/30/19 2349    Specimen:  Blood Updated:  09/30/19 2350     Glucose 153 mg/dL     POC Glucose Once [[id number]]  (Normal) Collected:  09/30/19 1747    Specimen:  Blood Updated:  09/30/19 1749     Glucose 113 mg/dL     [place] Draw [[id number]] Collected:  09/30/19 1549    Specimen:  Blood Updated:  09/30/19 1703    Narrative:       The following orders were created for panel order [place] Draw.  Procedure                               Abnormality         Status                     ---------                               -----------         ------                     Light Blue Top[[id number]]                                   Final result               Green Top (Gel)[[id number]]                                  Final result               Lavender Top[[id number]]                                     Final result               Gold Top - SST[[id number]]                                   Final result               Green Top (No Gel)[[id number]]                                                            Please view results for these tests on the individual orders.    Light Blue Top [[id number]] Collected:   09/30/19 1549    Specimen:  Blood Updated:  09/30/19 1703     Extra Tube hold for add-on     Comment: Auto resulted       Lavender Top [[id number]] Collected:  09/30/19 1549    Specimen:  Blood Updated:  09/30/19 1703     Extra Tube hold for add-on     Comment: Auto resulted       Green Top (Gel) [[id number]] Collected:  09/30/19 1549    Specimen:  Blood Updated:  09/30/19 1703     Extra Tube Hold for add-ons.     Comment: Auto resulted.       Gold Top - SST [[id number]] Collected:  09/30/19 1549    Specimen:  Blood Updated:  09/30/19 1703     Extra Tube Hold for add-ons.     Comment: Auto resulted.       aPTT [[id number]]  (Normal) Collected:  09/30/19 1549    Specimen:  Blood Updated:  09/30/19 1618     PTT 29.8 seconds     Narrative:       PTT = The equivalent PTT values for the therapeutic range of heparin levels at 0.3 to 0.5 U/ml are 55 to 70 seconds.    AST [[id number]]  (Normal) Collected:  09/30/19 1549    Specimen:  Blood Updated:  09/30/19 1617     AST (SGOT) 13 U/L      Comment: Specimen hemolyzed.  Results may be affected.       ALT [[id number]]  (Normal) Collected:  09/30/19 1549    Specimen:  Blood Updated:  09/30/19 1617     ALT (SGPT) 12 U/L     Troponin [[id number]]  (Normal) Collected:  09/30/19 1549    Specimen:  Blood Updated:  09/30/19 1616     Troponin T <0.010 ng/mL     Narrative:       Troponin T Reference Range:  <= 0.03 ng/mL-   Negative for AMI  >0.03 ng/mL-     Abnormal for myocardial necrosis.  Clinicians would have to utilize clinical acumen, EKG, Troponin and serial changes to determine if it is an Acute Myocardial Infarction or myocardial injury due to an underlying chronic condition.     CBC & Differential [[id number]] Collected:  09/30/19 1549    Specimen:  Blood Updated:  09/30/19 1600    Narrative:       The following orders were created for panel order CBC & Differential.  Procedure                               Abnormality         Status                     ---------                                -----------         ------                     CBC Auto Differential[[id number]]        Abnormal            Final result                 Please view results for these tests on the individual orders.    CBC Auto Differential [[id number]]  (Abnormal) Collected:  09/30/19 1549    Specimen:  Blood Updated:  09/30/19 1600     WBC 4.13 10*3/mm3      RBC 4.09 10*6/mm3      Hemoglobin 11.8 g/dL      Hematocrit 36.2 %      MCV 88.5 fL      MCH 28.9 pg      MCHC 32.6 g/dL      RDW 14.5 %      RDW-SD 46.4 fl      MPV 10.3 fL      Platelets 171 10*3/mm3      Neutrophil % 63.7 %      Lymphocyte % 24.5 %      Monocyte % 8.0 %      Eosinophil % 3.1 %      Basophil % 0.5 %      Immature Grans % 0.2 %      Neutrophils, Absolute 2.63 10*3/mm3      Lymphocytes, Absolute 1.01 10*3/mm3      Monocytes, Absolute 0.33 10*3/mm3      Eosinophils, Absolute 0.13 10*3/mm3      Basophils, Absolute 0.02 10*3/mm3      Immature Grans, Absolute 0.01 10*3/mm3      nRBC 0.0 /100 WBC     POC CHEM 8 [[id number]]  (Abnormal) Collected:  09/30/19 1540    Specimen:  Blood Updated:  09/30/19 1544     Glucose 103 mg/dL      BUN 15 mg/dL      Creatinine 0.80 mg/dL      Sodium 141 mmol/L      Potassium 3.4 mmol/L      Chloride 105 mmol/L      Total CO2 26 mmol/L      Hemoglobin 12.2 g/dL      Comment: Serial Number: [id number]:  [id number]        Hematocrit 36 %      Ionized Calcium 1.20 mmol/L     POC Protime / INR [[id number]]  (Abnormal) Collected:  09/30/19 1539    Specimen:  Blood Updated:  09/30/19 1543     Protime 11.9 seconds      INR 1.0     Comment: Serial Number: [id number]:  [id number]             Rads:  Imaging Results (last 72 hours)     Procedure Component Value Units Date/Time    XR Chest 1 View [[id number]] Collected:  09/30/19 1658     Updated:  10/01/19 0810    Narrative:       EXAMINATION: XR CHEST 1 VW-      INDICATION: Acute stroke protocol (onset < 12 hrs); I63.9-Cerebral  infarction, unspecified.      COMPARISON: None.      FINDINGS: Portable chest reveals cardiac and mediastinal silhouettes  within normal limits. The lung fields are grossly clear. No focal  parenchymal opacification is present.  No pleural effusion or  pneumothorax. Degenerative change is seen within the spine. Pulmonary  vascularity is within normal limits.           Impression:       No acute cardiopulmonary disease.     D:  09/30/2019  E:  10/01/2019          CT Head Without Contrast Stroke Protocol [[id number]] Collected:  09/30/19 1546     Updated:  09/30/19 1703    Narrative:       EXAMINATION: CT HEAD WO CONTRAST, STROKE PROTOCOL-09/30/2019:      INDICATION: Stroke.      TECHNIQUE: CT head without intravenous contrast following stroke  protocol.     The radiation dose reduction device was turned on for each scan per the  ALARA (As Low as Reasonably Achievable) protocol.     COMPARISON: NONE.     FINDINGS: Midline structures are symmetric without evidence of mass,  mass effect or midline shift. Ventricles and sulci within normal limits.  No intra-axial hemorrhage or extra-axial fluid collection. Globes and  orbits are unremarkable. The visualized paranasal sinuses and mastoid  air cells are grossly clear and well pneumatized. Calvarium intact.       Impression:       No acute intracranial abnormality, specifically no acute  hemorrhage.     Scan performed on 09/30/2019 at 1537 hours. Scan report given to ER  physician and team in person by Dr. Petty on 09/30/2019 at 1542 hours.     D:  09/30/2019  E:  09/30/2019           This report was finalized on 9/30/2019 5:00 PM by Dr. Lew Petty.               Assessment: Malignant hypertension    Ophthalmic migraine    Expressive a aphasia, questionable cause       Plan:    MRI brain without infusion.    CT angiogram head and neck.        Comment:   Interesting mix of the issues.    She certainly has ophthalmic migraine and with the malignant blood pressure reading may well have triggered a complex migraine event.    She  has significant coronary disease in the past although she is on Lipitor with good results and is not diabetic.  She does smoke cigarettes however.    Certainly conceivable that she could have had a an acute stroke either secondary to the malignant hypertension or to a complex migraine.    Certainly need to stop smoking and needs a target blood pressure at 120/80.    I discussed the patients findings and my recommendations with patient, family and nursing staff      Aramis Varela MD  10/01/19  11:57 AM         Otc Regimen: Recommended the Over achiever retinol alternative for Bare Faced Plan: Discussed starting a Tretinoin 0.025% after patient is done breast feeding, she will call to initiate at a later date and we can send her a prescription to the pharmacy Detail Level: Zone

## 2024-04-01 ENCOUNTER — TELEPHONE (OUTPATIENT)
Dept: INTERNAL MEDICINE | Facility: CLINIC | Age: 69
End: 2024-04-01
Payer: MEDICARE

## 2024-04-01 DIAGNOSIS — B37.0 CANDIDA, ORAL: Primary | ICD-10-CM

## 2024-04-01 NOTE — TELEPHONE ENCOUNTER
Patient called to see if she can get something called in for thrush on her tongue. Her tongue feels like it is burning and has a white and yellow coating. Please call: [phone]

## 2024-04-05 ENCOUNTER — TELEMEDICINE (OUTPATIENT)
Dept: INTERNAL MEDICINE | Facility: CLINIC | Age: 69
End: 2024-04-05
Payer: MEDICARE

## 2024-04-05 VITALS — WEIGHT: 160 LBS | BODY MASS INDEX: 30.48 KG/M2

## 2024-04-05 DIAGNOSIS — J40 BRONCHITIS: Primary | ICD-10-CM

## 2024-04-05 RX ORDER — IPRATROPIUM BROMIDE AND ALBUTEROL SULFATE 2.5; .5 MG/3ML; MG/3ML
3 SOLUTION RESPIRATORY (INHALATION) EVERY 4 HOURS PRN
Qty: 360 ML | Refills: 0 | Status: SHIPPED | OUTPATIENT
Start: 2024-04-05

## 2024-04-05 RX ORDER — ACETAMINOPHEN 500 MG
500 TABLET ORAL EVERY 6 HOURS PRN
COMMUNITY

## 2024-04-05 NOTE — PROGRESS NOTES
Office Note     Name: Jasvir Romero    : 1955     MRN: [id number]     Chief Complaint  Cough, Fatigue, Sore Throat ( ), Nasal Congestion, and Ear Fullness    Subjective     History of Present Illness:  Jasvir Romero is a 69 y.o. female who presents today for     This visit was conducted via telehealth A V.E.T.S.c.a.r.e.ku/PurpleTealilio used   Patient gave consent for telehealth audio visual treatment.   Patient was in her home and provider in [facility];        The patient consented to the use of [employer].     The patient is a 69-year-old female who presents to the clinic for follow-up via telehealth.    Patient complains of persistent productive cough and fatigue for 1 month. She was evaluated in our office on 2024, during which she tested negative for COVID-19, strep A, and influenza. On 2024, she was prescribed with Z-Erik and Medrol dose pack. Subsequently, she presented via telemedicine on 2024, where she was prescribed doxycycline and prednisone. She denies experiencing fever, myalgia, pharyngitis, otalgia, nausea, or vomiting. Apart from the productive cough and fatigue, she reports no other symptoms. She has a nebulizer at home.      Review of Systems:   Review of Systems    Past Medical History:   Past Medical History:   Diagnosis Date    Allergic 80s    Seasonal    Chicken pox     Clotting disorder     Colon polyp     Polyps  removed and clipped    Coronary artery disease     Diverticulitis     Diverticulosis     Easy bruising     Gall stones     GERD (gastroesophageal reflux disease)     Glaucoma     Hyperlipemia     Hypertension     Ischemic stroke 10/24/2019    Low back pain 2016    Measles     Menopause     Mumps     Obesity 1985    Positive PPD     Scarlet fever     Tuberculosis 1986    Exposure       Past Surgical History:   Past Surgical History:   Procedure Laterality Date    CARDIAC CATHETERIZATION      CARPAL TUNNEL RELEASE      both hands     CHOLECYSTECTOMY      COLONOSCOPY  2009    CORONARY STENT PLACEMENT  2015    ECTOPIC PREGNANCY      ENDOMETRIAL ABLATION      HAND SURGERY      HYSTERECTOMY      OOPHORECTOMY      unilateral    OTHER SURGICAL HISTORY      lap and leep    SUBTOTAL HYSTERECTOMY  2001    Ablation    TUBAL ABDOMINAL LIGATION  1993    with L salpingo-oophorectomy       Immunizations:   Immunization History   Administered Date(s) Administered    COVID-19 (PFIZER) BIVALENT 12+YRS 11/16/2022    COVID-19 (PFIZER) Purple Cap Monovalent 06/17/2021, 07/09/2021, 01/06/2022    Influenza, Unspecified 11/19/2018    Pneumococcal Conjugate 20-Valent (PCV20) 01/17/2023    Pneumococcal Polysaccharide (PPSV23) 11/06/2017    Pneumococcal, Unspecified 07/01/2017    Tdap 10/07/2011    Zostavax 07/01/2017, 07/26/2017        Medications:     Current Outpatient Medications:     acetaminophen (TYLENOL) 500 MG tablet, Take 1 tablet by mouth Every 6 (Six) Hours As Needed for Mild Pain., Disp: , Rfl:     ascorbic acid (VITAMIN C) 1000 MG tablet, Take 1 tablet by mouth Daily., Disp: , Rfl:     BIOTIN PO, Take 1 tablet by mouth Daily., Disp: , Rfl:     cholecalciferol (VITAMIN D3) 25 MCG (1000 UT) tablet, Take 1 tablet by mouth Daily., Disp: 90 tablet, Rfl: 1    clopidogrel (PLAVIX) 75 MG tablet, Take 1 tablet by mouth Daily., Disp: 90 tablet, Rfl: 1    isosorbide mononitrate (IMDUR) 30 MG 24 hr tablet, Take 1 tablet by mouth Daily., Disp: 90 tablet, Rfl: 1    ketoconazole (NIZORAL) 2 % shampoo, Apply  topically to the appropriate area as directed 2 (Two) Times a Week., Disp: 360 mL, Rfl: 3    losartan (COZAAR) 100 MG tablet, Take 1 tablet by mouth Daily., Disp: 90 tablet, Rfl: 1    metoprolol tartrate (LOPRESSOR) 50 MG tablet, Take 1 tablet by mouth 2 (Two) Times a Day., Disp: 180 tablet, Rfl: 3    nitroglycerin (NITROSTAT) 0.4 MG SL tablet, Place 1 tablet under the tongue Every 5 (Five) Minutes As Needed for Chest Pain., Disp: 25 tablet, Rfl: 1    nystatin  (MYCOSTATIN) 100,000 unit/mL suspension, Swish and swallow 5 mL 4 (Four) Times a Day., Disp: 140 mL, Rfl: 0    pantoprazole (PROTONIX) 40 MG EC tablet, Take 1 tablet by mouth Daily., Disp: 90 tablet, Rfl: 1    rosuvastatin (CRESTOR) 40 MG tablet, Take 1 tablet by mouth Daily., Disp: 90 tablet, Rfl: 1    sucralfate (CARAFATE) 1 g tablet, Take 1 tablet by mouth 4 (Four) Times a Day. Thoroughly crush pill and mix in small amount of water prior to swallowing. (Patient taking differently: Take 1 tablet by mouth 4 (Four) Times a Day As Needed. Thoroughly crush pill and mix in small amount of water prior to swallowing.), Disp: 90 tablet, Rfl: 0    vitamin B-12 (CYANOCOBALAMIN) 1000 MCG tablet, Take 1 tablet by mouth Daily., Disp: , Rfl:     guaiFENesin (MUCINEX) 600 MG 12 hr tablet, Take 2 tablets by mouth 2 (Two) Times a Day. (Patient not taking: Reported on 2024), Disp: , Rfl:     ipratropium-albuterol (DUO-NEB) 0.5-2.5 mg/3 ml nebulizer, Take 3 mL by nebulization Every 4 (Four) Hours As Needed for Wheezing., Disp: 360 mL, Rfl: 0    Allergies:   Allergies   Allergen Reactions    Pravastatin Myalgia    Codeine Other (See Comments)    Influenza Vac Split Quad Hives       Family History:   Family History   Problem Relation Age of Onset    Arthritis Mother     Hypertension Mother     Hyperlipidemia Mother     Heart attack Father     Hypertension Father     Obesity Father     Stroke Father         massive    Heart disease Father          massive stroke age 69    Hyperlipidemia Father     Arthritis Sister     Hypertension Sister     No Known Problems Sister     Breast cancer Neg Hx     Ovarian cancer Neg Hx        Social History:   Social History     Socioeconomic History    Marital status:    Tobacco Use    Smoking status: Every Day     Current packs/day: 0.00     Average packs/day: 1 pack/day for 40.0 years (40.0 ttl pk-yrs)     Types: Cigarettes     Start date: 1980     Last attempt to quit: 2019  "    Years since quittin.5     Passive exposure: Current    Smokeless tobacco: Never   Vaping Use    Vaping status: Never Used   Substance and Sexual Activity    Alcohol use: Not Currently     Comment: rarely    Drug use: No    Sexual activity: Not Currently     Partners: Male     Birth control/protection: Post-menopausal         Objective     Vital Signs  Wt 72.6 kg (160 lb)   BMI 30.48 kg/m²   Estimated body mass index is 30.48 kg/m² as calculated from the following:    Height as of 3/19/24: 154.3 cm (60.75\").    Weight as of this encounter: 72.6 kg (160 lb).            Physical Exam  Vitals and nursing note reviewed.   Constitutional:       Appearance: Normal appearance.   Cardiovascular:      Rate and Rhythm: Normal rate and regular rhythm.   Pulmonary:      Effort: Pulmonary effort is normal.      Breath sounds: Normal breath sounds.   Skin:     General: Skin is warm and dry.   Neurological:      Mental Status: She is alert.   Psychiatric:         Mood and Affect: Mood normal.         Behavior: Behavior normal.        Assessment and Plan     1. Bronchitis    - ipratropium-albuterol (DUO-NEB) 0.5-2.5 mg/3 ml nebulizer; Take 3 mL by nebulization Every 4 (Four) Hours As Needed for Wheezing.  Dispense: 360 mL; Refill: 0       A therapeutic approach involving a nebulizer solution DuoNeb will be initiated. If this is ineffective over the weekend, the patient will contact the clinic on 2024. Will do a chest x-ray and consider antibiotics. However, will avoid antibiotics because she is experiencing thrush at the moment. She is currently taking nystatin for her thrush.     Reviewed medications, potential side effects and signs and symptoms to report. Discussed risk versus benefits of treatment plan with patient and/or family-including medications, labs and radiology that may be ordered. Addressed questions and concerns during visit. Patient and/or family verbalized understanding and agree with plan. Instructed " to call the office with any questions and report to ER with any life-threatening symptoms.       Follow Up  No follow-ups on file.    JOHANN Johnson [facility]  [address] [id number] [phone]    Transcribed from ambient dictation for Kaitlynn Loving PA-C by Елена Glover.  04/05/24   15:01 EDT    Patient or patient representative verbalized consent to the visit recording.  I have personally performed the services described in this document as transcribed by the above individual, and it is both accurate and complete.

## 2024-05-10 ENCOUNTER — TELEPHONE (OUTPATIENT)
Dept: INTERNAL MEDICINE | Facility: CLINIC | Age: 69
End: 2024-05-10
Payer: MEDICARE

## 2024-05-10 NOTE — TELEPHONE ENCOUNTER
Called patient and notified that her pneumonia vaccine was given 01/17/2023 and is not due for one.  Shingles she had 2 doses given in 2011. Is she recommended to get another shingles vaccine at this time? What about TDAP? She asked about RSV and I did state that it is being recommended for patients that are 60 and up.

## 2024-05-10 NOTE — TELEPHONE ENCOUNTER
Caller: Jasvir Romero    Relationship: Self    Best call back number: [phone]    What is the best time to reach you: ANYTIME    Who are you requesting to speak with (clinical staff, provider,  specific staff member): PROVIDER OR CLINICAL STAFF    What was the call regarding: PATIENT HAS QUESTIONS REGARDING HER PNEUMONIA AND SHINGLES VACCINE. PLEASE ADVISE    Is it okay if the provider responds through MyChart: NO

## 2024-05-13 ENCOUNTER — HOSPITAL ENCOUNTER (OUTPATIENT)
Dept: ULTRASOUND IMAGING | Facility: HOSPITAL | Age: 69
Discharge: HOME | End: 2024-05-13
Admitting: OTOLARYNGOLOGY
Payer: MEDICARE

## 2024-05-13 DIAGNOSIS — E04.1 NONTOXIC SINGLE THYROID NODULE: ICD-10-CM

## 2024-05-13 PROCEDURE — 76942 ECHO GUIDE FOR BIOPSY: CPT

## 2024-05-13 RX ORDER — LIDOCAINE HYDROCHLORIDE 10 MG/ML
10 INJECTION, SOLUTION EPIDURAL; INFILTRATION; INTRACAUDAL; PERINEURAL ONCE
Status: COMPLETED | OUTPATIENT
Start: 2024-05-13 | End: 2024-05-13

## 2024-05-13 RX ADMIN — LIDOCAINE HYDROCHLORIDE 10 ML: 10 INJECTION, SOLUTION EPIDURAL; INFILTRATION; INTRACAUDAL; PERINEURAL at 11:43

## 2024-05-13 NOTE — TELEPHONE ENCOUNTER
Tdap is recommended every 10 years.  It may not be covered on her insurance unless she is cut or bitten by an animal.  If this happens she will need to have this updated. Shingles and RSV can be obtained at her pharmacy.

## 2024-05-14 LAB — REF LAB TEST METHOD: NORMAL

## 2024-05-14 NOTE — TELEPHONE ENCOUNTER
Name: Jasvir Romero      Relationship: Self      Best Callback Number:     [phone]          HUB PROVIDED THE RELAY MESSAGE FROM THE OFFICE      PATIENT: VOICED UNDERSTANDING AND HAS NO FURTHER QUESTIONS AT THIS TIME

## 2024-05-14 NOTE — TELEPHONE ENCOUNTER
I left a message on the patients voicemail to call our office back, office number provided.     HUB relay:    Tdap is recommended every 10 years.  It may not be covered on her insurance unless she is cut or bitten by an animal.  If this happens she will need to have this updated. Shingles and RSV can be obtained at her pharmacy.

## 2024-05-17 ENCOUNTER — OFFICE VISIT (OUTPATIENT)
Dept: INTERNAL MEDICINE | Facility: CLINIC | Age: 69
End: 2024-05-17
Payer: MEDICARE

## 2024-05-17 ENCOUNTER — HOSPITAL ENCOUNTER (OUTPATIENT)
Dept: GENERAL RADIOLOGY | Facility: HOSPITAL | Age: 69
Discharge: HOME | End: 2024-05-17
Payer: MEDICARE

## 2024-05-17 VITALS
TEMPERATURE: 97.1 F | RESPIRATION RATE: 18 BRPM | WEIGHT: 162.6 LBS | OXYGEN SATURATION: 97 % | HEIGHT: 61 IN | BODY MASS INDEX: 30.7 KG/M2 | HEART RATE: 64 BPM | SYSTOLIC BLOOD PRESSURE: 122 MMHG | DIASTOLIC BLOOD PRESSURE: 66 MMHG

## 2024-05-17 DIAGNOSIS — R05.8 PRODUCTIVE COUGH: ICD-10-CM

## 2024-05-17 DIAGNOSIS — J30.89 NON-SEASONAL ALLERGIC RHINITIS, UNSPECIFIED TRIGGER: ICD-10-CM

## 2024-05-17 DIAGNOSIS — J20.9 CHRONIC BRONCHITIS WITH ACUTE EXACERBATION: Primary | ICD-10-CM

## 2024-05-17 DIAGNOSIS — R05.3 CHRONIC COUGH: ICD-10-CM

## 2024-05-17 DIAGNOSIS — J42 CHRONIC BRONCHITIS WITH ACUTE EXACERBATION: Primary | ICD-10-CM

## 2024-05-17 DIAGNOSIS — Z78.0 POST-MENOPAUSAL: ICD-10-CM

## 2024-05-17 DIAGNOSIS — F17.200 CURRENT EVERY DAY SMOKER: ICD-10-CM

## 2024-05-17 DIAGNOSIS — Z23 ENCOUNTER FOR IMMUNIZATION: ICD-10-CM

## 2024-05-17 PROCEDURE — 3074F SYST BP LT 130 MM HG: CPT | Performed by: NURSE PRACTITIONER

## 2024-05-17 PROCEDURE — 99406 BEHAV CHNG SMOKING 3-10 MIN: CPT | Performed by: NURSE PRACTITIONER

## 2024-05-17 PROCEDURE — 1160F RVW MEDS BY RX/DR IN RCRD: CPT | Performed by: NURSE PRACTITIONER

## 2024-05-17 PROCEDURE — 1159F MED LIST DOCD IN RCRD: CPT | Performed by: NURSE PRACTITIONER

## 2024-05-17 PROCEDURE — 1126F AMNT PAIN NOTED NONE PRSNT: CPT | Performed by: NURSE PRACTITIONER

## 2024-05-17 PROCEDURE — 3078F DIAST BP <80 MM HG: CPT | Performed by: NURSE PRACTITIONER

## 2024-05-17 PROCEDURE — 71046 X-RAY EXAM CHEST 2 VIEWS: CPT

## 2024-05-17 PROCEDURE — 99214 OFFICE O/P EST MOD 30 MIN: CPT | Performed by: NURSE PRACTITIONER

## 2024-05-17 RX ORDER — CLOBETASOL PROPIONATE 0.46 MG/ML
SOLUTION TOPICAL
COMMUNITY
Start: 2024-05-08

## 2024-05-17 RX ORDER — NICOTINE 4 MG/1
1 INHALANT RESPIRATORY (INHALATION) AS NEEDED
Qty: 168 EACH | Refills: 5 | Status: SHIPPED | OUTPATIENT
Start: 2024-05-17

## 2024-05-17 RX ORDER — FLUTICASONE PROPIONATE 50 MCG
2 SPRAY, SUSPENSION (ML) NASAL DAILY
Qty: 48 G | Refills: 1 | Status: SHIPPED | OUTPATIENT
Start: 2024-05-17

## 2024-05-17 RX ORDER — LORATADINE 10 MG/1
10 TABLET ORAL DAILY
Qty: 90 TABLET | Refills: 1 | Status: SHIPPED | OUTPATIENT
Start: 2024-05-17

## 2024-05-17 NOTE — PROGRESS NOTES
Patient Name: Jasvir Romero  : 1955   MRN: [id number]     Chief Complaint:    Chief Complaint   Patient presents with    Cough     Cough for 2 months.        History of Present Illness: Jasvir Romero is a 69 y.o. female.  History of Present Illness  The patient presents for evaluation of cough.    The patient has been experiencing a persistent cough for a duration of 2 months. Despite multiple attempts at treatment, including two courses of antibiotics and two courses of steroids, the cough persists. She also utilized a nebulizer for a few days, which resulted in some improvement, albeit not completely resolved. The cough, initially constant, was particularly severe at night when lying down, has shown some improvement over the past few weeks. The cough is productive, yielding clear sputum, with no blood present. She denies experiencing fevers or chills. Mild wheezing has been noted, albeit less frequently. The patient has a 40-year history of smoking one pack per day. She has not undergone any recent radiographic examinations since the onset of her cough. Previously, she experienced shortness of breath and wheezing, but these have since improved. She denies any postnasal drainage. The patient has been on pantoprazole for several years. She has previously tried Chantix, which induced anxiety and nightmares, and Wellbutrin for sleep. Nicotine patches have raised blood pressure levels, but she currently uses nicotine gum, which provides some relief. She has no desire to quit smoking but willing to try something else.   She has GI intolerance to AMOXICILLIN and CODEINE.   She has received 2 shingles vaccines.         Subjective     Review of System: Review of Systems     Medications:     Current Outpatient Medications:     ascorbic acid (VITAMIN C) 1000 MG tablet, Take 1 tablet by mouth Daily., Disp: , Rfl:     BIOTIN PO, Take 1 tablet by mouth Daily., Disp: , Rfl:     cholecalciferol  (VITAMIN D3) 25 MCG (1000 UT) tablet, Take 1 tablet by mouth Daily., Disp: 90 tablet, Rfl: 1    clobetasol (TEMOVATE) 0.05 % external solution, APPLY TO SCALP TWICE DAILY FOR 2 WEEKS AT A TIME, Disp: , Rfl:     clopidogrel (PLAVIX) 75 MG tablet, Take 1 tablet by mouth Daily., Disp: 90 tablet, Rfl: 1    isosorbide mononitrate (IMDUR) 30 MG 24 hr tablet, Take 1 tablet by mouth Daily., Disp: 90 tablet, Rfl: 1    ketoconazole (NIZORAL) 2 % shampoo, Apply  topically to the appropriate area as directed 2 (Two) Times a Week., Disp: 360 mL, Rfl: 3    losartan (COZAAR) 100 MG tablet, Take 1 tablet by mouth Daily., Disp: 90 tablet, Rfl: 1    metoprolol tartrate (LOPRESSOR) 50 MG tablet, Take 1 tablet by mouth 2 (Two) Times a Day., Disp: 180 tablet, Rfl: 3    nitroglycerin (NITROSTAT) 0.4 MG SL tablet, Place 1 tablet under the tongue Every 5 (Five) Minutes As Needed for Chest Pain., Disp: 25 tablet, Rfl: 1    pantoprazole (PROTONIX) 40 MG EC tablet, Take 1 tablet by mouth Daily., Disp: 90 tablet, Rfl: 1    rosuvastatin (CRESTOR) 40 MG tablet, Take 1 tablet by mouth Daily., Disp: 90 tablet, Rfl: 1    sucralfate (CARAFATE) 1 g tablet, Take 1 tablet by mouth 4 (Four) Times a Day. Thoroughly crush pill and mix in small amount of water prior to swallowing. (Patient taking differently: Take 1 tablet by mouth 4 (Four) Times a Day As Needed. Thoroughly crush pill and mix in small amount of water prior to swallowing.), Disp: 90 tablet, Rfl: 0    vitamin B-12 (CYANOCOBALAMIN) 1000 MCG tablet, Take 1 tablet by mouth Daily., Disp: , Rfl:     Budeson-Glycopyrrol-Formoterol (BREZTRI) 160-9-4.8 MCG/ACT aerosol inhaler, Inhale 2 puffs 2 (Two) Times a Day., Disp: 48 g, Rfl: 1    fluticasone (FLONASE) 50 MCG/ACT nasal spray, 2 sprays into the nostril(s) as directed by provider Daily., Disp: 48 g, Rfl: 1    loratadine (Claritin) 10 MG tablet, Take 1 tablet by mouth Daily., Disp: 90 tablet, Rfl: 1    nicotine (Nicotrol) 10 MG inhaler, Inhale 1  "puff As Needed for Smoking Cessation., Disp: 168 each, Rfl: 5    Allergies:   Allergies   Allergen Reactions    Pravastatin Myalgia    Codeine Unknown - Low Severity    Doxycycline GI Intolerance    Influenza Vac Split Quad Hives       Objective     Physical Exam:   Vital Signs:   Vitals:    05/17/24 1007 05/17/24 1112   BP: 122/66    BP Location: Right arm    Patient Position: Sitting    Cuff Size: Adult    Pulse: 64    Resp: 18    Temp: 97.1 °F (36.2 °C)    TempSrc: Infrared    SpO2: 90% 97%  Comment: after 6 min walk   Weight: 73.8 kg (162 lb 9.6 oz)    Height: 154.3 cm (60.75\")    PainSc: 0-No pain      Body mass index is 30.98 kg/m².        Physical Exam  Constitutional:       General: She is not in acute distress.     Appearance: She is not ill-appearing.   HENT:      Head: Normocephalic.   Cardiovascular:      Rate and Rhythm: Normal rate and regular rhythm.      Heart sounds: Normal heart sounds. No murmur heard.  Abdominal:      General: Bowel sounds are normal.      Tenderness: There is no abdominal tenderness.   Neurological:      General: No focal deficit present.      Mental Status: She is oriented to person, place, and time.   Psychiatric:         Mood and Affect: Mood normal.       Physical Exam  Lung sounds are diminished in both bases and right upper lobe of the lungs. No wheezing noted.       Results  Imaging  Low-dose CT screening in August 2023 showed atherosclerosis in coronary arteries. Lungs were clear, no consolidation, no significant nodules or interstitial changes.     Assessment / Plan      Assessment/Plan:   Diagnoses and all orders for this visit:    1. Chronic bronchitis with acute exacerbation (Primary)  -     Budeson-Glycopyrrol-Formoterol (BREZTRI) 160-9-4.8 MCG/ACT aerosol inhaler; Inhale 2 puffs 2 (Two) Times a Day.  Dispense: 48 g; Refill: 1  -     Home Nebulizer Accessories    2. Post-menopausal  -     DEXA Bone Density Axial; Future    3. Productive cough  -     DEXA Bone " Density Axial; Future  -     Complete PFT - Pre & Post Bronchodilator; Future  -     Ambulatory Referral to Pulmonology  -     Budeson-Glycopyrrol-Formoterol (BREZTRI) 160-9-4.8 MCG/ACT aerosol inhaler; Inhale 2 puffs 2 (Two) Times a Day.  Dispense: 48 g; Refill: 1    4. Chronic cough  -     XR Chest PA & Lateral; Future  -     Complete PFT - Pre & Post Bronchodilator; Future  -     Ambulatory Referral to Pulmonology  -     Budeson-Glycopyrrol-Formoterol (BREZTRI) 160-9-4.8 MCG/ACT aerosol inhaler; Inhale 2 puffs 2 (Two) Times a Day.  Dispense: 48 g; Refill: 1  -     Home Nebulizer Accessories    5. Current every day smoker  -     nicotine (Nicotrol) 10 MG inhaler; Inhale 1 puff As Needed for Smoking Cessation.  Dispense: 168 each; Refill: 5    6. Non-seasonal allergic rhinitis, unspecified trigger  -     fluticasone (FLONASE) 50 MCG/ACT nasal spray; 2 sprays into the nostril(s) as directed by provider Daily.  Dispense: 48 g; Refill: 1    7. Encounter for immunization  -     Arexvy Vaccine (RSV for Adults > 60); Future  -     Shingrix Vaccine; Future    Other orders  -     loratadine (Claritin) 10 MG tablet; Take 1 tablet by mouth Daily.  Dispense: 90 tablet; Refill: 1     Jasvir Baez Nick  reports that she has been smoking cigarettes. She started smoking about 44 years ago. She has a 40 pack-year smoking history. She has been exposed to tobacco smoke. She has never used smokeless tobacco. I have educated her on the risk of diseases from using tobacco products such as cancer, COPD, and heart disease.     I advised her to quit and she is willing to quit. We have discussed the following method/s for tobacco cessation:  Prescription Medicaiton.  Together we have set a quit date for  asap .  She will follow up with me in 3 months or sooner to check on her progress.    I spent 4 minutes counseling the patient.        Assessment & Plan  1. Chronic cough.  The patient has previously attempted smoking cessation  with nicotine patches, however, they resulted in hypertension. We will initiate a Nicotrol inhaler. In order to evaluate her productive cough and potential chronic bronchitis, A chest x-ray will be conducted.  Pulmonary function tests will be conducted, and a referral to pulmonology will be made. Additionally, a Breztri inhaler will be prescribed. She will continue her current regimen of albuterol nebulizers. Allergy medication will be initiated.         Patient or patient representative verbalized consent for the use of Ambient Listening during the visit with  LORI Gunn for chart documentation. 5/21/2024  10:27 EDT    LORI Gunn  [facility]

## 2024-05-27 DIAGNOSIS — I10 ESSENTIAL HYPERTENSION: ICD-10-CM

## 2024-05-28 RX ORDER — LOSARTAN POTASSIUM 100 MG/1
100 TABLET ORAL DAILY
Qty: 90 TABLET | Refills: 1 | Status: SHIPPED | OUTPATIENT
Start: 2024-05-28

## 2024-05-30 DIAGNOSIS — I63.9 ISCHEMIC STROKE: ICD-10-CM

## 2024-05-30 DIAGNOSIS — K21.9 GASTROESOPHAGEAL REFLUX DISEASE, UNSPECIFIED WHETHER ESOPHAGITIS PRESENT: ICD-10-CM

## 2024-05-30 DIAGNOSIS — I25.119 CORONARY ARTERY DISEASE INVOLVING NATIVE CORONARY ARTERY OF NATIVE HEART WITH ANGINA PECTORIS: ICD-10-CM

## 2024-05-30 RX ORDER — CLOPIDOGREL BISULFATE 75 MG/1
75 TABLET ORAL DAILY
Qty: 90 TABLET | Refills: 1 | Status: SHIPPED | OUTPATIENT
Start: 2024-05-30

## 2024-05-30 RX ORDER — PANTOPRAZOLE SODIUM 40 MG/1
40 TABLET, DELAYED RELEASE ORAL DAILY
Qty: 90 TABLET | Refills: 1 | Status: SHIPPED | OUTPATIENT
Start: 2024-05-30

## 2024-06-03 DIAGNOSIS — J42 CHRONIC BRONCHITIS WITH ACUTE EXACERBATION: ICD-10-CM

## 2024-06-03 DIAGNOSIS — R05.3 CHRONIC COUGH: Primary | ICD-10-CM

## 2024-06-03 DIAGNOSIS — J20.9 CHRONIC BRONCHITIS WITH ACUTE EXACERBATION: ICD-10-CM

## 2024-06-03 DIAGNOSIS — R05.8 PRODUCTIVE COUGH: ICD-10-CM

## 2024-06-05 DIAGNOSIS — E78.5 HYPERLIPIDEMIA LDL GOAL <70: ICD-10-CM

## 2024-06-05 RX ORDER — ROSUVASTATIN CALCIUM 40 MG/1
40 TABLET, COATED ORAL DAILY
Qty: 90 TABLET | Refills: 1 | Status: SHIPPED | OUTPATIENT
Start: 2024-06-05

## 2024-06-28 NOTE — PROGRESS NOTES
"    Cardiology Outpatient Visit      Identification: Jasvir Romero is a 69 y.o. female who resides in [place], [place]    Reason for visit:  Coronary artery disease involving native coronary artery of      Subjective      Patient is a 69-year-old female who returns today for follow-up of her coronary artery disease, carotid artery disease and cardiac risk factors.  The patient has not been seen since 2022 when she presented as new consultation to establish care for her coronary artery disease.  She had a heart cath in 2012 at [facility] that showed one-vessel disease in the mid LAD.  At that time medical management was recommended but in 2015 stenting was needed for this stenosis.  She reports her anginal equivalent in the past was heartburn symptoms.  At her last visit a carotid duplex was performed which showed a high-grade stenosis of the right external carotid artery but no significant disease in the internal carotid arteries bilaterally.  She was diagnosed with RSV in March.  She thought she was going to \"die\".  She had to receive 2 rounds of antibiotics and steroids.  It has taken a while for her breathing to improve.  She does report it is improving and is better this month than last month.  Her primary care provider has made arrangements for her to see pulmonology and have PFTs.  She stays active with her flowers and doing housework.  She denies any exertional chest pain.  She thinks her breathing is improving and not declining.  She is a longtime smoker and understands the importance of quitting.    Review of Systems   Constitutional: Negative for malaise/fatigue.   Eyes:  Negative for vision loss in left eye and vision loss in right eye.   Cardiovascular:  Positive for dyspnea on exertion. Negative for chest pain, near-syncope, orthopnea, palpitations, paroxysmal nocturnal dyspnea and syncope.   Musculoskeletal:  Negative for myalgias.   Neurological:  Negative for brief paralysis, " "excessive daytime sleepiness, focal weakness, numbness, paresthesias and weakness.   All other systems reviewed and are negative.      Allergies   Allergen Reactions    Pravastatin Myalgia    Codeine Unknown - Low Severity    Doxycycline GI Intolerance    Influenza Vac Split Quad Hives         Current Outpatient Medications   Medication Instructions    ascorbic acid (VITAMIN C) 1,000 mg, Oral, Daily    BIOTIN PO 1 tablet, Oral, Daily    cholecalciferol (VITAMIN D3) 1,000 Units, Oral, Daily    clobetasol (TEMOVATE) 0.05 % external solution APPLY TO SCALP TWICE DAILY FOR 2 WEEKS AT A TIME    clopidogrel (PLAVIX) 75 mg, Oral, Daily    fluticasone (FLONASE) 50 MCG/ACT nasal spray 2 sprays, Nasal, Daily    isosorbide mononitrate (IMDUR) 30 mg, Oral, Daily    ketoconazole (NIZORAL) 2 % shampoo Topical, 2 Times Weekly    losartan (COZAAR) 100 mg, Oral, Daily    metoprolol tartrate (LOPRESSOR) 50 mg, Oral, 2 Times Daily    nitroglycerin (NITROSTAT) 0.4 mg, Sublingual, Every 5 Minutes PRN    pantoprazole (PROTONIX) 40 mg, Oral, Daily    rosuvastatin (CRESTOR) 40 mg, Oral, Daily    sucralfate (CARAFATE) 1 g, Oral, 4 Times Daily, Thoroughly crush pill and mix in small amount of water prior to swallowing.    vitamin B-12 (CYANOCOBALAMIN) 1,000 mcg, Oral, Daily         Objective     /70 (BP Location: Right arm, Patient Position: Sitting, Cuff Size: Adult)   Pulse 67   Ht 152.4 cm (60\")   Wt 74.1 kg (163 lb 6.4 oz)   SpO2 98%   BMI 31.91 kg/m²       Constitutional:       General: Awake.      Appearance: Healthy appearance.   Pulmonary:      Effort: Pulmonary effort is normal.      Breath sounds: Normal breath sounds.   Cardiovascular:      Normal rate. Regular rhythm.      Murmurs: There is no murmur.   Pulses:     Intact distal pulses.   Edema:     Peripheral edema absent.   Skin:     General: Skin is warm and dry.   Neurological:      Mental Status: Alert and oriented to person, place and time.   Psychiatric:         " Behavior: Behavior is cooperative.         Result Review  (reviewed with patient):            Lab Results   Component Value Date    GLUCOSE 105 (H) 02/09/2024    BUN 17 02/09/2024    CREATININE 0.88 02/09/2024    EGFRRESULT 81.4 04/17/2023    EGFR 72.6 02/09/2024    BCR 19.3 02/09/2024    K 4.4 02/09/2024    CO2 26.0 02/09/2024    CALCIUM 10.0 02/09/2024    PROTENTOTREF 6.2 04/17/2023    ALBUMIN 4.6 02/09/2024    BILITOT 0.6 02/09/2024    AST 11 02/09/2024    ALT 10 02/09/2024     Lab Results   Component Value Date    WBC 5.70 02/09/2024    HGB 14.0 02/09/2024    HCT 41.7 02/09/2024    MCV 85.8 02/09/2024     02/09/2024     Lab Results   Component Value Date    CHOL 150 02/09/2024    CHLPL 135 01/17/2023    TRIG 185 (H) 02/09/2024    HDL 40 02/09/2024    LDL 79 02/09/2024     Lab Results   Component Value Date    HGBA1C 5.5 08/30/2023           Assessment     Diagnoses and all orders for this visit:    1. Coronary artery disease involving native coronary artery of native heart with angina pectoris (Primary)  Overview:  Cardiac catherization at [facility] (3/16/2012): 1-vessel CAD with 60-70% eccentric stenosis of the mid LAD. LVEF 65-70%  Cardiac catherization at [facility] (1/26/2015): Coronary angioplasty with stenting to the mid LAD. 2.5 x 15 mm bioabsorbable scaffold implanted   Echo (10/1/2019): EF 68%.     Assessment & Plan:  No signs or symptoms of angina  Continue Plavix 75 mg daily  Continue isosorbide 30 mg daily      2. Carotid artery disease, unspecified laterality, unspecified type  Overview:  Carotid duplex (10/1/2019): High-grade stenosis of right EXTERNAL carotid artery.  No significant ICA stenoses      Assessment & Plan:  No signs or symptoms TIA or CVA  Continue Plavix and statin therapy      3. Essential hypertension  Overview:  Target blood pressure <130/80 mmHg    Assessment & Plan:  Continue metoprolol tartrate 50 mg twice daily  Continue losartan 100 mg daily      4. Current  every day smoker  Assessment & Plan:  Recommend immediate smoking cessation      5. Hyperlipidemia LDL goal <70  Overview:  High intensity statin therapy indicated given the presence of CAD    Assessment & Plan:   Continue Crestor 40 mg daily            Plan   The patient is still having shortness of breath following RSV infection but reports it is improved.  Discussed stress test but will defer until pulmonary workup is completed.  Her anginal equivalent in the past was upper epigastric/heartburn symptoms.  She is not having symptoms such as this.  If PFTs are within normal limits and breathing continues to worsen she will contact us and we will order stress test at that time.  Continue current medications      Follow-up   Return in about 1 year (around 7/2/2025), or if symptoms worsen or fail to improve, for Follow-up with Dr. Anguiano next visit.      Emily Ba, APRN  7/2/2024

## 2024-06-29 DIAGNOSIS — I25.119 CORONARY ARTERY DISEASE INVOLVING NATIVE CORONARY ARTERY OF NATIVE HEART WITH ANGINA PECTORIS: ICD-10-CM

## 2024-06-29 DIAGNOSIS — K21.9 GASTROESOPHAGEAL REFLUX DISEASE, UNSPECIFIED WHETHER ESOPHAGITIS PRESENT: ICD-10-CM

## 2024-07-01 ENCOUNTER — HOSPITAL ENCOUNTER (OUTPATIENT)
Dept: BONE DENSITY | Facility: HOSPITAL | Age: 69
Discharge: HOME | End: 2024-07-01
Admitting: NURSE PRACTITIONER
Payer: MEDICARE

## 2024-07-01 DIAGNOSIS — Z78.0 POST-MENOPAUSAL: ICD-10-CM

## 2024-07-01 DIAGNOSIS — R05.8 PRODUCTIVE COUGH: ICD-10-CM

## 2024-07-01 PROCEDURE — 77080 DXA BONE DENSITY AXIAL: CPT

## 2024-07-01 RX ORDER — METOPROLOL TARTRATE 50 MG/1
50 TABLET, FILM COATED ORAL 2 TIMES DAILY
Qty: 180 TABLET | Refills: 3 | Status: SHIPPED | OUTPATIENT
Start: 2024-07-01

## 2024-07-01 RX ORDER — PANTOPRAZOLE SODIUM 40 MG/1
40 TABLET, DELAYED RELEASE ORAL DAILY
Qty: 90 TABLET | Refills: 1 | Status: SHIPPED | OUTPATIENT
Start: 2024-07-01

## 2024-07-02 ENCOUNTER — OFFICE VISIT (OUTPATIENT)
Dept: CARDIOLOGY | Facility: CLINIC | Age: 69
End: 2024-07-02
Payer: MEDICARE

## 2024-07-02 VITALS
DIASTOLIC BLOOD PRESSURE: 70 MMHG | WEIGHT: 163.4 LBS | SYSTOLIC BLOOD PRESSURE: 118 MMHG | OXYGEN SATURATION: 98 % | BODY MASS INDEX: 32.08 KG/M2 | HEART RATE: 67 BPM | HEIGHT: 60 IN

## 2024-07-02 DIAGNOSIS — F17.200 CURRENT EVERY DAY SMOKER: ICD-10-CM

## 2024-07-02 DIAGNOSIS — I25.119 CORONARY ARTERY DISEASE INVOLVING NATIVE CORONARY ARTERY OF NATIVE HEART WITH ANGINA PECTORIS: Primary | ICD-10-CM

## 2024-07-02 DIAGNOSIS — I10 ESSENTIAL HYPERTENSION: ICD-10-CM

## 2024-07-02 DIAGNOSIS — E78.5 HYPERLIPIDEMIA LDL GOAL <70: ICD-10-CM

## 2024-07-02 DIAGNOSIS — I77.9 CAROTID ARTERY DISEASE, UNSPECIFIED LATERALITY, UNSPECIFIED TYPE: ICD-10-CM

## 2024-07-02 PROCEDURE — 1160F RVW MEDS BY RX/DR IN RCRD: CPT | Performed by: NURSE PRACTITIONER

## 2024-07-02 PROCEDURE — 3074F SYST BP LT 130 MM HG: CPT | Performed by: NURSE PRACTITIONER

## 2024-07-02 PROCEDURE — 3078F DIAST BP <80 MM HG: CPT | Performed by: NURSE PRACTITIONER

## 2024-07-02 PROCEDURE — 99214 OFFICE O/P EST MOD 30 MIN: CPT | Performed by: NURSE PRACTITIONER

## 2024-07-02 PROCEDURE — 1159F MED LIST DOCD IN RCRD: CPT | Performed by: NURSE PRACTITIONER

## 2024-08-26 DIAGNOSIS — I10 ESSENTIAL HYPERTENSION: ICD-10-CM

## 2024-08-26 RX ORDER — ISOSORBIDE MONONITRATE 30 MG/1
30 TABLET, EXTENDED RELEASE ORAL DAILY
Qty: 90 TABLET | Refills: 1 | Status: SHIPPED | OUTPATIENT
Start: 2024-08-26

## 2024-10-27 DIAGNOSIS — I10 ESSENTIAL HYPERTENSION: ICD-10-CM

## 2024-10-28 RX ORDER — LOSARTAN POTASSIUM 100 MG/1
100 TABLET ORAL DAILY
Qty: 90 TABLET | Refills: 1 | Status: SHIPPED | OUTPATIENT
Start: 2024-10-28

## 2024-10-29 DIAGNOSIS — I25.119 CORONARY ARTERY DISEASE INVOLVING NATIVE CORONARY ARTERY OF NATIVE HEART WITH ANGINA PECTORIS: ICD-10-CM

## 2024-10-29 DIAGNOSIS — I63.9 ISCHEMIC STROKE: ICD-10-CM

## 2024-10-29 RX ORDER — CLOPIDOGREL BISULFATE 75 MG/1
75 TABLET ORAL DAILY
Qty: 90 TABLET | Refills: 1 | Status: SHIPPED | OUTPATIENT
Start: 2024-10-29

## 2024-10-30 ENCOUNTER — TELEMEDICINE (OUTPATIENT)
Dept: INTERNAL MEDICINE | Facility: CLINIC | Age: 69
End: 2024-10-30
Payer: MEDICARE

## 2024-10-30 DIAGNOSIS — J40 BRONCHITIS: Primary | ICD-10-CM

## 2024-10-30 DIAGNOSIS — R09.81 NASAL CONGESTION: ICD-10-CM

## 2024-10-30 PROCEDURE — 99214 OFFICE O/P EST MOD 30 MIN: CPT | Performed by: INTERNAL MEDICINE

## 2024-10-30 PROCEDURE — 1126F AMNT PAIN NOTED NONE PRSNT: CPT | Performed by: INTERNAL MEDICINE

## 2024-10-30 RX ORDER — AZITHROMYCIN 250 MG/1
TABLET, FILM COATED ORAL
Qty: 6 TABLET | Refills: 0 | Status: SHIPPED | OUTPATIENT
Start: 2024-10-30

## 2024-10-30 RX ORDER — PREDNISONE 10 MG/1
TABLET ORAL
Qty: 12 TABLET | Refills: 0 | Status: SHIPPED | OUTPATIENT
Start: 2024-10-30

## 2024-10-30 NOTE — PROGRESS NOTES
Chief Complaint  No chief complaint on file.    Subjective    Jasvir Romero is a 69 y.o. female.     Jasvir Romero presents to [facility] for       History of Present Illness    The following portions of the patient's history were reviewed and updated as appropriate: allergies, current medications, past family history, past medical history, past social history, past surgical history, and problem list.    Patient is consented for video MyChart  Patient location:  Provider location: Clinic ([place])    Cough and congestion   Duration 2 weeks  Minimal productive cough, mild body ache, no fever, no chills, no shortness of breath, patient says that she normally gets this at least once a year and usually has to take Zithromax and prednisone which usually works.    Review of Systems   All other systems reviewed and are negative.      Objective   Vital Signs:   There were no vitals taken for this visit.    There is no height or weight on file to calculate BMI.        Physical Exam  Vitals and nursing note reviewed.   Constitutional:       Appearance: Normal appearance.   HENT:      Head: Normocephalic and atraumatic.      Nose: Congestion present.      Mouth/Throat:      Mouth: Mucous membranes are moist.   Eyes:      Extraocular Movements: Extraocular movements intact.      Pupils: Pupils are equal, round, and reactive to light.   Cardiovascular:      Pulses: Normal pulses.      Heart sounds: Normal heart sounds.   Pulmonary:      Effort: Pulmonary effort is normal.      Breath sounds: Normal breath sounds.   Skin:     General: Skin is warm.      Capillary Refill: Capillary refill takes less than 2 seconds.   Neurological:      General: No focal deficit present.      Mental Status: She is alert.   Psychiatric:         Mood and Affect: Mood normal.         Behavior: Behavior normal.         Thought Content: Thought content normal.         Judgment:  Judgment normal.               Assessment and Plan  Diagnoses and all orders for this visit:    Diagnoses and all orders for this visit:        1. Bronchitis (Primary)  -     azithromycin (Zithromax) 250 MG tablet; Take 2 tablets the first day, then 1 tablet daily for 4 days.  Dispense: 6 tablet; Refill: 0    -     predniSONE (DELTASONE) 10 MG tablet; Take three tablets po qd x 2 days, two tablets po qd x 2 days, one tablet po qd x 2 days then completed  Dispense: 12 tablet; Refill: 0    2. Nasal congestion                Follow Up   No follow-ups on file.  Patient was given instructions and counseling regarding her condition or for health maintenance advice. Please see specific information pulled into the AVS if appropriate.

## 2024-10-31 ENCOUNTER — TELEPHONE (OUTPATIENT)
Dept: INTERNAL MEDICINE | Facility: CLINIC | Age: 69
End: 2024-10-31
Payer: MEDICARE

## 2024-10-31 NOTE — TELEPHONE ENCOUNTER
Spoke with Jasivr she states she saw Dr Brennan on a telehealth visit yesterday   she is wheezing and would like an inhaler called into [facility] pharmacy.  She states she started the zithromycin and steroid last night .  She states she gets the wheezing twice a year and the last time in 5/2024 Nichole gave her Breztri but she did not pick it up because it cost $200

## 2024-11-01 ENCOUNTER — TELEPHONE (OUTPATIENT)
Dept: INTERNAL MEDICINE | Facility: CLINIC | Age: 69
End: 2024-11-01
Payer: MEDICARE

## 2024-11-01 RX ORDER — ALBUTEROL SULFATE 90 UG/1
2 INHALANT RESPIRATORY (INHALATION) EVERY 6 HOURS PRN
Qty: 8 G | Refills: 1 | Status: SHIPPED | OUTPATIENT
Start: 2024-11-01

## 2024-11-01 NOTE — TELEPHONE ENCOUNTER
I did call in a rescue inhaler for patient and if her respiratory symptoms get worse she should be seen and evaluated

## 2024-11-01 NOTE — TELEPHONE ENCOUNTER
PA Nicotrol 10 mg PA sent to Cover my meds   Tried and failed nicotine gum-  did not work  Nicotine Patch caused increased BP and dizziness.  PA approved  # [id number]  8/1/2024 to 10/31/2025  Jasvir notified

## 2024-11-12 DIAGNOSIS — J30.89 NON-SEASONAL ALLERGIC RHINITIS, UNSPECIFIED TRIGGER: ICD-10-CM

## 2024-11-12 RX ORDER — FLUTICASONE PROPIONATE 50 MCG
2 SPRAY, SUSPENSION (ML) NASAL DAILY
Qty: 48 G | Refills: 5 | Status: SHIPPED | OUTPATIENT
Start: 2024-11-12

## 2024-11-14 ENCOUNTER — PATIENT MESSAGE (OUTPATIENT)
Dept: INTERNAL MEDICINE | Facility: CLINIC | Age: 69
End: 2024-11-14
Payer: MEDICARE

## 2024-11-15 DIAGNOSIS — F17.200 CURRENT EVERY DAY SMOKER: Primary | ICD-10-CM

## 2024-11-22 ENCOUNTER — OFFICE VISIT (OUTPATIENT)
Dept: INTERNAL MEDICINE | Facility: CLINIC | Age: 69
End: 2024-11-22
Payer: MEDICARE

## 2024-11-22 VITALS
WEIGHT: 162 LBS | BODY MASS INDEX: 31.8 KG/M2 | DIASTOLIC BLOOD PRESSURE: 76 MMHG | HEART RATE: 64 BPM | SYSTOLIC BLOOD PRESSURE: 112 MMHG | HEIGHT: 60 IN | RESPIRATION RATE: 18 BRPM | TEMPERATURE: 97.7 F

## 2024-11-22 DIAGNOSIS — F17.210 CIGARETTE NICOTINE DEPENDENCE WITHOUT COMPLICATION: ICD-10-CM

## 2024-11-22 DIAGNOSIS — R05.3 CHRONIC COUGH: ICD-10-CM

## 2024-11-22 DIAGNOSIS — J98.8 RESPIRATORY INFECTION: Primary | ICD-10-CM

## 2024-11-22 DIAGNOSIS — E78.5 HYPERLIPIDEMIA LDL GOAL <70: ICD-10-CM

## 2024-11-22 DIAGNOSIS — J41.1 MUCOPURULENT CHRONIC BRONCHITIS: ICD-10-CM

## 2024-11-22 DIAGNOSIS — Z86.73 HISTORY OF STROKE: ICD-10-CM

## 2024-11-22 DIAGNOSIS — R05.1 ACUTE COUGH: ICD-10-CM

## 2024-11-22 DIAGNOSIS — I25.119 CORONARY ARTERY DISEASE INVOLVING NATIVE CORONARY ARTERY OF NATIVE HEART WITH ANGINA PECTORIS: ICD-10-CM

## 2024-11-22 PROCEDURE — 99214 OFFICE O/P EST MOD 30 MIN: CPT | Performed by: NURSE PRACTITIONER

## 2024-11-22 PROCEDURE — 1160F RVW MEDS BY RX/DR IN RCRD: CPT | Performed by: NURSE PRACTITIONER

## 2024-11-22 PROCEDURE — 3078F DIAST BP <80 MM HG: CPT | Performed by: NURSE PRACTITIONER

## 2024-11-22 PROCEDURE — 1159F MED LIST DOCD IN RCRD: CPT | Performed by: NURSE PRACTITIONER

## 2024-11-22 PROCEDURE — 1126F AMNT PAIN NOTED NONE PRSNT: CPT | Performed by: NURSE PRACTITIONER

## 2024-11-22 PROCEDURE — 3074F SYST BP LT 130 MM HG: CPT | Performed by: NURSE PRACTITIONER

## 2024-11-22 RX ORDER — ROSUVASTATIN CALCIUM 40 MG/1
40 TABLET, COATED ORAL DAILY
Qty: 90 TABLET | Refills: 1 | Status: SHIPPED | OUTPATIENT
Start: 2024-11-22

## 2024-11-22 RX ORDER — METHYLPREDNISOLONE 4 MG/1
TABLET ORAL
Qty: 21 TABLET | Refills: 0 | Status: SHIPPED | OUTPATIENT
Start: 2024-11-22

## 2024-11-22 RX ORDER — CEFDINIR 300 MG/1
300 CAPSULE ORAL 2 TIMES DAILY
Qty: 20 CAPSULE | Refills: 0 | Status: SHIPPED | OUTPATIENT
Start: 2024-11-22

## 2024-11-22 NOTE — PROGRESS NOTES
Patient Name: Jasvir Romero  : 1955   MRN: [id number]     Chief Complaint:    Chief Complaint   Patient presents with    Cough     Follow up       History of Present Illness: Jasvir Romero is a 69 y.o. female.  History of Present Illness  The patient is a 69-year-old female who presents for evaluation of a persistent cough.    She was diagnosed with RSV on 10/30/2024, which has led to ongoing coughing. She has been managing her symptoms with Z-Erik, prednisone, DayQuil, NyQuil, Flonase, and an albuterol inhaler. Her condition has improved significantly, although she still experiences some coughing. She has been using cough drops and has reduced her smoking habit. She has not tried Nicotrol nasal spray due to concerns about potential burning sensations. She experiences wheezing when she is very active. She had scheduled a pulmonary test but had to cancel it due to her 's hospitalization.    She has a history of coronary artery disease and is currently on a high-intensity statin. She has also had a stroke in the past. She has not experienced pancreatitis or thyroid cancer, but does have a thyroid nodule.    She has had adverse reactions to amoxicillin, Augmentin, and cefdinir, which caused diarrhea. She has been using Breztri as a maintenance inhaler.         Subjective     Review of System: Review of Systems     Medications:     Current Outpatient Medications:     albuterol sulfate  (90 Base) MCG/ACT inhaler, Inhale 2 puffs Every 6 (Six) Hours As Needed for Wheezing., Disp: 8 g, Rfl: 1    ascorbic acid (VITAMIN C) 1000 MG tablet, Take 1 tablet by mouth Daily., Disp: , Rfl:     BIOTIN PO, Take 1 tablet by mouth Daily., Disp: , Rfl:     cholecalciferol (VITAMIN D3) 25 MCG (1000 UT) tablet, Take 1 tablet by mouth Daily., Disp: 90 tablet, Rfl: 1    clobetasol (TEMOVATE) 0.05 % external solution, APPLY TO SCALP TWICE DAILY FOR 2 WEEKS AT A TIME, Disp: , Rfl:     clopidogrel  (PLAVIX) 75 MG tablet, TAKE ONE TABLET BY MOUTH EVERY DAY, Disp: 90 tablet, Rfl: 1    fluticasone (FLONASE) 50 MCG/ACT nasal spray, Administer 2 sprays into the nostril(s) as directed by provider Daily., Disp: 48 g, Rfl: 5    isosorbide mononitrate (IMDUR) 30 MG 24 hr tablet, TAKE 1 TABLET BY MOUTH DAILY, Disp: 90 tablet, Rfl: 1    ketoconazole (NIZORAL) 2 % shampoo, Apply  topically to the appropriate area as directed 2 (Two) Times a Week., Disp: 360 mL, Rfl: 3    losartan (COZAAR) 100 MG tablet, TAKE ONE TABLET BY MOUTH EVERY DAY, Disp: 90 tablet, Rfl: 1    metoprolol tartrate (LOPRESSOR) 50 MG tablet, Take 1 tablet by mouth 2 (Two) Times a Day., Disp: 180 tablet, Rfl: 3    nitroglycerin (NITROSTAT) 0.4 MG SL tablet, Place 1 tablet under the tongue Every 5 (Five) Minutes As Needed for Chest Pain., Disp: 25 tablet, Rfl: 1    pantoprazole (PROTONIX) 40 MG EC tablet, Take 1 tablet by mouth Daily., Disp: 90 tablet, Rfl: 1    rosuvastatin (CRESTOR) 40 MG tablet, TAKE ONE TABLET BY MOUTH EVERY DAY, Disp: 90 tablet, Rfl: 1    sucralfate (CARAFATE) 1 g tablet, Take 1 tablet by mouth 4 (Four) Times a Day. Thoroughly crush pill and mix in small amount of water prior to swallowing. (Patient taking differently: Take 1 tablet by mouth 4 (Four) Times a Day As Needed. Thoroughly crush pill and mix in small amount of water prior to swallowing.), Disp: 90 tablet, Rfl: 0    vitamin B-12 (CYANOCOBALAMIN) 1000 MCG tablet, Take 1 tablet by mouth Daily., Disp: , Rfl:     cefdinir (OMNICEF) 300 MG capsule, Take 1 capsule by mouth 2 (Two) Times a Day., Disp: 20 capsule, Rfl: 0    methylPREDNISolone (MEDROL) 4 MG dose pack, Take as directed on package instructions., Disp: 21 tablet, Rfl: 0    tiotropium bromide-olodaterol (STIOLTO RESPIMAT) 2.5-2.5 MCG/ACT aerosol solution inhaler, Inhale 2 puffs Daily., Disp: 4 g, Rfl: 0    Allergies:   Allergies   Allergen Reactions    Pravastatin Myalgia    Codeine Unknown - Low Severity    Doxycycline  "GI Intolerance    Influenza Vac Split Quad Hives       Objective     Physical Exam:   Vital Signs:   Vitals:    11/22/24 1129   BP: 112/76   BP Location: Right arm   Patient Position: Sitting   Cuff Size: Adult   Pulse: 64   Resp: 18   Temp: 97.7 °F (36.5 °C)   TempSrc: Infrared   Weight: 73.5 kg (162 lb)   Height: 152.4 cm (60\")   PainSc: 0-No pain           Physical Exam  Constitutional:       General: She is not in acute distress.     Appearance: She is not ill-appearing.   HENT:      Head: Normocephalic.   Cardiovascular:      Rate and Rhythm: Normal rate and regular rhythm.      Heart sounds: Normal heart sounds. No murmur heard.  Pulmonary:      Breath sounds: Rales present.   Neurological:      General: No focal deficit present.      Mental Status: She is oriented to person, place, and time.   Psychiatric:         Mood and Affect: Mood normal.     Physical Exam         Results       Assessment / Plan      Assessment/Plan:   Diagnoses and all orders for this visit:    1. Respiratory infection (Primary)  -     cefdinir (OMNICEF) 300 MG capsule; Take 1 capsule by mouth 2 (Two) Times a Day.  Dispense: 20 capsule; Refill: 0    2. Acute cough    3. Chronic cough  -     tiotropium bromide-olodaterol (STIOLTO RESPIMAT) 2.5-2.5 MCG/ACT aerosol solution inhaler; Inhale 2 puffs Daily.  Dispense: 4 g; Refill: 0    4. Cigarette nicotine dependence without complication  -     CT Chest Low Dose Wo; Future    5. Mucopurulent chronic bronchitis  -     tiotropium bromide-olodaterol (STIOLTO RESPIMAT) 2.5-2.5 MCG/ACT aerosol solution inhaler; Inhale 2 puffs Daily.  Dispense: 4 g; Refill: 0  -     methylPREDNISolone (MEDROL) 4 MG dose pack; Take as directed on package instructions.  Dispense: 21 tablet; Refill: 0    6. Coronary artery disease involving native coronary artery of native heart with angina pectoris    7. History of stroke       Assessment & Plan  1. Chronic bronchitis.  Her respiratory effort is normal. Pulmonary " function testing has been too expensive. She is symptomatic for COPD and chronic bronchitis. A trial of Stiolto will be initiated to see if it helps. She will reach out to report its effectiveness.  She declined a chest x-ray.  But would like to complete her low-dose CT lung cancer screening. If her cough does not improve in 2 weeks, a regular CT scan will be considered. She will be treated with steroids and cefdinir to cover for respiratory infection and exacerbation of chronic bronchitis.     2. Smoking cessation.  She has slowed down on her smoking. Nicotrol nasal spray was called in for her, but she has concerns about using it due to potential side effects. She is advised to try the nasal spray if her insurance covers it. Weight loss medication, Wejovy (semaglutide), was discussed as it may help with smoking cessation, but she declined it at this time.    3. Coronary artery disease.  She is on a high-intensity statin for coronary artery disease. Weight loss medication approval was discussed due to her condition.    4. Stroke.  She has a history of stroke and is under appropriate management.  Consider Wegovy.     Follow-up  Return in March 2025 for her Medicare wellness visit, sooner if no improvement and symptoms are worsening.         Follow Up:   Return in about 3 months (around 3/5/2025) for Medicare Wellness.  Patient or patient representative verbalized consent for the use of Ambient Listening during the visit with  LORI Gunn for chart documentation. 11/22/2024  17:52 EST    LORI Gunn  [facility]  Answers submitted by the patient for this visit:  Primary Reason for Visit (Submitted on 11/15/2024)  What is the primary reason for your visit?: Shortness of Breath  Shortness of Breath Questionnaire (Submitted on 11/15/2024)  Chief Complaint: Shortness of breath  Chronicity: recurrent  Onset: in the past 7 days  Frequency: 2 to 4 times per  day  Progression since onset: worsening  Fever: no fever  chest congestion: Yes  hemoptysis: No  sputum production: Yes  PND: No  syncope: No  dry cough: No  leg pain: No  orthopnea: No  swollen glands: No  Aggravating factors: nothing  asthma: No  COPD: No  Recent oxygen %: 96

## 2024-12-16 ENCOUNTER — HOSPITAL ENCOUNTER (OUTPATIENT)
Dept: CT IMAGING | Facility: HOSPITAL | Age: 69
Discharge: HOME | End: 2024-12-16
Admitting: NURSE PRACTITIONER
Payer: MEDICARE

## 2024-12-16 DIAGNOSIS — F17.210 CIGARETTE NICOTINE DEPENDENCE WITHOUT COMPLICATION: ICD-10-CM

## 2024-12-16 PROCEDURE — 71271 CT THORAX LUNG CANCER SCR C-: CPT

## 2024-12-19 DIAGNOSIS — Z12.31 ENCOUNTER FOR SCREENING MAMMOGRAM FOR BREAST CANCER: Primary | ICD-10-CM

## 2024-12-31 DIAGNOSIS — I25.119 CORONARY ARTERY DISEASE INVOLVING NATIVE CORONARY ARTERY OF NATIVE HEART WITH ANGINA PECTORIS: ICD-10-CM

## 2024-12-31 DIAGNOSIS — I10 ESSENTIAL HYPERTENSION: ICD-10-CM

## 2024-12-31 DIAGNOSIS — E78.5 HYPERLIPIDEMIA LDL GOAL <70: ICD-10-CM

## 2024-12-31 DIAGNOSIS — K21.9 GASTROESOPHAGEAL REFLUX DISEASE, UNSPECIFIED WHETHER ESOPHAGITIS PRESENT: ICD-10-CM

## 2024-12-31 DIAGNOSIS — J30.89 NON-SEASONAL ALLERGIC RHINITIS, UNSPECIFIED TRIGGER: ICD-10-CM

## 2024-12-31 DIAGNOSIS — I63.9 ISCHEMIC STROKE: ICD-10-CM

## 2024-12-31 RX ORDER — FLUTICASONE PROPIONATE 50 MCG
2 SPRAY, SUSPENSION (ML) NASAL DAILY
Qty: 48 G | Refills: 5 | Status: SHIPPED | OUTPATIENT
Start: 2024-12-31

## 2024-12-31 RX ORDER — ROSUVASTATIN CALCIUM 40 MG/1
40 TABLET, COATED ORAL DAILY
Qty: 90 TABLET | Refills: 1 | Status: SHIPPED | OUTPATIENT
Start: 2024-12-31

## 2024-12-31 RX ORDER — ISOSORBIDE MONONITRATE 30 MG/1
30 TABLET, EXTENDED RELEASE ORAL DAILY
Qty: 90 TABLET | Refills: 1 | Status: SHIPPED | OUTPATIENT
Start: 2024-12-31

## 2024-12-31 RX ORDER — PANTOPRAZOLE SODIUM 40 MG/1
40 TABLET, DELAYED RELEASE ORAL DAILY
Qty: 90 TABLET | Refills: 1 | Status: SHIPPED | OUTPATIENT
Start: 2024-12-31

## 2024-12-31 RX ORDER — CLOPIDOGREL BISULFATE 75 MG/1
75 TABLET ORAL DAILY
Qty: 90 TABLET | Refills: 1 | Status: SHIPPED | OUTPATIENT
Start: 2024-12-31

## 2024-12-31 NOTE — TELEPHONE ENCOUNTER
Caller: [facility] - [place], [place] - [address] - [phone] [facility] [phone] FX    Relationship: Pharmacy    Best call back number:      Requested Prescriptions:   Requested Prescriptions     Pending Prescriptions Disp Refills    clopidogrel (PLAVIX) 75 MG tablet 90 tablet 1     Sig: Take 1 tablet by mouth Daily.    rosuvastatin (CRESTOR) 40 MG tablet 90 tablet 1     Sig: Take 1 tablet by mouth Daily.    isosorbide mononitrate (IMDUR) 30 MG 24 hr tablet 90 tablet 1     Sig: Take 1 tablet by mouth Daily.    pantoprazole (PROTONIX) 40 MG EC tablet 90 tablet 1     Sig: Take 1 tablet by mouth Daily.    fluticasone (FLONASE) 50 MCG/ACT nasal spray 48 g 5     Sig: Administer 2 sprays into the nostril(s) as directed by provider Daily.        Pharmacy where request should be sent: [facility] - [place], [place] - [address] - [phone] [facility] [phone] FX     Last office visit with prescribing clinician: 11/22/2024   Last telemedicine visit with prescribing clinician: 10/30/2024   Next office visit with prescribing clinician: 3/7/2025     Additional details provided by patient: THEY ARE NEEDING NEW PRESCRIPTIONS FOR THE PATIENT AND PATIENT IS OUT OF MEDICATION    Does the patient have less than a 3 day supply:  [x] Yes  [] No    Ly Montelongo Rep   12/31/24 11:46 EST

## 2025-01-16 ENCOUNTER — TELEPHONE (OUTPATIENT)
Dept: INTERNAL MEDICINE | Facility: CLINIC | Age: 70
End: 2025-01-16
Payer: MEDICARE

## 2025-01-16 NOTE — TELEPHONE ENCOUNTER
I left a message on the patients voicemail to call our office back, office number provided.     HUB relay:    Let patient know we can always seen her sooner if she needs to be seen.

## 2025-01-16 NOTE — TELEPHONE ENCOUNTER
Called patient to call our scheduling department at [phone] to schedule PFT testing that was ordered.    Was able to reach patient. She has started smoking again and has had a lot going on with her .     She will discuss this again with Nichole at her March appointment.     Please advise.

## 2025-01-17 NOTE — TELEPHONE ENCOUNTER
Name: Jasvir Romero    Relationship: Self    Best Callback Number: [phone]     HUB PROVIDED THE RELAY MESSAGE FROM THE OFFICE   PATIENT VOICED UNDERSTANDING AND HAS NO FURTHER QUESTIONS AT THIS TIME    ADDITIONAL INFORMATION: WANTS TO WAIT TO BE SEEN.

## 2025-02-05 DIAGNOSIS — K21.9 GASTROESOPHAGEAL REFLUX DISEASE, UNSPECIFIED WHETHER ESOPHAGITIS PRESENT: ICD-10-CM

## 2025-02-05 RX ORDER — PANTOPRAZOLE SODIUM 40 MG/1
40 TABLET, DELAYED RELEASE ORAL DAILY
Qty: 90 TABLET | Refills: 1 | Status: SHIPPED | OUTPATIENT
Start: 2025-02-05

## 2025-03-06 LAB
NCCN CRITERIA FLAG: NORMAL
TYRER CUZICK SCORE: 4.5

## 2025-03-07 ENCOUNTER — OFFICE VISIT (OUTPATIENT)
Dept: INTERNAL MEDICINE | Facility: CLINIC | Age: 70
End: 2025-03-07
Payer: MEDICARE

## 2025-03-07 VITALS
OXYGEN SATURATION: 95 % | DIASTOLIC BLOOD PRESSURE: 68 MMHG | WEIGHT: 165 LBS | RESPIRATION RATE: 16 BRPM | TEMPERATURE: 97.1 F | SYSTOLIC BLOOD PRESSURE: 112 MMHG | BODY MASS INDEX: 32.39 KG/M2 | HEART RATE: 66 BPM | HEIGHT: 60 IN

## 2025-03-07 DIAGNOSIS — Z23 ENCOUNTER FOR IMMUNIZATION: ICD-10-CM

## 2025-03-07 DIAGNOSIS — E55.9 VITAMIN D DEFICIENCY: ICD-10-CM

## 2025-03-07 DIAGNOSIS — Z12.11 ENCOUNTER FOR SCREENING FOR MALIGNANT NEOPLASM OF COLON: ICD-10-CM

## 2025-03-07 DIAGNOSIS — Z00.00 ENCOUNTER FOR SUBSEQUENT ANNUAL WELLNESS VISIT (AWV) IN MEDICARE PATIENT: Primary | ICD-10-CM

## 2025-03-07 DIAGNOSIS — E78.5 HYPERLIPIDEMIA LDL GOAL <70: ICD-10-CM

## 2025-03-07 DIAGNOSIS — I25.119 CORONARY ARTERY DISEASE INVOLVING NATIVE CORONARY ARTERY OF NATIVE HEART WITH ANGINA PECTORIS: ICD-10-CM

## 2025-03-07 DIAGNOSIS — I10 ESSENTIAL HYPERTENSION: ICD-10-CM

## 2025-03-07 DIAGNOSIS — R05.3 CHRONIC COUGH: ICD-10-CM

## 2025-03-07 LAB
25(OH)D3 SERPL-MCNC: 74.3 NG/ML (ref 30–100)
ALBUMIN SERPL-MCNC: 3.8 G/DL (ref 3.5–5.2)
ALBUMIN/GLOB SERPL: 1.5 G/DL
ALP SERPL-CCNC: 69 U/L (ref 39–117)
ALT SERPL W P-5'-P-CCNC: 6 U/L (ref 1–33)
ANION GAP SERPL CALCULATED.3IONS-SCNC: 11.3 MMOL/L (ref 5–15)
AST SERPL-CCNC: 11 U/L (ref 1–32)
BASOPHILS # BLD AUTO: 0.04 10*3/MM3 (ref 0–0.2)
BASOPHILS NFR BLD AUTO: 0.6 % (ref 0–1.5)
BILIRUB SERPL-MCNC: 0.7 MG/DL (ref 0–1.2)
BUN SERPL-MCNC: 16 MG/DL (ref 8–23)
BUN/CREAT SERPL: 18.2 (ref 7–25)
CALCIUM SPEC-SCNC: 9.8 MG/DL (ref 8.6–10.5)
CHLORIDE SERPL-SCNC: 103 MMOL/L (ref 98–107)
CHOLEST SERPL-MCNC: 139 MG/DL (ref 0–200)
CO2 SERPL-SCNC: 25.7 MMOL/L (ref 22–29)
CREAT SERPL-MCNC: 0.88 MG/DL (ref 0.57–1)
DEPRECATED RDW RBC AUTO: 43.1 FL (ref 37–54)
EGFRCR SERPLBLD CKD-EPI 2021: 72.1 ML/MIN/1.73
EOSINOPHIL # BLD AUTO: 0.17 10*3/MM3 (ref 0–0.4)
EOSINOPHIL NFR BLD AUTO: 2.4 % (ref 0.3–6.2)
ERYTHROCYTE [DISTWIDTH] IN BLOOD BY AUTOMATED COUNT: 13 % (ref 12.3–15.4)
GLOBULIN UR ELPH-MCNC: 2.5 GM/DL
GLUCOSE SERPL-MCNC: 100 MG/DL (ref 65–99)
HCT VFR BLD AUTO: 43.8 % (ref 34–46.6)
HDLC SERPL-MCNC: 37 MG/DL (ref 40–60)
HGB BLD-MCNC: 14.5 G/DL (ref 12–15.9)
IMM GRANULOCYTES # BLD AUTO: 0.03 10*3/MM3 (ref 0–0.05)
IMM GRANULOCYTES NFR BLD AUTO: 0.4 % (ref 0–0.5)
LDLC SERPL CALC-MCNC: 60 MG/DL (ref 0–100)
LDLC/HDLC SERPL: 1.33 {RATIO}
LYMPHOCYTES # BLD AUTO: 1.09 10*3/MM3 (ref 0.7–3.1)
LYMPHOCYTES NFR BLD AUTO: 15.7 % (ref 19.6–45.3)
MCH RBC QN AUTO: 30 PG (ref 26.6–33)
MCHC RBC AUTO-ENTMCNC: 33.1 G/DL (ref 31.5–35.7)
MCV RBC AUTO: 90.7 FL (ref 79–97)
MONOCYTES # BLD AUTO: 0.54 10*3/MM3 (ref 0.1–0.9)
MONOCYTES NFR BLD AUTO: 7.8 % (ref 5–12)
NEUTROPHILS NFR BLD AUTO: 5.09 10*3/MM3 (ref 1.7–7)
NEUTROPHILS NFR BLD AUTO: 73.1 % (ref 42.7–76)
NRBC BLD AUTO-RTO: 0 /100 WBC (ref 0–0.2)
PLATELET # BLD AUTO: 230 10*3/MM3 (ref 140–450)
PMV BLD AUTO: 10.7 FL (ref 6–12)
POTASSIUM SERPL-SCNC: 4.1 MMOL/L (ref 3.5–5.2)
PROT SERPL-MCNC: 6.3 G/DL (ref 6–8.5)
RBC # BLD AUTO: 4.83 10*6/MM3 (ref 3.77–5.28)
SODIUM SERPL-SCNC: 140 MMOL/L (ref 136–145)
TRIGL SERPL-MCNC: 264 MG/DL (ref 0–150)
TSH SERPL DL<=0.05 MIU/L-ACNC: 0.73 UIU/ML (ref 0.27–4.2)
VLDLC SERPL-MCNC: 42 MG/DL (ref 5–40)
WBC NRBC COR # BLD AUTO: 6.96 10*3/MM3 (ref 3.4–10.8)

## 2025-03-07 PROCEDURE — 80061 LIPID PANEL: CPT | Performed by: NURSE PRACTITIONER

## 2025-03-07 PROCEDURE — 82306 VITAMIN D 25 HYDROXY: CPT | Performed by: NURSE PRACTITIONER

## 2025-03-07 PROCEDURE — 85025 COMPLETE CBC W/AUTO DIFF WBC: CPT | Performed by: NURSE PRACTITIONER

## 2025-03-07 PROCEDURE — 84443 ASSAY THYROID STIM HORMONE: CPT | Performed by: NURSE PRACTITIONER

## 2025-03-07 PROCEDURE — 80053 COMPREHEN METABOLIC PANEL: CPT | Performed by: NURSE PRACTITIONER

## 2025-03-07 NOTE — PROGRESS NOTES
Subjective   The ABCs of the Annual Wellness Visit  Medicare Wellness Visit      Jasvir Romero is a 69 y.o. patient who presents for a Medicare Wellness Visit.    The following portions of the patient's history were reviewed and   updated as appropriate: allergies, current medications, past family history, past medical history, past social history, past surgical history, and problem list.    Compared to one year ago, the patient's physical   health is the same.  Compared to one year ago, the patient's mental   health is the same.    Recent Hospitalizations:  She was not admitted to the hospital during the last year.     Current Medical Providers:  Patient Care Team:  Nichole Cardenas APRN as PCP - General (Nurse Practitioner)  Telly Anguiano IV, MD as Consulting Physician (Interventional Cardiology)  Emily Ba APRN as Nurse Practitioner (Interventional Cardiology)  Calin Little MD as Consulting Physician (Otolaryngology)    Outpatient Medications Prior to Visit   Medication Sig Dispense Refill    albuterol sulfate  (90 Base) MCG/ACT inhaler Inhale 2 puffs Every 6 (Six) Hours As Needed for Wheezing. 8 g 1    ascorbic acid (VITAMIN C) 1000 MG tablet Take 1 tablet by mouth Daily.      BIOTIN PO Take 1 tablet by mouth Daily.      cholecalciferol (VITAMIN D3) 25 MCG (1000 UT) tablet Take 1 tablet by mouth Daily. 90 tablet 1    clopidogrel (PLAVIX) 75 MG tablet Take 1 tablet by mouth Daily. 90 tablet 1    fluticasone (FLONASE) 50 MCG/ACT nasal spray Administer 2 sprays into the nostril(s) as directed by provider Daily. 48 g 5    isosorbide mononitrate (IMDUR) 30 MG 24 hr tablet Take 1 tablet by mouth Daily. 90 tablet 1    ketoconazole (NIZORAL) 2 % shampoo Apply  topically to the appropriate area as directed 2 (Two) Times a Week. 360 mL 3    losartan (COZAAR) 100 MG tablet TAKE ONE TABLET BY MOUTH EVERY DAY 90 tablet 1    metoprolol tartrate (LOPRESSOR) 50 MG tablet Take  1 tablet by mouth 2 (Two) Times a Day. 180 tablet 3    nitroglycerin (NITROSTAT) 0.4 MG SL tablet Place 1 tablet under the tongue Every 5 (Five) Minutes As Needed for Chest Pain. 25 tablet 1    pantoprazole (PROTONIX) 40 MG EC tablet TAKE 1 TABLET BY MOUTH DAILY 90 tablet 1    rosuvastatin (CRESTOR) 40 MG tablet Take 1 tablet by mouth Daily. 90 tablet 1    sucralfate (CARAFATE) 1 g tablet Take 1 tablet by mouth 4 (Four) Times a Day. Thoroughly crush pill and mix in small amount of water prior to swallowing. (Patient taking differently: Take 1 tablet by mouth 4 (Four) Times a Day As Needed. Thoroughly crush pill and mix in small amount of water prior to swallowing.) 90 tablet 0    tiotropium bromide-olodaterol (STIOLTO RESPIMAT) 2.5-2.5 MCG/ACT aerosol solution inhaler Inhale 2 puffs Daily. 4 g 0    vitamin B-12 (CYANOCOBALAMIN) 1000 MCG tablet Take 1 tablet by mouth Daily.      cefdinir (OMNICEF) 300 MG capsule Take 1 capsule by mouth 2 (Two) Times a Day. 20 capsule 0    clobetasol (TEMOVATE) 0.05 % external solution APPLY TO SCALP TWICE DAILY FOR 2 WEEKS AT A TIME      methylPREDNISolone (MEDROL) 4 MG dose pack Take as directed on package instructions. 21 tablet 0     No facility-administered medications prior to visit.     No opioid medication identified on active medication list. I have reviewed chart for other potential  high risk medication/s and harmful drug interactions in the elderly.      Aspirin is not on active medication list.  Aspirin use is not indicated based on review of current medical condition/s. Risk of harm outweighs potential benefits.  .    Patient Active Problem List   Diagnosis    Essential hypertension    GERD (gastroesophageal reflux disease)    Hyperlipidemia LDL goal <70    Cobalamin deficiency    Vitamin D deficiency    Coronary artery disease involving native coronary artery of native heart with angina pectoris    Current every day smoker    Obesity (BMI 30.0-34.9)    Carotid artery  "disease    Multinodular goiter    Prediabetes     Advance Care Planning Advance Directive is not on file.  ACP discussion was held with the patient during this visit. Patient does not have an advance directive, declines further assistance.            Objective   Vitals:    25 0922   BP: 112/68   BP Location: Right arm   Patient Position: Sitting   Cuff Size: Adult   Pulse: 66   Resp: 16   Temp: 97.1 °F (36.2 °C)   TempSrc: Infrared   SpO2: 95%   Weight: 74.8 kg (165 lb)   Height: 152.4 cm (60\")   PainSc: 0-No pain       Estimated body mass index is 32.22 kg/m² as calculated from the following:    Height as of this encounter: 152.4 cm (60\").    Weight as of this encounter: 74.8 kg (165 lb).    BMI is >= 30 and <35. (Class 1 Obesity). The following options were offered after discussion;: exercise counseling/recommendations, nutrition counseling/recommendations, and Information on healthy weight added to patient's after visit summary.            Does the patient have evidence of cognitive impairment? No  Lab Results   Component Value Date    TRIG 264 (H) 2025    HDL 37 (L) 2025    LDL 60 2025    VLDL 42 (H) 2025                                                                                                Health  Risk Assessment    Smoking Status:  Social History     Tobacco Use   Smoking Status Every Day    Current packs/day: 0.00    Average packs/day: 1 pack/day for 40.0 years (40.0 ttl pk-yrs)    Types: Cigarettes    Start date: 1980    Last attempt to quit: 2019    Years since quittin.4    Passive exposure: Current   Smokeless Tobacco Never     Alcohol Consumption:  Social History     Substance and Sexual Activity   Alcohol Use Never    Comment: rarely       Fall Risk Screen  STEADI Fall Risk Assessment was completed, and patient is at LOW risk for falls.Assessment completed on:3/7/2025    Depression Screening   Little interest or pleasure in doing things? Not at all "   Feeling down, depressed, or hopeless? Not at all   PHQ-2 Total Score 0      Health Habits and Functional and Cognitive Screenin/28/2025     7:28 AM   Functional & Cognitive Status   Do you have difficulty preparing food and eating? No   Do you have difficulty bathing yourself, getting dressed or grooming yourself? No   Do you have difficulty using the toilet? No   Do you have difficulty moving around from place to place? No   Do you have trouble with steps or getting out of a bed or a chair? No   Current Diet Unhealthy Diet   Dental Exam Up to date   Eye Exam Up to date   Exercise (times per week) 0 times per week   Current Exercises Include No Regular Exercise   Do you need help using the phone?  No   Are you deaf or do you have serious difficulty hearing?  No   Do you need help to go to places out of walking distance? No   Do you need help shopping? No   Do you need help preparing meals?  No   Do you need help with housework?  No   Do you need help with laundry? No   Do you need help taking your medications? No   Do you need help managing money? No   Do you ever drive or ride in a car without wearing a seat belt? No   Have you felt unusual stress, anger or loneliness in the last month? No   Who do you live with? Spouse   If you need help, do you have trouble finding someone available to you? No   Have you been bothered in the last four weeks by sexual problems? No   Do you have difficulty concentrating, remembering or making decisions? No           Age-appropriate Screening Schedule:  Refer to the list below for future screening recommendations based on patient's age, sex and/or medical conditions. Orders for these recommended tests are listed in the plan section. The patient has been provided with a written plan.    Health Maintenance List  Health Maintenance   Topic Date Due    TDAP/TD VACCINES (2 - Td or Tdap) 10/07/2021    ZOSTER VACCINE (3 of 3) 2024    COVID-19 Vaccine (2024- season)  09/01/2024    COLORECTAL CANCER SCREENING  02/13/2025    MAMMOGRAM  02/16/2025    PAP SMEAR  03/30/2025    LUNG CANCER SCREENING  12/16/2025    ANNUAL WELLNESS VISIT  03/07/2026    LIPID PANEL  03/07/2026    BMI FOLLOWUP  03/07/2026    DXA SCAN  07/01/2026    HEPATITIS C SCREENING  Completed    Pneumococcal Vaccine 50+  Completed                                                                                                                                                CMS Preventative Services Quick Reference  Risk Factors Identified During Encounter  Fall Risk-High or Moderate: Discussed Fall Prevention in the home and Information on Fall Prevention Shared in After Visit Summary  Glaucoma or Family History of Glaucoma:   follows with ophthalmology  Immunizations Discussed/Encouraged: Shingrix    Inactivity/Sedentary: Patient was advised to exercise at least 150 minutes a week per CDC recommendations.  Polypharmacy: Medication List reviewed  Jasvir Baez Nick  reports that she has been smoking cigarettes. She started smoking about 45 years ago. She has a 40 pack-year smoking history. She has been exposed to tobacco smoke. She has never used smokeless tobacco. I have educated her on the risk of diseases from using tobacco products such as cancer, COPD, and heart disease.     I advised her to quit and she is willing to quit. We have discussed the following method/s for tobacco cessation:  Education Material and Counseling.  She uses gum while working and postpones smoking to help cut back .  She will follow up with me in 1 month on my chart or sooner to check on her progress.    I spent 3  minutes counseling the patient.        Urinary Incontinence: Kegel exercises discussed  Dental Screening Recommended  Vision Screening Recommended    The above risks/problems have been discussed with the patient.  Pertinent information has been shared with the patient in the After Visit Summary.  An After Visit Summary  "and PPPS were made available to the patient.    Follow Up:   Next Medicare Wellness visit to be scheduled in 1 year.         Additional E&M Note during same encounter follows:  Patient has additional, significant, and separately identifiable condition(s)/problem(s) that require work above and beyond the Medicare Wellness Visit     Chief Complaint  Medicare Wellness-subsequent    Subjective    HPI  Jasvir is also being seen today for additional medical problem/s and wellness.       Chronic cough  Continues with chronic cough.  She is using Stiolto inconsistently. Breztri was not cost effective. The patient reports no known triggers.  Since the last office visit, the patient has not sought emergency care. She is trying to quit smoking. She needs to reschedule pulmonary visit.      CAD  She has a history of coronary artery disease and is currently on a high-intensity statin. She has also had a stroke in the past. She is not taking aspirin since 2022; maintains on plavix 75 mg daily.     Hypertension  The patient denies headaches, chest pain, dyspnea, edema, syncope, blurred vision or palpitations. They state that they are taking their medication as prescribed. They are not having medication side effects.     Immunizations: shingrix  Pap: 3/2022  Mammogram:  Discussed importance of screening for any malignancy early. (Scheduled)  Colonoscopy: ordered                      Objective   Vital Signs:  /68 (BP Location: Right arm, Patient Position: Sitting, Cuff Size: Adult)   Pulse 66   Temp 97.1 °F (36.2 °C) (Infrared)   Resp 16   Ht 152.4 cm (60\")   Wt 74.8 kg (165 lb)   SpO2 95%   BMI 32.22 kg/m²   Physical Exam  Vitals and nursing note reviewed.   Constitutional:       General: She is not in acute distress.     Appearance: Normal appearance. She is not ill-appearing.   HENT:      Head: Normocephalic.      Right Ear: Tympanic membrane, ear canal and external ear normal. There is no impacted cerumen.      " Left Ear: Tympanic membrane, ear canal and external ear normal. There is no impacted cerumen.      Nose: No nasal tenderness or rhinorrhea.      Mouth/Throat:      Mouth: Mucous membranes are moist.      Pharynx: Oropharynx is clear. No oropharyngeal exudate or posterior oropharyngeal erythema.   Eyes:      General:         Right eye: No discharge.         Left eye: No discharge.      Extraocular Movements: Extraocular movements intact.      Conjunctiva/sclera: Conjunctivae normal.      Pupils: Pupils are equal, round, and reactive to light.   Neck:      Thyroid: No thyromegaly.      Vascular: Carotid bruit present.   Cardiovascular:      Rate and Rhythm: Normal rate and regular rhythm.      Pulses: Normal pulses.      Heart sounds: Normal heart sounds. No murmur heard.     No gallop.   Pulmonary:      Effort: Pulmonary effort is normal.      Breath sounds: Normal breath sounds. No wheezing, rhonchi or rales.   Abdominal:      General: Bowel sounds are normal.      Palpations: Abdomen is soft. There is no mass.      Tenderness: There is no abdominal tenderness. There is no right CVA tenderness or left CVA tenderness.   Musculoskeletal:         General: No swelling or tenderness. Normal range of motion.      Cervical back: Normal range of motion.      Right lower leg: No edema.      Left lower leg: No edema.   Lymphadenopathy:      Cervical: No cervical adenopathy.   Skin:     General: Skin is warm and dry.      Capillary Refill: Capillary refill takes less than 2 seconds.      Findings: No erythema or rash.      Comments: No suspicious skin lesions   Neurological:      General: No focal deficit present.      Mental Status: She is alert and oriented to person, place, and time.      Motor: No weakness.   Psychiatric:         Mood and Affect: Mood normal.         Behavior: Behavior is cooperative.         Cognition and Memory: She does not exhibit impaired recent memory.                       Results                 Assessment and Plan Additional age appropriate preventative wellness advice topics were discussed during today's preventative wellness exam(some topics already addressed during AWV portion of the note above):    Physical Activity: Advised cardiovascular activity 150 minutes per week as tolerated. (example brisk walk for 30 minutes, 5 days a week).     Nutrition: Discussed nutrition plan with patient. Information shared in after visit summary. Goal is for a well balanced diet to enhance overall health.     Healthy Weight: Discussed current and goal BMI with patient. Steps to attain this goal discussed. Information shared in after visit summary.     Gun Safety Awareness Discussion: Information Shared in after visit summary.     Tobacco Misuse Discussion: Information shared in after visit summary.     Motor Vehicle Safety Discussion:  Wearing Seatbelt While in Motor Vehicle recommendation. Adhering to posted speed limit recommendation.            Problems Addressed this Visit          Cardiac and Vasculature    Essential hypertension    Relevant Orders    Comprehensive Metabolic Panel (Completed)    TSH (Completed)    CBC & Differential (Completed)    Hyperlipidemia LDL goal <70    Relevant Orders    Lipid Panel (Completed)    Coronary artery disease involving native coronary artery of native heart with angina pectoris       Endocrine and Metabolic    Vitamin D deficiency    Relevant Orders    Vitamin D,25-Hydroxy (Completed)     Other Visit Diagnoses         Encounter for subsequent annual wellness visit (AWV) in Medicare patient    -  Primary      Encounter for screening for malignant neoplasm of colon        Relevant Orders    Ambulatory Referral For Screening Colonoscopy      Chronic cough          Encounter for immunization        Relevant Orders    Shingrix Vaccine          Diagnoses         Codes Comments      Encounter for subsequent annual wellness visit (AWV) in Medicare patient    -  Primary ICD-10-CM:  Z00.00  ICD-9-CM: V70.0       Encounter for screening for malignant neoplasm of colon     ICD-10-CM: Z12.11  ICD-9-CM: V76.51       Essential hypertension     ICD-10-CM: I10  ICD-9-CM: 401.9       Hyperlipidemia LDL goal <70     ICD-10-CM: E78.5  ICD-9-CM: 272.4       Vitamin D deficiency     ICD-10-CM: E55.9  ICD-9-CM: 268.9       Coronary artery disease involving native coronary artery of native heart with angina pectoris     ICD-10-CM: I25.119  ICD-9-CM: 414.01, 413.9       Chronic cough     ICD-10-CM: R05.3  ICD-9-CM: 786.2       Encounter for immunization     ICD-10-CM: Z23  ICD-9-CM: V03.89               Encounter for screening for malignant neoplasm of colon    Essential hypertension  Hypertension is stable and controlled  Continue current treatment regimen.  Blood pressure will be reassessed in 3 months.  Hyperlipidemia LDL goal <70   Lipid abnormalities are stable    Plan:  Continue same medication/s without change.      Discussed medication dosage, use, side effects, and goals of treatment in detail.    Counseled patient on lifestyle modifications to help control hyperlipidemia.     Patient Treatment Goals:   LDL goal is less than 70    Followup in 3 months.  Vitamin D deficiency    Encounter for subsequent annual wellness visit (AWV) in Medicare patient    Coronary artery disease involving native coronary artery of native heart with angina pectoris  Coronary Artery Disease (OPTIONAL): Coronary artery disease is stable.  Continue current treatment regimen.  Cardiac status will be reassessed in 3 months.  Chronic cough  Follow-up with pulmonary; number given  Patient will check with ophthalmology about starting trelegy. One sample of trelegy given.   Encounter for immunization            Follow Up   Return in about 3 months (around 6/7/2025) for Recheck.  Patient was given instructions and counseling regarding her condition or for health maintenance advice. Please see specific information pulled into the  AVS if appropriate.  Patient or patient representative verbalized consent for the use of Ambient Listening during the visit with  LORI Gunn for chart documentation. 3/9/2025  12:58 EDT

## 2025-03-07 NOTE — PATIENT INSTRUCTIONS
MyPlate from USDA  MyPlate is an outline of a general healthy diet based on the Dietary Guidelines for Americans, 8472-5230, from the U.S. Department of Agriculture (USDA). It sets guidelines for how much food you should eat from each food group based on your age, sex, and level of physical activity.  What are tips for following MyPlate?  To follow MyPlate recommendations:  Eat a wide variety of fruits and vegetables, grains, and protein foods.  Serve smaller portions and eat less food throughout the day.  Limit portion sizes to avoid overeating.  Enjoy your food.  Get at least 150 minutes of exercise every week. This is about 30 minutes each day, 5 or more days per week.  It can be difficult to have every meal look like MyPlate. Think about MyPlate as eating guidelines for an entire day, rather than each individual meal.  Fruits and vegetables  Make one half of your plate fruits and vegetables.  Eat many different colors of fruits and vegetables each day.  For a 2,000-calorie daily food plan, eat:  2½ cups of vegetables every day.  2 cups of fruit every day.  1 cup is equal to:  1 cup raw or cooked vegetables.  1 cup raw fruit.  1 medium-sized orange, apple, or banana.  1 cup 100% fruit or vegetable juice.  2 cups raw leafy greens, such as lettuce, spinach, or kale.  ½ cup dried fruit.  Grains  One fourth of your plate should be grains.  Make at least half of the grains you eat each day whole grains.  For a 2,000-calorie daily food plan, eat 6 oz of grains every day.  1 oz is equal to:  1 slice bread.  1 cup cereal.  ½ cup cooked rice, cereal, or pasta.  Protein  One fourth of your plate should be protein.  Eat a wide variety of protein foods, including meat, poultry, fish, eggs, beans, nuts, and tofu.  For a 2,000-calorie daily food plan, eat 5½ oz of protein every day.  1 oz is equal to:  1 oz meat, poultry, or fish.  ¼ cup cooked beans.  1 egg.  ½ oz nuts or seeds.  1 Tbsp peanut butter.  Dairy  Drink fat-free  or low-fat (1%) milk.  Eat or drink dairy as a side to meals.  For a 2,000-calorie daily food plan, eat or drink 3 cups of dairy every day.  1 cup is equal to:  1 cup milk, yogurt, cottage cheese, or soy milk (soy beverage).  2 oz processed cheese.  1½ oz natural cheese.  Fats, oils, salt, and sugars  Only small amounts of oils are recommended.  Avoid foods that are high in calories and low in nutritional value (empty calories), like foods high in fat or added sugars.  Choose foods that are low in salt (sodium). Choose foods that have less than 140 milligrams (mg) of sodium per serving.  Drink water instead of sugary drinks. Drink enough fluid to keep your urine pale yellow.  Where to find support  Work with your health care provider or a dietitian to develop a customized eating plan that is right for you.  Download an ruslan (mobile application) to help you track your daily food intake.  Where to find more information  USDA: ChooseMyPlate.gov  Summary  MyPlate is a general guideline for healthy eating from the USDA. It is based on the Dietary Guidelines for Americans, 4610-4140.  In general, fruits and vegetables should take up one half of your plate, grains should take up one fourth of your plate, and protein should take up one fourth of your plate.  This information is not intended to replace advice given to you by your health care provider. Make sure you discuss any questions you have with your health care provider.  Document Revised: 11/08/2021 Document Reviewed: 11/08/2021  SellanApp Patient Education © 2022 Elsevier Inc. For more information:    Quit Now [facility]  1-800-QUIT-NOW  https://haimy.quitlogix.org/en-US/  Steps to Quit Smoking  Smoking tobacco can be harmful to your health and can affect almost every organ in your body. Smoking puts you, and those around you, at risk for developing many serious chronic diseases. Quitting smoking is difficult, but it is one of the best things that you can do for your  health. It is never too late to quit.  What are the benefits of quitting smoking?  When you quit smoking, you lower your risk of developing serious diseases and conditions, such as:  Lung cancer or lung disease, such as COPD.  Heart disease.  Stroke.  Heart attack.  Infertility.  Osteoporosis and bone fractures.  Additionally, symptoms such as coughing, wheezing, and shortness of breath may get better when you quit. You may also find that you get sick less often because your body is stronger at fighting off colds and infections. If you are pregnant, quitting smoking can help to reduce your chances of having a baby of low birth weight.  How do I get ready to quit?  When you decide to quit smoking, create a plan to make sure that you are successful. Before you quit:  Pick a date to quit. Set a date within the next two weeks to give you time to prepare.  Write down the reasons why you are quitting. Keep this list in places where you will see it often, such as on your bathroom mirror or in your car or wallet.  Identify the people, places, things, and activities that make you want to smoke (triggers) and avoid them. Make sure to take these actions:  Throw away all cigarettes at home, at work, and in your car.  Throw away smoking accessories, such as ashtrays and lighters.  Clean your car and make sure to empty the ashtray.  Clean your home, including curtains and carpets.  Tell your family, friends, and coworkers that you are quitting. Support from your loved ones can make quitting easier.  Talk with your health care provider about your options for quitting smoking.  Find out what treatment options are covered by your health insurance.  What strategies can I use to quit smoking?  Talk with your healthcare provider about different strategies to quit smoking. Some strategies include:  Quitting smoking altogether instead of gradually lessening how much you smoke over a period of time. Research shows that quitting “cold  turkey” is more successful than gradually quitting.  Attending in-person counseling to help you build problem-solving skills. You are more likely to have success in quitting if you attend several counseling sessions. Even short sessions of 10 minutes can be effective.  Finding resources and support systems that can help you to quit smoking and remain smoke-free after you quit. These resources are most helpful when you use them often. They can include:  Online chats with a counselor.  Telephone quitlines.  Printed self-help materials.  Support groups or group counseling.  Text messaging programs.  Mobile phone applications.  Taking medicines to help you quit smoking. (If you are pregnant or breastfeeding, talk with your health care provider first.) Some medicines contain nicotine and some do not. Both types of medicines help with cravings, but the medicines that include nicotine help to relieve withdrawal symptoms. Your health care provider may recommend:  Nicotine patches, gum, or lozenges.  Nicotine inhalers or sprays.  Non-nicotine medicine that is taken by mouth.  Talk with your health care provider about combining strategies, such as taking medicines while you are also receiving in-person counseling. Using these two strategies together makes you more likely to succeed in quitting than if you used either strategy on its own.  If you are pregnant or breastfeeding, talk with your health care provider about finding counseling or other support strategies to quit smoking. Do not take medicine to help you quit smoking unless told to do so by your health care provider.  What things can I do to make it easier to quit?  Quitting smoking might feel overwhelming at first, but there is a lot that you can do to make it easier. Take these important actions:  Reach out to your family and friends and ask that they support and encourage you during this time. Call telephone quitlines, reach out to support groups, or work with a  counselor for support.  Ask people who smoke to avoid smoking around you.  Avoid places that trigger you to smoke, such as bars, parties, or smoke-break areas at work.  Spend time around people who do not smoke.  Lessen stress in your life, because stress can be a smoking trigger for some people. To lessen stress, try:  Exercising regularly.  Deep-breathing exercises.  Yoga.  Meditating.  Performing a body scan. This involves closing your eyes, scanning your body from head to toe, and noticing which parts of your body are particularly tense. Purposefully relax the muscles in those areas.  Download or purchase mobile phone or tablet apps (applications) that can help you stick to your quit plan by providing reminders, tips, and encouragement. There are many free apps, such as QuitGuide from the CDC (Centers for Disease Control and Prevention). You can find other support for quitting smoking (smoking cessation) through smokefree.gov and other websites.  How will I feel when I quit smoking?  Within the first 24 hours of quitting smoking, you may start to feel some withdrawal symptoms. These symptoms are usually most noticeable 2-3 days after quitting, but they usually do not last beyond 2-3 weeks. Changes or symptoms that you might experience include:  Mood swings.  Restlessness, anxiety, or irritation.  Difficulty concentrating.  Dizziness.  Strong cravings for sugary foods in addition to nicotine.  Mild weight gain.  Constipation.  Nausea.  Coughing or a sore throat.  Changes in how your medicines work in your body.  A depressed mood.  Difficulty sleeping (insomnia).  After the first 2-3 weeks of quitting, you may start to notice more positive results, such as:  Improved sense of smell and taste.  Decreased coughing and sore throat.  Slower heart rate.  Lower blood pressure.  Clearer skin.  The ability to breathe more easily.  Fewer sick days.  Quitting smoking is very challenging for most people. Do not get  discouraged if you are not successful the first time. Some people need to make many attempts to quit before they achieve long-term success. Do your best to stick to your quit plan, and talk with your health care provider if you have any questions or concerns.  This information is not intended to replace advice given to you by your health care provider. Make sure you discuss any questions you have with your health care provider.  Document Released: 12/12/2002 Document Revised: 08/15/2017 Document Reviewed: 05/03/2016  Elsevier Interactive Patient Education © 2017 Elsevier Inc.

## 2025-03-09 NOTE — ASSESSMENT & PLAN NOTE
Coronary Artery Disease (OPTIONAL): Coronary artery disease is stable.  Continue current treatment regimen.  Cardiac status will be reassessed in 3 months.

## 2025-03-13 DIAGNOSIS — I10 ESSENTIAL HYPERTENSION: ICD-10-CM

## 2025-03-13 RX ORDER — ISOSORBIDE MONONITRATE 30 MG/1
30 TABLET, EXTENDED RELEASE ORAL DAILY
Qty: 90 TABLET | Refills: 0 | Status: SHIPPED | OUTPATIENT
Start: 2025-03-13

## 2025-03-21 ENCOUNTER — TELEPHONE (OUTPATIENT)
Dept: INTERNAL MEDICINE | Facility: CLINIC | Age: 70
End: 2025-03-21
Payer: MEDICARE

## 2025-03-21 ENCOUNTER — HOSPITAL ENCOUNTER (OUTPATIENT)
Dept: MAMMOGRAPHY | Facility: HOSPITAL | Age: 70
Discharge: HOME | End: 2025-03-21
Admitting: NURSE PRACTITIONER
Payer: MEDICARE

## 2025-03-21 DIAGNOSIS — J41.1 MUCOPURULENT CHRONIC BRONCHITIS: Primary | ICD-10-CM

## 2025-03-21 DIAGNOSIS — Z12.31 ENCOUNTER FOR SCREENING MAMMOGRAM FOR BREAST CANCER: ICD-10-CM

## 2025-03-21 PROCEDURE — 77063 BREAST TOMOSYNTHESIS BI: CPT

## 2025-03-21 PROCEDURE — 77067 SCR MAMMO BI INCL CAD: CPT

## 2025-03-21 NOTE — TELEPHONE ENCOUNTER
Patient stopped by office. She states Trelegy ellipta is not covered with her insurance it is over $400. She brought back the sample we gave her(unused)  She states she has been using stiolto respimat. Should she continue that inhaler?  She also states Breztri is covered on her insurance so she wanted to know if that was equivalent to trelegy ellipta. If so can you send a script in for her?

## 2025-04-07 DIAGNOSIS — I10 ESSENTIAL HYPERTENSION: ICD-10-CM

## 2025-04-07 DIAGNOSIS — I25.119 CORONARY ARTERY DISEASE INVOLVING NATIVE CORONARY ARTERY OF NATIVE HEART WITH ANGINA PECTORIS: ICD-10-CM

## 2025-04-08 RX ORDER — METOPROLOL TARTRATE 50 MG
50 TABLET ORAL 2 TIMES DAILY
Qty: 180 TABLET | Refills: 1 | Status: SHIPPED | OUTPATIENT
Start: 2025-04-08

## 2025-04-08 RX ORDER — LOSARTAN POTASSIUM 100 MG/1
100 TABLET ORAL DAILY
Qty: 90 TABLET | Refills: 1 | Status: SHIPPED | OUTPATIENT
Start: 2025-04-08

## 2025-04-30 RX ORDER — KETOCONAZOLE 20 MG/ML
SHAMPOO, SUSPENSION TOPICAL 2 TIMES WEEKLY
Qty: 360 ML | Refills: 3 | Status: SHIPPED | OUTPATIENT
Start: 2025-05-01

## 2025-05-09 ENCOUNTER — OFFICE VISIT (OUTPATIENT)
Dept: PULMONOLOGY | Facility: CLINIC | Age: 70
End: 2025-05-09
Payer: MEDICARE

## 2025-05-09 VITALS
HEIGHT: 60 IN | BODY MASS INDEX: 32.34 KG/M2 | SYSTOLIC BLOOD PRESSURE: 108 MMHG | TEMPERATURE: 96.6 F | HEART RATE: 62 BPM | DIASTOLIC BLOOD PRESSURE: 62 MMHG | WEIGHT: 164.7 LBS | OXYGEN SATURATION: 96 %

## 2025-05-09 DIAGNOSIS — R05.3 CHRONIC COUGH: Primary | ICD-10-CM

## 2025-05-09 DIAGNOSIS — J44.89 ASTHMA-COPD OVERLAP SYNDROME: ICD-10-CM

## 2025-05-09 DIAGNOSIS — Z87.891 PERSONAL HISTORY OF SMOKING: ICD-10-CM

## 2025-05-09 RX ORDER — ALBUTEROL SULFATE 90 UG/1
2 INHALANT RESPIRATORY (INHALATION) EVERY 6 HOURS PRN
Qty: 8 G | Refills: 1 | Status: SHIPPED | OUTPATIENT
Start: 2025-05-09

## 2025-05-09 RX ORDER — LEVALBUTEROL TARTRATE 45 UG/1
2 AEROSOL, METERED ORAL DAILY
Status: SHIPPED | OUTPATIENT
Start: 2025-05-09

## 2025-05-09 RX ORDER — TOBRAMYCIN AND DEXAMETHASONE 3; 1 MG/ML; MG/ML
SUSPENSION/ DROPS OPHTHALMIC
COMMUNITY
Start: 2025-05-08

## 2025-05-09 RX ORDER — FLUTICASONE PROPIONATE AND SALMETEROL 500; 50 UG/1; UG/1
1 POWDER RESPIRATORY (INHALATION)
Qty: 60 EACH | Refills: 11 | Status: SHIPPED | OUTPATIENT
Start: 2025-05-09

## 2025-05-09 RX ADMIN — LEVALBUTEROL TARTRATE 2 PUFF: 45 AEROSOL, METERED ORAL at 09:06

## 2025-05-09 NOTE — PROGRESS NOTES
New Patient Pulmonary Office Visit      Patient Name: Jasvir Romero    Referring Physician: Nichole Cardenas APRN    Chief Complaint:    Chief Complaint   Patient presents with    Cough       History of Present Illness: Jasvir Romero is a 70 y.o. female who is here today to establish care with Pulmonary.  Patient has a past medical history significant for tobacco use, hypertension, hyperlipidemia, coronary artery disease, and GERD.  Patient was referred to pulmonary for evaluation of a cough.  Patient states she has had a cough now for extended period of time.  But has a significant smoking history.  It is productive.  Denies any chest pain, nausea, fever, chills.  Tried to get Trelegy and Breztri both were too expensive.  She has occasionally used her  Stiolto.  No other acute complaints.    Review of Systems:   Review of Systems   Constitutional:  Negative for chills, fatigue and fever.   HENT:  Negative for congestion and voice change.    Eyes:  Negative for blurred vision.   Respiratory:  Positive for cough. Negative for shortness of breath and wheezing.    Cardiovascular:  Negative for chest pain.   Skin:  Negative for dry skin.   Hematological:  Negative for adenopathy.   Psychiatric/Behavioral:  Negative for agitation and depressed mood.        Past Medical History:   Past Medical History:   Diagnosis Date    Allergic 80s    Seasonal    Chicken pox     Clotting disorder 1998    Colon polyp 2/24    Polyps  removed and clipped    Coronary artery disease 2015    Diverticulitis     Diverticulosis 2019    Easy bruising     Gall stones     GERD (gastroesophageal reflux disease)     Glaucoma     Hyperlipemia     Hypertension     Irritable bowel syndrome 2020    Ischemic stroke 10/24/2019    Low back pain 2016    Measles     Menopause     Mumps     Obesity 1985    Positive PPD     Scarlet fever     Tuberculosis 1986    Exposure       Past Surgical History:   Past Surgical History:    Procedure Laterality Date    CARDIAC CATHETERIZATION      CARPAL TUNNEL RELEASE      both hands    CHOLECYSTECTOMY      COLONOSCOPY  2009    CORONARY STENT PLACEMENT  2015    ECTOPIC PREGNANCY      ENDOMETRIAL ABLATION      HAND SURGERY      HYSTERECTOMY      OOPHORECTOMY      unilateral    OTHER SURGICAL HISTORY      lap and leep    SUBTOTAL HYSTERECTOMY  2001    Ablation    TUBAL ABDOMINAL LIGATION      with L salpingo-oophorectomy       Family History:   Family History   Problem Relation Age of Onset    Arthritis Mother     Hypertension Mother     Hyperlipidemia Mother     Heart attack Father     Hypertension Father     Obesity Father     Stroke Father         massive    Heart disease Father          massive stroke age 69    Hyperlipidemia Father     Arthritis Sister     Hypertension Sister     Hyperlipidemia Sister     No Known Problems Sister     Breast cancer Neg Hx     Ovarian cancer Neg Hx        Social History:   Social History     Socioeconomic History    Marital status:    Tobacco Use    Smoking status: Every Day     Current packs/day: 0.00     Average packs/day: 1 pack/day for 40.0 years (40.0 ttl pk-yrs)     Types: Cigarettes     Start date: 1980     Last attempt to quit: 2019     Years since quittin.6     Passive exposure: Current    Smokeless tobacco: Never   Vaping Use    Vaping status: Never Used   Substance and Sexual Activity    Alcohol use: Never     Comment: rarely    Drug use: Never    Sexual activity: Not Currently     Partners: Male     Birth control/protection: Post-menopausal       Medications:     Current Outpatient Medications:     albuterol sulfate  (90 Base) MCG/ACT inhaler, Inhale 2 puffs Every 6 (Six) Hours As Needed for Wheezing., Disp: 8 g, Rfl: 1    ascorbic acid (VITAMIN C) 1000 MG tablet, Take 1 tablet by mouth Daily., Disp: , Rfl:     BIOTIN PO, Take 1 tablet by mouth Daily., Disp: , Rfl:     cholecalciferol (VITAMIN D3) 25 MCG (1000 UT)  tablet, Take 1 tablet by mouth Daily., Disp: 90 tablet, Rfl: 1    clopidogrel (PLAVIX) 75 MG tablet, Take 1 tablet by mouth Daily., Disp: 90 tablet, Rfl: 1    fluticasone (FLONASE) 50 MCG/ACT nasal spray, Administer 2 sprays into the nostril(s) as directed by provider Daily., Disp: 48 g, Rfl: 5    isosorbide mononitrate (IMDUR) 30 MG 24 hr tablet, TAKE 1 Tablet BY MOUTH daily, Disp: 90 tablet, Rfl: 0    ketoconazole (NIZORAL) 2 % shampoo, Apply  topically to the appropriate area as directed 2 (Two) Times a Week., Disp: 360 mL, Rfl: 3    losartan (COZAAR) 100 MG tablet, Take 1 tablet by mouth Daily., Disp: 90 tablet, Rfl: 1    metoprolol tartrate (LOPRESSOR) 50 MG tablet, Take 1 tablet by mouth 2 (Two) Times a Day., Disp: 180 tablet, Rfl: 1    nitroglycerin (NITROSTAT) 0.4 MG SL tablet, Place 1 tablet under the tongue Every 5 (Five) Minutes As Needed for Chest Pain., Disp: 25 tablet, Rfl: 1    pantoprazole (PROTONIX) 40 MG EC tablet, TAKE 1 TABLET BY MOUTH DAILY, Disp: 90 tablet, Rfl: 1    rosuvastatin (CRESTOR) 40 MG tablet, Take 1 tablet by mouth Daily., Disp: 90 tablet, Rfl: 1    sucralfate (CARAFATE) 1 g tablet, Take 1 tablet by mouth 4 (Four) Times a Day. Thoroughly crush pill and mix in small amount of water prior to swallowing. (Patient taking differently: Take 1 tablet by mouth 4 (Four) Times a Day As Needed. Thoroughly crush pill and mix in small amount of water prior to swallowing.), Disp: 90 tablet, Rfl: 0    tobramycin-dexAMETHasone (TOBRADEX) 0.3-0.1 % ophthalmic suspension, , Disp: , Rfl:     vitamin B-12 (CYANOCOBALAMIN) 1000 MCG tablet, Take 1 tablet by mouth Daily., Disp: , Rfl:     Fluticasone-Salmeterol (Advair Diskus) 500-50 MCG/ACT DISKUS, Inhale 1 puff 2 (Two) Times a Day., Disp: 60 each, Rfl: 11    Current Facility-Administered Medications:     levalbuterol (XOPENEX HFA) inhaler 2 puff, 2 puff, Inhalation, Daily, Benito Jones DO, 2 puff at 05/09/25 0906    Allergies:   Allergies  "  Allergen Reactions    Pravastatin Myalgia    Codeine Unknown - Low Severity    Doxycycline GI Intolerance    Influenza Vac Split Quad Hives       Physical Exam:  Vital Signs:   Vitals:    05/09/25 0823   BP: 108/62   BP Location: Left arm   Patient Position: Sitting   Cuff Size: Adult   Pulse: 62   Temp: 96.6 °F (35.9 °C)   TempSrc: Infrared   SpO2: 96%  Comment: Room air at rest   Weight: 74.7 kg (164 lb 11.2 oz)   Height: 152.4 cm (60\")       Physical Exam  Vitals and nursing note reviewed.   Constitutional:       General: She is not in acute distress.     Appearance: She is well-developed and normal weight. She is not ill-appearing or toxic-appearing.   HENT:      Head: Normocephalic and atraumatic.   Cardiovascular:      Rate and Rhythm: Normal rate and regular rhythm.      Pulses: Normal pulses.      Heart sounds: Normal heart sounds. No murmur heard.     No friction rub. No gallop.   Pulmonary:      Effort: Pulmonary effort is normal. No respiratory distress.      Breath sounds: Normal breath sounds. No wheezing, rhonchi or rales.   Musculoskeletal:      Right lower leg: No edema.      Left lower leg: No edema.   Skin:     General: Skin is warm and dry.   Neurological:      Mental Status: She is alert and oriented to person, place, and time.         Immunization History   Administered Date(s) Administered    Arexvy (RSV, Adults 60+ yrs) 03/08/2025    COVID-19 (PFIZER) BIVALENT 12+YRS 11/16/2022    COVID-19 (PFIZER) Purple Cap Monovalent 06/17/2021, 07/09/2021, 01/06/2022    Influenza, Unspecified 11/19/2018    Pneumococcal Conjugate 20-Valent (PCV20) 01/17/2023    Pneumococcal Polysaccharide (PPSV23) 11/06/2017    Pneumococcal, Unspecified 07/01/2017    Shingrix 06/03/2024, 03/08/2025    Tdap 10/07/2011    Zostavax 07/01/2017, 07/26/2017       Results Review:   -I personally viewed the patient's chest x-ray from 5/9/2025 showing no acute cardiopulmonary process.  - I personally reviewed the pts imaging from " CT of the chest from 12/16/2024 showed no concerning nodules, mass or infiltrates.  - I personally reviewed the pts PFT from 5/9/2025 shows moderate obstruction without restriction, a normal DLCO and a significant 28% bronchodilator response.  - I personally reviewed the pts chart with regards to evaluation by the patient's PCP    Assessment / Plan:   Diagnoses and all orders for this visit:    1. Chronic cough (Primary)  2. Asthma-COPD overlap syndrome  - With her PFTs and symptoms I would recommend a long-acting inhaler.  We will have to see what the insurance is covered.  We also discussed nebulized treatment on today's visit.  Since the triple therapy has not been covered I will go ahead and try Advair 500 mcg 1 puff twice daily with albuterol every 4 hours as needed.  Recommend 30 minutes cardiovascular exercise per day.    3. Personal history of smoking  - Recommend tobacco cessation, also continue with yearly lung cancer screening.    Follow Up:   Return in about 4 months (around 9/9/2025).     YINA Jones,   Pulmonary and Critical Care Medicine  Note Electronically Signed    Part of this note may be an electronic transcription/translation of spoken language to printed text using the Dragon Dictation System.

## 2025-05-28 DIAGNOSIS — I25.119 CORONARY ARTERY DISEASE INVOLVING NATIVE CORONARY ARTERY OF NATIVE HEART WITH ANGINA PECTORIS: ICD-10-CM

## 2025-05-28 DIAGNOSIS — K21.9 GASTROESOPHAGEAL REFLUX DISEASE, UNSPECIFIED WHETHER ESOPHAGITIS PRESENT: ICD-10-CM

## 2025-05-28 DIAGNOSIS — I63.9 ISCHEMIC STROKE: ICD-10-CM

## 2025-05-28 DIAGNOSIS — E78.5 HYPERLIPIDEMIA LDL GOAL <70: ICD-10-CM

## 2025-05-28 DIAGNOSIS — I10 ESSENTIAL HYPERTENSION: ICD-10-CM

## 2025-05-28 RX ORDER — PANTOPRAZOLE SODIUM 40 MG/1
40 TABLET, DELAYED RELEASE ORAL DAILY
Qty: 90 TABLET | Refills: 3 | Status: SHIPPED | OUTPATIENT
Start: 2025-05-28

## 2025-05-28 RX ORDER — ISOSORBIDE MONONITRATE 30 MG/1
30 TABLET, EXTENDED RELEASE ORAL DAILY
Qty: 90 TABLET | Refills: 3 | Status: SHIPPED | OUTPATIENT
Start: 2025-05-28

## 2025-05-28 RX ORDER — ROSUVASTATIN CALCIUM 40 MG/1
40 TABLET, COATED ORAL DAILY
Qty: 90 TABLET | Refills: 3 | Status: SHIPPED | OUTPATIENT
Start: 2025-05-28

## 2025-05-28 RX ORDER — CLOPIDOGREL BISULFATE 75 MG/1
75 TABLET ORAL DAILY
Qty: 90 TABLET | Refills: 3 | Status: SHIPPED | OUTPATIENT
Start: 2025-05-28

## 2025-05-29 ENCOUNTER — TELEPHONE (OUTPATIENT)
Dept: PULMONOLOGY | Facility: CLINIC | Age: 70
End: 2025-05-29
Payer: MEDICARE

## 2025-05-29 RX ORDER — FLUTICASONE PROPIONATE AND SALMETEROL XINAFOATE 230; 21 UG/1; UG/1
2 AEROSOL, METERED RESPIRATORY (INHALATION)
Qty: 12 G | Refills: 3 | Status: SHIPPED | OUTPATIENT
Start: 2025-05-29

## 2025-05-29 NOTE — TELEPHONE ENCOUNTER
PA started for Rx Advair Diskus 500/50. Insurance will cover but will still cost pt $121.00 per pharmacy. Looks like insurance may want to cover Advair HFA. Will this be okay to alternate? What dose?

## 2025-05-29 NOTE — TELEPHONE ENCOUNTER
Filled Advair HFA per chart sent to [facility]. Looks like the cost is just as much ranging $158.94. Will call pt and advise her to reach out to insurance to see what is covered and cheaper.

## 2025-06-02 ENCOUNTER — HOSPITAL ENCOUNTER (EMERGENCY)
Facility: HOSPITAL | Age: 70
Discharge: HOME | End: 2025-06-02
Attending: EMERGENCY MEDICINE | Admitting: EMERGENCY MEDICINE
Payer: MEDICARE

## 2025-06-02 ENCOUNTER — APPOINTMENT (OUTPATIENT)
Dept: GENERAL RADIOLOGY | Facility: HOSPITAL | Age: 70
End: 2025-06-02
Payer: MEDICARE

## 2025-06-02 VITALS
OXYGEN SATURATION: 91 % | WEIGHT: 162 LBS | BODY MASS INDEX: 31.8 KG/M2 | RESPIRATION RATE: 18 BRPM | SYSTOLIC BLOOD PRESSURE: 170 MMHG | HEART RATE: 68 BPM | HEIGHT: 60 IN | DIASTOLIC BLOOD PRESSURE: 86 MMHG | TEMPERATURE: 97.6 F

## 2025-06-02 DIAGNOSIS — R07.89 ATYPICAL CHEST PAIN: Primary | ICD-10-CM

## 2025-06-02 DIAGNOSIS — F17.200 TOBACCO USE DISORDER: ICD-10-CM

## 2025-06-02 LAB
ALBUMIN SERPL-MCNC: 4.1 G/DL (ref 3.5–5.2)
ALBUMIN/GLOB SERPL: 1.6 G/DL
ALP SERPL-CCNC: 73 U/L (ref 39–117)
ALT SERPL W P-5'-P-CCNC: 14 U/L (ref 1–33)
ANION GAP SERPL CALCULATED.3IONS-SCNC: 11 MMOL/L (ref 5–15)
AST SERPL-CCNC: 18 U/L (ref 1–32)
BASOPHILS # BLD AUTO: 0.03 10*3/MM3 (ref 0–0.2)
BASOPHILS NFR BLD AUTO: 0.5 % (ref 0–1.5)
BILIRUB SERPL-MCNC: 0.5 MG/DL (ref 0–1.2)
BUN SERPL-MCNC: 16.1 MG/DL (ref 8–23)
BUN/CREAT SERPL: 20.1 (ref 7–25)
CALCIUM SPEC-SCNC: 10 MG/DL (ref 8.6–10.5)
CHLORIDE SERPL-SCNC: 102 MMOL/L (ref 98–107)
CO2 SERPL-SCNC: 26 MMOL/L (ref 22–29)
CREAT SERPL-MCNC: 0.8 MG/DL (ref 0.57–1)
D DIMER PPP FEU-MCNC: 0.3 MCGFEU/ML (ref 0–0.68)
DEPRECATED RDW RBC AUTO: 44.9 FL (ref 37–54)
EGFRCR SERPLBLD CKD-EPI 2021: 80.4 ML/MIN/1.73
EOSINOPHIL # BLD AUTO: 0.15 10*3/MM3 (ref 0–0.4)
EOSINOPHIL NFR BLD AUTO: 2.4 % (ref 0.3–6.2)
ERYTHROCYTE [DISTWIDTH] IN BLOOD BY AUTOMATED COUNT: 14 % (ref 12.3–15.4)
GEN 5 1HR TROPONIN T REFLEX: 12 NG/L
GLOBULIN UR ELPH-MCNC: 2.5 GM/DL
GLUCOSE SERPL-MCNC: 103 MG/DL (ref 65–99)
HCT VFR BLD AUTO: 42 % (ref 34–46.6)
HGB BLD-MCNC: 13.8 G/DL (ref 12–15.9)
HOLD SPECIMEN: NORMAL
IMM GRANULOCYTES # BLD AUTO: 0.02 10*3/MM3 (ref 0–0.05)
IMM GRANULOCYTES NFR BLD AUTO: 0.3 % (ref 0–0.5)
LIPASE SERPL-CCNC: 45 U/L (ref 13–60)
LYMPHOCYTES # BLD AUTO: 1.13 10*3/MM3 (ref 0.7–3.1)
LYMPHOCYTES NFR BLD AUTO: 18 % (ref 19.6–45.3)
MCH RBC QN AUTO: 29.4 PG (ref 26.6–33)
MCHC RBC AUTO-ENTMCNC: 32.9 G/DL (ref 31.5–35.7)
MCV RBC AUTO: 89.6 FL (ref 79–97)
MONOCYTES # BLD AUTO: 0.42 10*3/MM3 (ref 0.1–0.9)
MONOCYTES NFR BLD AUTO: 6.7 % (ref 5–12)
NEUTROPHILS NFR BLD AUTO: 4.54 10*3/MM3 (ref 1.7–7)
NEUTROPHILS NFR BLD AUTO: 72.1 % (ref 42.7–76)
NRBC BLD AUTO-RTO: 0 /100 WBC (ref 0–0.2)
NT-PROBNP SERPL-MCNC: 118.2 PG/ML (ref 0–900)
PLATELET # BLD AUTO: 187 10*3/MM3 (ref 140–450)
PMV BLD AUTO: 10.4 FL (ref 6–12)
POTASSIUM SERPL-SCNC: 4.2 MMOL/L (ref 3.5–5.2)
PROT SERPL-MCNC: 6.6 G/DL (ref 6–8.5)
RBC # BLD AUTO: 4.69 10*6/MM3 (ref 3.77–5.28)
SODIUM SERPL-SCNC: 139 MMOL/L (ref 136–145)
TROPONIN T NUMERIC DELTA: 3 NG/L
TROPONIN T SERPL HS-MCNC: 9 NG/L
WBC NRBC COR # BLD AUTO: 6.29 10*3/MM3 (ref 3.4–10.8)
WHOLE BLOOD HOLD COAG: NORMAL
WHOLE BLOOD HOLD SPECIMEN: NORMAL

## 2025-06-02 PROCEDURE — 84484 ASSAY OF TROPONIN QUANT: CPT | Performed by: EMERGENCY MEDICINE

## 2025-06-02 PROCEDURE — 80053 COMPREHEN METABOLIC PANEL: CPT

## 2025-06-02 PROCEDURE — 84484 ASSAY OF TROPONIN QUANT: CPT

## 2025-06-02 PROCEDURE — 85025 COMPLETE CBC W/AUTO DIFF WBC: CPT

## 2025-06-02 PROCEDURE — 93005 ELECTROCARDIOGRAM TRACING: CPT | Performed by: EMERGENCY MEDICINE

## 2025-06-02 PROCEDURE — 71045 X-RAY EXAM CHEST 1 VIEW: CPT

## 2025-06-02 PROCEDURE — 83880 ASSAY OF NATRIURETIC PEPTIDE: CPT

## 2025-06-02 PROCEDURE — 83690 ASSAY OF LIPASE: CPT

## 2025-06-02 PROCEDURE — 93005 ELECTROCARDIOGRAM TRACING: CPT

## 2025-06-02 PROCEDURE — 36415 COLL VENOUS BLD VENIPUNCTURE: CPT

## 2025-06-02 PROCEDURE — 85379 FIBRIN DEGRADATION QUANT: CPT | Performed by: EMERGENCY MEDICINE

## 2025-06-02 PROCEDURE — 99284 EMERGENCY DEPT VISIT MOD MDM: CPT

## 2025-06-02 RX ORDER — CLONIDINE HYDROCHLORIDE 0.1 MG/1
0.1 TABLET ORAL 2 TIMES DAILY PRN
Qty: 30 TABLET | Refills: 0 | Status: SHIPPED | OUTPATIENT
Start: 2025-06-02

## 2025-06-02 RX ORDER — ALUMINA, MAGNESIA, AND SIMETHICONE 2400; 2400; 240 MG/30ML; MG/30ML; MG/30ML
30 SUSPENSION ORAL ONCE
Status: COMPLETED | OUTPATIENT
Start: 2025-06-02 | End: 2025-06-02

## 2025-06-02 RX ORDER — LIDOCAINE HYDROCHLORIDE 20 MG/ML
10 SOLUTION OROPHARYNGEAL ONCE
Status: COMPLETED | OUTPATIENT
Start: 2025-06-02 | End: 2025-06-02

## 2025-06-02 RX ORDER — NITROGLYCERIN 0.4 MG/1
0.4 TABLET SUBLINGUAL
Qty: 25 TABLET | Refills: 0 | Status: SHIPPED | OUTPATIENT
Start: 2025-06-02

## 2025-06-02 RX ORDER — NITROGLYCERIN 0.4 MG/1
0.4 TABLET SUBLINGUAL ONCE
Status: COMPLETED | OUTPATIENT
Start: 2025-06-02 | End: 2025-06-02

## 2025-06-02 RX ORDER — ASPIRIN 81 MG/1
324 TABLET, CHEWABLE ORAL ONCE
Status: COMPLETED | OUTPATIENT
Start: 2025-06-02 | End: 2025-06-02

## 2025-06-02 RX ORDER — SODIUM CHLORIDE 0.9 % (FLUSH) 0.9 %
10 SYRINGE (ML) INJECTION AS NEEDED
Status: DISCONTINUED | OUTPATIENT
Start: 2025-06-02 | End: 2025-06-02 | Stop reason: HOSPADM

## 2025-06-02 RX ADMIN — ASPIRIN 324 MG: 81 TABLET, CHEWABLE ORAL at 01:53

## 2025-06-02 RX ADMIN — ALUMINUM HYDROXIDE, MAGNESIUM HYDROXIDE, AND DIMETHICONE 30 ML: 400; 400; 40 SUSPENSION ORAL at 03:53

## 2025-06-02 RX ADMIN — NITROGLYCERIN 0.4 MG: 0.4 TABLET SUBLINGUAL at 03:53

## 2025-06-02 RX ADMIN — LIDOCAINE HYDROCHLORIDE 10 ML: 20 SOLUTION ORAL at 03:53

## 2025-06-02 NOTE — ED PROVIDER NOTES
Subjective   History of Present Illness  70 year old female with history of HTN, HLD presents to the emergency department accompanied by her  for evaluation of chest pain, pressure-like in quality, radiating to her neck intermittently for the past three days, which became more constant over the past two days. She took an  nitroglycerin table without relief. She took carafate at 2000 last night without relief. She had associated elevated blood pressure. She has had similar prior episodes in the past alleviated by a GI cocktail.       Review of Systems   Constitutional:  Negative for diaphoresis and fever.   HENT:  Negative for nosebleeds.    Eyes:  Negative for photophobia and discharge.   Respiratory:  Negative for stridor.    Cardiovascular:  Positive for chest pain.   Neurological:  Negative for speech difficulty.       Past Medical History:   Diagnosis Date    Allergic 80s    Seasonal    Chicken pox     Clotting disorder     Colon polyp     Polyps  removed and clipped    Coronary artery disease     Diverticulitis     Diverticulosis     Easy bruising     Gall stones     GERD (gastroesophageal reflux disease)     Glaucoma     Hyperlipemia     Hypertension     Irritable bowel syndrome 2020    Ischemic stroke 10/24/2019    Low back pain 2016    Measles     Menopause     Mumps     Obesity 1985    Positive PPD     Scarlet fever     Tuberculosis 1986    Exposure       Allergies   Allergen Reactions    Pravastatin Myalgia    Codeine Unknown - Low Severity    Doxycycline GI Intolerance    Influenza Vac Split Quad Hives       Past Surgical History:   Procedure Laterality Date    CARDIAC CATHETERIZATION      CARPAL TUNNEL RELEASE      both hands    CHOLECYSTECTOMY      COLONOSCOPY  2009    CORONARY STENT PLACEMENT  2015    ECTOPIC PREGNANCY      ENDOMETRIAL ABLATION      HAND SURGERY      HYSTERECTOMY      OOPHORECTOMY      unilateral    OTHER SURGICAL HISTORY      lap and leep    SUBTOTAL  HYSTERECTOMY  2001    Ablation    TUBAL ABDOMINAL LIGATION      with L salpingo-oophorectomy       Family History   Problem Relation Age of Onset    Arthritis Mother     Hypertension Mother     Hyperlipidemia Mother     Heart attack Father     Hypertension Father     Obesity Father     Stroke Father         massive    Heart disease Father          massive stroke age 69    Hyperlipidemia Father     Arthritis Sister     Hypertension Sister     Hyperlipidemia Sister     No Known Problems Sister     Breast cancer Neg Hx     Ovarian cancer Neg Hx        Social History     Socioeconomic History    Marital status:    Tobacco Use    Smoking status: Every Day     Current packs/day: 0.00     Average packs/day: 1 pack/day for 40.0 years (40.0 ttl pk-yrs)     Types: Cigarettes     Start date: 1980     Last attempt to quit: 2019     Years since quittin.7     Passive exposure: Current    Smokeless tobacco: Never   Vaping Use    Vaping status: Never Used   Substance and Sexual Activity    Alcohol use: Never     Comment: rarely    Drug use: Never    Sexual activity: Not Currently     Partners: Male     Birth control/protection: Post-menopausal           Objective   Physical Exam  Vitals and nursing note reviewed.   Constitutional:       General: She is not in acute distress.     Appearance: She is not diaphoretic.      Comments: BMI 31.   Neck:      Trachea: No tracheal deviation.   Cardiovascular:      Rate and Rhythm: Normal rate and regular rhythm.      Heart sounds: No murmur heard.     No friction rub. No gallop.      Comments: S1, S2, distant heart sounds, no murmur appreciated.  Pulmonary:      Effort: Pulmonary effort is normal. No tachypnea or respiratory distress.      Breath sounds: Normal breath sounds. No stridor. No decreased breath sounds, wheezing, rhonchi or rales.   Abdominal:      Palpations: Abdomen is soft.      Tenderness: There is no abdominal tenderness. There is no guarding.       Comments: Nondistended.   Musculoskeletal:      Cervical back: Neck supple.      Comments: 2+ radial pulses bilaterally, symmetric. No significant peripheral edema. No calf tenderness bilaterally.   Skin:     General: Skin is warm and dry.   Neurological:      Mental Status: She is alert.      Comments: Normal speech, no dysarthria.                     ED Course  ED Course as of 06/02/25 0458   Mon Jun 02, 2025   0122 BP(!): 201/91 [LD]   0122 Temp: 97.6 °F (36.4 °C) [LD]   0122 Heart Rate: 78 [LD]   0122 Resp: 18 [LD]   0122 SpO2: 98 % [LD]   0122 WBC: 6.29 [LD]   0122 Hemoglobin: 13.8 [LD]   0122 Platelets: 187 [LD]   0122 HS Troponin T: 9 [LD]   0122 proBNP: 118.2 [LD]   0122 Lipase: 45 [LD]   0122 Creatinine: 0.80 [LD]   0122 BUN: 16.1 [LD]   0122 Glucose(!): 103 [LD]   0122 Sodium: 139 [LD]   0122 Potassium: 4.2 [LD]   0122 Chloride: 102 [LD]   0122 CO2: 26.0 [LD]   0122 Calcium: 10.0 [LD]   0122 Total Protein: 6.6 [LD]   0122 Albumin: 4.1 [LD]   0122 ALT (SGPT): 14 [LD]   0122 AST (SGOT): 18 [LD]   0122 Alkaline Phosphatase: 73 [LD]   0122 Total Bilirubin: 0.5 [LD]   0333 BP: 154/65 [LD]   0335 6/10 pain.  [LD]   0404 D-Dimer, Quant: 0.30 [LD]   0410 Results and plan discussed with the patient and her  at the bedside. All questions addressed. She is feeling better after SL NTG and GI cocktail.  [LD]      ED Course User Index  [LD] Evangelina Laguna MD                  HEART Score: 4   Shared Decision Making  I discussed the findings with the patient/patient representative who is in agreement with the treatment plan and the final disposition.  Risks and benefits of discharge and/or observation/admission were discussed: Yes                                      Medical Decision Making  Differential diagnosis includes acute coronary syndrome, GERD, esophagitis, esophageal spasm, pulmonary embolus, musculoskeletal pain, symptomatic hypertension, evaluate for end organ damage, and others.     Problems  Addressed:  Atypical chest pain: complicated acute illness or injury  Tobacco use disorder: complicated acute illness or injury    Amount and/or Complexity of Data Reviewed  Independent Historian: spouse  Labs: ordered. Decision-making details documented in ED Course.  Radiology: ordered. Decision-making details documented in ED Course.  ECG/medicine tests: ordered and independent interpretation performed. Decision-making details documented in ED Course.     Details: EKG at 0147 shows normal sinus rhythm at a rate of 69 beats per minute, normal intervals, no acute ischemic changes. Repeat EKG at 0206 shows normal sinus rhythm at a rate of 69 beats per minute, low voltage QRS, normal intervals, no acute ischemic changes, no significant dynamic EKG changes.     Risk  OTC drugs.  Prescription drug management.      Recent Results (from the past 24 hours)   ECG 12 Lead Chest Pain    Collection Time: 06/02/25 12:17 AM   Result Value Ref Range    QT Interval 376 ms    QTC Interval 411 ms   High Sensitivity Troponin T    Collection Time: 06/02/25 12:19 AM    Specimen: Blood   Result Value Ref Range    HS Troponin T 9 <14 ng/L   Comprehensive Metabolic Panel    Collection Time: 06/02/25 12:19 AM    Specimen: Blood   Result Value Ref Range    Glucose 103 (H) 65 - 99 mg/dL    BUN 16.1 8.0 - 23.0 mg/dL    Creatinine 0.80 0.57 - 1.00 mg/dL    Sodium 139 136 - 145 mmol/L    Potassium 4.2 3.5 - 5.2 mmol/L    Chloride 102 98 - 107 mmol/L    CO2 26.0 22.0 - 29.0 mmol/L    Calcium 10.0 8.6 - 10.5 mg/dL    Total Protein 6.6 6.0 - 8.5 g/dL    Albumin 4.1 3.5 - 5.2 g/dL    ALT (SGPT) 14 1 - 33 U/L    AST (SGOT) 18 1 - 32 U/L    Alkaline Phosphatase 73 39 - 117 U/L    Total Bilirubin 0.5 0.0 - 1.2 mg/dL    Globulin 2.5 gm/dL    A/G Ratio 1.6 g/dL    BUN/Creatinine Ratio 20.1 7.0 - 25.0    Anion Gap 11.0 5.0 - 15.0 mmol/L    eGFR 80.4 >60.0 mL/min/1.73   Lipase    Collection Time: 06/02/25 12:19 AM    Specimen: Blood   Result Value Ref  Range    Lipase 45 13 - 60 U/L   BNP    Collection Time: 06/02/25 12:19 AM    Specimen: Blood   Result Value Ref Range    proBNP 118.2 0.0 - 900.0 pg/mL   Green Top (Gel)    Collection Time: 06/02/25 12:19 AM   Result Value Ref Range    Extra Tube Hold for add-ons.    Lavender Top    Collection Time: 06/02/25 12:19 AM   Result Value Ref Range    Extra Tube hold for add-on    Gold Top - SST    Collection Time: 06/02/25 12:19 AM   Result Value Ref Range    Extra Tube Hold for add-ons.    Gray Top    Collection Time: 06/02/25 12:19 AM   Result Value Ref Range    Extra Tube Hold for add-ons.    Light Blue Top    Collection Time: 06/02/25 12:19 AM   Result Value Ref Range    Extra Tube Hold for add-ons.    CBC Auto Differential    Collection Time: 06/02/25 12:19 AM    Specimen: Blood   Result Value Ref Range    WBC 6.29 3.40 - 10.80 10*3/mm3    RBC 4.69 3.77 - 5.28 10*6/mm3    Hemoglobin 13.8 12.0 - 15.9 g/dL    Hematocrit 42.0 34.0 - 46.6 %    MCV 89.6 79.0 - 97.0 fL    MCH 29.4 26.6 - 33.0 pg    MCHC 32.9 31.5 - 35.7 g/dL    RDW 14.0 12.3 - 15.4 %    RDW-SD 44.9 37.0 - 54.0 fl    MPV 10.4 6.0 - 12.0 fL    Platelets 187 140 - 450 10*3/mm3    Neutrophil % 72.1 42.7 - 76.0 %    Lymphocyte % 18.0 (L) 19.6 - 45.3 %    Monocyte % 6.7 5.0 - 12.0 %    Eosinophil % 2.4 0.3 - 6.2 %    Basophil % 0.5 0.0 - 1.5 %    Immature Grans % 0.3 0.0 - 0.5 %    Neutrophils, Absolute 4.54 1.70 - 7.00 10*3/mm3    Lymphocytes, Absolute 1.13 0.70 - 3.10 10*3/mm3    Monocytes, Absolute 0.42 0.10 - 0.90 10*3/mm3    Eosinophils, Absolute 0.15 0.00 - 0.40 10*3/mm3    Basophils, Absolute 0.03 0.00 - 0.20 10*3/mm3    Immature Grans, Absolute 0.02 0.00 - 0.05 10*3/mm3    nRBC 0.0 0.0 - 0.2 /100 WBC   D-dimer, Quantitative    Collection Time: 06/02/25 12:19 AM    Specimen: Blood   Result Value Ref Range    D-Dimer, Quantitative 0.30 0.00 - 0.68 MCGFEU/mL   High Sensitivity Troponin T 1Hr    Collection Time: 06/02/25  1:43 AM    Specimen: Blood   Result  Value Ref Range    HS Troponin T 12 <14 ng/L    Troponin T Numeric Delta 3 (C) Abnormal if >/=3 ng/L   ECG 12 Lead Chest Pain    Collection Time: 06/02/25  1:47 AM   Result Value Ref Range    QT Interval 388 ms    QTC Interval 415 ms   ECG 12 Lead Chest Pain    Collection Time: 06/02/25  2:06 AM   Result Value Ref Range    QT Interval 392 ms    QTC Interval 420 ms     Note: In addition to lab results from this visit, the labs listed above may include labs taken at another facility or during a different encounter within the last 24 hours. Please correlate lab times with ED admission and discharge times for further clarification of the services performed during this visit.    XR Chest 1 View   Final Result   Impression:   No active disease.               Electronically Signed: Chucho Calixto MD     6/2/2025 1:46 AM EDT     Workstation ID: [id number]        Vitals:    06/02/25 0200 06/02/25 0230 06/02/25 0300 06/02/25 0400   BP: (!) 186/82 (!) 188/85 154/65 170/86   Pulse: 65 89 68 68   Resp:       Temp:       SpO2: 98% 99% 98% 91%   Weight:       Height:         Medications   sodium chloride 0.9 % flush 10 mL (has no administration in time range)   aspirin chewable tablet 324 mg (324 mg Oral Given 6/2/25 0153)   nitroglycerin (NITROSTAT) SL tablet 0.4 mg (0.4 mg Sublingual Given 6/2/25 4273)   aluminum-magnesium hydroxide-simethicone (MAALOX MAX) 400-400-40 MG/5ML suspension 30 mL (30 mL Oral Given 6/2/25 0353)   Lidocaine Viscous HCl (XYLOCAINE) 2 % solution 10 mL (10 mL Mouth/Throat Given 6/2/25 0353)     ECG/EMG Results (last 24 hours)       Procedure Component Value Units Date/Time    ECG 12 Lead Chest Pain [[id number]] Collected: 06/02/25 0017     Updated: 06/02/25 0017     QT Interval 376 ms      QTC Interval 411 ms     Narrative:      Test Reason : Chest Pain  Blood Pressure :   */*   mmHG  Vent. Rate :  72 BPM     Atrial Rate :  72 BPM     P-R Int : 132 ms          QRS Dur :  82 ms      QT Int : 376 ms       P-R-T  Axes :  53  50  33 degrees    QTcB Int : 411 ms    Normal sinus rhythm  Normal ECG  When compared with ECG of 09-Aug-2023 01:13,  No significant change was found    Referred By:            Confirmed By:     ECG 12 Lead Chest Pain [[id number]] Collected: 06/02/25 0147     Updated: 06/02/25 0147     QT Interval 388 ms      QTC Interval 415 ms     Narrative:      Test Reason : Chest Pain  Blood Pressure :   */*   mmHG  Vent. Rate :  69 BPM     Atrial Rate :  69 BPM     P-R Int : 126 ms          QRS Dur :  84 ms      QT Int : 388 ms       P-R-T Axes :  50  45  31 degrees    QTcB Int : 415 ms    Normal sinus rhythm  Normal ECG  When compared with ECG of 02-Jun-2025 00:17, (Unconfirmed)  No significant change was found    Referred By: EDMD           Confirmed By:     ECG 12 Lead Chest Pain [[id number]] Collected: 06/02/25 0206     Updated: 06/02/25 0206     QT Interval 392 ms      QTC Interval 420 ms     Narrative:      Test Reason : Chest Pain  Blood Pressure :   */*   mmHG  Vent. Rate :  69 BPM     Atrial Rate :  69 BPM     P-R Int : 128 ms          QRS Dur :  84 ms      QT Int : 392 ms       P-R-T Axes :  50  44  26 degrees    QTcB Int : 420 ms    Normal sinus rhythm  Low voltage QRS  Borderline ECG  When compared with ECG of 02-Jun-2025 01:47, (Unconfirmed)  No significant change was found    Referred By: EDMD           Confirmed By:           ECG 12 Lead Chest Pain   Preliminary Result   Test Reason : Chest Pain   Blood Pressure :   */*   mmHG   Vent. Rate :  69 BPM     Atrial Rate :  69 BPM      P-R Int : 128 ms          QRS Dur :  84 ms       QT Int : 392 ms       P-R-T Axes :  50  44  26 degrees     QTcB Int : 420 ms      Normal sinus rhythm   Low voltage QRS   Borderline ECG   When compared with ECG of 02-Jun-2025 01:47, (Unconfirmed)   No significant change was found      Referred By: EDMD           Confirmed By:       ECG 12 Lead Chest Pain   Preliminary Result   Test Reason : Chest Pain   Blood Pressure :   */*    mmHG   Vent. Rate :  69 BPM     Atrial Rate :  69 BPM      P-R Int : 126 ms          QRS Dur :  84 ms       QT Int : 388 ms       P-R-T Axes :  50  45  31 degrees     QTcB Int : 415 ms      Normal sinus rhythm   Normal ECG   When compared with ECG of 02-Jun-2025 00:17, (Unconfirmed)   No significant change was found      Referred By: EDMD           Confirmed By:       ECG 12 Lead Chest Pain   Preliminary Result   Test Reason : Chest Pain   Blood Pressure :   */*   mmHG   Vent. Rate :  72 BPM     Atrial Rate :  72 BPM      P-R Int : 132 ms          QRS Dur :  82 ms       QT Int : 376 ms       P-R-T Axes :  53  50  33 degrees     QTcB Int : 411 ms      Normal sinus rhythm   Normal ECG   When compared with ECG of 09-Aug-2023 01:13,   No significant change was found      Referred By:            Confirmed By:              Final diagnoses:   Atypical chest pain   Tobacco use disorder       ED Disposition  ED Disposition       ED Disposition   Discharge    Condition   Stable    Comment   --               Nichole Cardenas APRN  [address]  [address]  [place] [id number]  [phone]    Schedule an appointment as soon as possible for a visit in 3 days  primary care provider         Medication List        New Prescriptions      cloNIDine 0.1 MG tablet  Commonly known as: CATAPRES  Take 1 tablet by mouth 2 (Two) Times a Day As Needed for High Blood Pressure (180 mmHg or higher systolic (top number)).            Changed      * nitroglycerin 0.4 MG SL tablet  Commonly known as: NITROSTAT  Place 1 tablet under the tongue Every 5 (Five) Minutes As Needed for Chest Pain.  What changed: Another medication with the same name was added. Make sure you understand how and when to take each.     * nitroglycerin 0.4 MG SL tablet  Commonly known as: NITROSTAT  Place 1 tablet under the tongue Every 5 (Five) Minutes As Needed for Chest Pain. Take no more than 3 doses in 15 minutes.  What changed: You were already taking a  medication with the same name, and this prescription was added. Make sure you understand how and when to take each.     sucralfate 1 g tablet  Commonly known as: CARAFATE  Take 1 tablet by mouth 4 (Four) Times a Day. Thoroughly crush pill and mix in small amount of water prior to swallowing.  What changed:   when to take this  reasons to take this           * This list has 2 medication(s) that are the same as other medications prescribed for you. Read the directions carefully, and ask your doctor or other care provider to review them with you.                   Where to Get Your Medications        These medications were sent to [facility][id number] - [address] AT [facility] & [facility] [phone] [place] [id number] [address] [address]      Phone: [phone]   cloNIDine 0.1 MG tablet  nitroglycerin 0.4 MG SL tablet            Evangelina Laguna MD  06/06/25 7601

## 2025-06-05 LAB
QT INTERVAL: 376 MS
QT INTERVAL: 388 MS
QT INTERVAL: 392 MS
QTC INTERVAL: 411 MS
QTC INTERVAL: 415 MS
QTC INTERVAL: 420 MS

## 2025-06-10 ENCOUNTER — OFFICE VISIT (OUTPATIENT)
Dept: INTERNAL MEDICINE | Facility: CLINIC | Age: 70
End: 2025-06-10
Payer: MEDICARE

## 2025-06-10 VITALS
HEIGHT: 60 IN | RESPIRATION RATE: 14 BRPM | HEART RATE: 59 BPM | TEMPERATURE: 97.8 F | BODY MASS INDEX: 31.73 KG/M2 | WEIGHT: 161.6 LBS | SYSTOLIC BLOOD PRESSURE: 124 MMHG | OXYGEN SATURATION: 95 % | DIASTOLIC BLOOD PRESSURE: 70 MMHG

## 2025-06-10 DIAGNOSIS — K21.9 GASTROESOPHAGEAL REFLUX DISEASE, UNSPECIFIED WHETHER ESOPHAGITIS PRESENT: Primary | ICD-10-CM

## 2025-06-10 DIAGNOSIS — F17.210 CIGARETTE NICOTINE DEPENDENCE WITHOUT COMPLICATION: ICD-10-CM

## 2025-06-10 DIAGNOSIS — F41.9 ANXIETY: ICD-10-CM

## 2025-06-10 RX ORDER — SUCRALFATE 1 G/1
1 TABLET ORAL 4 TIMES DAILY PRN
Qty: 90 TABLET | Refills: 1 | Status: SHIPPED | OUTPATIENT
Start: 2025-06-10

## 2025-06-10 RX ORDER — BUSPIRONE HYDROCHLORIDE 5 MG/1
5 TABLET ORAL 3 TIMES DAILY
Qty: 90 TABLET | Refills: 1 | Status: SHIPPED | OUTPATIENT
Start: 2025-06-10

## 2025-06-10 NOTE — PROGRESS NOTES
Patient Name: Jasvir Romero  : 1955   MRN: [id number]     Chief Complaint:    Chief Complaint   Patient presents with    Hypertension     Follow up       History of Present Illness: Jasvir Romero is a 70 y.o. female.  History of Present Illness  The patient presents for evaluation of epigastric pain, anxiety, and smoking cessation.    Epigastric Pain  - Severe enough to necessitate an emergency room visit due to a suspected heart attack  - Cardiac issues were ruled out  - Symptoms improved following the administration of a GI cocktail  - Does not experience dysphagia or food impaction but occasionally chokes on water  - Mild reflux is present  - Persistent cough  - Does not report early satiety, hematemesis, melena, or dark stools  - Persistent hoarseness  - Occasionally experiences postprandial nausea  - Sensation of food sitting in her stomach and pressure in her lower neck if she consumes food after 6:00 PM  - Has been on pantoprazole for an extended period  - Previously tried Carafate, which did not provide relief  - Believes Carafate was effective but may have   - Takes Tums but has not needed it in recent days  - Uses a large pillow for elevation during sleep  - Has undergone endoscopy in the past and is due for a follow-up colonoscopy    Anxiety  - Reports significant anxiety  - Expresses reluctance to use antidepressants  - Previously tried buspirone at night    Smoking Cessation  - Reduced smoking due to associated gastrointestinal discomfort  - Continues to use nicotine gum  - Experienced hypertension with nicotine patches  - Has never been diagnosed with asthma    Supplemental information: She had a tooth extraction and got a dry socket.    SOCIAL HISTORY  The patient has cut back on smoking because it makes her sick in her stomach.   Answers submitted by the patient for this visit:  Problem not listed (Submitted on 6/3/2025)  Chief Complaint: Other medical  problem  Reason for appointment: Follow up  anorexia: No  joint pain: No  change in stool: No  joint swelling: No  vertigo: No  visual change: No  Onset: 6 to 12 months  Chronicity: chronic  Frequency: daily  Medications tried: Inhaler      Subjective     Review of System: Review of Systems     Medications:     Current Outpatient Medications:     albuterol sulfate  (90 Base) MCG/ACT inhaler, Inhale 2 puffs Every 6 (Six) Hours As Needed for Wheezing., Disp: 8 g, Rfl: 1    ascorbic acid (VITAMIN C) 1000 MG tablet, Take 1 tablet by mouth Daily., Disp: , Rfl:     BIOTIN PO, Take 1 tablet by mouth Daily., Disp: , Rfl:     cholecalciferol (VITAMIN D3) 25 MCG (1000 UT) tablet, Take 1 tablet by mouth Daily., Disp: 90 tablet, Rfl: 1    cloNIDine (CATAPRES) 0.1 MG tablet, Take 1 tablet by mouth 2 (Two) Times a Day As Needed for High Blood Pressure (180 mmHg or higher systolic (top number))., Disp: 30 tablet, Rfl: 0    clopidogrel (PLAVIX) 75 MG tablet, TAKE 1 TABLET EVERY DAY, Disp: 90 tablet, Rfl: 3    fluticasone (FLONASE) 50 MCG/ACT nasal spray, Administer 2 sprays into the nostril(s) as directed by provider Daily., Disp: 48 g, Rfl: 5    fluticasone-salmeterol (Advair HFA) 230-21 MCG/ACT inhaler, Inhale 2 puffs 2 (Two) Times a Day., Disp: 12 g, Rfl: 3    isosorbide mononitrate (IMDUR) 30 MG 24 hr tablet, TAKE 1 TABLET EVERY DAY, Disp: 90 tablet, Rfl: 3    ketoconazole (NIZORAL) 2 % shampoo, Apply  topically to the appropriate area as directed 2 (Two) Times a Week., Disp: 360 mL, Rfl: 3    losartan (COZAAR) 100 MG tablet, Take 1 tablet by mouth Daily., Disp: 90 tablet, Rfl: 1    metoprolol tartrate (LOPRESSOR) 50 MG tablet, Take 1 tablet by mouth 2 (Two) Times a Day., Disp: 180 tablet, Rfl: 1    nitroglycerin (NITROSTAT) 0.4 MG SL tablet, Place 1 tablet under the tongue Every 5 (Five) Minutes As Needed for Chest Pain., Disp: 25 tablet, Rfl: 1    pantoprazole (PROTONIX) 40 MG EC tablet, TAKE 1 TABLET EVERY DAY, Disp:  "90 tablet, Rfl: 3    rosuvastatin (CRESTOR) 40 MG tablet, TAKE 1 TABLET EVERY DAY, Disp: 90 tablet, Rfl: 3    sucralfate (CARAFATE) 1 g tablet, Take 1 tablet by mouth 4 (Four) Times a Day As Needed (abdominal pain). Thoroughly crush pill and mix in small amount of water prior to swallowing., Disp: 90 tablet, Rfl: 1    vitamin B-12 (CYANOCOBALAMIN) 1000 MCG tablet, Take 1 tablet by mouth Daily., Disp: , Rfl:     busPIRone (BUSPAR) 5 MG tablet, Take 1 tablet by mouth 3 (Three) Times a Day., Disp: 90 tablet, Rfl: 1    Current Facility-Administered Medications:     levalbuterol (XOPENEX HFA) inhaler 2 puff, 2 puff, Inhalation, Daily, Benito Jones, DO, 2 puff at 05/09/25 0906    Allergies:   Allergies   Allergen Reactions    Pravastatin Myalgia    Codeine Unknown - Low Severity    Doxycycline GI Intolerance    Influenza Vac Split Quad Hives       Objective     Physical Exam:   Vital Signs:   Vitals:    06/10/25 0904   BP: 124/70   BP Location: Right arm   Patient Position: Sitting   Cuff Size: Adult   Pulse: 59   Resp: 14   Temp: 97.8 °F (36.6 °C)   TempSrc: Infrared   SpO2: 95%   Weight: 73.3 kg (161 lb 9.6 oz)   Height: 152.4 cm (60\")   PainSc: 0-No pain           Physical Exam  Physical Exam  Respiratory: Clear to auscultation, no wheezing, rales or rhonchi  Cardiovascular: Regular rate and rhythm, no murmurs, rubs, or gallops  Gastrointestinal: Soft, no tenderness, no distention, no masses       Results       Assessment / Plan      Assessment/Plan:   Diagnoses and all orders for this visit:    1. Gastroesophageal reflux disease, unspecified whether esophagitis present (Primary)  -     Ambulatory referral for Screening EGD  -     sucralfate (CARAFATE) 1 g tablet; Take 1 tablet by mouth 4 (Four) Times a Day As Needed (abdominal pain). Thoroughly crush pill and mix in small amount of water prior to swallowing.  Dispense: 90 tablet; Refill: 1    2. Anxiety  -     busPIRone (BUSPAR) 5 MG tablet; Take 1 " tablet by mouth 3 (Three) Times a Day.  Dispense: 90 tablet; Refill: 1    3. Cigarette nicotine dependence without complication       Assessment & Plan  1. Epigastric pain:  - Reports epigastric pain that led to an ER visit, improved by a GI cocktail  - Occasional choking on water, mild reflux, and nausea after eating  - Increased pain and limited mobility  - EGD recommended for further evaluation  - Sucralfate tablets prescribed for use as needed  - Continue pantoprazole 40 mg daily  - Elevate head during sleep and avoid lying down for 2 to 3 hours post meals    2. Anxiety:  - Reports significant anxiety but reluctant to use antidepressants  - Increased anxiety  - Buspirone prescribed, to be taken up to three times daily or as needed  - Monitor response to medication and report any issues    3. Smoking cessation:  - Reduced smoking due to associated gastrointestinal discomfort  - Continues to use nicotine gum  - Hypertension with nicotine patches  - Encouraged to quit smoking due to impact on COPD and heart disease  - Bupropion suggested as a potential aid in smoking cessation    Follow-up:  - Follow up in 3 months or sooner if condition worsens     Jasvir Baez Nick  reports that she has been smoking cigarettes. She started smoking about 45 years ago. She has a 40 pack-year smoking history. She has been exposed to tobacco smoke. She has never used smokeless tobacco. I have educated her on the risk of diseases from using tobacco products such as cancer, COPD, and heart disease.     I advised her to quit and she is willing to quit. We have discussed the following method/s for tobacco cessation:  continue gum.  Together we have set a quit date for 1 month from today.  She will follow up with me in 1 month or sooner to check on her progress (Mychart).    I spent 3  minutes counseling the patient.        Explained and discussed patient's condition and plan of care including labs and radiology that may be  ordered.  Discussed when to follow-up.  Discussed possible red flags and how to follow-up with those.  Viewed patient's medications and discussed common side effects and symptoms to report. Patient to continue current medications as advised.  Be compliant with medications. Patient to call clinic if they have any worsening, no improvement, does not tolerate medication, or any future concerns about treatment.  Questions and concerns addressed at this appointment.  Patient to report to ER for emergencies.  Patient verbalized an understanding and agreement with plan of care.      Follow Up:   Return in about 3 months (around 9/10/2025) for Recheck.  Patient or patient representative verbalized consent for the use of Ambient Listening during the visit with  LORI Gunn for chart documentation. 6/10/2025  09:32 EDT    LORI Gunn  [facility]

## 2025-06-16 DIAGNOSIS — I25.119 CORONARY ARTERY DISEASE INVOLVING NATIVE CORONARY ARTERY OF NATIVE HEART WITH ANGINA PECTORIS: ICD-10-CM

## 2025-06-16 RX ORDER — METOPROLOL TARTRATE 50 MG
50 TABLET ORAL 2 TIMES DAILY
Qty: 180 TABLET | Refills: 1 | Status: SHIPPED | OUTPATIENT
Start: 2025-06-16

## 2025-07-01 DIAGNOSIS — F41.9 ANXIETY: ICD-10-CM

## 2025-07-01 DIAGNOSIS — K21.9 GASTROESOPHAGEAL REFLUX DISEASE, UNSPECIFIED WHETHER ESOPHAGITIS PRESENT: ICD-10-CM

## 2025-07-01 NOTE — TELEPHONE ENCOUNTER
Caller: [facility] - [place], [place] - [address] - [phone] [address] [phone] FX    Relationship: Pharmacy    Best call back number: [phone]     Requested Prescriptions:   Requested Prescriptions     Pending Prescriptions Disp Refills    busPIRone (BUSPAR) 5 MG tablet 90 tablet 1     Sig: Take 1 tablet by mouth 3 (Three) Times a Day.    sucralfate (CARAFATE) 1 g tablet 90 tablet 1     Sig: Take 1 tablet by mouth 4 (Four) Times a Day As Needed (abdominal pain). Thoroughly crush pill and mix in small amount of water prior to swallowing.        Pharmacy where request should be sent: [facility] - [place], [place] - [address] - [phone] [address] [phone] FX     Last office visit with prescribing clinician: 6/10/2025   Last telemedicine visit with prescribing clinician: Visit date not found   Next office visit with prescribing clinician: 9/11/2025     Does the patient have less than a 3 day supply:  [] Yes  [x] No    Would you like a call back once the refill request has been completed: [] Yes [] No    If the office needs to give you a call back, can they leave a voicemail: [] Yes [] No    Ly Brandt   07/01/25 09:06 EDT

## 2025-07-02 NOTE — PROGRESS NOTES
Cardiology Outpatient Visit      Identification: Jasvir Romero is a 70 y.o. female who resides in [place], [place]    Reason for visit:  Coronary artery disease involving native coronary artery of      Subjective      Patient is a 70-year-old female who returns today for follow-up of her coronary artery disease, carotid artery disease and cardiac risk factors.  The patient went to the emergency room last month with chest pain and ruled out for ACS with negative troponins and EKG without acute ischemia. Her symptoms were relieved with GI cocktail.  She was also diagnosed with COVID-19 end of June and has now recovered.  According to the patient she is still having upper epigastric discomfort.  It is not necessarily related to exertion and she thinks it is associated with eating food particularly bread.  She has been using a lot of Tums.  She is uncertain what her symptoms were in the past prior to PCI.  She has a history of a bioabsorbable scaffold stent to the mid LAD at [facility] in 2015.  She has not had an ischemic evaluation since that time.  She denies any shortness of breath, palpitations, presyncope or syncope.  She does report when she went to the emergency room her blood pressure was elevated.  She was prescribed clonidine to use as needed.  She thinks her pain was what caused the spike in blood pressure because she has not needed her clonidine at home.  She does have a history of carotid artery disease with a high-grade stenosis of the right external carotid artery.  Her last carotid duplex in 2022 showed no significant stenosis in the internal carotid arteries bilaterally but her right external carotid stenosis was still present.  She does not take aspirin but is on Plavix monotherapy.  She denies symptoms of TIA or CVA.  She is on statin therapy and last LDL was at goal    Review of Systems   Constitutional: Negative for malaise/fatigue.   Eyes:  Negative for vision loss in  "left eye and vision loss in right eye.   Cardiovascular:  Positive for chest pain. Negative for dyspnea on exertion, near-syncope, orthopnea, palpitations, paroxysmal nocturnal dyspnea and syncope.   Musculoskeletal:  Negative for myalgias.   Neurological:  Negative for brief paralysis, excessive daytime sleepiness, focal weakness, numbness, paresthesias and weakness.   All other systems reviewed and are negative.      Allergies   Allergen Reactions    Pravastatin Myalgia    Codeine Unknown - Low Severity    Doxycycline GI Intolerance    Influenza Vac Split Quad Hives         Current Outpatient Medications   Medication Instructions    albuterol sulfate  (90 Base) MCG/ACT inhaler 2 puffs, Inhalation, Every 6 Hours PRN    ascorbic acid (VITAMIN C) 1,000 mg, Daily    BIOTIN PO 1 tablet, Daily    busPIRone (BUSPAR) 5 mg, Oral, 3 Times Daily    cholecalciferol (VITAMIN D3) 1,000 Units, Oral, Daily    cloNIDine (CATAPRES) 0.1 mg, Oral, 2 Times Daily PRN    clopidogrel (PLAVIX) 75 mg, Oral, Daily    fluticasone (FLONASE) 50 MCG/ACT nasal spray 2 sprays, Nasal, Daily    fluticasone-salmeterol (Advair HFA) 230-21 MCG/ACT inhaler 2 puffs, Inhalation, 2 Times Daily - RT    isosorbide mononitrate (IMDUR) 30 mg, Oral, Daily    ketoconazole (NIZORAL) 2 % shampoo Topical, 2 Times Weekly    losartan (COZAAR) 100 mg, Oral, Daily    metoprolol tartrate (LOPRESSOR) 50 mg, Oral, 2 Times Daily    nitroglycerin (NITROSTAT) 0.4 mg, Sublingual, Every 5 Minutes PRN    pantoprazole (PROTONIX) 40 mg, Oral, Daily    rosuvastatin (CRESTOR) 40 mg, Oral, Daily    sucralfate (CARAFATE) 1 g, Oral, 4 Times Daily PRN, Thoroughly crush pill and mix in small amount of water prior to swallowing.    vitamin B-12 (CYANOCOBALAMIN) 1,000 mcg, Daily         Objective     /70   Pulse 67   Ht 152.4 cm (60\")   Wt 73.7 kg (162 lb 6.4 oz)   SpO2 96%   BMI 31.72 kg/m²       Constitutional:       Appearance: Healthy appearance. Well-developed. "   Eyes:      General: Lids are normal. No scleral icterus.     Conjunctiva/sclera: Conjunctivae normal.   HENT:      Head: Normocephalic and atraumatic.   Neck:      Thyroid: No thyromegaly.      Vascular: No carotid bruit or JVD.   Pulmonary:      Effort: Pulmonary effort is normal.      Breath sounds: Normal breath sounds. No wheezing. No rhonchi. No rales.   Cardiovascular:      Normal rate. Regular rhythm.      Murmurs: There is no murmur.      No gallop.  No rub.   Pulses:     Intact distal pulses.   Edema:     Peripheral edema absent.   Abdominal:      General: There is no distension.      Palpations: Abdomen is soft. There is no abdominal mass.   Musculoskeletal:      Cervical back: Normal range of motion. Skin:     General: Skin is warm and dry.      Findings: No rash.   Neurological:      General: No focal deficit present.      Mental Status: Alert and oriented to person, place, and time.      Gait: Gait is intact.   Psychiatric:         Attention and Perception: Attention normal.         Mood and Affect: Mood normal.         Behavior: Behavior normal.         Result Review  (reviewed with patient):            Lab Results   Component Value Date    GLUCOSE 103 (H) 06/02/2025    BUN 16.1 06/02/2025    CREATININE 0.80 06/02/2025     06/02/2025    K 4.2 06/02/2025     06/02/2025    CALCIUM 10.0 06/02/2025    PROTEINTOT 6.6 06/02/2025    ALBUMIN 4.1 06/02/2025    ALT 14 06/02/2025    AST 18 06/02/2025    ALKPHOS 73 06/02/2025    BILITOT 0.5 06/02/2025    GLOB 2.5 06/02/2025    AGRATIO 1.6 06/02/2025    BCR 20.1 06/02/2025    ANIONGAP 11.0 06/02/2025    EGFR 80.4 06/02/2025     Lab Results   Component Value Date    WBC 6.29 06/02/2025    HGB 13.8 06/02/2025    HCT 42.0 06/02/2025    MCV 89.6 06/02/2025     06/02/2025     Lab Results   Component Value Date    CHOL 139 03/07/2025    CHLPL 135 01/17/2023    TRIG 264 (H) 03/07/2025    HDL 37 (L) 03/07/2025    LDL 60 03/07/2025     Lab Results    Component Value Date    HGBA1C 5.5 08/30/2023             Assessment     Diagnoses and all orders for this visit:    1. Coronary artery disease involving native coronary artery of native heart with angina pectoris (Primary)  Overview:  Cardiac catherization at [facility] (3/16/2012): 1-vessel CAD with 60-70% eccentric stenosis of the mid LAD. LVEF 65-70%  Cardiac catherization at [facility] (1/26/2015): Coronary angioplasty with stenting to the mid LAD. 2.5 x 15 mm bioabsorbable scaffold implanted   Echo (10/1/2019): EF 68%.     Assessment & Plan:  Patient having recurrent upper epigastric discomfort  Obtain CT coronary angiogram.  Double up on home dose of metoprolol night before test and 1 hour before.  (Two 50 mg tablets evening before and two 50 mg tablets an hour before test)  Continue metoprolol tartrate 50 mg twice daily  Continue Plavix 75 mg daily  Continue isosorbide 30 mg daily   if CT coronary angiogram does not show obstructive CAD would recommend follow-up with GI for upper and lower endoscopy    Orders:  -     CT Angiogram Coronary; Future  -     CT Angiogram Coronary-Cardiology Interpretation; Future  -     metoprolol tartrate (LOPRESSOR) tablet 200 mg  -     metoprolol tartrate (LOPRESSOR) tablet 150 mg  -     metoprolol tartrate (LOPRESSOR) tablet 100 mg  -     metoprolol tartrate (LOPRESSOR) tablet 50 mg  -     metoprolol tartrate (LOPRESSOR) tablet 50 mg  -     metoprolol tartrate (LOPRESSOR) injection 5 mg  -     nitroglycerin (NITROSTAT) SL tablet 0.4 mg  -     nitroglycerin (NITROSTAT) SL tablet 0.8 mg  -     ivabradine HCl (CORLANOR) tablet 15 mg  -     No Caffeine or Nicotine 4 Hours Prior to CTA Appointment  -     Nothing to Eat or Drink 4 Hours Prior to CTA Appointment  -     Do Not Take Phosphodiasterase Inhibitors in the 72 Hours Prior to Coronary CTA  -     Obtain Informed Consent - Computed Tomography Angiography of Chest - Angiogram of Coronary Arteries; Standing  -      Vital Signs Upon Arrival; Standing  -     Cardiac Monitoring; Standing  -     Verify NPO Status - Patient to Be NPO at Least 4 Hours Prior to CTA; Standing  -     Notify CT After Administration of metoprolol tartrate (LOPRESSOR) tablet; Standing  -     Notify Provider If Total Metoprolol Given Equals 300mg & Heart Rate Not At Goal; Standing  -     Notify Provider Prior to Administration of Nitroglycerin if Patient SBP <80; Standing  -     POC Creatinine; Standing  -     Insert Peripheral IV; Standing  -     Saline Lock & Maintain IV Access; Standing  -     sodium chloride 0.9 % flush 10 mL  -     sodium chloride 0.9 % flush 10 mL  -     sodium chloride 0.9 % infusion 40 mL  -     Vital Signs - See Instructions; Standing  -     Hold Medication Metformin (Glucophage, Glucophage XR, Fortament, Glumetza);  Metglip (metformin/glipizide);  Glucovance (metformin/glyburide); Avandamet (metformin/rosiglitazone); Standing  -     Patient May Discharge Home After Procedure Complete (If Stable); Standing  -     No Metoprolol Prescription Needed    2. Carotid artery disease, unspecified laterality, unspecified type  Overview:  Carotid duplex (10/1/2019): High-grade stenosis of right EXTERNAL carotid artery.  No significant ICA stenoses  Carotid duplex (2022):0-49% bilateral stenosis.  Right external carotid stenosis      Assessment & Plan:  No symptoms TIA or CVA  Continue Plavix and statin therapy      3. Essential hypertension  Overview:  Target blood pressure <130/80 mmHg    Assessment & Plan:  Hypertension is controlled  Continue losartan 100 mg daily  Continue metoprolol tartrate 50 mg twice daily  Continue clonidine 0.1 mg twice daily as needed for BP greater than 160/100      4. Hyperlipidemia LDL goal <70  Overview:  High intensity statin therapy indicated given the presence of CAD    Assessment & Plan:   Crestor 40 mg daily      5. Current every day smoker  Assessment & Plan:  Recommend immediate smoking  cessation            Plan   Patient having atypical chest pain has not had an ischemic evaluation since stenting at [facility] in 2015.  Will obtain a CT coronary angiogram.  She should double up on her metoprolol dose and take 2 of the 50 mg tablets the night before and 2 of the 50 mg tablets 1 hour before test.  If CT coronary angiogram does not suggest obstructive CAD she should follow-up with GI for upper and lower endoscopy  Continue current medications  Consider repeat carotid duplex at next visit      Follow-up   Return in about 1 year (around 7/8/2026), or if symptoms worsen or fail to improve, for Follow-up with Dr. Anguiano next visit.      Emily Ba, APRN  7/8/2025

## 2025-07-03 RX ORDER — SUCRALFATE 1 G/1
1 TABLET ORAL 4 TIMES DAILY PRN
Qty: 90 TABLET | Refills: 1 | Status: SHIPPED | OUTPATIENT
Start: 2025-07-03

## 2025-07-03 RX ORDER — BUSPIRONE HYDROCHLORIDE 5 MG/1
5 TABLET ORAL 3 TIMES DAILY
Qty: 90 TABLET | Refills: 1 | Status: SHIPPED | OUTPATIENT
Start: 2025-07-03

## 2025-07-08 ENCOUNTER — OFFICE VISIT (OUTPATIENT)
Dept: CARDIOLOGY | Facility: CLINIC | Age: 70
End: 2025-07-08
Payer: MEDICARE

## 2025-07-08 VITALS
WEIGHT: 162.4 LBS | SYSTOLIC BLOOD PRESSURE: 130 MMHG | BODY MASS INDEX: 31.88 KG/M2 | DIASTOLIC BLOOD PRESSURE: 70 MMHG | OXYGEN SATURATION: 96 % | HEIGHT: 60 IN | HEART RATE: 67 BPM

## 2025-07-08 DIAGNOSIS — I77.9 CAROTID ARTERY DISEASE, UNSPECIFIED LATERALITY, UNSPECIFIED TYPE: ICD-10-CM

## 2025-07-08 DIAGNOSIS — I10 ESSENTIAL HYPERTENSION: ICD-10-CM

## 2025-07-08 DIAGNOSIS — E78.5 HYPERLIPIDEMIA LDL GOAL <70: ICD-10-CM

## 2025-07-08 DIAGNOSIS — I25.119 CORONARY ARTERY DISEASE INVOLVING NATIVE CORONARY ARTERY OF NATIVE HEART WITH ANGINA PECTORIS: Primary | ICD-10-CM

## 2025-07-08 DIAGNOSIS — F17.200 CURRENT EVERY DAY SMOKER: ICD-10-CM

## 2025-07-08 PROCEDURE — 3075F SYST BP GE 130 - 139MM HG: CPT | Performed by: NURSE PRACTITIONER

## 2025-07-08 PROCEDURE — 1160F RVW MEDS BY RX/DR IN RCRD: CPT | Performed by: NURSE PRACTITIONER

## 2025-07-08 PROCEDURE — 3078F DIAST BP <80 MM HG: CPT | Performed by: NURSE PRACTITIONER

## 2025-07-08 PROCEDURE — 1159F MED LIST DOCD IN RCRD: CPT | Performed by: NURSE PRACTITIONER

## 2025-07-08 PROCEDURE — 99214 OFFICE O/P EST MOD 30 MIN: CPT | Performed by: NURSE PRACTITIONER

## 2025-07-08 RX ORDER — NITROGLYCERIN 0.4 MG/1
0.8 TABLET SUBLINGUAL
OUTPATIENT
Start: 2025-07-08

## 2025-07-08 RX ORDER — METOPROLOL TARTRATE 25 MG/1
150 TABLET, FILM COATED ORAL ONCE
OUTPATIENT
Start: 2025-07-08

## 2025-07-08 RX ORDER — SODIUM CHLORIDE 0.9 % (FLUSH) 0.9 %
10 SYRINGE (ML) INJECTION EVERY 12 HOURS SCHEDULED
OUTPATIENT
Start: 2025-07-08

## 2025-07-08 RX ORDER — METOPROLOL TARTRATE 1 MG/ML
5 INJECTION, SOLUTION INTRAVENOUS
OUTPATIENT
Start: 2025-07-08

## 2025-07-08 RX ORDER — METOPROLOL TARTRATE 25 MG/1
200 TABLET, FILM COATED ORAL ONCE
OUTPATIENT
Start: 2025-07-08 | End: 2025-07-08

## 2025-07-08 RX ORDER — METOPROLOL TARTRATE 25 MG/1
50 TABLET, FILM COATED ORAL ONCE
OUTPATIENT
Start: 2025-07-08

## 2025-07-08 RX ORDER — IVABRADINE 5 MG/1
15 TABLET, FILM COATED ORAL ONCE
OUTPATIENT
Start: 2025-07-08 | End: 2025-07-08

## 2025-07-08 RX ORDER — METOPROLOL TARTRATE 25 MG/1
100 TABLET, FILM COATED ORAL ONCE
OUTPATIENT
Start: 2025-07-08

## 2025-07-08 RX ORDER — NITROGLYCERIN 0.4 MG/1
0.4 TABLET SUBLINGUAL
OUTPATIENT
Start: 2025-07-08 | End: 2025-07-08

## 2025-07-08 RX ORDER — SODIUM CHLORIDE 0.9 % (FLUSH) 0.9 %
10 SYRINGE (ML) INJECTION AS NEEDED
OUTPATIENT
Start: 2025-07-08

## 2025-07-08 RX ORDER — SODIUM CHLORIDE 9 MG/ML
40 INJECTION, SOLUTION INTRAVENOUS AS NEEDED
OUTPATIENT
Start: 2025-07-08

## 2025-07-08 RX ORDER — METOPROLOL TARTRATE 50 MG
50 TABLET ORAL
OUTPATIENT
Start: 2025-07-08

## 2025-07-08 NOTE — ASSESSMENT & PLAN NOTE
Hypertension is controlled  Continue losartan 100 mg daily  Continue metoprolol tartrate 50 mg twice daily  Continue clonidine 0.1 mg twice daily as needed for BP greater than 160/100

## 2025-07-08 NOTE — ASSESSMENT & PLAN NOTE
Patient having recurrent upper epigastric discomfort  Obtain CT coronary angiogram.  Double up on home dose of metoprolol night before test and 1 hour before.  (Two 50 mg tablets evening before and two 50 mg tablets an hour before test)  Continue metoprolol tartrate 50 mg twice daily  Continue Plavix 75 mg daily  Continue isosorbide 30 mg daily   if CT coronary angiogram does not show obstructive CAD would recommend follow-up with GI for upper and lower endoscopy

## 2025-07-13 DIAGNOSIS — I25.119 CORONARY ARTERY DISEASE INVOLVING NATIVE CORONARY ARTERY OF NATIVE HEART WITH ANGINA PECTORIS: ICD-10-CM

## 2025-07-14 RX ORDER — NITROGLYCERIN 0.4 MG/1
0.4 TABLET SUBLINGUAL
Qty: 25 TABLET | Refills: 1 | Status: SHIPPED | OUTPATIENT
Start: 2025-07-14

## 2025-07-18 DIAGNOSIS — K21.9 GASTROESOPHAGEAL REFLUX DISEASE, UNSPECIFIED WHETHER ESOPHAGITIS PRESENT: ICD-10-CM

## 2025-07-21 RX ORDER — SUCRALFATE 1 G/1
TABLET ORAL
Qty: 90 TABLET | Refills: 2 | Status: SHIPPED | OUTPATIENT
Start: 2025-07-21

## 2025-08-15 ENCOUNTER — OFFICE VISIT (OUTPATIENT)
Dept: INTERNAL MEDICINE | Facility: CLINIC | Age: 70
End: 2025-08-15
Payer: MEDICARE

## 2025-08-15 VITALS
OXYGEN SATURATION: 93 % | DIASTOLIC BLOOD PRESSURE: 78 MMHG | WEIGHT: 160.4 LBS | SYSTOLIC BLOOD PRESSURE: 130 MMHG | TEMPERATURE: 98.7 F | HEART RATE: 78 BPM | RESPIRATION RATE: 18 BRPM | BODY MASS INDEX: 31.33 KG/M2

## 2025-08-15 DIAGNOSIS — Z72.0 TOBACCO USE: ICD-10-CM

## 2025-08-15 DIAGNOSIS — J40 BRONCHITIS: Primary | ICD-10-CM

## 2025-08-15 LAB
EXPIRATION DATE: NORMAL
FLUAV AG UPPER RESP QL IA.RAPID: NOT DETECTED
FLUBV AG UPPER RESP QL IA.RAPID: NOT DETECTED
INTERNAL CONTROL: NORMAL
Lab: NORMAL
SARS-COV-2 AG UPPER RESP QL IA.RAPID: NOT DETECTED

## 2025-08-15 RX ORDER — BENZONATATE 100 MG/1
100 CAPSULE ORAL 3 TIMES DAILY PRN
Qty: 30 CAPSULE | Refills: 0 | Status: SHIPPED | OUTPATIENT
Start: 2025-08-15 | End: 2025-08-25

## 2025-08-15 RX ORDER — PREDNISONE 20 MG/1
40 TABLET ORAL DAILY
Qty: 10 TABLET | Refills: 0 | Status: SHIPPED | OUTPATIENT
Start: 2025-08-15 | End: 2025-08-20

## 2025-08-18 ENCOUNTER — HOSPITAL ENCOUNTER (OUTPATIENT)
Dept: CT IMAGING | Facility: HOSPITAL | Age: 70
Discharge: HOME | End: 2025-08-18
Payer: MEDICARE

## 2025-08-22 ENCOUNTER — TELEPHONE (OUTPATIENT)
Dept: INTERNAL MEDICINE | Facility: CLINIC | Age: 70
End: 2025-08-22
Payer: MEDICARE

## 2025-08-22 ENCOUNTER — TELEPHONE (OUTPATIENT)
Dept: CARDIOLOGY | Facility: CLINIC | Age: 70
End: 2025-08-22
Payer: MEDICARE

## 2025-08-22 DIAGNOSIS — J40 BRONCHITIS: Primary | ICD-10-CM

## 2025-08-22 RX ORDER — AZITHROMYCIN 250 MG/1
TABLET, FILM COATED ORAL
Qty: 6 TABLET | Refills: 0 | Status: SHIPPED | OUTPATIENT
Start: 2025-08-22

## 2025-08-25 ENCOUNTER — TELEPHONE (OUTPATIENT)
Dept: INFUSION THERAPY | Facility: HOSPITAL | Age: 70
End: 2025-08-25
Payer: MEDICARE

## 2025-08-26 ENCOUNTER — HOSPITAL ENCOUNTER (OUTPATIENT)
Dept: CT IMAGING | Facility: HOSPITAL | Age: 70
Discharge: HOME | End: 2025-08-26
Payer: MEDICARE

## 2025-08-26 VITALS
HEIGHT: 60 IN | WEIGHT: 159 LBS | HEART RATE: 56 BPM | TEMPERATURE: 97.2 F | BODY MASS INDEX: 31.22 KG/M2 | DIASTOLIC BLOOD PRESSURE: 69 MMHG | SYSTOLIC BLOOD PRESSURE: 118 MMHG | OXYGEN SATURATION: 97 % | RESPIRATION RATE: 18 BRPM

## 2025-08-26 DIAGNOSIS — I25.119 CORONARY ARTERY DISEASE INVOLVING NATIVE CORONARY ARTERY OF NATIVE HEART WITH ANGINA PECTORIS: ICD-10-CM

## 2025-08-26 PROCEDURE — 75574 CT ANGIO HRT W/3D IMAGE: CPT

## 2025-08-26 PROCEDURE — 25510000001 IOPAMIDOL PER 1 ML: Performed by: NURSE PRACTITIONER

## 2025-08-26 RX ORDER — SODIUM CHLORIDE 0.9 % (FLUSH) 0.9 %
10 SYRINGE (ML) INJECTION EVERY 12 HOURS SCHEDULED
Status: DISCONTINUED | OUTPATIENT
Start: 2025-08-26 | End: 2025-08-27 | Stop reason: HOSPADM

## 2025-08-26 RX ORDER — METOPROLOL TARTRATE 100 MG/1
200 TABLET ORAL ONCE
Status: DISCONTINUED | OUTPATIENT
Start: 2025-08-26 | End: 2025-08-27 | Stop reason: HOSPADM

## 2025-08-26 RX ORDER — IVABRADINE 5 MG/1
15 TABLET, FILM COATED ORAL ONCE
Status: DISCONTINUED | OUTPATIENT
Start: 2025-08-26 | End: 2025-08-27 | Stop reason: HOSPADM

## 2025-08-26 RX ORDER — METOPROLOL TARTRATE 50 MG
50 TABLET ORAL
Status: DISCONTINUED | OUTPATIENT
Start: 2025-08-26 | End: 2025-08-27 | Stop reason: HOSPADM

## 2025-08-26 RX ORDER — METOPROLOL TARTRATE 1 MG/ML
5 INJECTION, SOLUTION INTRAVENOUS
Status: DISCONTINUED | OUTPATIENT
Start: 2025-08-26 | End: 2025-08-27 | Stop reason: HOSPADM

## 2025-08-26 RX ORDER — SODIUM CHLORIDE 9 MG/ML
40 INJECTION, SOLUTION INTRAVENOUS AS NEEDED
Status: DISCONTINUED | OUTPATIENT
Start: 2025-08-26 | End: 2025-08-27 | Stop reason: HOSPADM

## 2025-08-26 RX ORDER — METOPROLOL TARTRATE 100 MG/1
100 TABLET ORAL ONCE
Status: DISCONTINUED | OUTPATIENT
Start: 2025-08-26 | End: 2025-08-27 | Stop reason: HOSPADM

## 2025-08-26 RX ORDER — METOPROLOL TARTRATE 50 MG
50 TABLET ORAL ONCE
Status: DISCONTINUED | OUTPATIENT
Start: 2025-08-26 | End: 2025-08-27 | Stop reason: HOSPADM

## 2025-08-26 RX ORDER — NITROGLYCERIN 0.4 MG/1
0.4 TABLET SUBLINGUAL
Status: COMPLETED | OUTPATIENT
Start: 2025-08-26 | End: 2025-08-26

## 2025-08-26 RX ORDER — NITROGLYCERIN 0.4 MG/1
0.8 TABLET SUBLINGUAL
Status: COMPLETED | OUTPATIENT
Start: 2025-08-26 | End: 2025-08-26

## 2025-08-26 RX ORDER — IOPAMIDOL 755 MG/ML
100 INJECTION, SOLUTION INTRAVASCULAR
Status: COMPLETED | OUTPATIENT
Start: 2025-08-26 | End: 2025-08-26

## 2025-08-26 RX ORDER — SODIUM CHLORIDE 0.9 % (FLUSH) 0.9 %
10 SYRINGE (ML) INJECTION AS NEEDED
Status: DISCONTINUED | OUTPATIENT
Start: 2025-08-26 | End: 2025-08-27 | Stop reason: HOSPADM

## 2025-08-26 RX ADMIN — IOPAMIDOL 65 ML: 755 INJECTION, SOLUTION INTRAVENOUS at 11:06

## 2025-08-26 RX ADMIN — NITROGLYCERIN 0.8 MG: 0.4 TABLET SUBLINGUAL at 10:59

## 2025-08-26 RX ADMIN — Medication 10 ML: at 11:00

## 2025-08-28 ENCOUNTER — TELEPHONE (OUTPATIENT)
Dept: INTERNAL MEDICINE | Facility: CLINIC | Age: 70
End: 2025-08-28
Payer: MEDICARE

## 2025-08-29 ENCOUNTER — TELEPHONE (OUTPATIENT)
Dept: GASTROENTEROLOGY | Facility: CLINIC | Age: 70
End: 2025-08-29
Payer: MEDICARE

## 2025-08-29 ENCOUNTER — PREP FOR SURGERY (OUTPATIENT)
Dept: OTHER | Facility: HOSPITAL | Age: 70
End: 2025-08-29
Payer: MEDICARE

## 2025-08-29 ENCOUNTER — DOCUMENTATION (OUTPATIENT)
Dept: CARDIOLOGY | Facility: CLINIC | Age: 70
End: 2025-08-29
Payer: MEDICARE

## 2025-08-29 DIAGNOSIS — I25.119 CORONARY ARTERY DISEASE INVOLVING NATIVE CORONARY ARTERY OF NATIVE HEART WITH ANGINA PECTORIS: Primary | ICD-10-CM

## 2025-08-29 DIAGNOSIS — I25.10 CORONARY ARTERY DISEASE: Primary | ICD-10-CM

## 2025-08-29 RX ORDER — SODIUM, POTASSIUM,MAG SULFATES 17.5-3.13G
SOLUTION, RECONSTITUTED, ORAL ORAL
Qty: 354 ML | Refills: 0 | Status: SHIPPED | OUTPATIENT
Start: 2025-08-29

## 2025-08-29 RX ORDER — SODIUM CHLORIDE 9 MG/ML
40 INJECTION, SOLUTION INTRAVENOUS AS NEEDED
OUTPATIENT
Start: 2025-08-29

## 2025-08-29 RX ORDER — ASPIRIN 81 MG/1
81 TABLET ORAL DAILY
OUTPATIENT
Start: 2025-08-30

## 2025-08-29 RX ORDER — SODIUM CHLORIDE 0.9 % (FLUSH) 0.9 %
10 SYRINGE (ML) INJECTION AS NEEDED
OUTPATIENT
Start: 2025-08-29

## 2025-08-29 RX ORDER — SODIUM CHLORIDE 0.9 % (FLUSH) 0.9 %
10 SYRINGE (ML) INJECTION EVERY 12 HOURS SCHEDULED
OUTPATIENT
Start: 2025-08-29

## 2025-08-29 RX ORDER — ACETAMINOPHEN 325 MG/1
650 TABLET ORAL EVERY 4 HOURS PRN
OUTPATIENT
Start: 2025-08-29

## 2025-08-29 RX ORDER — ASPIRIN 81 MG/1
324 TABLET, CHEWABLE ORAL ONCE
OUTPATIENT
Start: 2025-08-29 | End: 2025-08-29

## 2025-08-29 RX ORDER — NITROGLYCERIN 0.4 MG/1
0.4 TABLET SUBLINGUAL
OUTPATIENT
Start: 2025-08-29